# Patient Record
Sex: MALE | Race: WHITE | Employment: OTHER | URBAN - METROPOLITAN AREA
[De-identification: names, ages, dates, MRNs, and addresses within clinical notes are randomized per-mention and may not be internally consistent; named-entity substitution may affect disease eponyms.]

---

## 2019-04-11 ENCOUNTER — HOSPITAL ENCOUNTER (OUTPATIENT)
Dept: CT IMAGING | Age: 74
Discharge: HOME | End: 2019-04-11
Attending: FAMILY MEDICINE
Payer: MEDICARE

## 2019-04-11 DIAGNOSIS — S09.90XA CLOSED HEAD INJURY, INITIAL ENCOUNTER: ICD-10-CM

## 2019-04-11 DIAGNOSIS — R42 DIZZINESS: ICD-10-CM

## 2019-04-11 PROCEDURE — 70450 CT HEAD/BRAIN W/O DYE: CPT

## 2019-08-01 ENCOUNTER — HOSPITAL ENCOUNTER (INPATIENT)
Age: 74
LOS: 3 days | Discharge: HOME | DRG: 641 | End: 2019-08-04
Attending: EMERGENCY MEDICINE | Admitting: FAMILY MEDICINE
Payer: MEDICARE

## 2019-08-01 DIAGNOSIS — E87.1 HYPONATREMIA: Primary | ICD-10-CM

## 2019-08-01 PROBLEM — N17.9 AKI (ACUTE KIDNEY INJURY) (HCC): Status: ACTIVE | Noted: 2019-08-01

## 2019-08-01 LAB
ALBUMIN SERPL-MCNC: 3.4 G/DL (ref 3.5–5)
ALBUMIN SERPL-MCNC: 3.7 G/DL (ref 3.5–5)
ALBUMIN/GLOB SERPL: 1 {RATIO} (ref 1.1–2.2)
ALP SERPL-CCNC: 78 U/L (ref 45–117)
ALT SERPL-CCNC: 38 U/L (ref 12–78)
ANION GAP SERPL CALC-SCNC: 8 MMOL/L (ref 5–15)
ANION GAP SERPL CALC-SCNC: 8 MMOL/L (ref 5–15)
APPEARANCE UR: CLEAR
AST SERPL-CCNC: 31 U/L (ref 15–37)
ATRIAL RATE: 68 BPM
BACTERIA URNS QL MICRO: NEGATIVE /HPF
BASOPHILS # BLD: 0 K/UL (ref 0–0.1)
BASOPHILS NFR BLD: 0 % (ref 0–1)
BILIRUB SERPL-MCNC: 1 MG/DL (ref 0.2–1)
BILIRUB UR QL: NEGATIVE
BUN SERPL-MCNC: 13 MG/DL (ref 6–20)
BUN SERPL-MCNC: 13 MG/DL (ref 6–20)
BUN/CREAT SERPL: 11 (ref 12–20)
BUN/CREAT SERPL: 13 (ref 12–20)
CALCIUM SERPL-MCNC: 8.2 MG/DL (ref 8.5–10.1)
CALCIUM SERPL-MCNC: 8.5 MG/DL (ref 8.5–10.1)
CALCULATED P AXIS, ECG09: 27 DEGREES
CALCULATED R AXIS, ECG10: -22 DEGREES
CALCULATED T AXIS, ECG11: 32 DEGREES
CHLORIDE SERPL-SCNC: 82 MMOL/L (ref 97–108)
CHLORIDE SERPL-SCNC: 86 MMOL/L (ref 97–108)
CO2 SERPL-SCNC: 26 MMOL/L (ref 21–32)
CO2 SERPL-SCNC: 27 MMOL/L (ref 21–32)
COLOR UR: NORMAL
COMMENT, HOLDF: NORMAL
CREAT SERPL-MCNC: 0.99 MG/DL (ref 0.7–1.3)
CREAT SERPL-MCNC: 1.23 MG/DL (ref 0.7–1.3)
DIAGNOSIS, 93000: NORMAL
DIFFERENTIAL METHOD BLD: ABNORMAL
EOSINOPHIL # BLD: 0.1 K/UL (ref 0–0.4)
EOSINOPHIL NFR BLD: 1 % (ref 0–7)
EPITH CASTS URNS QL MICRO: NORMAL /LPF
ERYTHROCYTE [DISTWIDTH] IN BLOOD BY AUTOMATED COUNT: 11.9 % (ref 11.5–14.5)
GLOBULIN SER CALC-MCNC: 3.6 G/DL (ref 2–4)
GLUCOSE SERPL-MCNC: 114 MG/DL (ref 65–100)
GLUCOSE SERPL-MCNC: 134 MG/DL (ref 65–100)
GLUCOSE UR STRIP.AUTO-MCNC: NEGATIVE MG/DL
HCT VFR BLD AUTO: 35.1 % (ref 36.6–50.3)
HGB BLD-MCNC: 12.5 G/DL (ref 12.1–17)
HGB UR QL STRIP: NEGATIVE
IMM GRANULOCYTES # BLD AUTO: 0.1 K/UL (ref 0–0.04)
IMM GRANULOCYTES NFR BLD AUTO: 1 % (ref 0–0.5)
KETONES UR QL STRIP.AUTO: NEGATIVE MG/DL
LEUKOCYTE ESTERASE UR QL STRIP.AUTO: NEGATIVE
LYMPHOCYTES # BLD: 1.2 K/UL (ref 0.8–3.5)
LYMPHOCYTES NFR BLD: 13 % (ref 12–49)
MAGNESIUM SERPL-MCNC: 1.9 MG/DL (ref 1.6–2.4)
MCH RBC QN AUTO: 31.2 PG (ref 26–34)
MCHC RBC AUTO-ENTMCNC: 35.6 G/DL (ref 30–36.5)
MCV RBC AUTO: 87.5 FL (ref 80–99)
MONOCYTES # BLD: 0.8 K/UL (ref 0–1)
MONOCYTES NFR BLD: 9 % (ref 5–13)
NEUTS SEG # BLD: 7.3 K/UL (ref 1.8–8)
NEUTS SEG NFR BLD: 76 % (ref 32–75)
NITRITE UR QL STRIP.AUTO: NEGATIVE
NRBC # BLD: 0 K/UL (ref 0–0.01)
NRBC BLD-RTO: 0 PER 100 WBC
OSMOLALITY UR: 230 MOSM/KG H2O
P-R INTERVAL, ECG05: 188 MS
PH UR STRIP: 7 [PH] (ref 5–8)
PHOSPHATE SERPL-MCNC: 2.6 MG/DL (ref 2.6–4.7)
PHOSPHATE SERPL-MCNC: 3.1 MG/DL (ref 2.6–4.7)
PLATELET # BLD AUTO: 194 K/UL (ref 150–400)
PMV BLD AUTO: 8.5 FL (ref 8.9–12.9)
POTASSIUM SERPL-SCNC: 3.3 MMOL/L (ref 3.5–5.1)
POTASSIUM SERPL-SCNC: 3.5 MMOL/L (ref 3.5–5.1)
PROT SERPL-MCNC: 7.3 G/DL (ref 6.4–8.2)
PROT UR STRIP-MCNC: NEGATIVE MG/DL
Q-T INTERVAL, ECG07: 438 MS
QRS DURATION, ECG06: 100 MS
QTC CALCULATION (BEZET), ECG08: 465 MS
RBC # BLD AUTO: 4.01 M/UL (ref 4.1–5.7)
RBC #/AREA URNS HPF: NORMAL /HPF (ref 0–5)
SAMPLES BEING HELD,HOLD: NORMAL
SODIUM SERPL-SCNC: 116 MMOL/L (ref 136–145)
SODIUM SERPL-SCNC: 121 MMOL/L (ref 136–145)
SP GR UR REFRACTOMETRY: 1.01 (ref 1–1.03)
UROBILINOGEN UR QL STRIP.AUTO: 0.2 EU/DL (ref 0.2–1)
VENTRICULAR RATE, ECG03: 68 BPM
WBC # BLD AUTO: 9.5 K/UL (ref 4.1–11.1)
WBC URNS QL MICRO: NORMAL /HPF (ref 0–4)

## 2019-08-01 PROCEDURE — 74011250636 HC RX REV CODE- 250/636: Performed by: EMERGENCY MEDICINE

## 2019-08-01 PROCEDURE — 93005 ELECTROCARDIOGRAM TRACING: CPT

## 2019-08-01 PROCEDURE — 99284 EMERGENCY DEPT VISIT MOD MDM: CPT

## 2019-08-01 PROCEDURE — 85025 COMPLETE CBC W/AUTO DIFF WBC: CPT

## 2019-08-01 PROCEDURE — 83735 ASSAY OF MAGNESIUM: CPT

## 2019-08-01 PROCEDURE — 74011250636 HC RX REV CODE- 250/636: Performed by: FAMILY MEDICINE

## 2019-08-01 PROCEDURE — 96360 HYDRATION IV INFUSION INIT: CPT

## 2019-08-01 PROCEDURE — 74011250637 HC RX REV CODE- 250/637: Performed by: INTERNAL MEDICINE

## 2019-08-01 PROCEDURE — 65270000032 HC RM SEMIPRIVATE

## 2019-08-01 PROCEDURE — 84100 ASSAY OF PHOSPHORUS: CPT

## 2019-08-01 PROCEDURE — 80053 COMPREHEN METABOLIC PANEL: CPT

## 2019-08-01 PROCEDURE — 80069 RENAL FUNCTION PANEL: CPT

## 2019-08-01 PROCEDURE — 36415 COLL VENOUS BLD VENIPUNCTURE: CPT

## 2019-08-01 PROCEDURE — 81001 URINALYSIS AUTO W/SCOPE: CPT

## 2019-08-01 PROCEDURE — 83935 ASSAY OF URINE OSMOLALITY: CPT

## 2019-08-01 RX ORDER — ENOXAPARIN SODIUM 100 MG/ML
40 INJECTION SUBCUTANEOUS EVERY 24 HOURS
Status: DISCONTINUED | OUTPATIENT
Start: 2019-08-01 | End: 2019-08-04 | Stop reason: HOSPADM

## 2019-08-01 RX ORDER — LOSARTAN POTASSIUM 50 MG/1
100 TABLET ORAL DAILY
Status: DISCONTINUED | OUTPATIENT
Start: 2019-08-02 | End: 2019-08-04 | Stop reason: HOSPADM

## 2019-08-01 RX ORDER — SODIUM CHLORIDE 9 MG/ML
1000 INJECTION, SOLUTION INTRAVENOUS CONTINUOUS
Status: DISCONTINUED | OUTPATIENT
Start: 2019-08-01 | End: 2019-08-01

## 2019-08-01 RX ORDER — POTASSIUM CHLORIDE 750 MG/1
40 TABLET, FILM COATED, EXTENDED RELEASE ORAL
Status: COMPLETED | OUTPATIENT
Start: 2019-08-01 | End: 2019-08-01

## 2019-08-01 RX ORDER — SODIUM CHLORIDE 0.9 % (FLUSH) 0.9 %
5-40 SYRINGE (ML) INJECTION EVERY 8 HOURS
Status: DISCONTINUED | OUTPATIENT
Start: 2019-08-01 | End: 2019-08-04 | Stop reason: HOSPADM

## 2019-08-01 RX ORDER — SODIUM CHLORIDE 0.9 % (FLUSH) 0.9 %
5-40 SYRINGE (ML) INJECTION AS NEEDED
Status: DISCONTINUED | OUTPATIENT
Start: 2019-08-01 | End: 2019-08-04 | Stop reason: HOSPADM

## 2019-08-01 RX ADMIN — POTASSIUM CHLORIDE 40 MEQ: 750 TABLET, EXTENDED RELEASE ORAL at 19:14

## 2019-08-01 RX ADMIN — ENOXAPARIN SODIUM 40 MG: 40 INJECTION SUBCUTANEOUS at 19:15

## 2019-08-01 RX ADMIN — SODIUM CHLORIDE 1000 ML: 900 INJECTION, SOLUTION INTRAVENOUS at 12:09

## 2019-08-01 RX ADMIN — Medication 10 ML: at 22:00

## 2019-08-01 NOTE — ED TRIAGE NOTES
Pt presents after his PCP found his sodium to be low. Pt reports recently doubling his diuretic per his MD. Pt denies SOB, CP. Pt does report episodes of dizziness and lethargy that also makes him nauseas.  Pt states \"I have been peeing a lot\"
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
## 2019-08-01 NOTE — ROUTINE PROCESS
TRANSFER - OUT REPORT: 
 
Verbal report given to stephen(name) on Maty Pelaez  being transferred to (unit) for routine progression of care Report consisted of patients Situation, Background, Assessment and  
Recommendations(SBAR). Information from the following report(s) SBAR was reviewed with the receiving nurse. Lines:  
Peripheral IV 08/01/19 Left Antecubital (Active) Site Assessment Clean, dry, & intact 8/1/2019 11:21 AM  
Phlebitis Assessment 0 8/1/2019 11:21 AM  
Infiltration Assessment 0 8/1/2019 11:21 AM  
Dressing Status Clean, dry, & intact 8/1/2019 11:21 AM  
Dressing Type Transparent 8/1/2019 11:21 AM  
Hub Color/Line Status Patent; Flushed;Capped;Pink 8/1/2019 11:21 AM  
Action Taken Blood drawn 8/1/2019 11:21 AM  
  
 
Opportunity for questions and clarification was provided. Patient transported with: 
 [employer]

## 2019-08-02 LAB
ALBUMIN SERPL-MCNC: 3.5 G/DL (ref 3.5–5)
ALBUMIN SERPL-MCNC: 3.7 G/DL (ref 3.5–5)
ANION GAP SERPL CALC-SCNC: 8 MMOL/L (ref 5–15)
ANION GAP SERPL CALC-SCNC: 8 MMOL/L (ref 5–15)
BUN SERPL-MCNC: 14 MG/DL (ref 6–20)
BUN SERPL-MCNC: 19 MG/DL (ref 6–20)
BUN/CREAT SERPL: 13 (ref 12–20)
BUN/CREAT SERPL: 17 (ref 12–20)
CALCIUM SERPL-MCNC: 8.7 MG/DL (ref 8.5–10.1)
CALCIUM SERPL-MCNC: 8.8 MG/DL (ref 8.5–10.1)
CHLORIDE SERPL-SCNC: 89 MMOL/L (ref 97–108)
CHLORIDE SERPL-SCNC: 90 MMOL/L (ref 97–108)
CO2 SERPL-SCNC: 25 MMOL/L (ref 21–32)
CO2 SERPL-SCNC: 26 MMOL/L (ref 21–32)
CREAT SERPL-MCNC: 1.07 MG/DL (ref 0.7–1.3)
CREAT SERPL-MCNC: 1.11 MG/DL (ref 0.7–1.3)
GLUCOSE SERPL-MCNC: 101 MG/DL (ref 65–100)
GLUCOSE SERPL-MCNC: 118 MG/DL (ref 65–100)
PHOSPHATE SERPL-MCNC: 3 MG/DL (ref 2.6–4.7)
PHOSPHATE SERPL-MCNC: 3.2 MG/DL (ref 2.6–4.7)
POTASSIUM SERPL-SCNC: 3.9 MMOL/L (ref 3.5–5.1)
POTASSIUM SERPL-SCNC: 4 MMOL/L (ref 3.5–5.1)
SODIUM SERPL-SCNC: 122 MMOL/L (ref 136–145)
SODIUM SERPL-SCNC: 124 MMOL/L (ref 136–145)

## 2019-08-02 PROCEDURE — 74011250636 HC RX REV CODE- 250/636: Performed by: FAMILY MEDICINE

## 2019-08-02 PROCEDURE — 80069 RENAL FUNCTION PANEL: CPT

## 2019-08-02 PROCEDURE — 36415 COLL VENOUS BLD VENIPUNCTURE: CPT

## 2019-08-02 PROCEDURE — 74011250637 HC RX REV CODE- 250/637: Performed by: FAMILY MEDICINE

## 2019-08-02 PROCEDURE — 65270000032 HC RM SEMIPRIVATE

## 2019-08-02 RX ADMIN — Medication 10 ML: at 16:17

## 2019-08-02 RX ADMIN — Medication 10 ML: at 22:28

## 2019-08-02 RX ADMIN — ENOXAPARIN SODIUM 40 MG: 40 INJECTION SUBCUTANEOUS at 16:21

## 2019-08-02 RX ADMIN — Medication 10 ML: at 06:00

## 2019-08-02 RX ADMIN — LOSARTAN POTASSIUM 100 MG: 50 TABLET ORAL at 09:29

## 2019-08-02 NOTE — CDMP QUERY
Patient admitted with hyponatremia. Noted documentation of HATTIE in PN on 8/2/19. Please provide clinical indicators/treatment to support this diagnosis. The medical record reflects the following:   
   Risk Factors: losartan/hctz doubled a week ago for HTN control Clinical Indicators: Cr 0.87, 0.99, 1.23, 1.07; GFR 85, >60, 58, >60; excessive water intake; 
   Treatment: stop HCTZ, fluid restriction, Neph consult Thank you, Torey Schofield, RN, BSN, CCM Clinical  
[facility] 
[phone]

## 2019-08-02 NOTE — PROGRESS NOTES
Transition of Care Plan  1. Home with wife  2. Medical follow up      Reason for Admission:   Hyponatremia    (abnormal labs)      Medical hx includes. . Hypertension,   PCP [facility] [place]- he is following patient in the hospital                   RRAT Score:  5                   Plan for utilizing home health:    Not indicated at this time   Patient uses no DME                    Current Advanced Directive/Advance Care Plan: Not on file in chart                         Transition of Care Plan:     Home with wife, Makeda Counter 2008-1960 2 teenage daughters  and medical follow up       CM met with patient in his room to introduce self and explain role. Patient was alert and oriented and confirmed demographics, insurance -[address] He denies any concerns in securing medications. He has been  3 times-- he had no children with his first two children-- has two teenage daughters with present wife, Summer. He was was 64years old when had his first daughter. Patient is self care and active in the community. 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 [place] [employer] Went in [employer] and now is a . 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 Patient is self care and independent. Lives in a two level home in [facility]. Patient plans to return home when discharged. Wife will transport-- Cm to follow and assist with any needs that arise. Care Management Interventions  PCP Verified by CM: Yes  Mode of Transport at Discharge: (car with wife)  Transition of Care Consult (CM Consult): Discharge Planning  Discharge Durable Medical Equipment: No  Physical Therapy Consult: No  Occupational Therapy Consult: No  Speech Therapy Consult: No  Current Support Network: Lives with Spouse(lives with wife and two teenage daughters in own home.   self care and inependent prior to admission  No AMD in chart)  Confirm Follow Up Transport: (self)  Plan discussed with Pt/Family/Caregiver: Yes  Discharge Location  Discharge Placement: Home

## 2019-08-02 NOTE — PROGRESS NOTES
Na 121 per last blood draw. Per order from Dr. Richa Casas, RN paged on-call Nephrologist to notify. Spoke with Dr. Monty Zarate, who instructed to maintatin 1500mL fluid restriction and recheck sodium level at 4a.m. Will communicate to night shift as well.

## 2019-08-02 NOTE — PROGRESS NOTES
Bedside shift change report given to Gwen Del Valle RN (oncoming nurse) by Alexis Corley RN (offgoing nurse). Report included the following information SBAR and Kardex.

## 2019-08-02 NOTE — PROGRESS NOTES
Nancy Foy, Caryn Tobar, and Mati Cool Date: 8/1/2019      Subjective:     Patient feeling better today. Na up to 122. Renal functions improved. .       Current Facility-Administered Medications   Medication Dose Route Frequency    losartan (COZAAR) tablet 100 mg  100 mg Oral DAILY    sodium chloride (NS) flush 5-40 mL  5-40 mL IntraVENous Q8H    sodium chloride (NS) flush 5-40 mL  5-40 mL IntraVENous PRN    enoxaparin (LOVENOX) injection 40 mg  40 mg SubCUTAneous Q24H          Objective:     Patient Vitals for the past 8 hrs:   BP Temp Pulse Resp SpO2   08/01/19 2314 133/72 98.2 °F (36.8 °C) 66 18 98 %     No intake/output data recorded. 07/31 1901 - 08/02 0700  In: 240 [P.O.:240]  Out: -     Physical Exam: Lungs: clear to auscultation bilaterally  Heart: regular rate and rhythm, S1, S2 normal, no murmur, click, rub or gallop  Abdomen: soft, non-tender. Bowel sounds normal. No masses,  no organomegaly        Data Review   Recent Results (from the past 24 hour(s))   METABOLIC PANEL, COMPREHENSIVE    Collection Time: 08/01/19 11:18 AM   Result Value Ref Range    Sodium 116 (LL) 136 - 145 mmol/L    Potassium 3.5 3.5 - 5.1 mmol/L    Chloride 82 (L) 97 - 108 mmol/L    CO2 26 21 - 32 mmol/L    Anion gap 8 5 - 15 mmol/L    Glucose 114 (H) 65 - 100 mg/dL    BUN 13 6 - 20 MG/DL    Creatinine 0.99 0.70 - 1.30 MG/DL    BUN/Creatinine ratio 13 12 - 20      GFR est AA >60 >60 ml/min/1.73m2    GFR est non-AA >60 >60 ml/min/1.73m2    Calcium 8.5 8.5 - 10.1 MG/DL    Bilirubin, total 1.0 0.2 - 1.0 MG/DL    ALT (SGPT) 38 12 - 78 U/L    AST (SGOT) 31 15 - 37 U/L    Alk.  phosphatase 78 45 - 117 U/L    Protein, total 7.3 6.4 - 8.2 g/dL    Albumin 3.7 3.5 - 5.0 g/dL    Globulin 3.6 2.0 - 4.0 g/dL    A-G Ratio 1.0 (L) 1.1 - 2.2     CBC WITH AUTOMATED DIFF    Collection Time: 08/01/19 11:18 AM   Result Value Ref Range    WBC 9.5 4.1 - 11.1 K/uL    RBC 4.01 (L) 4.10 - 5.70 M/uL    HGB 12.5 12.1 - 17.0 g/dL    HCT 35.1 (L) 36.6 - 50.3 %    MCV 87.5 80.0 - 99.0 FL    MCH 31.2 26.0 - 34.0 PG    MCHC 35.6 30.0 - 36.5 g/dL    RDW 11.9 11.5 - 14.5 %    PLATELET 473 522 - 301 K/uL    MPV 8.5 (L) 8.9 - 12.9 FL    NRBC 0.0 0  WBC    ABSOLUTE NRBC 0.00 0.00 - 0.01 K/uL    NEUTROPHILS 76 (H) 32 - 75 %    LYMPHOCYTES 13 12 - 49 %    MONOCYTES 9 5 - 13 %    EOSINOPHILS 1 0 - 7 %    BASOPHILS 0 0 - 1 %    IMMATURE GRANULOCYTES 1 (H) 0.0 - 0.5 %    ABS. NEUTROPHILS 7.3 1.8 - 8.0 K/UL    ABS. LYMPHOCYTES 1.2 0.8 - 3.5 K/UL    ABS. MONOCYTES 0.8 0.0 - 1.0 K/UL    ABS. EOSINOPHILS 0.1 0.0 - 0.4 K/UL    ABS. BASOPHILS 0.0 0.0 - 0.1 K/UL    ABS. IMM. GRANS. 0.1 (H) 0.00 - 0.04 K/UL    DF AUTOMATED     SAMPLES BEING HELD    Collection Time: 08/01/19 11:18 AM   Result Value Ref Range    SAMPLES BEING HELD 1BLU 1RED     COMMENT        Add-on orders for these samples will be processed based on acceptable specimen integrity and analyte stability, which may vary by analyte.    MAGNESIUM    Collection Time: 08/01/19 11:18 AM   Result Value Ref Range    Magnesium 1.9 1.6 - 2.4 mg/dL   PHOSPHORUS    Collection Time: 08/01/19 11:18 AM   Result Value Ref Range    Phosphorus 3.1 2.6 - 4.7 MG/DL   EKG, 12 LEAD, INITIAL    Collection Time: 08/01/19 12:12 PM   Result Value Ref Range    Ventricular Rate 68 BPM    Atrial Rate 68 BPM    P-R Interval 188 ms    QRS Duration 100 ms    Q-T Interval 438 ms    QTC Calculation (Bezet) 465 ms    Calculated P Axis 27 degrees    Calculated R Axis -22 degrees    Calculated T Axis 32 degrees    Diagnosis       Normal sinus rhythm  Inferior-posterior infarct , age undetermined  No previous ECGs available  Confirmed by Philippe Lazcano MD, Jovan Membreno ([id number]) on 8/1/2019 2:16:24 PM     URINALYSIS W/MICROSCOPIC    Collection Time: 08/01/19  1:15 PM   Result Value Ref Range    Color YELLOW/STRAW      Appearance CLEAR CLEAR      Specific gravity 1.008 1.003 - 1.030      pH (UA) 7.0 5.0 - 8.0      Protein NEGATIVE  NEG mg/dL    Glucose NEGATIVE  NEG mg/dL    Ketone NEGATIVE  NEG mg/dL    Bilirubin NEGATIVE  NEG      Blood NEGATIVE  NEG      Urobilinogen 0.2 0.2 - 1.0 EU/dL    Nitrites NEGATIVE  NEG      Leukocyte Esterase NEGATIVE  NEG      WBC 0-4 0 - 4 /hpf    RBC 0-5 0 - 5 /hpf    Epithelial cells FEW FEW /lpf    Bacteria NEGATIVE  NEG /hpf   OSMOLALITY, UR    Collection Time: 08/01/19  1:15 PM   Result Value Ref Range    Osmolality,urine 230 MOSM/kg H2O   RENAL FUNCTION PANEL    Collection Time: 08/01/19  7:00 PM   Result Value Ref Range    Sodium 121 (L) 136 - 145 mmol/L    Potassium 3.3 (L) 3.5 - 5.1 mmol/L    Chloride 86 (L) 97 - 108 mmol/L    CO2 27 21 - 32 mmol/L    Anion gap 8 5 - 15 mmol/L    Glucose 134 (H) 65 - 100 mg/dL    BUN 13 6 - 20 MG/DL    Creatinine 1.23 0.70 - 1.30 MG/DL    BUN/Creatinine ratio 11 (L) 12 - 20      GFR est AA >60 >60 ml/min/1.73m2    GFR est non-AA 58 (L) >60 ml/min/1.73m2    Calcium 8.2 (L) 8.5 - 10.1 MG/DL    Phosphorus 2.6 2.6 - 4.7 MG/DL    Albumin 3.4 (L) 3.5 - 5.0 g/dL   RENAL FUNCTION PANEL    Collection Time: 08/02/19  4:10 AM   Result Value Ref Range    Sodium 122 (L) 136 - 145 mmol/L    Potassium 3.9 3.5 - 5.1 mmol/L    Chloride 89 (L) 97 - 108 mmol/L    CO2 25 21 - 32 mmol/L    Anion gap 8 5 - 15 mmol/L    Glucose 101 (H) 65 - 100 mg/dL    BUN 14 6 - 20 MG/DL    Creatinine 1.07 0.70 - 1.30 MG/DL    BUN/Creatinine ratio 13 12 - 20      GFR est AA >60 >60 ml/min/1.73m2    GFR est non-AA >60 >60 ml/min/1.73m2    Calcium 8.7 8.5 - 10.1 MG/DL    Phosphorus 3.2 2.6 - 4.7 MG/DL    Albumin 3.7 3.5 - 5.0 g/dL           Assessment:     Active Problems:    Hyponatremia (8/1/2019)      HATTIE (acute kidney injury) ([facility] [address] (8/1/2019)        Plan:     1) Cont fluid restriction  2) Correcting lytes  Appreciate nephrology hellp
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
## 2019-08-02 NOTE — PROGRESS NOTES
Nephrology Progress Note  Tarik Davies  Date of Admission : 8/1/2019    CC:  Follow up for hyponatremia       Assessment and Plan     Hyponatremia:  - likely 2/2 thiazide + excessive water intake  - Na improving nicely  - cont FR 1.5L/d  - repeat Na now and again in 6 hours  - goal Na 125    Hypokalemia:  - replete PRN     HTN:  - cont losartan  - should avoid thiazides from now on       Interval History:  Seen and examined. Na 122 this AM.  Feeling better. No cp, sob, n/v/d reported. Current Medications: all current  Medications have been eviewed in EPIC  Review of Systems: Pertinent items are noted in HPI. Objective:  Vitals:    Vitals:    08/01/19 1708 08/01/19 1959 08/01/19 2314 08/02/19 0859   BP: 132/71 137/73 133/72 129/66   Pulse: 90 72 66 79   Resp: 20 20 18 18   Temp: 98.3 °F (36.8 °C) 98.6 °F (37 °C) 98.2 °F (36.8 °C) 98.6 °F (37 °C)   SpO2: 95% 96% 98% 98%     Intake and Output:  No intake/output data recorded. 07/31 1901 - 08/02 0700  In: 240 [P.O.:240]  Out: 600 [Urine:600]    Physical Examination:  General: NAD,Conversant   Neck:  Supple, no mass  Resp:  Lungs CTA B/L, no wheezing , normal respiratory effort  CV:  RRR,  no murmur or rub, no LE edema  GI:  Soft, NT, + Bowel sounds, no hepatosplenomegaly  Neurologic:  Non focal  Psych:             AAO x 3 appropriate affect   Skin:  No Rash  :  No maldonado    []    High complexity decision making was performed  []    Patient is at high-risk of decompensation with multiple organ involvement    Lab Data Personally Reviewed: I have reviewed all the pertinent labs, microbiology data and radiology studies during assessment.     Recent Labs     08/02/19  0410 08/01/19  1900 08/01/19  1118   * 121* 116*   K 3.9 3.3* 3.5   CL 89* 86* 82*   CO2 25 27 26   * 134* 114*   BUN 14 13 13   CREA 1.07 1.23 0.99   CA 8.7 8.2* 8.5   MG  --   --  1.9   PHOS 3.2 2.6 3.1   ALB 3.7 3.4* 3.7   SGOT  --   --  31   ALT  --   --  38     Recent Labs 08/01/19  1118   WBC 9.5   HGB 12.5   HCT 35.1*        No results found for: SDES  No results found for: CULT  Recent Results (from the past 24 hour(s))   METABOLIC PANEL, COMPREHENSIVE    Collection Time: 08/01/19 11:18 AM   Result Value Ref Range    Sodium 116 (LL) 136 - 145 mmol/L    Potassium 3.5 3.5 - 5.1 mmol/L    Chloride 82 (L) 97 - 108 mmol/L    CO2 26 21 - 32 mmol/L    Anion gap 8 5 - 15 mmol/L    Glucose 114 (H) 65 - 100 mg/dL    BUN 13 6 - 20 MG/DL    Creatinine 0.99 0.70 - 1.30 MG/DL    BUN/Creatinine ratio 13 12 - 20      GFR est AA >60 >60 ml/min/1.73m2    GFR est non-AA >60 >60 ml/min/1.73m2    Calcium 8.5 8.5 - 10.1 MG/DL    Bilirubin, total 1.0 0.2 - 1.0 MG/DL    ALT (SGPT) 38 12 - 78 U/L    AST (SGOT) 31 15 - 37 U/L    Alk. phosphatase 78 45 - 117 U/L    Protein, total 7.3 6.4 - 8.2 g/dL    Albumin 3.7 3.5 - 5.0 g/dL    Globulin 3.6 2.0 - 4.0 g/dL    A-G Ratio 1.0 (L) 1.1 - 2.2     CBC WITH AUTOMATED DIFF    Collection Time: 08/01/19 11:18 AM   Result Value Ref Range    WBC 9.5 4.1 - 11.1 K/uL    RBC 4.01 (L) 4.10 - 5.70 M/uL    HGB 12.5 12.1 - 17.0 g/dL    HCT 35.1 (L) 36.6 - 50.3 %    MCV 87.5 80.0 - 99.0 FL    MCH 31.2 26.0 - 34.0 PG    MCHC 35.6 30.0 - 36.5 g/dL    RDW 11.9 11.5 - 14.5 %    PLATELET 275 242 - 549 K/uL    MPV 8.5 (L) 8.9 - 12.9 FL    NRBC 0.0 0  WBC    ABSOLUTE NRBC 0.00 0.00 - 0.01 K/uL    NEUTROPHILS 76 (H) 32 - 75 %    LYMPHOCYTES 13 12 - 49 %    MONOCYTES 9 5 - 13 %    EOSINOPHILS 1 0 - 7 %    BASOPHILS 0 0 - 1 %    IMMATURE GRANULOCYTES 1 (H) 0.0 - 0.5 %    ABS. NEUTROPHILS 7.3 1.8 - 8.0 K/UL    ABS. LYMPHOCYTES 1.2 0.8 - 3.5 K/UL    ABS. MONOCYTES 0.8 0.0 - 1.0 K/UL    ABS. EOSINOPHILS 0.1 0.0 - 0.4 K/UL    ABS. BASOPHILS 0.0 0.0 - 0.1 K/UL    ABS. IMM.  GRANS. 0.1 (H) 0.00 - 0.04 K/UL    DF AUTOMATED     SAMPLES BEING HELD    Collection Time: 08/01/19 11:18 AM   Result Value Ref Range    SAMPLES BEING HELD 1BLU 1RED     COMMENT        Add-on orders for these samples will be processed based on acceptable specimen integrity and analyte stability, which may vary by analyte.    MAGNESIUM    Collection Time: 08/01/19 11:18 AM   Result Value Ref Range    Magnesium 1.9 1.6 - 2.4 mg/dL   PHOSPHORUS    Collection Time: 08/01/19 11:18 AM   Result Value Ref Range    Phosphorus 3.1 2.6 - 4.7 MG/DL   EKG, 12 LEAD, INITIAL    Collection Time: 08/01/19 12:12 PM   Result Value Ref Range    Ventricular Rate 68 BPM    Atrial Rate 68 BPM    P-R Interval 188 ms    QRS Duration 100 ms    Q-T Interval 438 ms    QTC Calculation (Bezet) 465 ms    Calculated P Axis 27 degrees    Calculated R Axis -22 degrees    Calculated T Axis 32 degrees    Diagnosis       Normal sinus rhythm  Inferior-posterior infarct , age undetermined  No previous ECGs available  Confirmed by Reid Centeno MD, Faiza Finley ([id number]) on 8/1/2019 2:16:24 PM     URINALYSIS W/MICROSCOPIC    Collection Time: 08/01/19  1:15 PM   Result Value Ref Range    Color YELLOW/STRAW      Appearance CLEAR CLEAR      Specific gravity 1.008 1.003 - 1.030      pH (UA) 7.0 5.0 - 8.0      Protein NEGATIVE  NEG mg/dL    Glucose NEGATIVE  NEG mg/dL    Ketone NEGATIVE  NEG mg/dL    Bilirubin NEGATIVE  NEG      Blood NEGATIVE  NEG      Urobilinogen 0.2 0.2 - 1.0 EU/dL    Nitrites NEGATIVE  NEG      Leukocyte Esterase NEGATIVE  NEG      WBC 0-4 0 - 4 /hpf    RBC 0-5 0 - 5 /hpf    Epithelial cells FEW FEW /lpf    Bacteria NEGATIVE  NEG /hpf   OSMOLALITY, UR    Collection Time: 08/01/19  1:15 PM   Result Value Ref Range    Osmolality,urine 230 MOSM/kg H2O   RENAL FUNCTION PANEL    Collection Time: 08/01/19  7:00 PM   Result Value Ref Range    Sodium 121 (L) 136 - 145 mmol/L    Potassium 3.3 (L) 3.5 - 5.1 mmol/L    Chloride 86 (L) 97 - 108 mmol/L    CO2 27 21 - 32 mmol/L    Anion gap 8 5 - 15 mmol/L    Glucose 134 (H) 65 - 100 mg/dL    BUN 13 6 - 20 MG/DL    Creatinine 1.23 0.70 - 1.30 MG/DL    BUN/Creatinine ratio 11 (L) 12 - 20      GFR est AA >60 >60 ml/min/1.73m2 GFR est non-AA 58 (L) >60 ml/min/1.73m2    Calcium 8.2 (L) 8.5 - 10.1 MG/DL    Phosphorus 2.6 2.6 - 4.7 MG/DL    Albumin 3.4 (L) 3.5 - 5.0 g/dL   RENAL FUNCTION PANEL    Collection Time: 08/02/19  4:10 AM   Result Value Ref Range    Sodium 122 (L) 136 - 145 mmol/L    Potassium 3.9 3.5 - 5.1 mmol/L    Chloride 89 (L) 97 - 108 mmol/L    CO2 25 21 - 32 mmol/L    Anion gap 8 5 - 15 mmol/L    Glucose 101 (H) 65 - 100 mg/dL    BUN 14 6 - 20 MG/DL    Creatinine 1.07 0.70 - 1.30 MG/DL    BUN/Creatinine ratio 13 12 - 20      GFR est AA >60 >60 ml/min/1.73m2    GFR est non-AA >60 >60 ml/min/1.73m2    Calcium 8.7 8.5 - 10.1 MG/DL    Phosphorus 3.2 2.6 - 4.7 MG/DL    Albumin 3.7 3.5 - 5.0 g/dL                 Jhoana Valentino MD  [address]  Phone - [phone]   Fax - [phone]  [email or web address]

## 2019-08-03 LAB
ANION GAP SERPL CALC-SCNC: 9 MMOL/L (ref 5–15)
BUN SERPL-MCNC: 18 MG/DL (ref 6–20)
BUN/CREAT SERPL: 18 (ref 12–20)
CALCIUM SERPL-MCNC: 8.7 MG/DL (ref 8.5–10.1)
CHLORIDE SERPL-SCNC: 93 MMOL/L (ref 97–108)
CO2 SERPL-SCNC: 25 MMOL/L (ref 21–32)
CREAT SERPL-MCNC: 1.01 MG/DL (ref 0.7–1.3)
GLUCOSE SERPL-MCNC: 98 MG/DL (ref 65–100)
POTASSIUM SERPL-SCNC: 3.8 MMOL/L (ref 3.5–5.1)
SODIUM SERPL-SCNC: 127 MMOL/L (ref 136–145)

## 2019-08-03 PROCEDURE — 80048 BASIC METABOLIC PNL TOTAL CA: CPT

## 2019-08-03 PROCEDURE — 65270000032 HC RM SEMIPRIVATE

## 2019-08-03 PROCEDURE — 36415 COLL VENOUS BLD VENIPUNCTURE: CPT

## 2019-08-03 PROCEDURE — 74011250636 HC RX REV CODE- 250/636: Performed by: FAMILY MEDICINE

## 2019-08-03 PROCEDURE — 74011250637 HC RX REV CODE- 250/637: Performed by: FAMILY MEDICINE

## 2019-08-03 RX ADMIN — ENOXAPARIN SODIUM 40 MG: 40 INJECTION SUBCUTANEOUS at 15:43

## 2019-08-03 RX ADMIN — Medication 10 ML: at 06:41

## 2019-08-03 RX ADMIN — LOSARTAN POTASSIUM 100 MG: 50 TABLET ORAL at 09:10

## 2019-08-03 RX ADMIN — Medication 10 ML: at 15:20

## 2019-08-03 NOTE — PROGRESS NOTES
Bedside shift change report given to [facility] and Devan Zambrano (oncoming nurse) by Mika and David Mckeon (offgoing nurse). Report included the following information SBAR, Kardex, Procedure Summary, Intake/Output, MAR and Recent Results.

## 2019-08-03 NOTE — PROGRESS NOTES
Nery Marcus, Caryn Caldwell & Krystal    Admit Date: 8/1/2019    Subjective:     Pt says he is feeling better, but not quite well enough to go home yet. Na+ 127 today. No new complaints. Current Facility-Administered Medications   Medication Dose Route Frequency    losartan (COZAAR) tablet 100 mg  100 mg Oral DAILY    sodium chloride (NS) flush 5-40 mL  5-40 mL IntraVENous Q8H    sodium chloride (NS) flush 5-40 mL  5-40 mL IntraVENous PRN    enoxaparin (LOVENOX) injection 40 mg  40 mg SubCUTAneous Q24H          Objective:     Patient Vitals for the past 8 hrs:   BP Temp Pulse Resp SpO2   08/03/19 0813 138/72 98.7 °F (37.1 °C) 64 16 93 %     08/03 0701 - 08/03 1900  In: 118 [P.O.:118]  Out: -   08/01 1901 - 08/03 0700  In: 720 [P.O.:720]  Out: 2100 [Urine:2100]    Physical Exam: NAD. A&O. Neck -- Supple. No JVD. Heart -- RRR. Lungs -- CTA. Abd -- Benign. Ext -- No LE edema, b/l.       Data Review   Recent Results (from the past 24 hour(s))   RENAL FUNCTION PANEL    Collection Time: 08/02/19  7:30 PM   Result Value Ref Range    Sodium 124 (L) 136 - 145 mmol/L    Potassium 4.0 3.5 - 5.1 mmol/L    Chloride 90 (L) 97 - 108 mmol/L    CO2 26 21 - 32 mmol/L    Anion gap 8 5 - 15 mmol/L    Glucose 118 (H) 65 - 100 mg/dL    BUN 19 6 - 20 MG/DL    Creatinine 1.11 0.70 - 1.30 MG/DL    BUN/Creatinine ratio 17 12 - 20      GFR est AA >60 >60 ml/min/1.73m2    GFR est non-AA >60 >60 ml/min/1.73m2    Calcium 8.8 8.5 - 10.1 MG/DL    Phosphorus 3.0 2.6 - 4.7 MG/DL    Albumin 3.5 3.5 - 5.0 g/dL   METABOLIC PANEL, BASIC    Collection Time: 08/03/19  4:25 AM   Result Value Ref Range    Sodium 127 (L) 136 - 145 mmol/L    Potassium 3.8 3.5 - 5.1 mmol/L    Chloride 93 (L) 97 - 108 mmol/L    CO2 25 21 - 32 mmol/L    Anion gap 9 5 - 15 mmol/L    Glucose 98 65 - 100 mg/dL    BUN 18 6 - 20 MG/DL    Creatinine 1.01 0.70 - 1.30 MG/DL    BUN/Creatinine ratio 18 12 - 20      GFR est AA >60 >60 ml/min/1.73m2    GFR est non-AA >60 >60 ml/min/1.73m2    Calcium 8.7 8.5 - 10.1 MG/DL           Assessment:     Principal Problem:    Hyponatremia -- Due to HCTZ & polydipsia. Active Problems:    Essential hypertension (11/21/2015)        Plan:     1. Cont Losartan without HCTZ and cont PO fluid restriction. 2. If Na+ OK tomorrow, and if pt continues to improve, will aim for discharge home tomorrow.           Katey Gold MD

## 2019-08-04 VITALS
RESPIRATION RATE: 15 BRPM | BODY MASS INDEX: 27.92 KG/M2 | TEMPERATURE: 97.8 F | SYSTOLIC BLOOD PRESSURE: 155 MMHG | OXYGEN SATURATION: 97 % | WEIGHT: 200.2 LBS | DIASTOLIC BLOOD PRESSURE: 79 MMHG | HEART RATE: 75 BPM

## 2019-08-04 LAB
ANION GAP SERPL CALC-SCNC: 7 MMOL/L (ref 5–15)
BUN SERPL-MCNC: 18 MG/DL (ref 6–20)
BUN/CREAT SERPL: 18 (ref 12–20)
CALCIUM SERPL-MCNC: 8.9 MG/DL (ref 8.5–10.1)
CHLORIDE SERPL-SCNC: 98 MMOL/L (ref 97–108)
CO2 SERPL-SCNC: 25 MMOL/L (ref 21–32)
CREAT SERPL-MCNC: 1.01 MG/DL (ref 0.7–1.3)
GLUCOSE SERPL-MCNC: 98 MG/DL (ref 65–100)
POTASSIUM SERPL-SCNC: 4.1 MMOL/L (ref 3.5–5.1)
SODIUM SERPL-SCNC: 130 MMOL/L (ref 136–145)

## 2019-08-04 PROCEDURE — 80048 BASIC METABOLIC PNL TOTAL CA: CPT

## 2019-08-04 PROCEDURE — 36415 COLL VENOUS BLD VENIPUNCTURE: CPT

## 2019-08-04 PROCEDURE — 74011250637 HC RX REV CODE- 250/637: Performed by: FAMILY MEDICINE

## 2019-08-04 RX ORDER — LOSARTAN POTASSIUM 100 MG/1
100 TABLET ORAL DAILY
Qty: 30 TAB | Refills: 2 | Status: SHIPPED | OUTPATIENT
Start: 2019-08-04 | End: 2020-03-18 | Stop reason: SDUPTHER

## 2019-08-04 RX ADMIN — LOSARTAN POTASSIUM 100 MG: 50 TABLET ORAL at 08:25

## 2019-08-04 NOTE — PROGRESS NOTES
Na+ 130  Pt feels well and ready to go home. Discharge home on Losartan 100 mg every day (no HCTZ). He is instructed on 1,500 cc PO fluid restriction. F/u with Dr. Melissa Carreon this week for f/u.

## 2019-08-04 NOTE — DISCHARGE INSTRUCTIONS
Patient Discharge Instructions    Maty Pelaez / [id number] : 1945    Admitted 2019 Discharged: 2019     Take Home Medications       · It is important that you take the medication exactly as they are prescribed. · Keep your medication in the bottles provided by the pharmacist and keep a list of the medication names, dosages, and times to be taken in your wallet. · Do not take other medications without consulting your doctor. What to do at Home    Recommended diet: Regular Diet. Fluid restrictions -- 1,500 ml per 24 hours. Recommended activity: Activity as tolerated. Follow-up with Dr. Michael Morel this week. Information obtained by :  I understand that if any problems occur once I am at home I am to contact my physician. I understand and acknowledge receipt of the instructions indicated above.                                                                                                                                            Physician's or R.N.'s Signature                                                                  Date/Time                                                                                                                                              Patient or Representative Signature                                                          Date/Time

## 2019-08-04 NOTE — PROGRESS NOTES
Bedside and Verbal shift change report given to [address] (oncoming nurse) by Chris Norris (offgoing nurse). Report included the following information SBAR.

## 2019-08-26 NOTE — DISCHARGE SUMMARY
Physician Discharge Summary     Patient ID:  Maty Pelaez  [id number]  75 y.o.  1945    Admit date: 8/1/2019    Discharge date and time: 8/26/2019    Admission Diagnoses: Hyponatremia [E87.1]    Discharge Diagnoses:  Principal Diagnosis Hyponatremia                                            Principal Problem:    Hyponatremia (8/1/2019)    Active Problems:    Essential hypertension (11/21/2015)      HATTIE (acute kidney injury) ([facility] [address] (8/1/2019)           Hospital Course: Admitted with Na- 116 associated with profound malaise and feeling aweful for the last few days. Had been on HCTZ and recently his dose was increased for BP control. No CP, SOB, or altered MS. In hospital he HCTZ was stopped and was put on saline for 1 day. Then fluids were stopped and placed on free fluid restriction. He saw Nephrology. His BP was controlled with Losartan 100mg daily. His sodium improved to 130 and he felt much better. BP was adequately controlled. Discharged from hospital in much improved condition. PCP: Tong Alba    Consults: Nephrology        Discharge Exam:  Visit Vitals  /79 (BP 1 Location: Right arm, BP Patient Position: Sitting)   Pulse 75   Temp 97.8 °F (36.6 °C) Comment: Simultaneous filing. User may not have seen previous data. Resp 15   Wt 200 lb 3.2 oz (90.8 kg)   SpO2 97%   BMI 27.92 kg/m²     Neck: supple, symmetrical, trachea midline, no adenopathy, thyroid: not enlarged, symmetric, no tenderness/mass/nodules, no carotid bruit and no JVD  Lungs: clear to auscultation bilaterally  Heart: regular rate and rhythm, S1, S2 normal, no murmur, click, rub or gallop  Abdomen: soft, non-tender. Bowel sounds normal. No masses,  no organomegaly  Extremities: extremities normal, atraumatic, no cyanosis or edema  Neurologic: Grossly normal    Disposition: home    Patient Instructions:   Cannot display discharge medications since this patient is not currently admitted.     Activity: Activity as tolerated  Diet: Regular Diet and 1200 cc fluid restriction  Wound Care: None needed    Follow-up Appointments   Procedures    FOLLOW UP VISIT Appointment in: 3 - 5 Days With Dr. Case Zazueta. With Dr. Case Zazueta.      Standing Status:   Standing     Number of Occurrences:   1     Order Specific Question:   Appointment in     Answer:   3 - 5 Days          Signed:  Wen Solis MD  8/26/2019  10:05 AM

## 2020-12-11 ENCOUNTER — HOSPITAL ENCOUNTER (INPATIENT)
Age: 75
LOS: 33 days | Discharge: NURSING, REHAB OR LONG-TERM CARE | DRG: 896 | End: 2021-01-13
Attending: EMERGENCY MEDICINE | Admitting: INTERNAL MEDICINE
Payer: MEDICARE

## 2020-12-11 ENCOUNTER — APPOINTMENT (OUTPATIENT)
Dept: CT IMAGING | Age: 75
DRG: 896 | End: 2020-12-11
Attending: EMERGENCY MEDICINE
Payer: MEDICARE

## 2020-12-11 ENCOUNTER — APPOINTMENT (OUTPATIENT)
Dept: MRI IMAGING | Age: 75
DRG: 896 | End: 2020-12-11
Attending: NURSE PRACTITIONER
Payer: MEDICARE

## 2020-12-11 ENCOUNTER — APPOINTMENT (OUTPATIENT)
Dept: CT IMAGING | Age: 75
DRG: 896 | End: 2020-12-11
Attending: NURSE PRACTITIONER
Payer: MEDICARE

## 2020-12-11 DIAGNOSIS — E83.42 HYPOMAGNESEMIA: ICD-10-CM

## 2020-12-11 DIAGNOSIS — N39.0 URINARY TRACT INFECTION WITH HEMATURIA, SITE UNSPECIFIED: ICD-10-CM

## 2020-12-11 DIAGNOSIS — E87.6 HYPOKALEMIA: ICD-10-CM

## 2020-12-11 DIAGNOSIS — N17.9 AKI (ACUTE KIDNEY INJURY) (HCC): ICD-10-CM

## 2020-12-11 DIAGNOSIS — I47.29 NSVT (NONSUSTAINED VENTRICULAR TACHYCARDIA): ICD-10-CM

## 2020-12-11 DIAGNOSIS — R31.9 URINARY TRACT INFECTION WITH HEMATURIA, SITE UNSPECIFIED: ICD-10-CM

## 2020-12-11 DIAGNOSIS — F10.931 ALCOHOL WITHDRAWAL SYNDROME, WITH DELIRIUM (HCC): ICD-10-CM

## 2020-12-11 DIAGNOSIS — I10 ESSENTIAL HYPERTENSION: ICD-10-CM

## 2020-12-11 DIAGNOSIS — R26.81 UNSTEADY GAIT: ICD-10-CM

## 2020-12-11 DIAGNOSIS — R41.3 AMNESIA: Primary | ICD-10-CM

## 2020-12-11 PROBLEM — R41.82 AMS (ALTERED MENTAL STATUS): Status: ACTIVE | Noted: 2020-12-11

## 2020-12-11 PROBLEM — F10.10 EXCESSIVE DRINKING OF ALCOHOL: Status: ACTIVE | Noted: 2020-12-11

## 2020-12-11 LAB
ALBUMIN SERPL-MCNC: 3.3 G/DL (ref 3.5–5)
ALBUMIN/GLOB SERPL: 0.8 {RATIO} (ref 1.1–2.2)
ALP SERPL-CCNC: 84 U/L (ref 45–117)
ALT SERPL-CCNC: 76 U/L (ref 12–78)
AMMONIA PLAS-SCNC: <10 UMOL/L
ANION GAP SERPL CALC-SCNC: 9 MMOL/L (ref 5–15)
APPEARANCE UR: CLEAR
AST SERPL-CCNC: 148 U/L (ref 15–37)
ATRIAL RATE: 99 BPM
BACTERIA URNS QL MICRO: ABNORMAL /HPF
BASOPHILS # BLD: 0.1 K/UL (ref 0–0.1)
BASOPHILS NFR BLD: 2 % (ref 0–1)
BILIRUB SERPL-MCNC: 0.7 MG/DL (ref 0.2–1)
BILIRUB UR QL: NEGATIVE
BUN SERPL-MCNC: 8 MG/DL (ref 6–20)
BUN/CREAT SERPL: 8 (ref 12–20)
CALCIUM SERPL-MCNC: 7.9 MG/DL (ref 8.5–10.1)
CALCULATED P AXIS, ECG09: 39 DEGREES
CALCULATED R AXIS, ECG10: -6 DEGREES
CALCULATED T AXIS, ECG11: 42 DEGREES
CHLORIDE SERPL-SCNC: 105 MMOL/L (ref 97–108)
CO2 SERPL-SCNC: 28 MMOL/L (ref 21–32)
COLOR UR: ABNORMAL
CREAT SERPL-MCNC: 0.96 MG/DL (ref 0.7–1.3)
DIAGNOSIS, 93000: NORMAL
DIFFERENTIAL METHOD BLD: ABNORMAL
EOSINOPHIL # BLD: 0.1 K/UL (ref 0–0.4)
EOSINOPHIL NFR BLD: 2 % (ref 0–7)
EPITH CASTS URNS QL MICRO: ABNORMAL /LPF
ERYTHROCYTE [DISTWIDTH] IN BLOOD BY AUTOMATED COUNT: 13.4 % (ref 11.5–14.5)
ETHANOL SERPL-MCNC: 232 MG/DL
GLOBULIN SER CALC-MCNC: 4.2 G/DL (ref 2–4)
GLUCOSE SERPL-MCNC: 68 MG/DL (ref 65–100)
GLUCOSE UR STRIP.AUTO-MCNC: NEGATIVE MG/DL
HCT VFR BLD AUTO: 34.4 % (ref 36.6–50.3)
HGB BLD-MCNC: 11.5 G/DL (ref 12.1–17)
HGB UR QL STRIP: ABNORMAL
HYALINE CASTS URNS QL MICRO: ABNORMAL /LPF (ref 0–5)
IMM GRANULOCYTES # BLD AUTO: 0.1 K/UL (ref 0–0.04)
IMM GRANULOCYTES NFR BLD AUTO: 1 % (ref 0–0.5)
INR PPP: 0.9 (ref 0.9–1.1)
KETONES UR QL STRIP.AUTO: NEGATIVE MG/DL
LEUKOCYTE ESTERASE UR QL STRIP.AUTO: ABNORMAL
LYMPHOCYTES # BLD: 0.7 K/UL (ref 0.8–3.5)
LYMPHOCYTES NFR BLD: 13 % (ref 12–49)
MAGNESIUM SERPL-MCNC: 1.5 MG/DL (ref 1.6–2.4)
MCH RBC QN AUTO: 33.5 PG (ref 26–34)
MCHC RBC AUTO-ENTMCNC: 33.4 G/DL (ref 30–36.5)
MCV RBC AUTO: 100.3 FL (ref 80–99)
MONOCYTES # BLD: 0.5 K/UL (ref 0–1)
MONOCYTES NFR BLD: 10 % (ref 5–13)
NEUTS SEG # BLD: 3.6 K/UL (ref 1.8–8)
NEUTS SEG NFR BLD: 72 % (ref 32–75)
NITRITE UR QL STRIP.AUTO: NEGATIVE
NRBC # BLD: 0 K/UL (ref 0–0.01)
NRBC BLD-RTO: 0 PER 100 WBC
P-R INTERVAL, ECG05: 152 MS
PH UR STRIP: 5.5 [PH] (ref 5–8)
PHOSPHATE SERPL-MCNC: 3.2 MG/DL (ref 2.6–4.7)
PLATELET # BLD AUTO: 56 K/UL (ref 150–400)
PMV BLD AUTO: 8.8 FL (ref 8.9–12.9)
POTASSIUM SERPL-SCNC: 3.3 MMOL/L (ref 3.5–5.1)
PROT SERPL-MCNC: 7.5 G/DL (ref 6.4–8.2)
PROT UR STRIP-MCNC: 30 MG/DL
PROTHROMBIN TIME: 9.9 SEC (ref 9–11.1)
Q-T INTERVAL, ECG07: 392 MS
QRS DURATION, ECG06: 88 MS
QTC CALCULATION (BEZET), ECG08: 503 MS
RBC # BLD AUTO: 3.43 M/UL (ref 4.1–5.7)
RBC #/AREA URNS HPF: ABNORMAL /HPF (ref 0–5)
RBC MORPH BLD: ABNORMAL
SODIUM SERPL-SCNC: 142 MMOL/L (ref 136–145)
SP GR UR REFRACTOMETRY: 1.01 (ref 1–1.03)
TROPONIN I SERPL-MCNC: <0.05 NG/ML
UR CULT HOLD, URHOLD: NORMAL
UROBILINOGEN UR QL STRIP.AUTO: 0.2 EU/DL (ref 0.2–1)
VENTRICULAR RATE, ECG03: 99 BPM
WBC # BLD AUTO: 5.1 K/UL (ref 4.1–11.1)
WBC URNS QL MICRO: ABNORMAL /HPF (ref 0–4)

## 2020-12-11 PROCEDURE — 84100 ASSAY OF PHOSPHORUS: CPT

## 2020-12-11 PROCEDURE — A9575 INJ GADOTERATE MEGLUMI 0.1ML: HCPCS | Performed by: INTERNAL MEDICINE

## 2020-12-11 PROCEDURE — 70450 CT HEAD/BRAIN W/O DYE: CPT

## 2020-12-11 PROCEDURE — 74011000258 HC RX REV CODE- 258: Performed by: NURSE PRACTITIONER

## 2020-12-11 PROCEDURE — 70553 MRI BRAIN STEM W/O & W/DYE: CPT

## 2020-12-11 PROCEDURE — 85610 PROTHROMBIN TIME: CPT

## 2020-12-11 PROCEDURE — 81001 URINALYSIS AUTO W/SCOPE: CPT

## 2020-12-11 PROCEDURE — 74011250637 HC RX REV CODE- 250/637: Performed by: NURSE PRACTITIONER

## 2020-12-11 PROCEDURE — 80053 COMPREHEN METABOLIC PANEL: CPT

## 2020-12-11 PROCEDURE — 80307 DRUG TEST PRSMV CHEM ANLYZR: CPT

## 2020-12-11 PROCEDURE — 99222 1ST HOSP IP/OBS MODERATE 55: CPT | Performed by: PSYCHIATRY & NEUROLOGY

## 2020-12-11 PROCEDURE — 74011250636 HC RX REV CODE- 250/636: Performed by: NURSE PRACTITIONER

## 2020-12-11 PROCEDURE — 96375 TX/PRO/DX INJ NEW DRUG ADDON: CPT

## 2020-12-11 PROCEDURE — 74011250636 HC RX REV CODE- 250/636: Performed by: INTERNAL MEDICINE

## 2020-12-11 PROCEDURE — 70486 CT MAXILLOFACIAL W/O DYE: CPT

## 2020-12-11 PROCEDURE — 93005 ELECTROCARDIOGRAM TRACING: CPT

## 2020-12-11 PROCEDURE — 74011000250 HC RX REV CODE- 250: Performed by: NURSE PRACTITIONER

## 2020-12-11 PROCEDURE — 90715 TDAP VACCINE 7 YRS/> IM: CPT | Performed by: NURSE PRACTITIONER

## 2020-12-11 PROCEDURE — 74011000250 HC RX REV CODE- 250: Performed by: INTERNAL MEDICINE

## 2020-12-11 PROCEDURE — 65660000000 HC RM CCU STEPDOWN

## 2020-12-11 PROCEDURE — 84484 ASSAY OF TROPONIN QUANT: CPT

## 2020-12-11 PROCEDURE — 96365 THER/PROPH/DIAG IV INF INIT: CPT

## 2020-12-11 PROCEDURE — 36415 COLL VENOUS BLD VENIPUNCTURE: CPT

## 2020-12-11 PROCEDURE — 83735 ASSAY OF MAGNESIUM: CPT

## 2020-12-11 PROCEDURE — 82140 ASSAY OF AMMONIA: CPT

## 2020-12-11 PROCEDURE — 90471 IMMUNIZATION ADMIN: CPT

## 2020-12-11 PROCEDURE — 85025 COMPLETE CBC W/AUTO DIFF WBC: CPT

## 2020-12-11 PROCEDURE — 87086 URINE CULTURE/COLONY COUNT: CPT

## 2020-12-11 PROCEDURE — 99284 EMERGENCY DEPT VISIT MOD MDM: CPT

## 2020-12-11 RX ORDER — SODIUM CHLORIDE 0.9 % (FLUSH) 0.9 %
5-40 SYRINGE (ML) INJECTION EVERY 8 HOURS
Status: DISCONTINUED | OUTPATIENT
Start: 2020-12-11 | End: 2021-01-13 | Stop reason: HOSPADM

## 2020-12-11 RX ORDER — LOSARTAN POTASSIUM 50 MG/1
100 TABLET ORAL DAILY
Status: DISCONTINUED | OUTPATIENT
Start: 2020-12-12 | End: 2020-12-11

## 2020-12-11 RX ORDER — LORAZEPAM 2 MG/ML
2 INJECTION INTRAMUSCULAR
Status: DISCONTINUED | OUTPATIENT
Start: 2020-12-11 | End: 2020-12-26

## 2020-12-11 RX ORDER — DEXTROSE, SODIUM CHLORIDE, AND POTASSIUM CHLORIDE 5; .9; .15 G/100ML; G/100ML; G/100ML
75 INJECTION INTRAVENOUS CONTINUOUS
Status: DISCONTINUED | OUTPATIENT
Start: 2020-12-11 | End: 2020-12-13

## 2020-12-11 RX ORDER — SODIUM CHLORIDE 0.9 % (FLUSH) 0.9 %
10 SYRINGE (ML) INJECTION
Status: DISPENSED | OUTPATIENT
Start: 2020-12-11 | End: 2020-12-12

## 2020-12-11 RX ORDER — SODIUM CHLORIDE 0.9 % (FLUSH) 0.9 %
5-40 SYRINGE (ML) INJECTION AS NEEDED
Status: DISCONTINUED | OUTPATIENT
Start: 2020-12-11 | End: 2021-01-13 | Stop reason: HOSPADM

## 2020-12-11 RX ORDER — TETRACAINE HYDROCHLORIDE 5 MG/ML
1 SOLUTION OPHTHALMIC
Status: COMPLETED | OUTPATIENT
Start: 2020-12-11 | End: 2020-12-11

## 2020-12-11 RX ORDER — GADOTERATE MEGLUMINE 376.9 MG/ML
20 INJECTION INTRAVENOUS
Status: COMPLETED | OUTPATIENT
Start: 2020-12-11 | End: 2020-12-11

## 2020-12-11 RX ORDER — ONDANSETRON 2 MG/ML
4 INJECTION INTRAMUSCULAR; INTRAVENOUS
Status: DISCONTINUED | OUTPATIENT
Start: 2020-12-11 | End: 2021-01-03

## 2020-12-11 RX ORDER — MAGNESIUM SULFATE HEPTAHYDRATE 40 MG/ML
2 INJECTION, SOLUTION INTRAVENOUS ONCE
Status: COMPLETED | OUTPATIENT
Start: 2020-12-11 | End: 2020-12-11

## 2020-12-11 RX ORDER — POTASSIUM CHLORIDE 7.45 MG/ML
10 INJECTION INTRAVENOUS AS NEEDED
Status: DISCONTINUED | OUTPATIENT
Start: 2020-12-11 | End: 2021-01-13 | Stop reason: HOSPADM

## 2020-12-11 RX ORDER — LORAZEPAM 2 MG/ML
4 INJECTION INTRAMUSCULAR
Status: DISCONTINUED | OUTPATIENT
Start: 2020-12-11 | End: 2020-12-26

## 2020-12-11 RX ORDER — ONDANSETRON 4 MG/1
4 TABLET, ORALLY DISINTEGRATING ORAL
Status: DISCONTINUED | OUTPATIENT
Start: 2020-12-11 | End: 2021-01-03

## 2020-12-11 RX ORDER — ACETAMINOPHEN 325 MG/1
650 TABLET ORAL
Status: DISCONTINUED | OUTPATIENT
Start: 2020-12-11 | End: 2021-01-13 | Stop reason: HOSPADM

## 2020-12-11 RX ORDER — POLYETHYLENE GLYCOL 3350 17 G/17G
17 POWDER, FOR SOLUTION ORAL DAILY PRN
Status: DISCONTINUED | OUTPATIENT
Start: 2020-12-11 | End: 2021-01-13 | Stop reason: HOSPADM

## 2020-12-11 RX ORDER — POTASSIUM CHLORIDE 750 MG/1
40 TABLET, FILM COATED, EXTENDED RELEASE ORAL
Status: COMPLETED | OUTPATIENT
Start: 2020-12-11 | End: 2020-12-11

## 2020-12-11 RX ORDER — LORAZEPAM 0.5 MG/1
1 TABLET ORAL
Status: COMPLETED | OUTPATIENT
Start: 2020-12-11 | End: 2020-12-11

## 2020-12-11 RX ORDER — THERA TABS 400 MCG
1 TAB ORAL DAILY
Status: DISCONTINUED | OUTPATIENT
Start: 2020-12-11 | End: 2020-12-12

## 2020-12-11 RX ORDER — ACETAMINOPHEN 650 MG/1
650 SUPPOSITORY RECTAL
Status: DISCONTINUED | OUTPATIENT
Start: 2020-12-11 | End: 2021-01-13 | Stop reason: HOSPADM

## 2020-12-11 RX ORDER — LOSARTAN POTASSIUM 50 MG/1
100 TABLET ORAL
Status: DISCONTINUED | OUTPATIENT
Start: 2020-12-11 | End: 2020-12-14

## 2020-12-11 RX ADMIN — LORAZEPAM 2 MG: 2 INJECTION INTRAMUSCULAR; INTRAVENOUS at 22:37

## 2020-12-11 RX ADMIN — GADOTERATE MEGLUMINE 20 ML: 376.9 INJECTION INTRAVENOUS at 20:25

## 2020-12-11 RX ADMIN — LORAZEPAM 2 MG: 2 INJECTION INTRAMUSCULAR; INTRAVENOUS at 21:24

## 2020-12-11 RX ADMIN — TETANUS TOXOID, REDUCED DIPHTHERIA TOXOID AND ACELLULAR PERTUSSIS VACCINE, ADSORBED 0.5 ML: 5; 2.5; 8; 8; 2.5 SUSPENSION INTRAMUSCULAR at 12:50

## 2020-12-11 RX ADMIN — CEFTRIAXONE SODIUM 1 G: 1 INJECTION, POWDER, FOR SOLUTION INTRAMUSCULAR; INTRAVENOUS at 15:23

## 2020-12-11 RX ADMIN — LORAZEPAM 1 MG: 0.5 TABLET ORAL at 19:41

## 2020-12-11 RX ADMIN — Medication 10 ML: at 21:46

## 2020-12-11 RX ADMIN — LOSARTAN POTASSIUM 100 MG: 50 TABLET, FILM COATED ORAL at 22:32

## 2020-12-11 RX ADMIN — TETRACAINE HYDROCHLORIDE 1 DROP: 5 SOLUTION OPHTHALMIC at 12:49

## 2020-12-11 RX ADMIN — POTASSIUM CHLORIDE 40 MEQ: 750 TABLET, FILM COATED, EXTENDED RELEASE ORAL at 13:58

## 2020-12-11 RX ADMIN — THIAMINE HYDROCHLORIDE 100 MG: 100 INJECTION, SOLUTION INTRAMUSCULAR; INTRAVENOUS at 15:32

## 2020-12-11 RX ADMIN — FLUORESCEIN SODIUM 1 STRIP: 1 STRIP OPHTHALMIC at 12:49

## 2020-12-11 RX ADMIN — THIAMINE HYDROCHLORIDE: 100 INJECTION, SOLUTION INTRAMUSCULAR; INTRAVENOUS at 21:45

## 2020-12-11 RX ADMIN — MAGNESIUM SULFATE HEPTAHYDRATE 2 G: 40 INJECTION, SOLUTION INTRAVENOUS at 13:58

## 2020-12-11 NOTE — ED NOTES
TRANSFER - OUT REPORT:    Verbal report given to Nilo Goldberg RN (name) on Jacqueline Cifuentes  being transferred to [facility](unit) for routine progression of care       Report consisted of patients Situation, Background, Assessment and   Recommendations(SBAR). Information from the following report(s) SBAR and ED Summary was reviewed with the receiving nurse. Lines:   Peripheral IV 12/11/20 Left Antecubital (Active)   Site Assessment Clean, dry, & intact 12/11/20 1239   Phlebitis Assessment 0 12/11/20 1239   Infiltration Assessment 0 12/11/20 1239   Dressing Status Clean, dry, & intact 12/11/20 1239   Dressing Type Transparent 12/11/20 1239   Hub Color/Line Status Pink 12/11/20 1239        Opportunity for questions and clarification was provided.

## 2020-12-11 NOTE — CONSULTS
Consult dictated. Suspect he had a mild concussion related to the fall and symptoms possibly related to combination of a postconcussive syndrome, alcohol use and possibly infection/UTI. Reasonable to check MRI to rule out ischemia or signs of contusion.   Watch for alcohol withdrawal/DTs  Piyush Mcelroy MD
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
## 2020-12-11 NOTE — ED TRIAGE NOTES
Pt stated he tripped and fell on Tuesday, hitting head, denies loc, denies neck or back pain, denies n/v, pt stated he feels disoriented and \"weird\", +red/purplish discoloration to right eye
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
## 2020-12-12 PROBLEM — E87.6 HYPOKALEMIA: Status: ACTIVE | Noted: 2020-12-12

## 2020-12-12 PROBLEM — N39.0 UTI (URINARY TRACT INFECTION): Status: ACTIVE | Noted: 2020-12-12

## 2020-12-12 PROBLEM — D64.9 ANEMIA: Status: ACTIVE | Noted: 2020-12-12

## 2020-12-12 PROBLEM — D69.6 THROMBOCYTOPENIA (HCC): Status: ACTIVE | Noted: 2020-12-12

## 2020-12-12 LAB
ANION GAP SERPL CALC-SCNC: 9 MMOL/L (ref 5–15)
BACTERIA SPEC CULT: NORMAL
BUN SERPL-MCNC: 9 MG/DL (ref 6–20)
BUN/CREAT SERPL: 8 (ref 12–20)
CALCIUM SERPL-MCNC: 7.7 MG/DL (ref 8.5–10.1)
CHLORIDE SERPL-SCNC: 106 MMOL/L (ref 97–108)
CO2 SERPL-SCNC: 25 MMOL/L (ref 21–32)
CREAT SERPL-MCNC: 1.06 MG/DL (ref 0.7–1.3)
ERYTHROCYTE [DISTWIDTH] IN BLOOD BY AUTOMATED COUNT: 13.1 % (ref 11.5–14.5)
GLUCOSE SERPL-MCNC: 95 MG/DL (ref 65–100)
HCT VFR BLD AUTO: 29.7 % (ref 36.6–50.3)
HGB BLD-MCNC: 10.4 G/DL (ref 12.1–17)
MAGNESIUM SERPL-MCNC: 1.6 MG/DL (ref 1.6–2.4)
MCH RBC QN AUTO: 34.1 PG (ref 26–34)
MCHC RBC AUTO-ENTMCNC: 35 G/DL (ref 30–36.5)
MCV RBC AUTO: 97.4 FL (ref 80–99)
NRBC # BLD: 0 K/UL (ref 0–0.01)
NRBC BLD-RTO: 0 PER 100 WBC
PLATELET # BLD AUTO: 49 K/UL (ref 150–400)
PMV BLD AUTO: 9.5 FL (ref 8.9–12.9)
POTASSIUM SERPL-SCNC: 3.4 MMOL/L (ref 3.5–5.1)
RBC # BLD AUTO: 3.05 M/UL (ref 4.1–5.7)
SERVICE CMNT-IMP: NORMAL
SODIUM SERPL-SCNC: 140 MMOL/L (ref 136–145)
WBC # BLD AUTO: 4.2 K/UL (ref 4.1–11.1)

## 2020-12-12 PROCEDURE — 74011250637 HC RX REV CODE- 250/637: Performed by: INTERNAL MEDICINE

## 2020-12-12 PROCEDURE — 83735 ASSAY OF MAGNESIUM: CPT

## 2020-12-12 PROCEDURE — 84425 ASSAY OF VITAMIN B-1: CPT

## 2020-12-12 PROCEDURE — 77030034696 HC CATH URETH FOL 2W BARD -A

## 2020-12-12 PROCEDURE — 85027 COMPLETE CBC AUTOMATED: CPT

## 2020-12-12 PROCEDURE — 80048 BASIC METABOLIC PNL TOTAL CA: CPT

## 2020-12-12 PROCEDURE — 74011000250 HC RX REV CODE- 250: Performed by: NURSE PRACTITIONER

## 2020-12-12 PROCEDURE — 51798 US URINE CAPACITY MEASURE: CPT

## 2020-12-12 PROCEDURE — 74011250636 HC RX REV CODE- 250/636: Performed by: NURSE PRACTITIONER

## 2020-12-12 PROCEDURE — 74011000250 HC RX REV CODE- 250: Performed by: INTERNAL MEDICINE

## 2020-12-12 PROCEDURE — 74011000258 HC RX REV CODE- 258: Performed by: INTERNAL MEDICINE

## 2020-12-12 PROCEDURE — 74011250636 HC RX REV CODE- 250/636: Performed by: INTERNAL MEDICINE

## 2020-12-12 PROCEDURE — 65660000000 HC RM CCU STEPDOWN

## 2020-12-12 PROCEDURE — 74011250637 HC RX REV CODE- 250/637: Performed by: NURSE PRACTITIONER

## 2020-12-12 PROCEDURE — 97161 PT EVAL LOW COMPLEX 20 MIN: CPT

## 2020-12-12 PROCEDURE — 94760 N-INVAS EAR/PLS OXIMETRY 1: CPT

## 2020-12-12 PROCEDURE — 36415 COLL VENOUS BLD VENIPUNCTURE: CPT

## 2020-12-12 PROCEDURE — 97530 THERAPEUTIC ACTIVITIES: CPT

## 2020-12-12 RX ORDER — LABETALOL HYDROCHLORIDE 5 MG/ML
10 INJECTION, SOLUTION INTRAVENOUS ONCE
Status: COMPLETED | OUTPATIENT
Start: 2020-12-12 | End: 2020-12-12

## 2020-12-12 RX ORDER — POTASSIUM CHLORIDE 750 MG/1
30 TABLET, FILM COATED, EXTENDED RELEASE ORAL
Status: COMPLETED | OUTPATIENT
Start: 2020-12-12 | End: 2020-12-12

## 2020-12-12 RX ORDER — LIDOCAINE HYDROCHLORIDE 20 MG/ML
JELLY TOPICAL ONCE
Status: COMPLETED | OUTPATIENT
Start: 2020-12-12 | End: 2020-12-12

## 2020-12-12 RX ORDER — CLONIDINE HYDROCHLORIDE 0.1 MG/1
0.1 TABLET ORAL
Status: DISCONTINUED | OUTPATIENT
Start: 2020-12-12 | End: 2020-12-28

## 2020-12-12 RX ORDER — DIAZEPAM 10 MG/2ML
10 INJECTION INTRAMUSCULAR ONCE
Status: COMPLETED | OUTPATIENT
Start: 2020-12-12 | End: 2020-12-12

## 2020-12-12 RX ORDER — CHLORDIAZEPOXIDE HYDROCHLORIDE 25 MG/1
25 CAPSULE, GELATIN COATED ORAL 3 TIMES DAILY
Status: DISCONTINUED | OUTPATIENT
Start: 2020-12-12 | End: 2020-12-13

## 2020-12-12 RX ADMIN — LORAZEPAM 2 MG: 2 INJECTION INTRAMUSCULAR; INTRAVENOUS at 22:11

## 2020-12-12 RX ADMIN — THERA TABS 1 TABLET: TAB at 10:05

## 2020-12-12 RX ADMIN — LORAZEPAM 2 MG: 2 INJECTION INTRAMUSCULAR; INTRAVENOUS at 00:37

## 2020-12-12 RX ADMIN — LORAZEPAM 2 MG: 2 INJECTION INTRAMUSCULAR; INTRAVENOUS at 20:07

## 2020-12-12 RX ADMIN — POTASSIUM CHLORIDE, DEXTROSE MONOHYDRATE AND SODIUM CHLORIDE 75 ML/HR: 150; 5; 900 INJECTION, SOLUTION INTRAVENOUS at 14:22

## 2020-12-12 RX ADMIN — CHLORDIAZEPOXIDE HYDROCHLORIDE 25 MG: 25 CAPSULE ORAL at 21:32

## 2020-12-12 RX ADMIN — LORAZEPAM 2 MG: 2 INJECTION INTRAMUSCULAR; INTRAVENOUS at 15:49

## 2020-12-12 RX ADMIN — LORAZEPAM 2 MG: 2 INJECTION INTRAMUSCULAR; INTRAVENOUS at 06:34

## 2020-12-12 RX ADMIN — CHLORDIAZEPOXIDE HYDROCHLORIDE 25 MG: 25 CAPSULE ORAL at 15:39

## 2020-12-12 RX ADMIN — POTASSIUM CHLORIDE: 2 INJECTION, SOLUTION, CONCENTRATE INTRAVENOUS at 20:10

## 2020-12-12 RX ADMIN — LABETALOL HYDROCHLORIDE 10 MG: 5 INJECTION INTRAVENOUS at 04:15

## 2020-12-12 RX ADMIN — Medication 10 ML: at 22:00

## 2020-12-12 RX ADMIN — Medication 10 ML: at 06:34

## 2020-12-12 RX ADMIN — CLONIDINE HYDROCHLORIDE 0.1 MG: 0.1 TABLET ORAL at 13:59

## 2020-12-12 RX ADMIN — LABETALOL HYDROCHLORIDE 10 MG: 5 INJECTION INTRAVENOUS at 07:00

## 2020-12-12 RX ADMIN — DIAZEPAM 10 MG: 5 INJECTION, SOLUTION INTRAMUSCULAR; INTRAVENOUS at 02:04

## 2020-12-12 RX ADMIN — CEFTRIAXONE SODIUM 1 G: 1 INJECTION, POWDER, FOR SOLUTION INTRAMUSCULAR; INTRAVENOUS at 14:00

## 2020-12-12 RX ADMIN — LORAZEPAM 2 MG: 2 INJECTION INTRAMUSCULAR; INTRAVENOUS at 10:05

## 2020-12-12 RX ADMIN — LOSARTAN POTASSIUM 100 MG: 50 TABLET, FILM COATED ORAL at 21:32

## 2020-12-12 RX ADMIN — POTASSIUM CHLORIDE 30 MEQ: 750 TABLET, FILM COATED, EXTENDED RELEASE ORAL at 13:59

## 2020-12-12 RX ADMIN — LIDOCAINE HYDROCHLORIDE: 20 JELLY TOPICAL at 05:33

## 2020-12-12 RX ADMIN — CLONIDINE HYDROCHLORIDE 0.1 MG: 0.1 TABLET ORAL at 21:32

## 2020-12-12 NOTE — PROGRESS NOTES
12/11/20 2200   Vital Signs   Temp 99.8 °F (37.7 °C)   Temp Source Oral   Pulse (Heart Rate) (!) 104   Heart Rate Source Monitor   Cardiac Rhythm NSR   Resp Rate 17   O2 Sat (%) 95 %   Level of Consciousness Alert   BP (!) 200/92   MAP (Calculated) 128   BP 1 Method Automatic   BP 1 Location Left arm   BP Patient Position At rest   MEWS Score 4   Alarms Set and Audible Cardiac alarms   Box Number 661   Electrodes Replaced No   Pain 1   Pain Scale 1 Numeric (0 - 10)   Pain Intensity 1 0   Patient Stated Pain Goal 0   Pain Reassessment 1 Yes   Oxygen Therapy   O2 Device Room air   Height/Weight   Weight 93 kg (205 lb 0.4 oz)   Weight Source Bed   BMI (calculated) 28.6       Paulo Rye NP notified of MEWS score. Pt has a known UTI. Pt's PTA meds reviewed and losartan ordered and given.

## 2020-12-12 NOTE — PROGRESS NOTES
Nery Fair, & Marcello    Admit Date: 12/11/2020    Subjective:     Events noted. Pt's mental status not back to baseline per wife, but he is communicating fine. Brain MRI without acute findings. No new complaints. Current Facility-Administered Medications   Medication Dose Route Frequency    . PHARMACY TO SUBSTITUTE PER PROTOCOL (Reordered from: olmesartan (BENICAR) 20 mg tablet)    Per Protocol    cloNIDine HCL (CATAPRES) tablet 0.1 mg  0.1 mg Oral Q3H PRN    chlordiazePOXIDE (LIBRIUM) capsule 25 mg  25 mg Oral TID    potassium chloride SR (KLOR-CON 10) tablet 30 mEq  30 mEq Oral NOW    0.9% sodium chloride 1,000 mL with thiamine 224 mg, folic acid 1 mg, potassium chloride 20 mEq, magnesium sulfate 1 g, mvi, pedi no.1 with vit k infusion   IntraVENous Q24H    cefTRIAXone (ROCEPHIN) 1 g in 0.9% sodium chloride (MBP/ADV) 50 mL MBP  1 g IntraVENous Q24H    sodium chloride (NS) flush 5-40 mL  5-40 mL IntraVENous Q8H    sodium chloride (NS) flush 5-40 mL  5-40 mL IntraVENous PRN    potassium chloride 10 mEq in 100 ml IVPB  10 mEq IntraVENous PRN    acetaminophen (TYLENOL) tablet 650 mg  650 mg Oral Q6H PRN    Or    acetaminophen (TYLENOL) suppository 650 mg  650 mg Rectal Q6H PRN    polyethylene glycol (MIRALAX) packet 17 g  17 g Oral DAILY PRN    ondansetron (ZOFRAN ODT) tablet 4 mg  4 mg Oral Q8H PRN    Or    ondansetron (ZOFRAN) injection 4 mg  4 mg IntraVENous Q6H PRN    LORazepam (ATIVAN) injection 2 mg  2 mg IntraVENous Q1H PRN    LORazepam (ATIVAN) injection 4 mg  4 mg IntraVENous Q1H PRN    dextrose 5% - 0.9% NaCl with KCl 20 mEq/L infusion  75 mL/hr IntraVENous CONTINUOUS    influenza vaccine 2020-21 (6 mos+)(PF) (FLUARIX/FLULAVAL/FLUZONE QUAD) injection 0.5 mL  0.5 mL IntraMUSCular PRIOR TO DISCHARGE    losartan (COZAAR) tablet 100 mg  100 mg Oral QHS          Objective:     Patient Vitals for the past 8 hrs:   BP Temp Pulse Resp SpO2   12/12/20 1159 (!) 183/81       12/12/20 1007 (!) 162/94 98.8 °F (37.1 °C) (!) 115 18 93 %   12/12/20 0944     97 %   12/12/20 0700   98     12/12/20 0615 (!) 216/117 98.3 °F (36.8 °C) (!) 112 19 95 %   12/12/20 0452 (!) 204/99  (!) 107     12/12/20 0432   (!) 111       No intake/output data recorded. 12/10 1901 - 12/12 0700  In: 100 [I.V.:100]  Out: 1190 [Urine:1190]    Physical Exam: NAD. Alert. Neck -- Supple. No JVD. Heart -- RRR. Lungs -- CTA. Abd -- Benign. Ext -- No LE edema, b/l. Data Review   Recent Results (from the past 24 hour(s))   EKG, 12 LEAD, INITIAL    Collection Time: 12/11/20 12:29 PM   Result Value Ref Range    Ventricular Rate 99 BPM    Atrial Rate 99 BPM    P-R Interval 152 ms    QRS Duration 88 ms    Q-T Interval 392 ms    QTC Calculation (Bezet) 503 ms    Calculated P Axis 39 degrees    Calculated R Axis -6 degrees    Calculated T Axis 42 degrees    Diagnosis       Normal sinus rhythm  Nonspecific ST abnormality    When compared with ECG of 01-AUG-2019 12:12,  No significant change was found  Confirmed by Kelsi Roy M.D., [place] ([id number]) on 12/11/2020 4:53:01 PM     CBC WITH AUTOMATED DIFF    Collection Time: 12/11/20 12:40 PM   Result Value Ref Range    WBC 5.1 4.1 - 11.1 K/uL    RBC 3.43 (L) 4.10 - 5.70 M/uL    HGB 11.5 (L) 12.1 - 17.0 g/dL    HCT 34.4 (L) 36.6 - 50.3 %    .3 (H) 80.0 - 99.0 FL    MCH 33.5 26.0 - 34.0 PG    MCHC 33.4 30.0 - 36.5 g/dL    RDW 13.4 11.5 - 14.5 %    PLATELET 56 (L) 687 - 400 K/uL    MPV 8.8 (L) 8.9 - 12.9 FL    NRBC 0.0 0  WBC    ABSOLUTE NRBC 0.00 0.00 - 0.01 K/uL    NEUTROPHILS 72 32 - 75 %    LYMPHOCYTES 13 12 - 49 %    MONOCYTES 10 5 - 13 %    EOSINOPHILS 2 0 - 7 %    BASOPHILS 2 (H) 0 - 1 %    IMMATURE GRANULOCYTES 1 (H) 0.0 - 0.5 %    ABS. NEUTROPHILS 3.6 1.8 - 8.0 K/UL    ABS.  LYMPHOCYTES 0.7 (L) 0.8 - 3.5 K/UL    ABS. MONOCYTES 0.5 0.0 - 1.0 K/UL    ABS. EOSINOPHILS 0.1 0.0 - 0.4 K/UL    ABS. BASOPHILS 0.1 0.0 - 0.1 K/UL    ABS. IMM. GRANS. 0.1 (H) 0.00 - 0.04 K/UL    DF SMEAR SCANNED      RBC COMMENTS MACROCYTOSIS  1+       METABOLIC PANEL, COMPREHENSIVE    Collection Time: 12/11/20 12:40 PM   Result Value Ref Range    Sodium 142 136 - 145 mmol/L    Potassium 3.3 (L) 3.5 - 5.1 mmol/L    Chloride 105 97 - 108 mmol/L    CO2 28 21 - 32 mmol/L    Anion gap 9 5 - 15 mmol/L    Glucose 68 65 - 100 mg/dL    BUN 8 6 - 20 MG/DL    Creatinine 0.96 0.70 - 1.30 MG/DL    BUN/Creatinine ratio 8 (L) 12 - 20      GFR est AA >60 >60 ml/min/1.73m2    GFR est non-AA >60 >60 ml/min/1.73m2    Calcium 7.9 (L) 8.5 - 10.1 MG/DL    Bilirubin, total 0.7 0.2 - 1.0 MG/DL    ALT (SGPT) 76 12 - 78 U/L    AST (SGOT) 148 (H) 15 - 37 U/L    Alk.  phosphatase 84 45 - 117 U/L    Protein, total 7.5 6.4 - 8.2 g/dL    Albumin 3.3 (L) 3.5 - 5.0 g/dL    Globulin 4.2 (H) 2.0 - 4.0 g/dL    A-G Ratio 0.8 (L) 1.1 - 2.2     TROPONIN I    Collection Time: 12/11/20 12:40 PM   Result Value Ref Range    Troponin-I, Qt. <0.05 <0.05 ng/mL   MAGNESIUM    Collection Time: 12/11/20 12:40 PM   Result Value Ref Range    Magnesium 1.5 (L) 1.6 - 2.4 mg/dL   AMMONIA    Collection Time: 12/11/20 12:40 PM   Result Value Ref Range    Ammonia <10 <32 UMOL/L   PROTHROMBIN TIME + INR    Collection Time: 12/11/20 12:40 PM   Result Value Ref Range    INR 0.9 0.9 - 1.1      Prothrombin time 9.9 9.0 - 11.1 sec   ETHYL ALCOHOL    Collection Time: 12/11/20 12:40 PM   Result Value Ref Range    ALCOHOL(ETHYL),SERUM 232 (H) <10 MG/DL   PHOSPHORUS    Collection Time: 12/11/20 12:40 PM   Result Value Ref Range    Phosphorus 3.2 2.6 - 4.7 MG/DL   URINALYSIS W/MICROSCOPIC    Collection Time: 12/11/20 12:44 PM   Result Value Ref Range    Color YELLOW/STRAW      Appearance CLEAR CLEAR      Specific gravity 1.014 1.003 - 1.030      pH (UA) 5.5 5.0 - 8.0      Protein 30 (A) NEG mg/dL    Glucose Negative NEG mg/dL    Ketone Negative NEG mg/dL    Bilirubin Negative NEG      Blood TRACE (A) NEG      Urobilinogen 0.2 0.2 - 1.0 EU/dL    Nitrites Negative NEG      Leukocyte Esterase SMALL (A) NEG      WBC  0 - 4 /hpf    RBC 0-5 0 - 5 /hpf    Epithelial cells FEW FEW /lpf    Bacteria 1+ (A) NEG /hpf    Hyaline cast 2-5 0 - 5 /lpf   URINE CULTURE HOLD SAMPLE    Collection Time: 12/11/20 12:44 PM    Specimen: Serum; Urine   Result Value Ref Range    Urine culture hold        Urine on hold in Microbiology dept for 2 days. If unpreserved urine is submitted, it cannot be used for addtional testing after 24 hours, recollection will be required.    METABOLIC PANEL, BASIC    Collection Time: 12/12/20  1:38 AM   Result Value Ref Range    Sodium 140 136 - 145 mmol/L    Potassium 3.4 (L) 3.5 - 5.1 mmol/L    Chloride 106 97 - 108 mmol/L    CO2 25 21 - 32 mmol/L    Anion gap 9 5 - 15 mmol/L    Glucose 95 65 - 100 mg/dL    BUN 9 6 - 20 MG/DL    Creatinine 1.06 0.70 - 1.30 MG/DL    BUN/Creatinine ratio 8 (L) 12 - 20      GFR est AA >60 >60 ml/min/1.73m2    GFR est non-AA >60 >60 ml/min/1.73m2    Calcium 7.7 (L) 8.5 - 10.1 MG/DL   CBC W/O DIFF    Collection Time: 12/12/20  1:38 AM   Result Value Ref Range    WBC 4.2 4.1 - 11.1 K/uL    RBC 3.05 (L) 4.10 - 5.70 M/uL    HGB 10.4 (L) 12.1 - 17.0 g/dL    HCT 29.7 (L) 36.6 - 50.3 %    MCV 97.4 80.0 - 99.0 FL    MCH 34.1 (H) 26.0 - 34.0 PG    MCHC 35.0 30.0 - 36.5 g/dL    RDW 13.1 11.5 - 14.5 %    PLATELET 49 (LL) 048 - 400 K/uL    MPV 9.5 8.9 - 12.9 FL    NRBC 0.0 0  WBC    ABSOLUTE NRBC 0.00 0.00 - 0.01 K/uL   MAGNESIUM    Collection Time: 12/12/20  1:38 AM   Result Value Ref Range    Magnesium 1.6 1.6 - 2.4 mg/dL           Assessment:     Principal Problem:    AMS (altered mental status) (12/11/2020) -- Probably multifactorial, including ETOH, concussion, UTI, etc.        Active Problems:    Essential hypertension (11/21/2015)      Excessive drinking of alcohol with significant risk for ETOH w/d.      UTI (urinary tract infection) (12/12/2020)      Hypokalemia (12/12/2020)      Thrombocytopenia -- Likely due to ETOH abuse. Anemia (12/12/2020)        Plan:     1. Cont CIWA protocol. I am starting scheduled Librium. 2. Cont IVF & Goody Bag.  3. Replete K+. 4. Cont Rocephin & f/u urine cx.  5. Follow Hgb & plts. Check iron profile, B12/folate in am.  6. PT.      D/w wife, present.         Orlando Bradley MD

## 2020-12-12 NOTE — PROGRESS NOTES
Bedside shift change report given to Elizabeth Post RN (oncoming nurse) by Dre Alvarez RN (offgoing nurse). Report included the following information SBAR, Kardex, Intake/Output, MAR, Recent Results, Cardiac Rhythm NSR/ST and Dual Neuro Assessment.
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
## 2020-12-12 NOTE — PROGRESS NOTES
12/12/20 0615   Vital Signs   Temp 98.3 °F (36.8 °C)   Temp Source Oral   Pulse (Heart Rate) (!) 112   Heart Rate Source Monitor   Cardiac Rhythm Sinus Tach   Resp Rate 19   O2 Sat (%) 95 %   Level of Consciousness Alert   BP (!) 216/117   MAP (Calculated) 150   BP 1 Method Automatic   BP 1 Location Right arm   BP Patient Position At rest   MEWS Score 5   Alarms Set and Audible Cardiac alarms   Box Number 661   Electrodes Replaced No   Pain 1   Pain Scale 1 Numeric (0 - 10)   Pain Intensity 1 0   Patient Stated Pain Goal 0   Pain Reassessment 1 Yes   Oxygen Therapy   O2 Device Room air     NP notified, IV labetalol 10 mg ordered again. 0730 Pt's wife, Summer, called and informed of overnight events.  RN answered all questions and went over plan of care

## 2020-12-12 NOTE — CONSULTS
[address]    Name:  Juancho Houston  MR#:  [id number]  :  1945  ACCOUNT #:  [de-identified]  DATE OF SERVICE:  2020    REQUESTING PHYSICIAN:  Jim Severance, MD    REASON FOR EVALUATION:  Recent fall, memory issues, unsteady gait. HISTORY OF PRESENT ILLNESS:  The patient is a 77-year-old male with history of hypertension, who likes to drink alcohol on a daily basis. He drinks vodka and may drink 8-10 drinks spread out over the day on an average. This past Tuesday, he was walking on a pavement and states that it was uneven. He fell, face down, and struck his head just above his right eyebrow. He had a laceration. Thereafter, he was fine and looked relatively okay the next day. However, yesterday, he was feeling tired and slept most of the day. Daughter called the PCP to make an appointment but they advised him to go to the Emergency Department as he was starting to slur his words, was not remembering what he had done before and was unsteady on his feet. The patient thinks that he had not been drinking since the fall but his blood alcohol level came back elevated. He states he cannot remember drinking over the last couple of days. Denies any headache, changes in vision, shortness of breath, chest pain, or palpitations. He does have some tremors in his hands. No changes in bladder or bowel function. He was found to have a urinary tract infection. PAST MEDICAL HISTORY:  As mentioned above. HOME MEDICATIONS:  Losartan. ALLERGIES:  ACE INHIBITORS AND THIAZIDE. SOCIAL HISTORY:  Lives at home. No history of smoking. Alcohol use, daily as above. FAMILY HISTORY:  Noncontributory. PHYSICAL EXAMINATION:  GENERAL:  The patient is alert, fully oriented. VITAL SIGNS:  Blood pressure 172/87, temperature 98.6, pulse is 100. NEUROLOGIC:  Speech is clear. Comprehension is normal.  Pupils are equal, round, and reactive. Extraocular movements are full. Face is symmetric. Tongue is midline. Hearing is baseline. Muscle tone and bulk normal.  Strength normal in all extremities. He has fine tremor in both hands when arms were outstretched. DTRs 2/2, symmetric. Romberg positive. Slightly unsteady on his feet and cannot do tandem walking. HEART:  Regular rate and rhythm. CHEST:  Clear. ABDOMEN:  Soft, nontender. Positive bowel sounds. EXTREMITIES:  No edema. LABORATORY DATA:  CBC with WBC 5.1, hemoglobin 11.5, hematocrit is 34.4, platelet count is 49. Chemistry:  Sodium 142, potassium 3.3, BUN 8, creatinine 0.96. , ALT 76.  CT of brain is unremarkable. His potassium was initially elevated at 5.2 and magnesium was low at 1.5. ASSESSMENT AND PLAN:  A 42-year-old male with history of chronic alcohol abuse who had a fall on an uneven pavement 2 days ago. I suspect that he had a mild concussion related to it and his current symptoms of forgetfulness, lethargy, unsteadiness of gait are likely related to a combination of postconcussive syndrome, alcohol use, and possibly the urinary tract infection. Reasonable to check MRI to rule out ischemia or signs of contusion. Watch for alcohol withdrawal/delirium tremens. Please call with any further questions. Thank you for this consultation.       MD VENESSA [id number]  D:  12/11/2020 17:39  T:  12/11/2020 23:17  JOB #:  [id number]

## 2020-12-12 NOTE — PROGRESS NOTES
12/12/20 0215   Vitals   Temp 98.5 °F (36.9 °C)   Temp Source Oral   Pulse (Heart Rate) (!) 129   Heart Rate Source Monitor   Resp Rate 18   O2 Sat (%) 93 %   Level of Consciousness Alert   BP (!) 203/108   MAP (Calculated) 140   BP 1 Location Left arm   BP 1 Method Automatic   BP Patient Position At rest   Cardiac Rhythm Sinus Tach   MEWS Score 5   Alarms Set and Audible Cardiac alarms   Box Number 661   Electrodes Replaced No       NP aware      0400 /118, . NP made aware. 10 mg IV labetalol given. 0530 bladder scanned patient again since HR and BP are still elevated after treatment with IV labetalol. 220 ml in bladder, but patient is symptomatic and trying to urinate every 30 mins to an hour and only getting out about 50 mL of bloody urine each time. RN attempted and was unsuccessful. RN attempted again using uro-jet and a 14 Fr coude catheter and was unsuccessful. Prostate seems to be very enlarged. Pt did not tolerate procedure well     0715 Denisa, RN attempted straight cath 18 fr coude and was unsuccessful again. Pt complaining of burning sensation and difficulty urinating.

## 2020-12-12 NOTE — PROGRESS NOTES
12/12/20 0103   Vital Signs   Temp 98.1 °F (36.7 °C)   Temp Source Oral   Pulse (Heart Rate) (!) 130   Heart Rate Source Monitor   Cardiac Rhythm NSR   Resp Rate 16   O2 Sat (%) 95 %   Level of Consciousness Alert   BP (!) 148/86   MAP (Calculated) 107   BP 1 Method Automatic   BP 1 Location Left arm   BP Patient Position At rest   MEWS Score 4   Alarms Set and Audible Cardiac alarms   Box Number 661   Electrodes Replaced No   Pain 1   Pain Scale 1 Numeric (0 - 10)   Pain Intensity 1 0   Patient Stated Pain Goal 0   Pain Reassessment 1 Yes   Oxygen Therapy   O2 Device Room air     Elyn Pain, NP notified about HR. Seems to be related to ETOH withdrawals. Pt drowsy from ativan, yet still tremorous to the point of not being able to sit up or hold on to anything, anxious, and restless. Also, pt having visable blood urine output with moderate sized blood clots. I do not believe he was straight cathed in the ED for UA. Labs ordered and drawn. 0200 pt trying to get out of bed to go to the bathroom, reminded patient to use condom cath, Pt bladder scanned, 240 ml in bladder. Pt placed on bedpan, unable to have BM. HR still in 130s. [de-identified] 10. Pt is now oriented x2 (person and situation) but states that he is at home and states that the year is \"[id number]\". 10 mg IV Valium given.

## 2020-12-13 ENCOUNTER — APPOINTMENT (OUTPATIENT)
Dept: GENERAL RADIOLOGY | Age: 75
DRG: 896 | End: 2020-12-13
Attending: INTERNAL MEDICINE
Payer: MEDICARE

## 2020-12-13 LAB
ALBUMIN SERPL-MCNC: 2.7 G/DL (ref 3.5–5)
ALBUMIN/GLOB SERPL: 0.8 {RATIO} (ref 1.1–2.2)
ALP SERPL-CCNC: 62 U/L (ref 45–117)
ALT SERPL-CCNC: 41 U/L (ref 12–78)
ANION GAP SERPL CALC-SCNC: 7 MMOL/L (ref 5–15)
ARTERIAL PATENCY WRIST A: YES
ARTERIAL PATENCY WRIST A: YES
AST SERPL-CCNC: 63 U/L (ref 15–37)
BASE DEFICIT BLD-SCNC: 2 MMOL/L
BASE DEFICIT BLD-SCNC: 4 MMOL/L
BASOPHILS # BLD: 0 K/UL (ref 0–0.1)
BASOPHILS NFR BLD: 0 % (ref 0–1)
BDY SITE: ABNORMAL
BDY SITE: ABNORMAL
BILIRUB SERPL-MCNC: 1 MG/DL (ref 0.2–1)
BUN SERPL-MCNC: 8 MG/DL (ref 6–20)
BUN/CREAT SERPL: 9 (ref 12–20)
CA-I BLD-SCNC: 1.07 MMOL/L (ref 1.12–1.32)
CA-I BLD-SCNC: 1.1 MMOL/L (ref 1.12–1.32)
CALCIUM SERPL-MCNC: 7.6 MG/DL (ref 8.5–10.1)
CHLORIDE SERPL-SCNC: 108 MMOL/L (ref 97–108)
CO2 SERPL-SCNC: 24 MMOL/L (ref 21–32)
CREAT SERPL-MCNC: 0.87 MG/DL (ref 0.7–1.3)
DIFFERENTIAL METHOD BLD: ABNORMAL
EOSINOPHIL # BLD: 0.1 K/UL (ref 0–0.4)
EOSINOPHIL NFR BLD: 2 % (ref 0–7)
ERYTHROCYTE [DISTWIDTH] IN BLOOD BY AUTOMATED COUNT: 13.2 % (ref 11.5–14.5)
FERRITIN SERPL-MCNC: 957 NG/ML (ref 26–388)
FOLATE SERPL-MCNC: 21.8 NG/ML (ref 5–21)
GAS FLOW.O2 O2 DELIVERY SYS: ABNORMAL L/MIN
GAS FLOW.O2 O2 DELIVERY SYS: ABNORMAL L/MIN
GAS FLOW.O2 SETTING OXYMISER: 20 BPM
GLOBULIN SER CALC-MCNC: 3.3 G/DL (ref 2–4)
GLUCOSE SERPL-MCNC: 103 MG/DL (ref 65–100)
HCO3 BLD-SCNC: 20.9 MMOL/L (ref 22–26)
HCO3 BLD-SCNC: 23.2 MMOL/L (ref 22–26)
HCT VFR BLD AUTO: 27.7 % (ref 36.6–50.3)
HGB BLD-MCNC: 9.5 G/DL (ref 12.1–17)
IMM GRANULOCYTES # BLD AUTO: 0 K/UL
IMM GRANULOCYTES NFR BLD AUTO: 0 %
IRON SATN MFR SERPL: 10 % (ref 20–50)
IRON SERPL-MCNC: 20 UG/DL (ref 35–150)
LYMPHOCYTES # BLD: 0.3 K/UL (ref 0.8–3.5)
LYMPHOCYTES NFR BLD: 6 % (ref 12–49)
MAGNESIUM SERPL-MCNC: 1.7 MG/DL (ref 1.6–2.4)
MCH RBC QN AUTO: 34.2 PG (ref 26–34)
MCHC RBC AUTO-ENTMCNC: 34.3 G/DL (ref 30–36.5)
MCV RBC AUTO: 99.6 FL (ref 80–99)
MONOCYTES # BLD: 0.3 K/UL (ref 0–1)
MONOCYTES NFR BLD: 6 % (ref 5–13)
NEUTS SEG # BLD: 4.3 K/UL (ref 1.8–8)
NEUTS SEG NFR BLD: 86 % (ref 32–75)
NRBC # BLD: 0 K/UL (ref 0–0.01)
NRBC BLD-RTO: 0 PER 100 WBC
O2/TOTAL GAS SETTING VFR VENT: 100 %
O2/TOTAL GAS SETTING VFR VENT: 40 %
PATH REV BLD -IMP: ABNORMAL
PCO2 BLD: 35.9 MMHG (ref 35–45)
PCO2 BLD: 37.7 MMHG (ref 35–45)
PEEP RESPIRATORY: 6 CMH2O
PEEP RESPIRATORY: 8 CMH2O
PH BLD: 7.37 [PH] (ref 7.35–7.45)
PH BLD: 7.4 [PH] (ref 7.35–7.45)
PIP ISTAT,IPIP: 14
PLATELET # BLD AUTO: 47 K/UL (ref 150–400)
PMV BLD AUTO: 10.4 FL (ref 8.9–12.9)
PO2 BLD: 127 MMHG (ref 80–100)
PO2 BLD: 80 MMHG (ref 80–100)
POTASSIUM SERPL-SCNC: 3.5 MMOL/L (ref 3.5–5.1)
PROT SERPL-MCNC: 6 G/DL (ref 6.4–8.2)
RBC # BLD AUTO: 2.78 M/UL (ref 4.1–5.7)
RBC MORPH BLD: ABNORMAL
SAO2 % BLD: 96 % (ref 92–97)
SAO2 % BLD: 99 % (ref 92–97)
SODIUM SERPL-SCNC: 139 MMOL/L (ref 136–145)
SPECIMEN TYPE: ABNORMAL
SPECIMEN TYPE: ABNORMAL
TIBC SERPL-MCNC: 193 UG/DL (ref 250–450)
VENTILATION MODE VENT: ABNORMAL
VIT B12 SERPL-MCNC: 596 PG/ML (ref 193–986)
VT SETTING VENT: 450 ML
WBC # BLD AUTO: 5 K/UL (ref 4.1–11.1)

## 2020-12-13 PROCEDURE — 74011000258 HC RX REV CODE- 258: Performed by: INTERNAL MEDICINE

## 2020-12-13 PROCEDURE — 82607 VITAMIN B-12: CPT

## 2020-12-13 PROCEDURE — 77030008477 HC STYL SATN SLP COVD -A

## 2020-12-13 PROCEDURE — 82728 ASSAY OF FERRITIN: CPT

## 2020-12-13 PROCEDURE — 77030034540

## 2020-12-13 PROCEDURE — 74011000250 HC RX REV CODE- 250: Performed by: INTERNAL MEDICINE

## 2020-12-13 PROCEDURE — 94760 N-INVAS EAR/PLS OXIMETRY 1: CPT

## 2020-12-13 PROCEDURE — 36415 COLL VENOUS BLD VENIPUNCTURE: CPT

## 2020-12-13 PROCEDURE — 74011250636 HC RX REV CODE- 250/636: Performed by: INTERNAL MEDICINE

## 2020-12-13 PROCEDURE — 94660 CPAP INITIATION&MGMT: CPT

## 2020-12-13 PROCEDURE — 36600 WITHDRAWAL OF ARTERIAL BLOOD: CPT

## 2020-12-13 PROCEDURE — 83735 ASSAY OF MAGNESIUM: CPT

## 2020-12-13 PROCEDURE — 82803 BLOOD GASES ANY COMBINATION: CPT

## 2020-12-13 PROCEDURE — 65620000000 HC RM CCU GENERAL

## 2020-12-13 PROCEDURE — 74011250637 HC RX REV CODE- 250/637: Performed by: INTERNAL MEDICINE

## 2020-12-13 PROCEDURE — 71045 X-RAY EXAM CHEST 1 VIEW: CPT

## 2020-12-13 PROCEDURE — 0BH17EZ INSERTION OF ENDOTRACHEAL AIRWAY INTO TRACHEA, VIA NATURAL OR ARTIFICIAL OPENING: ICD-10-PCS | Performed by: INTERNAL MEDICINE

## 2020-12-13 PROCEDURE — 83921 ORGANIC ACID SINGLE QUANT: CPT

## 2020-12-13 PROCEDURE — 5A1945Z RESPIRATORY VENTILATION, 24-96 CONSECUTIVE HOURS: ICD-10-PCS | Performed by: INTERNAL MEDICINE

## 2020-12-13 PROCEDURE — 80053 COMPREHEN METABOLIC PANEL: CPT

## 2020-12-13 PROCEDURE — 82746 ASSAY OF FOLIC ACID SERUM: CPT

## 2020-12-13 PROCEDURE — 83540 ASSAY OF IRON: CPT

## 2020-12-13 PROCEDURE — 85025 COMPLETE CBC W/AUTO DIFF WBC: CPT

## 2020-12-13 PROCEDURE — 2709999900 HC NON-CHARGEABLE SUPPLY

## 2020-12-13 PROCEDURE — 77030008683 HC TU ET CUF COVD -A

## 2020-12-13 PROCEDURE — 94002 VENT MGMT INPAT INIT DAY: CPT

## 2020-12-13 RX ORDER — SODIUM CHLORIDE, SODIUM LACTATE, POTASSIUM CHLORIDE, CALCIUM CHLORIDE 600; 310; 30; 20 MG/100ML; MG/100ML; MG/100ML; MG/100ML
100 INJECTION, SOLUTION INTRAVENOUS CONTINUOUS
Status: DISCONTINUED | OUTPATIENT
Start: 2020-12-13 | End: 2020-12-16

## 2020-12-13 RX ORDER — PROPOFOL 10 MG/ML
INJECTION, EMULSION INTRAVENOUS
Status: DISPENSED
Start: 2020-12-13 | End: 2020-12-14

## 2020-12-13 RX ORDER — DIAZEPAM 10 MG/2ML
5 INJECTION INTRAMUSCULAR
Status: DISCONTINUED | OUTPATIENT
Start: 2020-12-13 | End: 2020-12-13

## 2020-12-13 RX ORDER — PROPOFOL 10 MG/ML
0-50 VIAL (ML) INTRAVENOUS
Status: DISCONTINUED | OUTPATIENT
Start: 2020-12-13 | End: 2020-12-16

## 2020-12-13 RX ORDER — FENTANYL CITRATE 50 UG/ML
25 INJECTION, SOLUTION INTRAMUSCULAR; INTRAVENOUS
Status: DISCONTINUED | OUTPATIENT
Start: 2020-12-13 | End: 2020-12-20

## 2020-12-13 RX ORDER — PROPOFOL 10 MG/ML
100 INJECTION, EMULSION INTRAVENOUS
Status: COMPLETED | OUTPATIENT
Start: 2020-12-13 | End: 2020-12-13

## 2020-12-13 RX ORDER — ROCURONIUM BROMIDE 10 MG/ML
50 INJECTION, SOLUTION INTRAVENOUS
Status: COMPLETED | OUTPATIENT
Start: 2020-12-13 | End: 2020-12-13

## 2020-12-13 RX ORDER — LORAZEPAM 2 MG/ML
2 INJECTION INTRAMUSCULAR EVERY 6 HOURS
Status: DISCONTINUED | OUTPATIENT
Start: 2020-12-13 | End: 2020-12-13 | Stop reason: SDUPTHER

## 2020-12-13 RX ORDER — DIAZEPAM 10 MG/2ML
5 INJECTION INTRAMUSCULAR EVERY 6 HOURS
Status: DISCONTINUED | OUTPATIENT
Start: 2020-12-13 | End: 2020-12-13

## 2020-12-13 RX ORDER — HYDRALAZINE HYDROCHLORIDE 20 MG/ML
10 INJECTION INTRAMUSCULAR; INTRAVENOUS
Status: DISCONTINUED | OUTPATIENT
Start: 2020-12-13 | End: 2020-12-17

## 2020-12-13 RX ADMIN — CLONIDINE HYDROCHLORIDE 0.1 MG: 0.1 TABLET ORAL at 08:52

## 2020-12-13 RX ADMIN — HYDRALAZINE HYDROCHLORIDE 10 MG: 20 INJECTION INTRAMUSCULAR; INTRAVENOUS at 13:57

## 2020-12-13 RX ADMIN — LORAZEPAM 2 MG: 2 INJECTION INTRAMUSCULAR; INTRAVENOUS at 18:24

## 2020-12-13 RX ADMIN — LORAZEPAM 2 MG: 2 INJECTION INTRAMUSCULAR; INTRAVENOUS at 00:15

## 2020-12-13 RX ADMIN — LORAZEPAM 2 MG: 2 INJECTION INTRAMUSCULAR; INTRAVENOUS at 05:19

## 2020-12-13 RX ADMIN — PROPOFOL 100 MG: 10 INJECTION, EMULSION INTRAVENOUS at 21:18

## 2020-12-13 RX ADMIN — Medication 10 ML: at 22:12

## 2020-12-13 RX ADMIN — SODIUM CHLORIDE, POTASSIUM CHLORIDE, SODIUM LACTATE AND CALCIUM CHLORIDE 100 ML/HR: 600; 310; 30; 20 INJECTION, SOLUTION INTRAVENOUS at 22:40

## 2020-12-13 RX ADMIN — POTASSIUM CHLORIDE: 2 INJECTION, SOLUTION, CONCENTRATE INTRAVENOUS at 19:09

## 2020-12-13 RX ADMIN — HYDRALAZINE HYDROCHLORIDE 10 MG: 20 INJECTION INTRAMUSCULAR; INTRAVENOUS at 18:29

## 2020-12-13 RX ADMIN — LORAZEPAM 4 MG: 2 INJECTION INTRAMUSCULAR; INTRAVENOUS at 15:57

## 2020-12-13 RX ADMIN — POTASSIUM CHLORIDE, DEXTROSE MONOHYDRATE AND SODIUM CHLORIDE 75 ML/HR: 150; 5; 900 INJECTION, SOLUTION INTRAVENOUS at 12:53

## 2020-12-13 RX ADMIN — FENTANYL CITRATE 25 MCG: 50 INJECTION, SOLUTION INTRAMUSCULAR; INTRAVENOUS at 20:47

## 2020-12-13 RX ADMIN — LORAZEPAM 4 MG: 2 INJECTION INTRAMUSCULAR; INTRAVENOUS at 08:55

## 2020-12-13 RX ADMIN — Medication 10 ML: at 13:24

## 2020-12-13 RX ADMIN — PROPOFOL 25 MCG/KG/MIN: 10 INJECTION, EMULSION INTRAVENOUS at 21:28

## 2020-12-13 RX ADMIN — ROCURONIUM BROMIDE 50 MG: 10 INJECTION, SOLUTION INTRAVENOUS at 21:18

## 2020-12-13 RX ADMIN — LORAZEPAM 4 MG: 2 INJECTION INTRAMUSCULAR; INTRAVENOUS at 20:22

## 2020-12-13 RX ADMIN — LORAZEPAM 4 MG: 2 INJECTION INTRAMUSCULAR; INTRAVENOUS at 20:42

## 2020-12-13 RX ADMIN — SODIUM CHLORIDE, POTASSIUM CHLORIDE, SODIUM LACTATE AND CALCIUM CHLORIDE 500 ML: 600; 310; 30; 20 INJECTION, SOLUTION INTRAVENOUS at 22:07

## 2020-12-13 RX ADMIN — LORAZEPAM 2 MG: 2 INJECTION INTRAMUSCULAR; INTRAVENOUS at 11:40

## 2020-12-13 RX ADMIN — CEFTRIAXONE SODIUM 1 G: 1 INJECTION, POWDER, FOR SOLUTION INTRAMUSCULAR; INTRAVENOUS at 12:12

## 2020-12-13 RX ADMIN — CLONIDINE HYDROCHLORIDE 0.1 MG: 0.1 TABLET ORAL at 05:13

## 2020-12-13 RX ADMIN — LORAZEPAM 4 MG: 2 INJECTION INTRAMUSCULAR; INTRAVENOUS at 13:35

## 2020-12-13 RX ADMIN — DIAZEPAM 5 MG: 5 INJECTION, SOLUTION INTRAMUSCULAR; INTRAVENOUS at 17:32

## 2020-12-13 RX ADMIN — LORAZEPAM 4 MG: 2 INJECTION INTRAMUSCULAR; INTRAVENOUS at 19:33

## 2020-12-13 RX ADMIN — CHLORDIAZEPOXIDE HYDROCHLORIDE 25 MG: 25 CAPSULE ORAL at 08:52

## 2020-12-13 RX ADMIN — Medication 10 ML: at 06:00

## 2020-12-13 NOTE — ROUTINE PROCESS
Bedside shift change report given to Shawna Thomas RN (oncoming nurse) by Claudio Henry RN (offgoing nurse). Report included the following information SBAR, MAR, Recent Results, Cardiac Rhythm SR and Dual Neuro Assessment.

## 2020-12-13 NOTE — PROGRESS NOTES
12/13/20 1005   Vital Signs   Temp 97.3 °F (36.3 °C)   Temp Source Oral   Pulse (Heart Rate) (!) 109   Heart Rate Source Monitor   Resp Rate 24   O2 Sat (%) 90 %   Level of Consciousness Alert   BP (!) 202/102   MAP (Calculated) 135   MEWS Score 5     Dr. Juwan Pereira aware, orders to follow. Patient on CIWA protocol & bed alarm in place. 12/13/20 1351   Vitals   Temp 97.9 °F (36.6 °C)   Temp Source Oral   Pulse (Heart Rate) (!) 110   Heart Rate Source Monitor   Resp Rate 23   Level of Consciousness Alert   BP (!) 201/97   MAP (Calculated) 132   MEWS Score 5       1630: Pt experiencing worsening withdrawal symptoms, HR into 130s, RR into 30s. MD on call paged and notified. 12/13/20 1757   Vital Signs   Temp (!) 101.3 °F (38.5 °C)   Temp Source Oral   Pulse (Heart Rate) (!) 129   Heart Rate Source Monitor   Resp Rate 28   O2 Sat (%) 94 %   Level of Consciousness Alert   BP (!) 169/89   MAP (Calculated) 116   MEWS Score 6   1839: Dr. Juwan Pereira notified of temperature. Will continue to monitor. 1900: TRANSFER - OUT REPORT:    Verbal report given to David Monroy RN(name) on Yaneth Driver  being transferred to CCU(unit) for urgent transfer       Report consisted of patients Situation, Background, Assessment and   Recommendations(SBAR). Information from the following report(s) SBAR, Kardex, Intake/Output, MAR, Recent Results, Med Rec Status, Cardiac Rhythm ST and Dual Neuro Assessment was reviewed with the receiving nurse. Lines:   Peripheral IV 12/13/20 Right Forearm (Active)   Site Assessment Clean, dry, & intact 12/13/20 1741   Phlebitis Assessment 0 12/13/20 1741   Infiltration Assessment 0 12/13/20 1741   Dressing Status Clean, dry, & intact 12/13/20 1741   Dressing Type Transparent 12/13/20 1741   Hub Color/Line Status Pink;Flushed; Infusing 12/13/20 1741   Action Taken Open ports on tubing capped 12/13/20 1741   Alcohol Cap Used Yes 12/13/20 1741        Opportunity for questions and clarification was provided.       Patient transported with:   Monitor  O2 @ 6 L liters  Patient-specific medications from Pharmacy  Registered Nurse

## 2020-12-13 NOTE — PROGRESS NOTES
Bedside and Verbal shift change report given to [address] [place] [address] (oncoming nurse) by Rachael Oneal RN (offgoing nurse). Report included the following information SBAR, Kardex, ED Summary, Procedure Summary, Intake/Output, MAR, Recent Results, Cardiac Rhythm NSR/ST, Quality Measures and Dual Neuro Assessment.

## 2020-12-13 NOTE — PROGRESS NOTES
Nery Regan, & Libertad Vilchis    Admit Date: 12/11/2020    Subjective:     Pt looks worse -- Appears to be withdrawing. No sz's. Current Facility-Administered Medications   Medication Dose Route Frequency    LORazepam (ATIVAN) injection 2 mg  2 mg IntraVENous Q6H    hydrALAZINE (APRESOLINE) 20 mg/mL injection 10 mg  10 mg IntraVENous Q4H PRN    . PHARMACY TO SUBSTITUTE PER PROTOCOL (Reordered from: olmesartan (BENICAR) 20 mg tablet)    Per Protocol    cloNIDine HCL (CATAPRES) tablet 0.1 mg  0.1 mg Oral Q3H PRN    0.9% sodium chloride 1,000 mL with thiamine 079 mg, folic acid 1 mg, potassium chloride 20 mEq, magnesium sulfate 1 g, mvi, pedi no.1 with vit k infusion   IntraVENous Q24H    cefTRIAXone (ROCEPHIN) 1 g in 0.9% sodium chloride (MBP/ADV) 50 mL MBP  1 g IntraVENous Q24H    sodium chloride (NS) flush 5-40 mL  5-40 mL IntraVENous Q8H    sodium chloride (NS) flush 5-40 mL  5-40 mL IntraVENous PRN    potassium chloride 10 mEq in 100 ml IVPB  10 mEq IntraVENous PRN    acetaminophen (TYLENOL) tablet 650 mg  650 mg Oral Q6H PRN    Or    acetaminophen (TYLENOL) suppository 650 mg  650 mg Rectal Q6H PRN    polyethylene glycol (MIRALAX) packet 17 g  17 g Oral DAILY PRN    ondansetron (ZOFRAN ODT) tablet 4 mg  4 mg Oral Q8H PRN    Or    ondansetron (ZOFRAN) injection 4 mg  4 mg IntraVENous Q6H PRN    LORazepam (ATIVAN) injection 2 mg  2 mg IntraVENous Q1H PRN    LORazepam (ATIVAN) injection 4 mg  4 mg IntraVENous Q1H PRN    dextrose 5% - 0.9% NaCl with KCl 20 mEq/L infusion  75 mL/hr IntraVENous CONTINUOUS    influenza vaccine 2020-21 (6 mos+)(PF) (FLUARIX/FLULAVAL/FLUZONE QUAD) injection 0.5 mL  0.5 mL IntraMUSCular PRIOR TO DISCHARGE    losartan (COZAAR) tablet 100 mg  100 mg Oral QHS          Objective:     Patient Vitals for the past 8 hrs:   BP Temp Pulse Resp SpO2   12/13/20 1026     95 %   12/13/20 1005 (!) 202/102 97.3 °F (36.3 °C) (!) 109 24 90 %   12/13/20 9188 (!) 189/111  (!) 116     12/13/20 0615 (!) 160/83 97.2 °F (36.2 °C) 97 18 98 %   12/13/20 0513 (!) 181/95  94     12/13/20 0312 (!) 171/82  (!) 103       No intake/output data recorded. 12/11 1901 - 12/13 0700  In: -   Out: 2790 [Urine:2790]    Physical Exam: NAD. Neck -- Supple. No JVD. Heart -- RR (Rate 100's). Lungs -- CTA. Abd -- Benign. Ext -- No LE edema, b/l. Data Review   Recent Results (from the past 24 hour(s))   CBC WITH AUTOMATED DIFF    Collection Time: 12/13/20  2:54 AM   Result Value Ref Range    WBC 5.0 4.1 - 11.1 K/uL    RBC 2.78 (L) 4.10 - 5.70 M/uL    HGB 9.5 (L) 12.1 - 17.0 g/dL    HCT 27.7 (L) 36.6 - 50.3 %    MCV 99.6 (H) 80.0 - 99.0 FL    MCH 34.2 (H) 26.0 - 34.0 PG    MCHC 34.3 30.0 - 36.5 g/dL    RDW 13.2 11.5 - 14.5 %    PLATELET 47 (LL) 047 - 400 K/uL    MPV 10.4 8.9 - 12.9 FL    NRBC 0.0 0  WBC    ABSOLUTE NRBC 0.00 0.00 - 0.01 K/uL    NEUTROPHILS 86 (H) 32 - 75 %    LYMPHOCYTES 6 (L) 12 - 49 %    MONOCYTES 6 5 - 13 %    EOSINOPHILS 2 0 - 7 %    BASOPHILS 0 0 - 1 %    IMMATURE GRANULOCYTES 0 %    ABS. NEUTROPHILS 4.3 1.8 - 8.0 K/UL    ABS. LYMPHOCYTES 0.3 (L) 0.8 - 3.5 K/UL    ABS. MONOCYTES 0.3 0.0 - 1.0 K/UL    ABS. EOSINOPHILS 0.1 0.0 - 0.4 K/UL    ABS. BASOPHILS 0.0 0.0 - 0.1 K/UL    ABS. IMM. GRANS. 0.0 K/UL    DF MANUAL      RBC COMMENTS MACROCYTOSIS  1+        Pathologist review        Pathologic examination results can be viewed in [facility] Chart Review under the Pathology tab.    METABOLIC PANEL, COMPREHENSIVE    Collection Time: 12/13/20  2:54 AM   Result Value Ref Range    Sodium 139 136 - 145 mmol/L    Potassium 3.5 3.5 - 5.1 mmol/L    Chloride 108 97 - 108 mmol/L    CO2 24 21 - 32 mmol/L    Anion gap 7 5 - 15 mmol/L    Glucose 103 (H) 65 - 100 mg/dL    BUN 8 6 - 20 MG/DL    Creatinine 0.87 0.70 - 1.30 MG/DL BUN/Creatinine ratio 9 (L) 12 - 20      GFR est AA >60 >60 ml/min/1.73m2    GFR est non-AA >60 >60 ml/min/1.73m2    Calcium 7.6 (L) 8.5 - 10.1 MG/DL    Bilirubin, total 1.0 0.2 - 1.0 MG/DL    ALT (SGPT) 41 12 - 78 U/L    AST (SGOT) 63 (H) 15 - 37 U/L    Alk. phosphatase 62 45 - 117 U/L    Protein, total 6.0 (L) 6.4 - 8.2 g/dL    Albumin 2.7 (L) 3.5 - 5.0 g/dL    Globulin 3.3 2.0 - 4.0 g/dL    A-G Ratio 0.8 (L) 1.1 - 2.2     MAGNESIUM    Collection Time: 12/13/20  2:54 AM   Result Value Ref Range    Magnesium 1.7 1.6 - 2.4 mg/dL   FERRITIN    Collection Time: 12/13/20  2:54 AM   Result Value Ref Range    Ferritin 957 (H) 26 - 388 NG/ML   IRON PROFILE    Collection Time: 12/13/20  2:54 AM   Result Value Ref Range    Iron 20 (L) 35 - 150 ug/dL    TIBC 193 (L) 250 - 450 ug/dL    Iron % saturation 10 (L) 20 - 50 %   VITAMIN B12    Collection Time: 12/13/20  2:54 AM   Result Value Ref Range    Vitamin B12 596 193 - 986 pg/mL   FOLATE    Collection Time: 12/13/20  2:54 AM   Result Value Ref Range    Folate 21.8 (H) 5.0 - 21.0 ng/mL           Assessment:     Principal Problem:    AMS (altered mental status) (12/11/2020) -- Felt largely due to ETOH w/d. Active Problems:    Essential hypertension (11/21/2015)      Excessive drinking of alcohol with significant risk for ETOH w/d.      UTI (urinary tract infection) (12/12/2020)      Hypokalemia (12/12/2020)      Thrombocytopenia -- Likely due to ETOH abuse. Anemia -- Prob dilutional, etc.  Iron profile suggestive of anemia of chronic dz. Plan:     1. Cont CIWA protocol. He is having trouble taking PO, so change scheduled Librium to scheduled IV Ativan 2 mg Q6h.  2. Cont IVF & Goody Bag.  3. Cont Rocephin & f/u urine cx.  4. Follow Hgb & plts. F/u remaining anemia labs. 5. If pt worsens, he will need to go to ICU for closer monitoring & heavier sedation. D/w wife, present.         Orlando Bradley MD

## 2020-12-13 NOTE — PROGRESS NOTES
Critical lab value of 47 for platelet count reported from [facility] at 0345. JOSE ARMANDO Alonzo notified.

## 2020-12-13 NOTE — PROGRESS NOTES
Transition of Care: Pending medical progress patients janes [facility] (Phone: [phone]) would like patient to be discharged home with home health services but also has discussed SNF vs Acute Rehab, pending medical progress. Cm will continue to follow. Reason for Admission:   AMS                    RUR Score:  14%                   Plan for utilizing home health:   Uncertain at this time but if patient needs home health would like to use [facility] ([phone] ) and if they don't accept patients janes [facility] was fine with using [facility] ( [phone] )    If patient needs SNF/Rehab patients spouse was going to have a conversation with Dr. Steven Watson [facility] to discuss and would contact care management if she needs  SNF/Rehab lists. Patients wife prefers discharge home with home health vs Rehab/SNF. She would have a bed and bathroom available downstairs for the patient because currently patients bedroom is on the 2nd floor. PCP:     Jinny Voss MD   General - Family Medicine    [phone]       Current Advanced Directive/Advance Care Plan:   Full Code and no ACP docs                         The Plan for Transition of Care is related to the following treatment goals: PT/OT SN    The  [facility] representative [facility] was provided with a choice of provider and agrees   with the discharge plan. [x] Yes [] No    Freedom of choice list was provided with basic dialogue that supports the patient's individualized plan of care/goals, treatment preferences and shares the quality data associated with the providers.  [x] Yes [] No

## 2020-12-14 ENCOUNTER — APPOINTMENT (OUTPATIENT)
Dept: GENERAL RADIOLOGY | Age: 75
DRG: 896 | End: 2020-12-14
Attending: EMERGENCY MEDICINE
Payer: MEDICARE

## 2020-12-14 LAB
ALBUMIN SERPL-MCNC: 2.3 G/DL (ref 3.5–5)
ALBUMIN/GLOB SERPL: 0.7 {RATIO} (ref 1.1–2.2)
ALP SERPL-CCNC: 51 U/L (ref 45–117)
ALT SERPL-CCNC: 34 U/L (ref 12–78)
ANION GAP SERPL CALC-SCNC: 9 MMOL/L (ref 5–15)
AST SERPL-CCNC: 49 U/L (ref 15–37)
BASOPHILS # BLD: 0.1 K/UL (ref 0–0.1)
BASOPHILS NFR BLD: 1 % (ref 0–1)
BILIRUB SERPL-MCNC: 0.6 MG/DL (ref 0.2–1)
BUN SERPL-MCNC: 12 MG/DL (ref 6–20)
BUN/CREAT SERPL: 11 (ref 12–20)
CALCIUM SERPL-MCNC: 7 MG/DL (ref 8.5–10.1)
CHLORIDE SERPL-SCNC: 110 MMOL/L (ref 97–108)
CO2 SERPL-SCNC: 22 MMOL/L (ref 21–32)
CREAT SERPL-MCNC: 1.06 MG/DL (ref 0.7–1.3)
DIFFERENTIAL METHOD BLD: ABNORMAL
EOSINOPHIL # BLD: 0.1 K/UL (ref 0–0.4)
EOSINOPHIL NFR BLD: 1 % (ref 0–7)
ERYTHROCYTE [DISTWIDTH] IN BLOOD BY AUTOMATED COUNT: 13.9 % (ref 11.5–14.5)
GLOBULIN SER CALC-MCNC: 3.2 G/DL (ref 2–4)
GLUCOSE SERPL-MCNC: 112 MG/DL (ref 65–100)
HCT VFR BLD AUTO: 25.5 % (ref 36.6–50.3)
HGB BLD-MCNC: 8.3 G/DL (ref 12.1–17)
IMM GRANULOCYTES # BLD AUTO: 0.1 K/UL (ref 0–0.04)
IMM GRANULOCYTES NFR BLD AUTO: 1 % (ref 0–0.5)
LYMPHOCYTES # BLD: 0.6 K/UL (ref 0.8–3.5)
LYMPHOCYTES NFR BLD: 9 % (ref 12–49)
MAGNESIUM SERPL-MCNC: 1.6 MG/DL (ref 1.6–2.4)
MCH RBC QN AUTO: 33.7 PG (ref 26–34)
MCHC RBC AUTO-ENTMCNC: 32.5 G/DL (ref 30–36.5)
MCV RBC AUTO: 103.7 FL (ref 80–99)
MONOCYTES # BLD: 0.6 K/UL (ref 0–1)
MONOCYTES NFR BLD: 10 % (ref 5–13)
NEUTS SEG # BLD: 4.8 K/UL (ref 1.8–8)
NEUTS SEG NFR BLD: 78 % (ref 32–75)
NRBC # BLD: 0 K/UL (ref 0–0.01)
NRBC BLD-RTO: 0 PER 100 WBC
PLATELET # BLD AUTO: 50 K/UL (ref 150–400)
PMV BLD AUTO: 10.2 FL (ref 8.9–12.9)
POTASSIUM SERPL-SCNC: 3.8 MMOL/L (ref 3.5–5.1)
PROT SERPL-MCNC: 5.5 G/DL (ref 6.4–8.2)
RBC # BLD AUTO: 2.46 M/UL (ref 4.1–5.7)
RBC MORPH BLD: ABNORMAL
RBC MORPH BLD: ABNORMAL
SODIUM SERPL-SCNC: 141 MMOL/L (ref 136–145)
WBC # BLD AUTO: 6.3 K/UL (ref 4.1–11.1)

## 2020-12-14 PROCEDURE — 74011250637 HC RX REV CODE- 250/637: Performed by: EMERGENCY MEDICINE

## 2020-12-14 PROCEDURE — 74011000258 HC RX REV CODE- 258: Performed by: EMERGENCY MEDICINE

## 2020-12-14 PROCEDURE — 94003 VENT MGMT INPAT SUBQ DAY: CPT

## 2020-12-14 PROCEDURE — 74011000258 HC RX REV CODE- 258: Performed by: INTERNAL MEDICINE

## 2020-12-14 PROCEDURE — 74011000258 HC RX REV CODE- 258: Performed by: FAMILY MEDICINE

## 2020-12-14 PROCEDURE — 65620000000 HC RM CCU GENERAL

## 2020-12-14 PROCEDURE — 74011000250 HC RX REV CODE- 250: Performed by: EMERGENCY MEDICINE

## 2020-12-14 PROCEDURE — 77030040361 HC SLV COMPR DVT MDII -B

## 2020-12-14 PROCEDURE — 83735 ASSAY OF MAGNESIUM: CPT

## 2020-12-14 PROCEDURE — C9113 INJ PANTOPRAZOLE SODIUM, VIA: HCPCS | Performed by: EMERGENCY MEDICINE

## 2020-12-14 PROCEDURE — 74011000250 HC RX REV CODE- 250: Performed by: FAMILY MEDICINE

## 2020-12-14 PROCEDURE — 74011250636 HC RX REV CODE- 250/636: Performed by: INTERNAL MEDICINE

## 2020-12-14 PROCEDURE — 74011250636 HC RX REV CODE- 250/636: Performed by: EMERGENCY MEDICINE

## 2020-12-14 PROCEDURE — 36415 COLL VENOUS BLD VENIPUNCTURE: CPT

## 2020-12-14 PROCEDURE — 74018 RADEX ABDOMEN 1 VIEW: CPT

## 2020-12-14 PROCEDURE — 85025 COMPLETE CBC W/AUTO DIFF WBC: CPT

## 2020-12-14 PROCEDURE — 80053 COMPREHEN METABOLIC PANEL: CPT

## 2020-12-14 RX ORDER — FOLIC ACID 5 MG/ML
1 INJECTION, SOLUTION INTRAMUSCULAR; INTRAVENOUS; SUBCUTANEOUS DAILY
Status: DISCONTINUED | OUTPATIENT
Start: 2020-12-14 | End: 2020-12-14 | Stop reason: SDUPTHER

## 2020-12-14 RX ORDER — MAGNESIUM SULFATE 1 G/100ML
1 INJECTION INTRAVENOUS ONCE
Status: COMPLETED | OUTPATIENT
Start: 2020-12-14 | End: 2020-12-14

## 2020-12-14 RX ORDER — CHLORDIAZEPOXIDE HYDROCHLORIDE 25 MG/1
25 CAPSULE, GELATIN COATED ORAL EVERY 8 HOURS
Status: DISCONTINUED | OUTPATIENT
Start: 2020-12-14 | End: 2020-12-20

## 2020-12-14 RX ORDER — SODIUM CHLORIDE, SODIUM LACTATE, POTASSIUM CHLORIDE, CALCIUM CHLORIDE 600; 310; 30; 20 MG/100ML; MG/100ML; MG/100ML; MG/100ML
100 INJECTION, SOLUTION INTRAVENOUS CONTINUOUS
Status: DISCONTINUED | OUTPATIENT
Start: 2020-12-14 | End: 2020-12-16

## 2020-12-14 RX ORDER — MIDODRINE HYDROCHLORIDE 5 MG/1
5 TABLET ORAL EVERY 8 HOURS
Status: DISCONTINUED | OUTPATIENT
Start: 2020-12-14 | End: 2020-12-17

## 2020-12-14 RX ADMIN — SODIUM CHLORIDE, SODIUM LACTATE, POTASSIUM CHLORIDE, AND CALCIUM CHLORIDE 100 ML/HR: 600; 310; 30; 20 INJECTION, SOLUTION INTRAVENOUS at 22:59

## 2020-12-14 RX ADMIN — CHLORDIAZEPOXIDE HYDROCHLORIDE 25 MG: 25 CAPSULE ORAL at 20:34

## 2020-12-14 RX ADMIN — THIAMINE HYDROCHLORIDE 100 MG: 100 INJECTION, SOLUTION INTRAMUSCULAR; INTRAVENOUS at 11:37

## 2020-12-14 RX ADMIN — Medication 10 ML: at 05:13

## 2020-12-14 RX ADMIN — PROPOFOL 20 MCG/KG/MIN: 10 INJECTION, EMULSION INTRAVENOUS at 03:51

## 2020-12-14 RX ADMIN — SODIUM CHLORIDE, SODIUM LACTATE, POTASSIUM CHLORIDE, AND CALCIUM CHLORIDE 100 ML/HR: 600; 310; 30; 20 INJECTION, SOLUTION INTRAVENOUS at 11:37

## 2020-12-14 RX ADMIN — CEFTRIAXONE SODIUM 1 G: 1 INJECTION, POWDER, FOR SOLUTION INTRAMUSCULAR; INTRAVENOUS at 13:00

## 2020-12-14 RX ADMIN — MIDODRINE HYDROCHLORIDE 5 MG: 5 TABLET ORAL at 13:06

## 2020-12-14 RX ADMIN — FOLIC ACID: 5 INJECTION, SOLUTION INTRAMUSCULAR; INTRAVENOUS; SUBCUTANEOUS at 11:38

## 2020-12-14 RX ADMIN — CHLORDIAZEPOXIDE HYDROCHLORIDE 25 MG: 25 CAPSULE ORAL at 13:07

## 2020-12-14 RX ADMIN — Medication 10 ML: at 13:07

## 2020-12-14 RX ADMIN — Medication 50 MCG/HR: at 02:26

## 2020-12-14 RX ADMIN — SODIUM CHLORIDE, POTASSIUM CHLORIDE, SODIUM LACTATE AND CALCIUM CHLORIDE 100 ML/HR: 600; 310; 30; 20 INJECTION, SOLUTION INTRAVENOUS at 10:00

## 2020-12-14 RX ADMIN — MIDODRINE HYDROCHLORIDE 5 MG: 5 TABLET ORAL at 20:34

## 2020-12-14 RX ADMIN — MAGNESIUM SULFATE HEPTAHYDRATE 1 G: 1 INJECTION, SOLUTION INTRAVENOUS at 10:54

## 2020-12-14 RX ADMIN — SODIUM CHLORIDE 40 MG: 9 INJECTION INTRAMUSCULAR; INTRAVENOUS; SUBCUTANEOUS at 13:00

## 2020-12-14 RX ADMIN — PROPOFOL 15 MCG/KG/MIN: 10 INJECTION, EMULSION INTRAVENOUS at 18:21

## 2020-12-14 NOTE — PROGRESS NOTES
Spiritual Care Assessment/Progress Note  [address]      NAME: Kirill Najera      MRN: [id number]  AGE: 76 y.o.  SEX: male  Jewish Affiliation: Other   Language: English     12/14/2020     Total Time (in minutes): 9     Spiritual Assessment begun in [facility] through conversation with:         []Patient        [] Family    [] Friend(s)        Reason for Consult: Initial/Spiritual assessment, critical care     Spiritual beliefs: (Please include comment if needed)     [] Identifies with a jose tradition:         [] Supported by a jose community:            [] Claims no spiritual orientation:           [] Seeking spiritual identity:                [] Adheres to an individual form of spirituality:           [x] Not able to assess:                           Identified resources for coping:      [] Prayer                               [] Music                  [] Guided Imagery     [] Family/friends                 [] Pet visits     [] Devotional reading                         [] Unknown     [] Other:                                              Interventions offered during this visit: (See comments for more details)    Patient Interventions: Initial/Spiritual assessment, Critical care, Initial visit, Other (comment)(Note left at bedside)     Family/Friend(s): Other (comment)(Note left at bedside)     Plan of Care:     [] Support spiritual and/or cultural needs    [] Support AMD and/or advance care planning process      [] Support grieving process   [] Coordinate Rites and/or Rituals    [] Coordination with community clergy   [] No spiritual needs identified at this time   [] Detailed Plan of Care below (See Comments)  [] Make referral to Music Therapy  [] Make referral to Pet Therapy     [] Make referral to Addiction services  [] Make referral to [facility]  [] Make referral to Spiritual Care Partner  [] No future visits requested        [] Follow up upon further referrals     Comments: Visited Mr Paty Morrow in [address] [place] [address] for initial spiritual assessment. Mr Paty Morrow was lying quietly in bed with his eyes closed and on vent support; no family was present at time of visit. Left note at bedside assuring patient and family of ongoing  availability for support. : Rev. Payam Thibodeaux.  Mykel Laurent; [facility], to contact [phone] [address] Nixon call: [phone]

## 2020-12-14 NOTE — CONSULTS
[facility]    ICU Team Consult Note    Name: Hiro Burnett   : 1945   MRN: [id number]   Date: 2020      Subjective:   Progress Note: 2020      Patient is asked to be seen by Dr. Amanda Gonzáles for alcohol withdrawals and respiratory distress, necessitating the need for possible ICU care. Reason for ICU Admission: Alcohol withdrawals     Overnight Events: The patient was noted for worsening agitation and delirium while on the medical floor. The patient was noted for tachypnea and requiring BiPAP. The patient remains encephalopathic and has needed several additional doses of benzodiazepines. POD:* No surgery found *    S/P:     Active Problem List:     Problem List  Date Reviewed: 2020          Codes Class    UTI (urinary tract infection) ICD-10-CM: N39.0  ICD-9-CM: 599.0         Hypokalemia ICD-10-CM: E87.6  ICD-9-CM: 276.8         Thrombocytopenia (HCC) ICD-10-CM: D69.6  ICD-9-CM: 287.5         Anemia ICD-10-CM: D64.9  ICD-9-CM: 285.9         * (Principal) AMS (altered mental status) ICD-10-CM: R41.82  ICD-9-CM: 780.97         Excessive drinking of alcohol ICD-10-CM: F10.10  ICD-9-CM: 305.00         Hyponatremia ICD-10-CM: E87.1  ICD-9-CM: 276.1         HATTIE (acute kidney injury) ([facility] [address] ICD-10-CM: N17.9  ICD-9-CM: 584.9         Essential hypertension ICD-10-CM: I10  ICD-9-CM: 401.9               Past Medical History:      has a past medical history of Cancer ([facility] [address] (~ ), Hypertension, and Other ill-defined conditions(799.89) (1960~). Past Surgical History:      has a past surgical history that includes hx other surgical (~ ) and colonoscopy (2011). Home Medications:     Prior to Admission medications    Medication Sig Start Date End Date Taking? Authorizing Provider   losartan (COZAAR) 100 mg tablet TAKE 1 TABLET BY MOUTH EVERY DAY 20  Yes Asia Saini MD       Allergies/Social/Family History:      Allergies   Allergen Reactions    Ace Inhibitors Cough  Thiazides Other (comments)     hyponatremia      Social History     Tobacco Use    Smoking status: Never Smoker    Smokeless tobacco: Never Used   Substance Use Topics    Alcohol use: Yes     Comment: occasional beer      Family History   Problem Relation Age of Onset    Hypertension Father        Review of Systems:     Review of systems not obtained due to patient factors. Objective:   Vital Signs:  Visit Vitals  BP (!) 171/100   Pulse (!) 127   Temp 99 °F (37.2 °C)   Resp (!) 37   Wt 90 kg (198 lb 6.4 oz)   SpO2 95%   BMI 27.67 kg/m²    O2 Flow Rate (L/min): 40 l/min O2 Device: BIPAP Temp (24hrs), Av.4 °F (36.9 °C), Min:97.2 °F (36.2 °C), Max:101.3 °F (38.5 °C)           Intake/Output:     Intake/Output Summary (Last 24 hours) at 2020  Last data filed at 2020 1213  Gross per 24 hour   Intake    Output 1550 ml   Net -1550 ml       Physical Exam:    General:  delirious, severe distress, appears older than stated age, confused, disoriented  Eye:  conjunctivae/corneas clear. PERRL, EOM's intact. Fundi benign  Neurologic:  no focal deficits, grossly non-focal, CN II-XII intact  Lymphatic:  Cervical, supraclavicular, and axillary nodes normal.   Neck:  normal and no erythema or exudates noted. Lungs:  clear to auscultation bilaterally  Heart:  normal apical impulse, regular rate and rhythm, S1, S2 normal, no S3 or S4  Abdomen:  soft, non-tender.  Bowel sounds normal. No masses,  no organomegaly  Cardiovascular:  Regular rate and rhythm, S1S2 present, without murmur or extra heart sounds, pedal pulses normal and no edema  Skin:  Normal. and no rash or abnormalities    LABS AND  DATA: Personally reviewed  Recent Labs     20   WBC 5.0 4.2   HGB 9.5* 10.4*   HCT 27.7* 29.7*   PLT 47* 49*     Recent Labs     20  1240    140 142   K 3.5 3.4* 3.3*    106 105   CO2 24 25 28   BUN 8 9 8   CREA 0.87 1.06 0.96   * 95 68   CA 7.6* 7.7* 7.9*   MG 1.7 1.6 1.5*   PHOS  --   --  3.2     Recent Labs     12/13/20  0254 12/11/20  1240   AP 62 84   TP 6.0* 7.5   ALB 2.7* 3.3*   GLOB 3.3 4.2*     Recent Labs     12/11/20  1240   INR 0.9   PTP 9.9      Recent Labs     12/13/20  1958   PHI 7.40   PCO2I 37.7   PO2I 80   FIO2I 40     Recent Labs     12/11/20  1240   TROIQ <0.05       Hemodynamics:   PAP:   CO:     Wedge:   CI:     CVP:    SVR:       PVR:       Ventilator Settings:  Mode Rate Tidal Volume Pressure FiO2 PEEP                    Peak airway pressure:      Minute ventilation: 9.1 l/min        MEDS: Reviewed    Chest X-Ray: personally reviewed and report checked        Assessment:     ICU Problems:  Acute metabolic encephalopathy  Delirium termens   Obstructive Sleep Apnea  Acute respiratory distress    ICU Comprehensive Plan of Care:   Plans for this Shift:   1. Continue with benzodiazapine dosing for alcohol withdrawals   2. Monitor airway, high risk for intubation  3.  Continue with alcohol withdrawal protocol    Multidisciplinary Rounds Completed:  No    ABCDEF Bundle/Checklist  Pain Medications: Fentanyl  Target RASS: 0 - Alert & Calm - Spontaneously pays attention to caregiver  Sedation Medications: None  CAM-ICU:  Positive  Mobility: Bedrest  PT/OT: Will consult PT   Restraints: None needed at this time  Discussed Plan of Care (goals of care): No  Addressed Code Status: Full Code    CARDIOVASCULAR  Cardiac Gtts: None  SBP Goal of: < 180 mmHg  MAP Goal of: > 65 mmHg  Transfusion Trigger (Hgb): <7 g/dL    RESPIRATORY  Vent Goals:   N/A  DVT Prophylaxis (if no, list reason): Heparin   SPO2 Goal: > 92%  Pulmonary toilet: Incentive Spirometry     GI/  August Catheter Present: Yes  GI Prophylaxis: Not at this time   Nutrition: No   IVFs: Multivitamin   Bowel Movement: No  Bowel Regimen: None needed at this time  Insulin: None    ANTIBIOTICS  Antibiotics:  Ceftriaxone    T/L/D  Tubes: None  Lines: Peripheral IV  Drains: None    SPECIAL EQUIPMENT  None    DISPOSITION  Stay in ICU    CRITICAL CARE CONSULTANT NOTE  I had a face to face encounter with the patient, reviewed and interpreted patient data including clinical events, labs, images, vital signs, I/O's, and examined patient. I have discussed the case and the plan and management of the patient's care with the consulting services, the bedside nurses and the respiratory therapist.      NOTE OF PERSONAL INVOLVEMENT IN CARE   This patient has a high probability of imminent, clinically significant deterioration, which requires the highest level of preparedness to intervene urgently. I participated in the decision-making and personally managed or directed the management of the following life and organ supporting interventions that required my frequent assessment to treat or prevent imminent deterioration. I personally spent 40 minutes of critical care time. This is time spent at this critically ill patient's bedside actively involved in patient care as well as the coordination of care and discussions with the patient's family. This does not include any procedural time which has been billed separately.

## 2020-12-14 NOTE — PROGRESS NOTES
0730: Bedside shift change report given to Zuleyka Kirk RN (oncoming nurse) by ESTRADA Chopra and Brennon Garay RN (offgoing nurse). Report included the following information SBAR, Kardex, ED Summary, Procedure Summary, Intake/Output, MAR, Recent Results, Cardiac Rhythm NSR and Dual Neuro Assessment. 0830: Summer, pt's wife, @ the bedside. 1330: RN @ the bedside; OGT inserted and KUB ordered. 1415: Dr Lindsay Winter @ the bedside with [facility]. 1425: Xray @ the bedside.

## 2020-12-14 NOTE — PROGRESS NOTES
Patient's wife, Jaime Fleming called and left a voicemail to call back so that we can give her an update. 2214: Patient's wife called back and was made aware of patient's current condition: intubation and light sedation. Opportunity for questions given. States she will be in today sometime to see him.

## 2020-12-14 NOTE — PROGRESS NOTES
Chart reviewed, patient received sedated and on vent. Pt s/p rapid response. Per ABCDE protocol, will work with patient when PEEP is 10.0 or less, FIO2 60% or less (FiO2 80% per chart), and patient is following basic commands. Will follow patient peripherally. Recommend nursing to complete with patient, as able, in order to promote cardiopulmonary systems, maintain strength, endurance and independence:   -bed in chair position with foot board on 3x/day 30-60 mins max each and/OR reverse trendelenburg with foot board on and non-skid footwear  -passive ROM B UEs and LEs during bathing to prevent contractures  -positioning to prevent edema and contractures. Thank you for your assistance.

## 2020-12-14 NOTE — PROGRESS NOTES
[facility]    ICU TEAM Progress Note    Name: Juan Luis Anderson   : 1945   MRN: [id number]   Date: 2020      Assessment     ICU Problems:  Acute metabolic encephalopathy  Delirium termens   Obstructive Sleep Apnea  Acute respiratory distress    ICU Comprehensive Plan of Care:     Plans for this Shift:   Severe alcohol withdrawal/DTs-currently on propofol and fentanyl for sedation, add Librium to therapy, start thiamine and folic acid supplementation    Transient hypotension-keep maps above 65, start midodrine therapy, resuscitation with IV fluids as needed    Acute respiratory tdlpipr-vcxjekrgk-hleomzqs lung protective strategies on the ventilator, needing an impressive FiO2, daily weaning trials    Start tube feeding, Protonix for GI prophylaxis    Oliguric, monitor urine output closely, dose medication renally, avoid nephrotoxic agents, continue maintenance IV fluids, correct electrolyte derangements as needed    Quite thrombocytopenic-monitor off chemical DVT prophylaxis, reports of hematuria-platelet count less than 20 and still has persistent hematuria will transfuse    Spiked a temperature yesterday, white count within normal limits, positive UA but negative urine culture-for now monitor off antibiotics    Keep glucose less than 180 with subcutaneous insulin as needed      Subjective:   Progress Note: 2020        HPI: Patient is asked to be seen by Dr. Anisa Wells for alcohol withdrawals and respiratory distress, necessitating the need for possible ICU care.      Reason for ICU Admission: Alcohol withdrawals      Overnight Events: The patient was noted for worsening agitation and delirium while on the medical floor. The patient was noted for tachypnea and requiring BiPAP. The patient remains encephalopathic and has needed several additional doses of benzodiazepines.      Overnight Events: Intubated overnight, oliguric    Active Problem List:     Problem List  Date Reviewed: 2020 Codes Class    UTI (urinary tract infection) ICD-10-CM: N39.0  ICD-9-CM: 599.0         Hypokalemia ICD-10-CM: E87.6  ICD-9-CM: 276.8         Thrombocytopenia (HCC) ICD-10-CM: D69.6  ICD-9-CM: 287.5         Anemia ICD-10-CM: D64.9  ICD-9-CM: 285.9         * (Principal) AMS (altered mental status) ICD-10-CM: R41.82  ICD-9-CM: 780.97         Excessive drinking of alcohol ICD-10-CM: F10.10  ICD-9-CM: 305.00         Hyponatremia ICD-10-CM: E87.1  ICD-9-CM: 276.1         HATTIE (acute kidney injury) ([facility] ICD-10-CM: N17.9  ICD-9-CM: 584.9         Essential hypertension ICD-10-CM: I10  ICD-9-CM: 401.9               Past Medical History:      has a past medical history of Cancer ([facility] (~ ), Hypertension, and Other ill-defined conditions(799.89) (1960~). Past Surgical History:      has a past surgical history that includes hx other surgical (~ ) and colonoscopy (2011). Home Medications:     Prior to Admission medications    Medication Sig Start Date End Date Taking? Authorizing Provider   losartan (COZAAR) 100 mg tablet TAKE 1 TABLET BY MOUTH EVERY DAY 20  Yes Barbra Oscar MD       Allergies/Social/Family History:      Allergies   Allergen Reactions    Ace Inhibitors Cough    Thiazides Other (comments)     hyponatremia      Social History     Tobacco Use    Smoking status: Never Smoker    Smokeless tobacco: Never Used   Substance Use Topics    Alcohol use: Yes     Comment: occasional beer      Family History   Problem Relation Age of Onset    Hypertension Father        Objective:   Vital Signs:  Visit Vitals  /61   Pulse 71   Temp 99.2 °F (37.3 °C)   Resp 20   Ht 5' 11\" (1.803 m)   Wt 90 kg (198 lb 6.4 oz)   SpO2 99%   BMI 27.67 kg/m²    O2 Flow Rate (L/min): 40 l/min O2 Device: Endotracheal tube, Ventilator Temp (24hrs), Av.7 °F (37.6 °C), Min:98.9 °F (37.2 °C), Max:101.3 °F (38.5 °C)           Intake/Output:     Intake/Output Summary (Last 24 hours) at 2020 1520  Last data filed at 12/14/2020 0900  Gross per 24 hour   Intake 6288.09 ml   Output 915 ml   Net 5373.09 ml       Physical Exam:  General-intubated, sedated  Neuro-pupils reactive, positive cough, withdraws in uppers  Cardiac-RRR  Lungs-clear  Abdomen-soft, nontender, nondistended  Extremities-warm        LABS AND  DATA: Personally reviewed  Recent Labs     12/14/20 0436 12/13/20 0254   WBC 6.3 5.0   HGB 8.3* 9.5*   HCT 25.5* 27.7*   PLT 50* 47*     Recent Labs     12/14/20 0436 12/13/20  0254    139   K 3.8 3.5   * 108   CO2 22 24   BUN 12 8   CREA 1.06 0.87   * 103*   CA 7.0* 7.6*   MG 1.6 1.7     Recent Labs     12/14/20 0436 12/13/20 0254   AP 51 62   TP 5.5* 6.0*   ALB 2.3* 2.7*   GLOB 3.2 3.3     No results for input(s): INR, PTP, APTT, INREXT in the last 72 hours. Recent Labs     12/13/20 2212 12/13/20 1958   PHI 7.37 7.40   PCO2I 35.9 37.7   PO2I 127* 80   FIO2I 100 40     No results for input(s): CPK, CKMB, TROIQ, BNPP in the last 72 hours. Hemodynamics:   PAP:   CO:     Wedge:   CI:     CVP:    SVR:       PVR:       Ventilator Settings:  Mode Rate Tidal Volume Pressure FiO2 PEEP   Assist control, Volume control   450 ml    70 %(weaned at this time sats remain 97%) 10 cm H20     Peak airway pressure: 22 cm H2O    Minute ventilation: 9.58 l/min        MEDS: Reviewed    Chest X-Ray:  CXR Results  (Last 48 hours)               12/13/20 2151  XR CHEST PORT Final result    Impression:  IMPRESSION:   Small left-sided pleural effusion with small amount of left basilar atelectasis   as well. ET tube is in appropriate position. NG tube does not definitively terminate below the diaphragm. Narrative:  Clinical history: ETT Plaement   INDICATION:   ETT Plaement   COMPARISON: 6/26/2008       FINDINGS:   AP portable upright view of the chest demonstrates a stable  cardiopericardial   silhouette. Interval small left-sided pleural effusion.  Interval increased   interstitial opacity. ET tube approximately 4 cm of the above the alex. NG   tube. There is no focal consolidation. .There is no pneumothorax. . Patient is on a   cardiac monitor. NOTE OF PERSONAL INVOLVEMENT IN CARE   This patient has a high probability of imminent, clinically significant deterioration, which requires the highest level of preparedness to intervene urgently. I participated in the decision-making and personally managed or directed the management of the following life and organ supporting interventions that required my frequent assessment to treat or prevent imminent deterioration. I personally spent 40 minutes of critical care time. This does not include any procedural time.     Signed By: Sukh Smith MD     December 14, 2020

## 2020-12-14 NOTE — PROCEDURES
[facility]      Procedure Note - Intubation:   Performed by Mera Langley DO . Immediately prior to the procedure, the patient was reevaluated and found suitable for the planned procedure and any planned medications. Immediately prior to the procedure a time out was called to verify the correct patient, procedure, equipment, staff, and marking as appropriate. Medications given were propofol and rocuronium (Zemuron). A number 8.0 cuffed   ETT was placed to 26 cm at the teeth. Placement was evaluated by noting bilateral, symmetric breath sounds, good end-tidal CO2 detector color change  and chest x-ray visualization. Attempts required: 1. Complications: none. RSI was used. .  The procedure was tolerated well.

## 2020-12-14 NOTE — PROGRESS NOTES
12/14/2020 -   NICOLASA:  - RUR: 16%  - Disposition is TBD dependent on progression  - CM notes that preference is for [facility] via [facility] or At [address]  - Patient will need IM Letter prior to discharge    - Patient is S/P intubation 12/13  - Patient remains vented and sedated  - CIWA Protocol continues  - PT and OT pending medical appropriateness  CRM: Pavel Jon, MPH, [address] [place] [address]; Z: [phone]

## 2020-12-14 NOTE — PROGRESS NOTES
1930 Bedside and Verbal shift change report given to [facility] (oncoming nurse) by [employer] (offgoing nurse). Report included the following information SBAR, Kardex, Procedure Summary, Intake/Output, MAR, Accordion and Recent Results.

## 2020-12-14 NOTE — PROGRESS NOTES
Comprehensive Nutrition Assessment    Type and Reason for Visit: Initial, Consult    Nutrition Recommendations/Plan:      Start enteral nutrition support (see goal below)    [place] liquid MVI    Transition B1 and Folic acid supplementation to NG    Monitor lytes and replace prn-Check BMP with phosphorus and magnesium daily x 3     Nutrition Assessment:    Pt admitted with AMS (recent fall). PMHx: Melanoma, HTN, Alcohol abuse. Noted: DT's, acute respiratory distress-intubated last night. Mr Ailyn Alvarez appears well-nourished. Left message for wife concerning appetite/?wt loss PTA (per MST). Weight stable for at least the past year per trends in EHR (see below). Diprivan @ current rate of 8.1 ml/hr will provide 214 lipid calories per day. Suggested tube feeding: Osmolite 1.5 @ 50 ml/hr with 1 packet Prosource daily and 150 ml water flush q 4 hr. This will provide 1200 ml, 1860 calories (2074 including Diprivan), 90 gm protein and 1875 ml free water (tube feeding/flush) per day to meet estimated needs. Goody vitamins ordered-transition to oral route if tolerates enteral feeds. Corrected CA 8.4 mg/dl-slightly BNL. Magnesium replaced today. Phosphorus last checked 12/11-WNL. Recommend adding to am labs tomorrow. May be at refeeding risk. Unclear how well eating PTA. Malnutrition Assessment:  Malnutrition Status:  No malnutrition    Context:  Acute illness       Nutritionally Significant Medications:   Folic acid, magnesium sulfate, Protonix, B1    Estimated Daily Nutrient Needs:  Energy (kcal): 2[facility] 2003b); Weight Used for Energy Requirements: Current(90 kg)  Protein (g):  (1.0-1.2g/kg);  Weight Used for Protein Requirements: Current(90 kg)  Fluid (ml/day):  ; Method Used for Fluid Requirements: 1 ml/kcal    Nutrition Related Findings:       BM: 12/14  Edema: none  Wounds:  None       Current Nutrition Therapies:   Diet: NPO  Additional Caloric Sources:  Diprivan  Meal intake: No data found. Anthropometric Measures:  · Height:  5' 11\" (180.3 cm)  · Current Body Wt:  90 kg (198 lb 6.6 oz)   · Admission Body Wt:  205 lb 0.4 oz      · Ideal Body Wt:   :  115.4 %     · BMI Categories:  Overweight (BMI 25.0-29. 9)     Wt Readings from Last 10 Encounters:   12/13/20 90 kg (198 lb 6.4 oz)   12/11/20 93 kg (205 lb 0.4 oz)   07/07/20 93 kg (205 lb)   09/04/19 93 kg (205 lb)   08/09/19 93 kg (205 lb)   08/03/19 90.8 kg (200 lb 3.2 oz)   08/01/19 93.4 kg (206 lb)   07/25/19 93.4 kg (206 lb)   06/12/19 93.4 kg (206 lb)   04/11/19 92.5 kg (204 lb)       Nutrition Diagnosis:   · Inadequate oral intake related to impaired respiratory function as evidenced by NPO or clear liquid status due to medical condition, intubation    Nutrition Interventions:   Food and/or Nutrient Delivery: Start tube feeding  Nutrition Education and Counseling: No recommendations at this time  Coordination of Nutrition Care: Continue to monitor while inpatient, Interdisciplinary rounds    Goals: Tolerate tube feeding at goal in next 2-3 days. Nutrition Monitoring and Evaluation:   Behavioral-Environmental Outcomes: None identified  Food/Nutrient Intake Outcomes: Enteral nutrition intake/tolerance  Physical Signs/Symptoms Outcomes: Biochemical data, Weight, GI status, Hemodynamic status    Discharge Planning:     Too soon to determine     Terell Salguero RD CNSC  Contact: Rodolfo Shea

## 2020-12-15 ENCOUNTER — APPOINTMENT (OUTPATIENT)
Dept: ULTRASOUND IMAGING | Age: 75
DRG: 896 | End: 2020-12-15
Attending: EMERGENCY MEDICINE
Payer: MEDICARE

## 2020-12-15 ENCOUNTER — APPOINTMENT (OUTPATIENT)
Dept: GENERAL RADIOLOGY | Age: 75
DRG: 896 | End: 2020-12-15
Attending: EMERGENCY MEDICINE
Payer: MEDICARE

## 2020-12-15 LAB
ALBUMIN SERPL-MCNC: 2.2 G/DL (ref 3.5–5)
ALBUMIN/GLOB SERPL: 0.6 {RATIO} (ref 1.1–2.2)
ALP SERPL-CCNC: 54 U/L (ref 45–117)
ALT SERPL-CCNC: 30 U/L (ref 12–78)
ANION GAP SERPL CALC-SCNC: 9 MMOL/L (ref 5–15)
AST SERPL-CCNC: 40 U/L (ref 15–37)
BILIRUB SERPL-MCNC: 0.6 MG/DL (ref 0.2–1)
BUN SERPL-MCNC: 17 MG/DL (ref 6–20)
BUN/CREAT SERPL: 19 (ref 12–20)
CALCIUM SERPL-MCNC: 7.5 MG/DL (ref 8.5–10.1)
CHLORIDE SERPL-SCNC: 110 MMOL/L (ref 97–108)
CO2 SERPL-SCNC: 23 MMOL/L (ref 21–32)
CREAT SERPL-MCNC: 0.9 MG/DL (ref 0.7–1.3)
ERYTHROCYTE [DISTWIDTH] IN BLOOD BY AUTOMATED COUNT: 13.8 % (ref 11.5–14.5)
GLOBULIN SER CALC-MCNC: 3.8 G/DL (ref 2–4)
GLUCOSE SERPL-MCNC: 76 MG/DL (ref 65–100)
HCT VFR BLD AUTO: 28.5 % (ref 36.6–50.3)
HGB BLD-MCNC: 9.3 G/DL (ref 12.1–17)
MAGNESIUM SERPL-MCNC: 2 MG/DL (ref 1.6–2.4)
MCH RBC QN AUTO: 34.3 PG (ref 26–34)
MCHC RBC AUTO-ENTMCNC: 32.6 G/DL (ref 30–36.5)
MCV RBC AUTO: 105.2 FL (ref 80–99)
NRBC # BLD: 0 K/UL (ref 0–0.01)
NRBC BLD-RTO: 0 PER 100 WBC
PHOSPHATE SERPL-MCNC: 3.4 MG/DL (ref 2.6–4.7)
PLATELET # BLD AUTO: 58 K/UL (ref 150–400)
PMV BLD AUTO: 10.9 FL (ref 8.9–12.9)
POTASSIUM SERPL-SCNC: 3.5 MMOL/L (ref 3.5–5.1)
PROT SERPL-MCNC: 6 G/DL (ref 6.4–8.2)
RBC # BLD AUTO: 2.71 M/UL (ref 4.1–5.7)
SODIUM SERPL-SCNC: 142 MMOL/L (ref 136–145)
WBC # BLD AUTO: 6.7 K/UL (ref 4.1–11.1)

## 2020-12-15 PROCEDURE — 85027 COMPLETE CBC AUTOMATED: CPT

## 2020-12-15 PROCEDURE — 74011250636 HC RX REV CODE- 250/636: Performed by: INTERNAL MEDICINE

## 2020-12-15 PROCEDURE — 84100 ASSAY OF PHOSPHORUS: CPT

## 2020-12-15 PROCEDURE — 74011250637 HC RX REV CODE- 250/637: Performed by: EMERGENCY MEDICINE

## 2020-12-15 PROCEDURE — 74011250636 HC RX REV CODE- 250/636: Performed by: EMERGENCY MEDICINE

## 2020-12-15 PROCEDURE — 94003 VENT MGMT INPAT SUBQ DAY: CPT

## 2020-12-15 PROCEDURE — 83735 ASSAY OF MAGNESIUM: CPT

## 2020-12-15 PROCEDURE — 74011000250 HC RX REV CODE- 250: Performed by: EMERGENCY MEDICINE

## 2020-12-15 PROCEDURE — 76770 US EXAM ABDO BACK WALL COMP: CPT

## 2020-12-15 PROCEDURE — 71045 X-RAY EXAM CHEST 1 VIEW: CPT

## 2020-12-15 PROCEDURE — 80053 COMPREHEN METABOLIC PANEL: CPT

## 2020-12-15 PROCEDURE — 36415 COLL VENOUS BLD VENIPUNCTURE: CPT

## 2020-12-15 PROCEDURE — 65620000000 HC RM CCU GENERAL

## 2020-12-15 PROCEDURE — C9113 INJ PANTOPRAZOLE SODIUM, VIA: HCPCS | Performed by: EMERGENCY MEDICINE

## 2020-12-15 PROCEDURE — 74011000258 HC RX REV CODE- 258: Performed by: EMERGENCY MEDICINE

## 2020-12-15 RX ORDER — ENOXAPARIN SODIUM 100 MG/ML
40 INJECTION SUBCUTANEOUS EVERY 24 HOURS
Status: DISCONTINUED | OUTPATIENT
Start: 2020-12-15 | End: 2021-01-13 | Stop reason: HOSPADM

## 2020-12-15 RX ORDER — DEXMEDETOMIDINE HYDROCHLORIDE 4 UG/ML
.1-1.5 INJECTION, SOLUTION INTRAVENOUS
Status: DISCONTINUED | OUTPATIENT
Start: 2020-12-15 | End: 2020-12-17

## 2020-12-15 RX ORDER — LABETALOL HYDROCHLORIDE 5 MG/ML
40 INJECTION, SOLUTION INTRAVENOUS ONCE
Status: ACTIVE | OUTPATIENT
Start: 2020-12-15 | End: 2020-12-16

## 2020-12-15 RX ADMIN — Medication 10 ML: at 14:02

## 2020-12-15 RX ADMIN — POTASSIUM CHLORIDE 10 MEQ: 7.46 INJECTION, SOLUTION INTRAVENOUS at 09:00

## 2020-12-15 RX ADMIN — Medication 50 MCG/HR: at 07:39

## 2020-12-15 RX ADMIN — PROPOFOL 25 MCG/KG/MIN: 10 INJECTION, EMULSION INTRAVENOUS at 01:09

## 2020-12-15 RX ADMIN — HYDRALAZINE HYDROCHLORIDE 10 MG: 20 INJECTION INTRAMUSCULAR; INTRAVENOUS at 11:46

## 2020-12-15 RX ADMIN — THIAMINE HYDROCHLORIDE 100 MG: 100 INJECTION, SOLUTION INTRAMUSCULAR; INTRAVENOUS at 08:00

## 2020-12-15 RX ADMIN — LORAZEPAM 4 MG: 2 INJECTION INTRAMUSCULAR; INTRAVENOUS at 11:36

## 2020-12-15 RX ADMIN — ENOXAPARIN SODIUM 40 MG: 40 INJECTION SUBCUTANEOUS at 07:56

## 2020-12-15 RX ADMIN — POTASSIUM CHLORIDE 10 MEQ: 7.46 INJECTION, SOLUTION INTRAVENOUS at 07:49

## 2020-12-15 RX ADMIN — DEXMEDETOMIDINE HYDROCHLORIDE 0.4 MCG/KG/HR: 400 INJECTION INTRAVENOUS at 14:00

## 2020-12-15 RX ADMIN — SODIUM CHLORIDE, SODIUM LACTATE, POTASSIUM CHLORIDE, AND CALCIUM CHLORIDE 100 ML/HR: 600; 310; 30; 20 INJECTION, SOLUTION INTRAVENOUS at 22:46

## 2020-12-15 RX ADMIN — LORAZEPAM 2 MG: 2 INJECTION INTRAMUSCULAR; INTRAVENOUS at 21:14

## 2020-12-15 RX ADMIN — POTASSIUM CHLORIDE 10 MEQ: 7.46 INJECTION, SOLUTION INTRAVENOUS at 11:41

## 2020-12-15 RX ADMIN — PROPOFOL 25 MCG/KG/MIN: 10 INJECTION, EMULSION INTRAVENOUS at 06:24

## 2020-12-15 RX ADMIN — DEXMEDETOMIDINE HYDROCHLORIDE 0.5 MCG/KG/HR: 400 INJECTION INTRAVENOUS at 22:46

## 2020-12-15 RX ADMIN — POTASSIUM CHLORIDE 10 MEQ: 7.46 INJECTION, SOLUTION INTRAVENOUS at 10:44

## 2020-12-15 RX ADMIN — CHLORDIAZEPOXIDE HYDROCHLORIDE 25 MG: 25 CAPSULE ORAL at 05:31

## 2020-12-15 RX ADMIN — SODIUM CHLORIDE 40 MG: 9 INJECTION INTRAMUSCULAR; INTRAVENOUS; SUBCUTANEOUS at 11:39

## 2020-12-15 RX ADMIN — CHLORDIAZEPOXIDE HYDROCHLORIDE 25 MG: 25 CAPSULE ORAL at 14:02

## 2020-12-15 RX ADMIN — LORAZEPAM 4 MG: 2 INJECTION INTRAMUSCULAR; INTRAVENOUS at 13:12

## 2020-12-15 RX ADMIN — Medication 10 ML: at 21:04

## 2020-12-15 RX ADMIN — HYDRALAZINE HYDROCHLORIDE 10 MG: 20 INJECTION INTRAMUSCULAR; INTRAVENOUS at 20:14

## 2020-12-15 NOTE — PROGRESS NOTES
0730: Bedside shift change report given to ESTRADA Ivan (oncoming nurse) by Bouchra Herring RN (offgoing nurse). Report included the following information SBAR, Kardex, ED Summary, OR Summary, Procedure Summary, Intake/Output, MAR, Recent Results, Cardiac Rhythm NSR and Dual Neuro Assessment. 0800: Dr Sharyn Linda, intensivist, assessing pt @ the bedside. MD placed orders for bedside renal US.     0945: US @ the bedside to perform renal US r/t decreased UOP. 1000: Interdisciplinary rounds were held @ the bedside with RN, CCL, RD, CM and MD.     8599: RN @ the bedside for SAT. 1100: MD @ the bedside to place pt on SBT for 1 hour. 1625: Pt extubated to [place] @ 4L. 1700: August removed r/t leaking. 1930: Bedside shift change report given to Philip Cortez RN (oncoming nurse) by ESTRADA Ivan (offgoing nurse). Report included the following information SBAR, Kardex, ED Summary, OR Summary, Procedure Summary, Intake/Output, MAR, Recent Results, Cardiac Rhythm ST and Dual Neuro Assessment.

## 2020-12-15 NOTE — PROGRESS NOTES
Physician Progress Note      PATIENT:               Naida Ruiz  CSN #:                  [id number]  :                       1945  ADMIT DATE:       2020 12:24 PM  [address] [place] DATE:  RESPONDING  PROVIDER #:        Lora Coombs MD          QUERY TEXT:    Dear Attending Provider,    Pt admitted with excessive alcohol use, acute encephalopathy, and UTI. Pt noted to have respiratory insufficiency. If possible, please document in the progress notes and discharge summary if you are evaluating and/or treating any of the following: The medical record reflects the following:  Risk Factors: 76yo with hx of excessive alcohol use and a concussion from recent fall  Clinical Indicators: ICU was consulted on  for respiratory distress. The patient was noted for tachypnea and requiring BiPAP. P/F ratio: 200 -- 127 (based on blood gasses)  Treatment: admitted to ICU and intubated at 2157 on     Thank you,  Rey Presley  [phone]  Options provided:  -- Acute respiratory failure with hypoxia  -- Hypoxia  -- Other - I will add my own diagnosis  -- Disagree - Not applicable / Not valid  -- Disagree - Clinically unable to determine / Unknown  -- Refer to Clinical Documentation Reviewer    PROVIDER RESPONSE TEXT:    This patient is in acute respiratory failure with hypoxia.     Query created by: Miguel Washington on 2020 11:18 AM      Electronically signed by:  Lora Coombs MD 12/15/2020 7:43 AM

## 2020-12-15 NOTE — PROGRESS NOTES
12/15/20 1531   Weaning Parameters   Spontaneous Breathing Trial Complete Yes   Resp Rate Observed 17   Ve 9      RSBI 50     1625: [facility]. MD/RN at bedside. Family present.

## 2020-12-15 NOTE — PROGRESS NOTES
1930: Report received from [address]: Pts wife updated  0730: Bedside shift change report given to [address] [place] [address], [address] (oncoming nurse) by Alphonso Payne (offgoing nurse). Report included the following information SBAR, Kardex, Intake/Output and MAR.

## 2020-12-15 NOTE — PROGRESS NOTES
[facility]    ICU TEAM Progress Note    Name: Pam Lyles   : 1945   MRN: [id number]   Date: 12/15/2020      Assessment     ICU Problems:  Acute metabolic encephalopathy  Delirium termens   Obstructive Sleep Apnea  Acute respiratory distress    ICU Comprehensive Plan of Care:     Plans for this Shift:   Severe alcohol withdrawal/DTs-currently on propofol and fentanyl for sedation, cont Librium, thiamine supplementation, CIWA protocol    keep maps above 65, cont as needed midodrine therapy    Acute respiratory fvxckxs-wpdtguadb-dszcsjfz lung protective strategies on the ventilator, daily weaning trials- doing well today - hopefully we can extubate    Hold feeds in anticipation of extubation, Protonix for GI prophylaxis    Oliguric but Cr stable, renal US unremarkable, monitor urine output closely, dose medication renally, avoid nephrotoxic agents, continue maintenance IV fluids, correct electrolyte derangements as needed    Quite thrombocytopenic-plt count > 50 now, will start chemical DVT ppx, reports of hematuria-much better    No signs of infection, off abx    Keep glucose less than 180 with subcutaneous insulin as needed      Subjective:   Progress Note: 12/15/2020        HPI: Patient is asked to be seen by Dr. Leonor Leedsma for alcohol withdrawals and respiratory distress, necessitating the need for possible ICU care.      Reason for ICU Admission: Alcohol withdrawals      Overnight Events: The patient was noted for worsening agitation and delirium while on the medical floor. The patient was noted for tachypnea and requiring BiPAP. The patient remains encephalopathic and has needed several additional doses of benzodiazepines.      Overnight Events: remains Intubated, oliguric    Active Problem List:     Problem List  Date Reviewed: 2020          Codes Class    UTI (urinary tract infection) ICD-10-CM: N39.0  ICD-9-CM: 599.0         Hypokalemia ICD-10-CM: E87.6  ICD-9-CM: 276.8 Thrombocytopenia (HCC) ICD-10-CM: D69.6  ICD-9-CM: 287.5         Anemia ICD-10-CM: D64.9  ICD-9-CM: 285.9         * (Principal) AMS (altered mental status) ICD-10-CM: R41.82  ICD-9-CM: 780.97         Excessive drinking of alcohol ICD-10-CM: F10.10  ICD-9-CM: 305.00         Hyponatremia ICD-10-CM: E87.1  ICD-9-CM: 276.1         HATTIE (acute kidney injury) ([facility] ICD-10-CM: N17.9  ICD-9-CM: 584.9         Essential hypertension ICD-10-CM: I10  ICD-9-CM: 401.9               Past Medical History:      has a past medical history of Cancer ([facility] (~ ), Hypertension, and Other ill-defined conditions(799.89) (1960~). Past Surgical History:      has a past surgical history that includes hx other surgical (~ ) and colonoscopy (2011). Home Medications:     Prior to Admission medications    Medication Sig Start Date End Date Taking? Authorizing Provider   losartan (COZAAR) 100 mg tablet TAKE 1 TABLET BY MOUTH EVERY DAY 20  Yes Aristeo Sandoval MD       Allergies/Social/Family History:      Allergies   Allergen Reactions    Ace Inhibitors Cough    Thiazides Other (comments)     hyponatremia      Social History     Tobacco Use    Smoking status: Never Smoker    Smokeless tobacco: Never Used   Substance Use Topics    Alcohol use: Yes     Comment: occasional beer      Family History   Problem Relation Age of Onset    Hypertension Father        Objective:   Vital Signs:  Visit Vitals  BP (!) 149/70   Pulse 97   Temp 100 °F (37.8 °C)   Resp 18   Ht 5' 11\" (1.803 m)   Wt 92.3 kg (203 lb 7.8 oz)   SpO2 97%   BMI 28.38 kg/m²    O2 Flow Rate (L/min): 40 l/min O2 Device: Endotracheal tube, Ventilator Temp (24hrs), Av.9 °F (37.2 °C), Min:98.5 °F (36.9 °C), Max:100 °F (37.8 °C)           Intake/Output:     Intake/Output Summary (Last 24 hours) at 12/15/2020 1331  Last data filed at 12/15/2020 1000  Gross per 24 hour   Intake 3708.92 ml   Output 480 ml   Net 3228.92 ml       Physical Exam:  General-intubated, sedated  Neuro-pupils reactive, positive cough, following simple commands  Cardiac-RRR  Lungs-clear  Abdomen-soft, nontender, nondistended  Extremities-warm        LABS AND  DATA: Personally reviewed  Recent Labs     12/15/20  0445 12/14/20  0436   WBC 6.7 6.3   HGB 9.3* 8.3*   HCT 28.5* 25.5*   PLT 58* 50*     Recent Labs     12/15/20  0445 12/14/20  0436    141   K 3.5 3.8   * 110*   CO2 23 22   BUN 17 12   CREA 0.90 1.06   GLU 76 112*   CA 7.5* 7.0*   MG 2.0 1.6   PHOS 3.4  --      Recent Labs     12/15/20  0445 12/14/20  0436   AP 54 51   TP 6.0* 5.5*   ALB 2.2* 2.3*   GLOB 3.8 3.2     No results for input(s): INR, PTP, APTT, INREXT, INREXT in the last 72 hours. Recent Labs     12/13/20 2212 12/13/20 1958   PHI 7.37 7.40   PCO2I 35.9 37.7   PO2I 127* 80   FIO2I 100 40     No results for input(s): CPK, CKMB, TROIQ, BNPP in the last 72 hours. Hemodynamics:   PAP:   CO:     Wedge:   CI:     CVP:    SVR:       PVR:       Ventilator Settings:  Mode Rate Tidal Volume Pressure FiO2 PEEP   Assist control, Volume control   450 ml  8 cm H2O 40 % 5 cm H20     Peak airway pressure: 22 cm H2O    Minute ventilation: 10.2 l/min        MEDS: Reviewed    Chest X-Ray:  CXR Results  (Last 48 hours)               12/15/20 0508  XR CHEST PORT Final result    Impression:  IMPRESSION:   No significant interval change. Narrative:  Clinical history: Intubated   INDICATION:   Intubated   COMPARISON: 12/13/2020       FINDINGS:   AP portable upright view of the chest demonstrates a stable  cardiopericardial   silhouette. ET tube is unchanged in position. Left basilar atelectasis and   effusion unchanged. Minimal interstitial opacities are stable. .There is no   pneumothorax. . Patient is on a cardiac monitor. 12/13/20 2151  XR CHEST PORT Final result    Impression:  IMPRESSION:   Small left-sided pleural effusion with small amount of left basilar atelectasis   as well.        ET tube is in appropriate position. NG tube does not definitively terminate below the diaphragm. Narrative:  Clinical history: ETT Plaement   INDICATION:   ETT Plaement   COMPARISON: 6/26/2008       FINDINGS:   AP portable upright view of the chest demonstrates a stable  cardiopericardial   silhouette. Interval small left-sided pleural effusion. Interval increased   interstitial opacity. ET tube approximately 4 cm of the above the alex. NG   tube. There is no focal consolidation. .There is no pneumothorax. . Patient is on a   cardiac monitor. NOTE OF PERSONAL INVOLVEMENT IN CARE   This patient has a high probability of imminent, clinically significant deterioration, which requires the highest level of preparedness to intervene urgently. I participated in the decision-making and personally managed or directed the management of the following life and organ supporting interventions that required my frequent assessment to treat or prevent imminent deterioration. I personally spent 40 minutes of critical care time. This does not include any procedural time.     Signed By: Teri Valdovinos MD     December 15, 2020

## 2020-12-15 NOTE — PROGRESS NOTES
Physical Therapy Note:  Consult received and chart reviewed. Patient currently intubated and sedated on CIWA protocol. Unable to participate in evaluation at this time. Will defer and follow up when alert and following commands.   Hermann Bland, PT, DPT

## 2020-12-15 NOTE — PROGRESS NOTES
12/15/2020 -   NICOLASA:  - RUR: 17%  - Disposition is TBD dependent on progression  - CM notes that preference is for [facility] via [facility] or At [facility]  - Patient will need IM Letter prior to discharge       - CIWA Protocol continues  - Patient remains vented and sedated  - Tube Feeds were started 12/14  - IVF continue  - PT and OT pending medical appropriateness    CM to follow to discuss potential addiction rehab and sobriety support efforts.   CRM: Mckenna Wilks, MPH, [address] [place] [address]; Z: [phone]

## 2020-12-16 ENCOUNTER — APPOINTMENT (OUTPATIENT)
Dept: GENERAL RADIOLOGY | Age: 75
DRG: 896 | End: 2020-12-16
Attending: EMERGENCY MEDICINE
Payer: MEDICARE

## 2020-12-16 LAB
ANION GAP SERPL CALC-SCNC: 11 MMOL/L (ref 5–15)
BASOPHILS # BLD: 0.1 K/UL (ref 0–0.1)
BASOPHILS NFR BLD: 1 % (ref 0–1)
BUN SERPL-MCNC: 18 MG/DL (ref 6–20)
BUN/CREAT SERPL: 25 (ref 12–20)
CALCIUM SERPL-MCNC: 7.2 MG/DL (ref 8.5–10.1)
CHLORIDE SERPL-SCNC: 111 MMOL/L (ref 97–108)
CO2 SERPL-SCNC: 20 MMOL/L (ref 21–32)
CREAT SERPL-MCNC: 0.72 MG/DL (ref 0.7–1.3)
DIFFERENTIAL METHOD BLD: ABNORMAL
EOSINOPHIL # BLD: 0.2 K/UL (ref 0–0.4)
EOSINOPHIL NFR BLD: 3 % (ref 0–7)
ERYTHROCYTE [DISTWIDTH] IN BLOOD BY AUTOMATED COUNT: 13.2 % (ref 11.5–14.5)
GLUCOSE SERPL-MCNC: 97 MG/DL (ref 65–100)
HCT VFR BLD AUTO: 27.6 % (ref 36.6–50.3)
HGB BLD-MCNC: 9 G/DL (ref 12.1–17)
IMM GRANULOCYTES # BLD AUTO: 0.1 K/UL (ref 0–0.04)
IMM GRANULOCYTES NFR BLD AUTO: 1 % (ref 0–0.5)
LYMPHOCYTES # BLD: 0.5 K/UL (ref 0.8–3.5)
LYMPHOCYTES NFR BLD: 9 % (ref 12–49)
MAGNESIUM SERPL-MCNC: 1.9 MG/DL (ref 1.6–2.4)
MCH RBC QN AUTO: 33.3 PG (ref 26–34)
MCHC RBC AUTO-ENTMCNC: 32.6 G/DL (ref 30–36.5)
MCV RBC AUTO: 102.2 FL (ref 80–99)
MONOCYTES # BLD: 1.1 K/UL (ref 0–1)
MONOCYTES NFR BLD: 20 % (ref 5–13)
NEUTS SEG # BLD: 3.5 K/UL (ref 1.8–8)
NEUTS SEG NFR BLD: 66 % (ref 32–75)
NRBC # BLD: 0 K/UL (ref 0–0.01)
NRBC BLD-RTO: 0 PER 100 WBC
PHOSPHATE SERPL-MCNC: 3.6 MG/DL (ref 2.6–4.7)
PLATELET # BLD AUTO: 90 K/UL (ref 150–400)
PMV BLD AUTO: 9.5 FL (ref 8.9–12.9)
POTASSIUM SERPL-SCNC: 3.6 MMOL/L (ref 3.5–5.1)
RBC # BLD AUTO: 2.7 M/UL (ref 4.1–5.7)
RBC MORPH BLD: ABNORMAL
SODIUM SERPL-SCNC: 142 MMOL/L (ref 136–145)
WBC # BLD AUTO: 5.5 K/UL (ref 4.1–11.1)

## 2020-12-16 PROCEDURE — 74011250636 HC RX REV CODE- 250/636: Performed by: EMERGENCY MEDICINE

## 2020-12-16 PROCEDURE — 85025 COMPLETE CBC W/AUTO DIFF WBC: CPT

## 2020-12-16 PROCEDURE — 77030004950 HC CATH ENTRL NG COVD -A

## 2020-12-16 PROCEDURE — 36415 COLL VENOUS BLD VENIPUNCTURE: CPT

## 2020-12-16 PROCEDURE — 74011250636 HC RX REV CODE- 250/636: Performed by: INTERNAL MEDICINE

## 2020-12-16 PROCEDURE — 84100 ASSAY OF PHOSPHORUS: CPT

## 2020-12-16 PROCEDURE — 80048 BASIC METABOLIC PNL TOTAL CA: CPT

## 2020-12-16 PROCEDURE — 77030040831 HC BAG URINE DRNG MDII -A

## 2020-12-16 PROCEDURE — C9113 INJ PANTOPRAZOLE SODIUM, VIA: HCPCS | Performed by: EMERGENCY MEDICINE

## 2020-12-16 PROCEDURE — 83735 ASSAY OF MAGNESIUM: CPT

## 2020-12-16 PROCEDURE — 74011000258 HC RX REV CODE- 258: Performed by: EMERGENCY MEDICINE

## 2020-12-16 PROCEDURE — 97530 THERAPEUTIC ACTIVITIES: CPT

## 2020-12-16 PROCEDURE — 94762 N-INVAS EAR/PLS OXIMTRY CONT: CPT

## 2020-12-16 PROCEDURE — 65620000000 HC RM CCU GENERAL

## 2020-12-16 PROCEDURE — 71045 X-RAY EXAM CHEST 1 VIEW: CPT

## 2020-12-16 PROCEDURE — 77010033711 HC HIGH FLOW OXYGEN

## 2020-12-16 PROCEDURE — 74011000250 HC RX REV CODE- 250: Performed by: EMERGENCY MEDICINE

## 2020-12-16 PROCEDURE — 97164 PT RE-EVAL EST PLAN CARE: CPT

## 2020-12-16 RX ORDER — FUROSEMIDE 10 MG/ML
40 INJECTION INTRAMUSCULAR; INTRAVENOUS
Status: DISCONTINUED | OUTPATIENT
Start: 2020-12-16 | End: 2020-12-17

## 2020-12-16 RX ORDER — FUROSEMIDE 10 MG/ML
40 INJECTION INTRAMUSCULAR; INTRAVENOUS ONCE
Status: COMPLETED | OUTPATIENT
Start: 2020-12-16 | End: 2020-12-16

## 2020-12-16 RX ADMIN — FUROSEMIDE 40 MG: 10 INJECTION, SOLUTION INTRAMUSCULAR; INTRAVENOUS at 13:00

## 2020-12-16 RX ADMIN — Medication 10 ML: at 06:42

## 2020-12-16 RX ADMIN — LORAZEPAM 2 MG: 2 INJECTION INTRAMUSCULAR; INTRAVENOUS at 18:36

## 2020-12-16 RX ADMIN — SODIUM CHLORIDE 40 MG: 9 INJECTION INTRAMUSCULAR; INTRAVENOUS; SUBCUTANEOUS at 13:00

## 2020-12-16 RX ADMIN — Medication 10 ML: at 21:14

## 2020-12-16 RX ADMIN — DEXMEDETOMIDINE HYDROCHLORIDE 0.5 MCG/KG/HR: 400 INJECTION INTRAVENOUS at 05:46

## 2020-12-16 RX ADMIN — HYDRALAZINE HYDROCHLORIDE 10 MG: 20 INJECTION INTRAMUSCULAR; INTRAVENOUS at 21:36

## 2020-12-16 RX ADMIN — LORAZEPAM 4 MG: 2 INJECTION INTRAMUSCULAR; INTRAVENOUS at 22:31

## 2020-12-16 RX ADMIN — LORAZEPAM 2 MG: 2 INJECTION INTRAMUSCULAR; INTRAVENOUS at 21:36

## 2020-12-16 RX ADMIN — FUROSEMIDE 40 MG: 10 INJECTION, SOLUTION INTRAMUSCULAR; INTRAVENOUS at 21:14

## 2020-12-16 RX ADMIN — THIAMINE HYDROCHLORIDE 100 MG: 100 INJECTION, SOLUTION INTRAMUSCULAR; INTRAVENOUS at 08:56

## 2020-12-16 RX ADMIN — ENOXAPARIN SODIUM 40 MG: 40 INJECTION SUBCUTANEOUS at 08:48

## 2020-12-16 RX ADMIN — Medication 10 ML: at 13:00

## 2020-12-16 NOTE — INTERDISCIPLINARY ROUNDS
Multidisciplinary rounds were held 12/16/20. Today's plan/goal includes (but not limited to): Weaning precedex, diet.
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
## 2020-12-16 NOTE — PROGRESS NOTES
1930: SBAR received from [employer]. 2000: Patient assessed. Restless, hypertensive, and tachycardic. Will start precdex and give PRN hydraliazine. 2114: Patient still hypertensive and restless. Not taking PO meds so unable to give Librium. CIWA 25 - spoke with MD will give 2 mg of ativan and continute to titrate precedex. 2145: Patient O2 trending down to high 80s on 6L NC - spoke with intensivist - will place on Hiflow NC.   2147: RT at bedside - placed patient on Hiflow 35L 100%. 0300: Patient incontinent of 5th large loose stool - spoke with intensivist - orders for flexi - placed flex - patient tolerated well. Assigned nurse, Radha Kuo RN, and verifying nurse, Patty Zapien, verify a provider order for the Fecal Management System is present and have reviewed the indications and contraindications confirming it is appropriate to proceed with insertion of the Fecal Management System. 0400: Patient oxygen low 80s - increased hi flow to 55L - patient oxygen saturations slowly increased to mid 90s - will monitor. 0730: Bedside and Verbal shift change report given to Gabriela DORADO  (oncoming nurse) by Jose Carlisle RN  (offgoing nurse). Report included the following information SBAR, Kardex, Intake/Output, MAR and Recent Results.

## 2020-12-16 NOTE — PROGRESS NOTES
0730 Received verbal bedside report from [facility], [facility]. Assumed care of the pt.     1100 Attempted dobhoff placement. Unsuccessful at this time. Shift summary: Pt weaned off precedex. Pt has become less agitated and more alert throughout the day. 1930 Bedside and Verbal shift change report given to Mirlande Prescott RN (oncoming nurse) by Hosey Lesches, RN (offgoing nurse). Report included the following information SBAR, Kardex, Procedure Summary, Intake/Output, MAR, Recent Results and Cardiac Rhythm NSR.

## 2020-12-16 NOTE — PROGRESS NOTES
12/16/2020 -   NICOLASA:   - RUR: 19%  - Disposition is TBD dependent on progression  - CM notes that preference is for [place] via [facility] [place] or [facility]  - Patient will need IM Letter prior to discharge    - Patient was extubated yesterday, 12/16  - Patient had to be placed on Hi Flow O2 at 55L over night  - Flex Seal was placed overnight   - PT and OT are pending medical appropriateness  - IVF and Tube Feeds continue  CRM: Mauri Hoffman, MPH, [address] [place] [address]; Z: [phone]

## 2020-12-16 NOTE — PROGRESS NOTES
[facility]    ICU TEAM Progress Note    Name: Felicity Farrell   : 1945   MRN: [id number]   Date: 2020      Assessment     ICU Problems:  Acute metabolic encephalopathy  Delirium termens   Obstructive Sleep Apnea  Acute respiratory distress    ICU Comprehensive Plan of Care:     Plans for this Shift:   Severe alcohol withdrawal/DTs-currently on CIWA protocol, Librium, PRN ativan, cont thiamine supplementation    keep maps above 65, cont as needed midodrine therapy    Acute respiratory failure-now on HFNC, BL effusions/pulm edema - will diurese today    Hopefully MS will improve enough for him to tolerate PO soon, Protonix for GI prophylaxis    monitor urine output closely, as above - will diurese today, correct electrolyte derangements as needed    Quite thrombocytopenic-hold chemical DVT ppxif plt count drops below 50, reports of hematuria initially-resolved    No signs of infection, off abx    Keep glucose less than 180 with subcutaneous insulin as needed      Subjective:   Progress Note: 2020        HPI: Patient is asked to be seen by Dr. Constantin Membreno for alcohol withdrawals and respiratory distress, necessitating the need for possible ICU care.      Reason for ICU Admission: Alcohol withdrawals      Overnight Events: The patient was noted for worsening agitation and delirium while on the medical floor. The patient was noted for tachypnea and requiring BiPAP. The patient remains encephalopathic and has needed several additional doses of benzodiazepines.      Overnight Events: Extubated 12/15  Now on HFNC, still requiring precedex intermittently    Active Problem List:     Problem List  Date Reviewed: 2020          Codes Class    UTI (urinary tract infection) ICD-10-CM: N39.0  ICD-9-CM: 599.0         Hypokalemia ICD-10-CM: E87.6  ICD-9-CM: 276.8         Thrombocytopenia ([facility] ICD-10-CM: D69.6  ICD-9-CM: 287.5         Anemia ICD-10-CM: D64.9  ICD-9-CM: 285.9         * (Principal) AMS (altered mental status) ICD-10-CM: R41.82  ICD-9-CM: 780.97         Excessive drinking of alcohol ICD-10-CM: F10.10  ICD-9-CM: 305.00         Hyponatremia ICD-10-CM: E87.1  ICD-9-CM: 276.1         HATTIE (acute kidney injury) ([facility] ICD-10-CM: N17.9  ICD-9-CM: 584.9         Essential hypertension ICD-10-CM: I10  ICD-9-CM: 401.9               Past Medical History:      has a past medical history of [facility] (~ ), Hypertension, and Other ill-defined conditions(799.89) (1960~). Past Surgical History:      has a past surgical history that includes hx other surgical (~ ) and colonoscopy (2011). Home Medications:     Prior to Admission medications    Medication Sig Start Date End Date Taking? Authorizing Provider   losartan (COZAAR) 100 mg tablet TAKE 1 TABLET BY MOUTH EVERY DAY 20  Yes Heather Baum MD       Allergies/Social/Family History:      Allergies   Allergen Reactions    Ace Inhibitors Cough    Thiazides Other (comments)     hyponatremia      Social History     Tobacco Use    Smoking status: Never Smoker    Smokeless tobacco: Never Used   Substance Use Topics    Alcohol use: Yes     Comment: occasional beer      Family History   Problem Relation Age of Onset    Hypertension Father        Objective:   Vital Signs:  Visit Vitals  /63   Pulse (!) 59   Temp 97.9 °F (36.6 °C)   Resp 18   Ht 5' 11\" (1.803 m)   Wt 92.5 kg (203 lb 14.8 oz)   SpO2 98%   BMI 28.44 kg/m²    O2 Flow Rate (L/min): 55 l/min O2 Device: Hi flow nasal cannula Temp (24hrs), Av.4 °F (37.4 °C), Min:97.9 °F (36.6 °C), Max:100 °F (37.8 °C)           Intake/Output:     Intake/Output Summary (Last 24 hours) at 2020 1328  Last data filed at 2020 0900  Gross per 24 hour   Intake 424.4 ml   Output 50 ml   Net 374.4 ml       Physical Exam:  General-In moderate resp distress  Neuro-following simple commands  Cardiac-RRR  Lungs-crackles BL, decreased in the bases, use of accessory muscles  Abdomen-soft, nontender, nondistended  Extremities-warm        LABS AND  DATA: Personally reviewed  Recent Labs     12/16/20  1224 12/15/20  0445   WBC 5.5 6.7   HGB 9.0* 9.3*   HCT 27.6* 28.5*   PLT 90* 58*     Recent Labs     12/16/20  1224 12/15/20  0445    142   K 3.6 3.5   * 110*   CO2 20* 23   BUN 18 17   CREA 0.72 0.90   GLU 97 76   CA 7.2* 7.5*   MG 1.9 2.0   PHOS 3.6 3.4     Recent Labs     12/15/20  0445 12/14/20  0436   AP 54 51   TP 6.0* 5.5*   ALB 2.2* 2.3*   GLOB 3.8 3.2     No results for input(s): INR, PTP, APTT, INREXT, INREXT in the last 72 hours. Recent Labs     12/13/20 2212 12/13/20  1958   PHI 7.37 7.40   PCO2I 35.9 37.7   PO2I 127* 80   FIO2I 100 40     No results for input(s): CPK, CKMB, TROIQ, BNPP in the last 72 hours. Hemodynamics:   PAP:   CO:     Wedge:   CI:     CVP:    SVR:       PVR:       Ventilator Settings:  Mode Rate Tidal Volume Pressure FiO2 PEEP   Assist control, Volume control   450 ml  5 cm H2O 100 % 5 cm H20     Peak airway pressure: 22 cm H2O    Minute ventilation: 10.2 l/min        MEDS: Reviewed    Chest X-Ray:  CXR Results  (Last 48 hours)               12/16/20 0538  XR CHEST PORT Final result    Impression:  IMPRESSION:    1. New or increased pulmonary edema. 2. Layering bilateral pleural effusions. Narrative:  PORTABLE CHEST RADIOGRAPH/S: 12/16/2020 5:38 AM       INDICATION: Bilateral pleural effusions and atelectasis. COMPARISON: 12/15/2020, 12/13/2020, 6/26/2008. TECHNIQUE: Portable frontal upright radiograph/s of the chest.       FINDINGS:    Passive atelectasis is associated with layering bilateral pleural effusions. Interstitial pulmonary edema is new or increased from 12/15/2020. An ET tube has   been removed. The central airways are patent. No large pneumothorax; the chin   obscures the apices. 12/15/20 0508  XR CHEST PORT Final result    Impression:  IMPRESSION:   No significant interval change.                 Narrative: Clinical history: Intubated   INDICATION:   Intubated   COMPARISON: 12/13/2020       FINDINGS:   AP portable upright view of the chest demonstrates a stable  cardiopericardial   silhouette. ET tube is unchanged in position. Left basilar atelectasis and   effusion unchanged. Minimal interstitial opacities are stable. .There is no   pneumothorax. . Patient is on a cardiac monitor. NOTE OF PERSONAL INVOLVEMENT IN CARE   This patient has a high probability of imminent, clinically significant deterioration, which requires the highest level of preparedness to intervene urgently. I participated in the decision-making and personally managed or directed the management of the following life and organ supporting interventions that required my frequent assessment to treat or prevent imminent deterioration. I personally spent 40 minutes of critical care time. This does not include any procedural time.     Signed By: Amanda Estrada MD     December 16, 2020

## 2020-12-16 NOTE — PROGRESS NOTES
Nutrition Note    Chart reviewed for brief follow-up; discussed during IDR. Good to see Mr Zahra Amaral extubated. Not ready for diet advancement d/t AMS 2/2 alcohol withdrawal. Plan is to place DHT for enteral nutrition support. Recommend adjusting previous tube feeding order slightly. Goal: Osmolite 1.5 @ 55 ml/hr with 1 packet Prosource daily and 160 ml water flush q 4 hr. This will provide 1320 ml, 2040 calories, 98 gm protein and 2025 ml free water (tube feeding/flush) per day to meet estimated needs. Recommend adding [place] MVI; continue B1 (transition to oral route). RD to follow.       Estimated Nutrition Needs:   Energy: [id number] (MSJ x 1.2-1.3)  Wt used: Current(90 kg)  Protein:  (1.0-1.2g/kg)  Wt used: Current(90 kg)   Fluid:   1 ml/kcal      Electronically signed by Maria Dolores Wallace RD on 12/16/2020 at 10:23 AM  Contact: [employer]

## 2020-12-17 LAB
ANION GAP SERPL CALC-SCNC: 11 MMOL/L (ref 5–15)
BUN SERPL-MCNC: 17 MG/DL (ref 6–20)
BUN/CREAT SERPL: 20 (ref 12–20)
CALCIUM SERPL-MCNC: 7.9 MG/DL (ref 8.5–10.1)
CHLORIDE SERPL-SCNC: 107 MMOL/L (ref 97–108)
CO2 SERPL-SCNC: 22 MMOL/L (ref 21–32)
CREAT SERPL-MCNC: 0.87 MG/DL (ref 0.7–1.3)
ERYTHROCYTE [DISTWIDTH] IN BLOOD BY AUTOMATED COUNT: 13.1 % (ref 11.5–14.5)
GLUCOSE SERPL-MCNC: 87 MG/DL (ref 65–100)
HCT VFR BLD AUTO: 29.3 % (ref 36.6–50.3)
HGB BLD-MCNC: 9.8 G/DL (ref 12.1–17)
MAGNESIUM SERPL-MCNC: 1.7 MG/DL (ref 1.6–2.4)
MCH RBC QN AUTO: 33.9 PG (ref 26–34)
MCHC RBC AUTO-ENTMCNC: 33.4 G/DL (ref 30–36.5)
MCV RBC AUTO: 101.4 FL (ref 80–99)
NRBC # BLD: 0 K/UL (ref 0–0.01)
NRBC BLD-RTO: 0 PER 100 WBC
PHOSPHATE SERPL-MCNC: 2.7 MG/DL (ref 2.6–4.7)
PLATELET # BLD AUTO: 116 K/UL (ref 150–400)
PMV BLD AUTO: 9.8 FL (ref 8.9–12.9)
POTASSIUM SERPL-SCNC: 3 MMOL/L (ref 3.5–5.1)
RBC # BLD AUTO: 2.89 M/UL (ref 4.1–5.7)
SODIUM SERPL-SCNC: 140 MMOL/L (ref 136–145)
VIT B1 BLD-SCNC: 173.9 NMOL/L (ref 66.5–200)
WBC # BLD AUTO: 6.9 K/UL (ref 4.1–11.1)

## 2020-12-17 PROCEDURE — 74011000250 HC RX REV CODE- 250: Performed by: EMERGENCY MEDICINE

## 2020-12-17 PROCEDURE — 74011250636 HC RX REV CODE- 250/636: Performed by: EMERGENCY MEDICINE

## 2020-12-17 PROCEDURE — C9113 INJ PANTOPRAZOLE SODIUM, VIA: HCPCS | Performed by: EMERGENCY MEDICINE

## 2020-12-17 PROCEDURE — 92610 EVALUATE SWALLOWING FUNCTION: CPT

## 2020-12-17 PROCEDURE — 85027 COMPLETE CBC AUTOMATED: CPT

## 2020-12-17 PROCEDURE — 74011250636 HC RX REV CODE- 250/636: Performed by: INTERNAL MEDICINE

## 2020-12-17 PROCEDURE — 84100 ASSAY OF PHOSPHORUS: CPT

## 2020-12-17 PROCEDURE — 83735 ASSAY OF MAGNESIUM: CPT

## 2020-12-17 PROCEDURE — 36415 COLL VENOUS BLD VENIPUNCTURE: CPT

## 2020-12-17 PROCEDURE — 74011250637 HC RX REV CODE- 250/637: Performed by: INTERNAL MEDICINE

## 2020-12-17 PROCEDURE — 80048 BASIC METABOLIC PNL TOTAL CA: CPT

## 2020-12-17 PROCEDURE — 65660000000 HC RM CCU STEPDOWN

## 2020-12-17 PROCEDURE — 74011000258 HC RX REV CODE- 258: Performed by: EMERGENCY MEDICINE

## 2020-12-17 PROCEDURE — 74011250637 HC RX REV CODE- 250/637: Performed by: EMERGENCY MEDICINE

## 2020-12-17 PROCEDURE — 77030040831 HC BAG URINE DRNG MDII -A

## 2020-12-17 RX ORDER — FUROSEMIDE 10 MG/ML
40 INJECTION INTRAMUSCULAR; INTRAVENOUS DAILY
Status: DISCONTINUED | OUTPATIENT
Start: 2020-12-18 | End: 2020-12-17

## 2020-12-17 RX ORDER — LABETALOL HYDROCHLORIDE 5 MG/ML
10 INJECTION, SOLUTION INTRAVENOUS
Status: DISCONTINUED | OUTPATIENT
Start: 2020-12-17 | End: 2020-12-28

## 2020-12-17 RX ORDER — MAGNESIUM SULFATE HEPTAHYDRATE 40 MG/ML
2 INJECTION, SOLUTION INTRAVENOUS ONCE
Status: COMPLETED | OUTPATIENT
Start: 2020-12-17 | End: 2020-12-17

## 2020-12-17 RX ORDER — LOSARTAN POTASSIUM 50 MG/1
100 TABLET ORAL DAILY
Status: DISCONTINUED | OUTPATIENT
Start: 2020-12-17 | End: 2020-12-26

## 2020-12-17 RX ORDER — FUROSEMIDE 10 MG/ML
40 INJECTION INTRAMUSCULAR; INTRAVENOUS ONCE
Status: COMPLETED | OUTPATIENT
Start: 2020-12-17 | End: 2020-12-17

## 2020-12-17 RX ORDER — HYDRALAZINE HYDROCHLORIDE 20 MG/ML
20 INJECTION INTRAMUSCULAR; INTRAVENOUS
Status: DISCONTINUED | OUTPATIENT
Start: 2020-12-17 | End: 2020-12-28

## 2020-12-17 RX ORDER — POTASSIUM CHLORIDE 14.9 MG/ML
10 INJECTION INTRAVENOUS
Status: COMPLETED | OUTPATIENT
Start: 2020-12-17 | End: 2020-12-17

## 2020-12-17 RX ADMIN — Medication 10 ML: at 21:26

## 2020-12-17 RX ADMIN — FUROSEMIDE 40 MG: 10 INJECTION, SOLUTION INTRAMUSCULAR; INTRAVENOUS at 08:14

## 2020-12-17 RX ADMIN — CLONIDINE HYDROCHLORIDE 0.1 MG: 0.1 TABLET ORAL at 22:21

## 2020-12-17 RX ADMIN — THIAMINE HYDROCHLORIDE 100 MG: 100 INJECTION, SOLUTION INTRAMUSCULAR; INTRAVENOUS at 08:35

## 2020-12-17 RX ADMIN — MAGNESIUM SULFATE HEPTAHYDRATE 2 G: 40 INJECTION, SOLUTION INTRAVENOUS at 09:21

## 2020-12-17 RX ADMIN — POTASSIUM CHLORIDE 10 MEQ: 14.9 INJECTION, SOLUTION INTRAVENOUS at 09:25

## 2020-12-17 RX ADMIN — POTASSIUM CHLORIDE 10 MEQ: 14.9 INJECTION, SOLUTION INTRAVENOUS at 06:40

## 2020-12-17 RX ADMIN — POTASSIUM CHLORIDE 10 MEQ: 14.9 INJECTION, SOLUTION INTRAVENOUS at 08:14

## 2020-12-17 RX ADMIN — CHLORDIAZEPOXIDE HYDROCHLORIDE 25 MG: 25 CAPSULE ORAL at 21:25

## 2020-12-17 RX ADMIN — LOSARTAN POTASSIUM 100 MG: 50 TABLET, FILM COATED ORAL at 11:08

## 2020-12-17 RX ADMIN — ENOXAPARIN SODIUM 40 MG: 40 INJECTION SUBCUTANEOUS at 08:14

## 2020-12-17 RX ADMIN — CHLORDIAZEPOXIDE HYDROCHLORIDE 25 MG: 25 CAPSULE ORAL at 15:11

## 2020-12-17 RX ADMIN — POTASSIUM CHLORIDE 10 MEQ: 14.9 INJECTION, SOLUTION INTRAVENOUS at 11:12

## 2020-12-17 RX ADMIN — Medication 10 ML: at 15:12

## 2020-12-17 RX ADMIN — POTASSIUM CHLORIDE 10 MEQ: 14.9 INJECTION, SOLUTION INTRAVENOUS at 05:36

## 2020-12-17 RX ADMIN — SODIUM CHLORIDE 40 MG: 9 INJECTION INTRAMUSCULAR; INTRAVENOUS; SUBCUTANEOUS at 11:12

## 2020-12-17 NOTE — INTERDISCIPLINARY ROUNDS
Multidisciplinary rounds were held Thursday, December 17, 2020. Today's plan/goal includes (but not limited to):  Increased LOC/Orientation, decreased hallucinations, transfer out CCU

## 2020-12-17 NOTE — PROGRESS NOTES
[facility]    ICU TEAM Progress Note    Name: Brittany Rodriguez   : 1945   MRN: [id number]   Date: 2020      Assessment     ICU Problems:  Acute metabolic encephalopathy  Delirium termens   Obstructive Sleep Apnea  Acute respiratory distress    ICU Comprehensive Plan of Care:     Plans for this Shift:   Severe alcohol withdrawal/MZb-[id number]-cgcnjjshe on CIWA protocol, Librium, PRN ativan, cont thiamine supplementation    Now hypertensive - resume home losartan    Acute respiratory failure-now on NC, BL effusions/pulm edema - will diurese again today    Hopefully MS will improve enough for him to tolerate PO soon, f/u speech eval Protonix for GI prophylaxis    monitor urine output closely, as above - will diurese again today, correct electrolyte derangements as needed    Quite thrombocytopenic- improving-hold chemical DVT ppxif plt count drops below 50, reports of hematuria initially-resolved    No signs of infection, off abx    Keep euglycemic      Subjective:   Progress Note: 2020        HPI: Patient is asked to be seen by Dr. Abdoulaye Maciel [address] [place] [address] for alcohol withdrawals and respiratory distress, necessitating the need for possible ICU care.      Reason for ICU Admission: Alcohol withdrawals      Overnight Events: The patient was noted for worsening agitation and delirium while on the medical floor. The patient was noted for tachypnea and requiring BiPAP. The patient remains encephalopathic and has needed several additional doses of benzodiazepines.      Overnight Events: Extubated 12/15  Now off HFNC, off precedex    Active Problem List:     Problem List  Date Reviewed: 2020          Codes Class    UTI (urinary tract infection) ICD-10-CM: N39.0  ICD-9-CM: 599.0         Hypokalemia ICD-10-CM: E87.6  ICD-9-CM: 276.8         Thrombocytopenia ([facility] ICD-10-CM: D69.6  ICD-9-CM: 287.5         Anemia ICD-10-CM: D64.9  ICD-9-CM: 285.9         * (Principal) AMS (altered mental status) ICD-10-CM: R41.82  ICD-9-CM: 780.97         Excessive drinking of alcohol ICD-10-CM: F10.10  ICD-9-CM: 305.00         Hyponatremia ICD-10-CM: E87.1  ICD-9-CM: 276.1         HATTIE (acute kidney injury) ([facility] ICD-10-CM: N17.9  ICD-9-CM: 584.9         Essential hypertension ICD-10-CM: I10  ICD-9-CM: 401.9               Past Medical History:      has a past medical [facility] of [facility] (~ ), Hypertension, and Other ill-defined conditions(799.89) (1960~). Past Surgical History:      has a past surgical history that includes hx other surgical (~ ) and colonoscopy (2011). Home Medications:     Prior to Admission medications    Medication Sig Start Date End Date Taking? Authorizing Provider   losartan (COZAAR) 100 mg tablet TAKE 1 TABLET BY MOUTH EVERY DAY 20  Yes Heidy Padilla MD       Allergies/Social/Family History:      Allergies   Allergen Reactions    Ace Inhibitors Cough    Thiazides Other (comments)     hyponatremia      Social History     Tobacco Use    Smoking status: Never Smoker    Smokeless tobacco: Never Used   Substance Use Topics    Alcohol use: Yes     Comment: occasional beer      Family History   Problem Relation Age of Onset    Hypertension Father        Objective:   Vital Signs:  Visit Vitals  BP (!) 174/96   Pulse (!) 107   Temp 97.9 °F (36.6 °C)   Resp 20   Ht 5' 11\" (1.803 m)   Wt 92.2 kg (203 lb 4.2 oz)   SpO2 97%   BMI 28.35 kg/m²    O2 Flow Rate (L/min): 4 l/min O2 Device: Nasal cannula Temp (24hrs), Av.4 °F (36.9 °C), Min:97.8 °F (36.6 °C), Max:98.9 °F (37.2 °C)           Intake/Output:     Intake/Output Summary (Last 24 hours) at 2020 1048  Last data filed at 2020 0900  Gross per 24 hour   Intake 710.35 ml   Output 2200 ml   Net -1489.65 ml       Physical Exam:  General-NAD  Neuro-Conversant, intermittent restlessness  Cardiac-RRR  Lungs-Clear anteriorly  Abdomen-soft, nontender, nondistended  Extremities-warm        LABS AND  DATA: Personally reviewed  Recent Labs     12/17/20  0355 12/16/20  1224   WBC 6.9 5.5   HGB 9.8* 9.0*   HCT 29.3* 27.6*   * 90*     Recent Labs     12/17/20  0355 12/17/20  0354 12/16/20  1224     --  142   K 3.0*  --  3.6     --  111*   CO2 22  --  20*   BUN 17  --  18   CREA 0.87  --  0.72   GLU 87  --  97   CA 7.9*  --  7.2*   MG 1.7  --  1.9   PHOS  --  2.7 3.6     Recent Labs     12/15/20  0445   AP 54   TP 6.0*   ALB 2.2*   GLOB 3.8     No results for input(s): INR, PTP, APTT, INREXT, INREXT in the last 72 hours. No results for input(s): PHI, PCO2I, PO2I, FIO2I in the last 72 hours. No results for input(s): CPK, CKMB, TROIQ, BNPP in the last 72 hours. Hemodynamics:   PAP:   CO:     Wedge:   CI:     CVP:    SVR:       PVR:       Ventilator Settings:  Mode Rate Tidal Volume Pressure FiO2 PEEP   Assist control, Volume control   450 ml  5 cm H2O 50 % 5 cm H20     Peak airway pressure: 22 cm H2O    Minute ventilation: 10.2 l/min        MEDS: Reviewed    Chest X-Ray:  CXR Results  (Last 48 hours)               12/16/20 0538  XR CHEST PORT Final result    Impression:  IMPRESSION:    1. New or increased pulmonary edema. 2. Layering bilateral pleural effusions. Narrative:  PORTABLE CHEST RADIOGRAPH/S: 12/16/2020 5:38 AM       INDICATION: Bilateral pleural effusions and atelectasis. COMPARISON: 12/15/2020, 12/13/2020, 6/26/2008. TECHNIQUE: Portable frontal upright radiograph/s of the chest.       FINDINGS:    Passive atelectasis is associated with layering bilateral pleural effusions. Interstitial pulmonary edema is new or increased from 12/15/2020. An ET tube has   been removed. The central airways are patent. No large pneumothorax; the chin   obscures the apices.                   Time 35 mins    Signed By: Valentina Lemus MD     December 17, 2020
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
## 2020-12-17 NOTE — PROGRESS NOTES
Physical Therapy  12/17/2020    Chart reviewed. Noted patient hypertensive (175/100 mmHg), supine in bed. Will defer skilled therapy at this time. Thank you.   Robert Hayden, PT, DPT

## 2020-12-17 NOTE — PROGRESS NOTES
0730 Received verbal bedside report from [address]. Assumed care of the pt.     0800 Incontinence care performed. Condom cath placed on pt.   1040 Speech at the bedside. States pt is cleared for regular diet. Orders placed. 1100 TRANSFER - OUT REPORT:    Verbal report given to ESTRADA Llanos (name) on Skinner Cousins  being transferred to  (unit) for routine progression of care       Report consisted of patients Situation, Background, Assessment and   Recommendations(SBAR). Information from the following report(s) SBAR, Kardex, ED Summary, Procedure Summary, Intake/Output, MAR, Recent Results and Cardiac Rhythm NSR was reviewed with the receiving nurse. Lines:   Peripheral IV 12/13/20 Right Forearm (Active)   Site Assessment Clean, dry, & intact 12/17/20 0800   Phlebitis Assessment 0 12/17/20 0800   Infiltration Assessment 0 12/17/20 0800   Dressing Status Clean, dry, & intact 12/17/20 0800   Dressing Type Transparent;Tape 12/17/20 0800   Hub Color/Line Status Pink;Capped 12/17/20 0800   Action Taken Open ports on tubing capped 12/17/20 0800   Alcohol Cap Used Yes 12/17/20 0800       Peripheral IV 12/13/20 Left Forearm (Active)   Site Assessment Clean, dry, & intact 12/17/20 0800   Phlebitis Assessment 0 12/17/20 0800   Infiltration Assessment 0 12/17/20 0800   Dressing Status Clean, dry, & intact 12/17/20 0800   Dressing Type Transparent;Tape 12/17/20 0800   Hub Color/Line Status Pink; Infusing 12/17/20 0800   Action Taken Open ports on tubing capped 12/17/20 0800   Alcohol Cap Used Yes 12/17/20 0800        Opportunity for questions and clarification was provided.       Patient transported with:   Monitor  O2 @ 4 liters  Registered Nurse  [facility]

## 2020-12-17 NOTE — PROGRESS NOTES
TRANSFER - IN REPORT:    Verbal report received from Gabriela(name) on Delon Rose  being received from CCU(unit) for routine progression of care      Report consisted of patients Situation, Background, Assessment and   Recommendations(SBAR). Information from the following report(s) SBAR was reviewed with the receiving nurse. Opportunity for questions and clarification was provided. Assessment completed upon patients arrival to unit and care assumed.

## 2020-12-17 NOTE — PROGRESS NOTES
1930: Bedside and Verbal shift change report given to Rogelio Tidwell RN (oncoming nurse) by Beltran Mckinnon RN (offgoing nurse). Report included the following information SBAR, Kardex, ED Summary, Procedure Summary, Intake/Output, MAR, Recent Results, Med Rec Status, Cardiac Rhythm NSR, Alarm Parameters  and Dual Neuro Assessment. 2000: Resumed pt care. Full CHG bath provided due to urine incontinence. Condom cath applied. Pt A/O to person/place, disoriented to time/situation. 2100: CIWA 8, PRN ativan given. , unable to swallow pills for PRN clonidine, OK per MD to given PRN hydralazine instead. 2200: Pt took off condom catheter, incontinent of urine, experiencing mild hallucinations, able to be redirected. CIWA 12, PRN ativan given. 0300: Incontinence care performed     0400: Linen soaked with urine, full CHG provided. AM labs drawn and sent     0500: K+ 3.0, replaced per protocol    0645: O2 Sats stable 100%, ok per Dr. Emma Jenkins to transition to NC. Pt placed on 4L NC, O2 sats @ 94%. 0730: Bedside and Verbal shift change report given to ESTRADA Ochoa (oncoming nurse) by Rogelio Tidwell RN (offgoing nurse). Report included the following information SBAR, Kardex, Procedure Summary, Intake/Output, Accordion, Recent Results, Med Rec Status, Cardiac Rhythm NSR, Alarm Parameters  and Dual Neuro Assessment.

## 2020-12-18 LAB
ANION GAP SERPL CALC-SCNC: 9 MMOL/L (ref 5–15)
BUN SERPL-MCNC: 16 MG/DL (ref 6–20)
BUN/CREAT SERPL: 22 (ref 12–20)
CALCIUM SERPL-MCNC: 8.5 MG/DL (ref 8.5–10.1)
CHLORIDE SERPL-SCNC: 108 MMOL/L (ref 97–108)
CO2 SERPL-SCNC: 26 MMOL/L (ref 21–32)
CREAT SERPL-MCNC: 0.74 MG/DL (ref 0.7–1.3)
ERYTHROCYTE [DISTWIDTH] IN BLOOD BY AUTOMATED COUNT: 12.6 % (ref 11.5–14.5)
GLUCOSE SERPL-MCNC: 81 MG/DL (ref 65–100)
HCT VFR BLD AUTO: 29 % (ref 36.6–50.3)
HGB BLD-MCNC: 9.8 G/DL (ref 12.1–17)
Lab: NORMAL
MCH RBC QN AUTO: 33.6 PG (ref 26–34)
MCHC RBC AUTO-ENTMCNC: 33.8 G/DL (ref 30–36.5)
MCV RBC AUTO: 99.3 FL (ref 80–99)
METHYLMALONATE SERPL-SCNC: 143 NMOL/L (ref 0–378)
NRBC # BLD: 0 K/UL (ref 0–0.01)
NRBC BLD-RTO: 0 PER 100 WBC
PHOSPHATE SERPL-MCNC: 2.9 MG/DL (ref 2.6–4.7)
PLATELET # BLD AUTO: 165 K/UL (ref 150–400)
PMV BLD AUTO: 9.7 FL (ref 8.9–12.9)
POTASSIUM SERPL-SCNC: 3.1 MMOL/L (ref 3.5–5.1)
RBC # BLD AUTO: 2.92 M/UL (ref 4.1–5.7)
SODIUM SERPL-SCNC: 143 MMOL/L (ref 136–145)
WBC # BLD AUTO: 6.4 K/UL (ref 4.1–11.1)

## 2020-12-18 PROCEDURE — 74011250637 HC RX REV CODE- 250/637: Performed by: INTERNAL MEDICINE

## 2020-12-18 PROCEDURE — 74011250637 HC RX REV CODE- 250/637: Performed by: EMERGENCY MEDICINE

## 2020-12-18 PROCEDURE — 74011250636 HC RX REV CODE- 250/636: Performed by: EMERGENCY MEDICINE

## 2020-12-18 PROCEDURE — 84100 ASSAY OF PHOSPHORUS: CPT

## 2020-12-18 PROCEDURE — 74011250637 HC RX REV CODE- 250/637: Performed by: FAMILY MEDICINE

## 2020-12-18 PROCEDURE — 65660000000 HC RM CCU STEPDOWN

## 2020-12-18 PROCEDURE — 85027 COMPLETE CBC AUTOMATED: CPT

## 2020-12-18 PROCEDURE — C9113 INJ PANTOPRAZOLE SODIUM, VIA: HCPCS | Performed by: EMERGENCY MEDICINE

## 2020-12-18 PROCEDURE — 36415 COLL VENOUS BLD VENIPUNCTURE: CPT

## 2020-12-18 PROCEDURE — 80048 BASIC METABOLIC PNL TOTAL CA: CPT

## 2020-12-18 PROCEDURE — 74011250636 HC RX REV CODE- 250/636: Performed by: INTERNAL MEDICINE

## 2020-12-18 PROCEDURE — 97530 THERAPEUTIC ACTIVITIES: CPT

## 2020-12-18 PROCEDURE — 74011000250 HC RX REV CODE- 250: Performed by: EMERGENCY MEDICINE

## 2020-12-18 RX ORDER — AMLODIPINE BESYLATE 5 MG/1
5 TABLET ORAL DAILY
Status: DISCONTINUED | OUTPATIENT
Start: 2020-12-18 | End: 2020-12-19

## 2020-12-18 RX ORDER — POTASSIUM CHLORIDE 750 MG/1
20 TABLET, FILM COATED, EXTENDED RELEASE ORAL 2 TIMES DAILY
Status: COMPLETED | OUTPATIENT
Start: 2020-12-18 | End: 2020-12-19

## 2020-12-18 RX ADMIN — CHLORDIAZEPOXIDE HYDROCHLORIDE 25 MG: 25 CAPSULE ORAL at 14:53

## 2020-12-18 RX ADMIN — LOSARTAN POTASSIUM 100 MG: 50 TABLET, FILM COATED ORAL at 09:08

## 2020-12-18 RX ADMIN — ACETAMINOPHEN 650 MG: 325 TABLET ORAL at 21:15

## 2020-12-18 RX ADMIN — CLONIDINE HYDROCHLORIDE 0.1 MG: 0.1 TABLET ORAL at 20:30

## 2020-12-18 RX ADMIN — CHLORDIAZEPOXIDE HYDROCHLORIDE 25 MG: 25 CAPSULE ORAL at 21:13

## 2020-12-18 RX ADMIN — SODIUM CHLORIDE 40 MG: 9 INJECTION INTRAMUSCULAR; INTRAVENOUS; SUBCUTANEOUS at 12:29

## 2020-12-18 RX ADMIN — LORAZEPAM 2 MG: 2 INJECTION INTRAMUSCULAR; INTRAVENOUS at 04:05

## 2020-12-18 RX ADMIN — ENOXAPARIN SODIUM 40 MG: 40 INJECTION SUBCUTANEOUS at 09:08

## 2020-12-18 RX ADMIN — Medication 10 ML: at 14:00

## 2020-12-18 RX ADMIN — LORAZEPAM 2 MG: 2 INJECTION INTRAMUSCULAR; INTRAVENOUS at 00:59

## 2020-12-18 RX ADMIN — LABETALOL HYDROCHLORIDE 10 MG: 5 INJECTION INTRAVENOUS at 07:01

## 2020-12-18 RX ADMIN — Medication 10 ML: at 21:13

## 2020-12-18 RX ADMIN — Medication 10 ML: at 06:06

## 2020-12-18 RX ADMIN — HYDRALAZINE HYDROCHLORIDE 20 MG: 20 INJECTION INTRAMUSCULAR; INTRAVENOUS at 04:19

## 2020-12-18 RX ADMIN — CHLORDIAZEPOXIDE HYDROCHLORIDE 25 MG: 25 CAPSULE ORAL at 06:04

## 2020-12-18 RX ADMIN — POTASSIUM CHLORIDE 20 MEQ: 750 TABLET, FILM COATED, EXTENDED RELEASE ORAL at 17:47

## 2020-12-18 RX ADMIN — AMLODIPINE BESYLATE 5 MG: 5 TABLET ORAL at 09:08

## 2020-12-18 RX ADMIN — POTASSIUM CHLORIDE 20 MEQ: 750 TABLET, FILM COATED, EXTENDED RELEASE ORAL at 09:08

## 2020-12-18 NOTE — PROGRESS NOTES
Problem: Falls - Risk of  Goal: *Absence of Falls  Description: Document Celeste Tej Fall Risk and appropriate interventions in the flowsheet.   Outcome: Progressing Towards Goal  Note: Fall Risk Interventions:  Mobility Interventions: Communicate number of staff needed for ambulation/transfer    Mentation Interventions: Bed/chair exit alarm, Door open when patient unattended, Adequate sleep, hydration, pain control    Medication Interventions: Evaluate medications/consider consulting pharmacy    Elimination Interventions: Call light in reach    History of Falls Interventions: Bed/chair exit alarm

## 2020-12-18 NOTE — PROGRESS NOTES
Nancy Armstrong, Caryn Oseguera, and Hilario Martins Date: 12/11/2020      Subjective:     Patient awake and recognizes me. Had a pleasant conversation with him this morning. He thinks he has been in [place] for the last few days.  ..       Current Facility-Administered Medications   Medication Dose Route Frequency    potassium chloride SR (KLOR-CON 10) tablet 20 mEq  20 mEq Oral BID    amLODIPine (NORVASC) tablet 5 mg  5 mg Oral DAILY    losartan (COZAAR) tablet 100 mg  100 mg Oral DAILY    hydrALAZINE (APRESOLINE) 20 mg/mL injection 20 mg  20 mg IntraVENous Q4H PRN    labetaloL (NORMODYNE;TRANDATE) injection 10 mg  10 mg IntraVENous Q2H PRN    enoxaparin (LOVENOX) injection 40 mg  40 mg SubCUTAneous Q24H    ELECTROLYTE REPLACEMENT PROTOCOL - Potassium and Magnesium  1 Each Other PRN    chlordiazePOXIDE (LIBRIUM) capsule 25 mg  25 mg Oral Q8H    thiamine (B-1) 100 mg in 0.9% sodium chloride 50 mL IVPB  100 mg IntraVENous DAILY    pantoprazole (PROTONIX) 40 mg in 0.9% sodium chloride 10 mL injection  40 mg IntraVENous Q24H    fentaNYL citrate (PF) injection 25 mcg  25 mcg IntraVENous Q20MIN PRN    cloNIDine HCL (CATAPRES) tablet 0.1 mg  0.1 mg Oral Q3H PRN    sodium chloride (NS) flush 5-40 mL  5-40 mL IntraVENous Q8H    sodium chloride (NS) flush 5-40 mL  5-40 mL IntraVENous PRN    potassium chloride 10 mEq in 100 ml IVPB  10 mEq IntraVENous PRN    acetaminophen (TYLENOL) tablet 650 mg  650 mg Oral Q6H PRN    Or    acetaminophen (TYLENOL) suppository 650 mg  650 mg Rectal Q6H PRN    polyethylene glycol (MIRALAX) packet 17 g  17 g Oral DAILY PRN    ondansetron (ZOFRAN ODT) tablet 4 mg  4 mg Oral Q8H PRN    Or    ondansetron (ZOFRAN) injection 4 mg  4 mg IntraVENous Q6H PRN    LORazepam (ATIVAN) injection 2 mg  2 mg IntraVENous Q1H PRN    LORazepam (ATIVAN) injection 4 mg  4 mg IntraVENous Q1H PRN    influenza vaccine 2020-21 (6 mos+)(PF) (FLUARIX/FLULAVAL/FLUZONE QUAD) injection 0.5 mL 0.5 mL IntraMUSCular PRIOR TO DISCHARGE          Objective:     Patient Vitals for the past 8 hrs:   BP Temp Pulse Resp SpO2   12/18/20 0648 (!) 182/92       12/18/20 0549 (!) 177/88       12/18/20 0416 (!) 214/104 97.5 °F (36.4 °C) 91 20 93 %   12/18/20 0103 (!) 153/75 98.6 °F (37 °C) 89 19 93 %     12/17 1901 - 12/18 0700  In: -   Out: 1000 [Urine:1000]  12/16 0701 - 12/17 1900  In: 1241.7 [I.V.:1241.7]  Out: 2250 [Urine:1900; Drains:350]    Physical Exam: Lungs: clear to auscultation bilaterally  Heart: regular rate and rhythm, S1, S2 normal, no murmur, click, rub or gallop  Abdomen: soft, non-tender.  Bowel sounds normal. No masses,  no organomegaly        Data Review   Recent Results (from the past 24 hour(s))   CBC W/O DIFF    Collection Time: 12/18/20  3:35 AM   Result Value Ref Range    WBC 6.4 4.1 - 11.1 K/uL    RBC 2.92 (L) 4.10 - 5.70 M/uL    HGB 9.8 (L) 12.1 - 17.0 g/dL    HCT 29.0 (L) 36.6 - 50.3 %    MCV 99.3 (H) 80.0 - 99.0 FL    MCH 33.6 26.0 - 34.0 PG    MCHC 33.8 30.0 - 36.5 g/dL    RDW 12.6 11.5 - 14.5 %    PLATELET 387 189 - 575 K/uL    MPV 9.7 8.9 - 12.9 FL    NRBC 0.0 0  WBC    ABSOLUTE NRBC 0.00 0.00 - 3.38 K/uL   METABOLIC PANEL, BASIC    Collection Time: 12/18/20  3:35 AM   Result Value Ref Range    Sodium 143 136 - 145 mmol/L    Potassium 3.1 (L) 3.5 - 5.1 mmol/L    Chloride 108 97 - 108 mmol/L    CO2 26 21 - 32 mmol/L    Anion gap 9 5 - 15 mmol/L    Glucose 81 65 - 100 mg/dL    BUN 16 6 - 20 MG/DL    Creatinine 0.74 0.70 - 1.30 MG/DL    BUN/Creatinine ratio 22 (H) 12 - 20      GFR est AA >60 >60 ml/min/1.73m2    GFR est non-AA >60 >60 ml/min/1.73m2    Calcium 8.5 8.5 - 10.1 MG/DL           Assessment:     Principal Problem:    AMS (altered mental status) (12/11/2020)    Active Problems:    Essential hypertension (11/21/2015)      Excessive drinking of alcohol (12/11/2020)      UTI (urinary tract infection) (12/12/2020)      Hypokalemia (12/12/2020)      [facility] ([facility] (12/12/2020)      Anemia (12/12/2020)        Plan:     1) Replete K  2) Add Amlodipine to meds for BP control  3) PT  4) OOB to chair

## 2020-12-18 NOTE — PROGRESS NOTES
LINETTE rec'd from [address] [place] [address] and care assumed at this time. Medications, labs and poc reviewed.

## 2020-12-18 NOTE — PROGRESS NOTES
Problem: Mobility Impaired (Adult and Pediatric)  Goal: *Acute Goals and Plan of Care (Insert Text)  Description: FUNCTIONAL STATUS PRIOR TO ADMISSION: Patient was independent and active without use of DME.    HOME SUPPORT PRIOR TO ADMISSION: The patient lived with his wife but did not require assist.    Physical Therapy Goals  Initiated 12/12/2020 and re-evaluated/downgraded 12/16/2020    1. Patient will move from supine to sit and sit to supine , scoot up and down, and roll side to side in bed with minimal assistance/contact guard assist within 7 day(s). 2.  Patient will transfer from bed to chair and chair to bed with minimal assistance/contact guard assist using the least restrictive device within 7 day(s). 3.  Patient will perform sit to stand with minimal assistance/contact guard assist within 7 day(s). 4.  Patient will ambulate with minimal assistance/contact guard assist for 50 feet with the least restrictive device within 7 day(s). Outcome: Progressing Towards Goal   PHYSICAL THERAPY TREATMENT  Patient: Poppy Frank (47 y.o. male)  Date: 12/18/2020  Diagnosis: AMS (altered mental status) [R41.82] AMS (altered mental status)       Precautions:  fall, seizure, CIWA  Chart, physical therapy assessment, plan of care and goals were reviewed. ASSESSMENT  Patient continues with skilled PT services and is slowly progressing towards goals. Pt alert throughout session, but oriented to self only, required repeated cues to follow simple commands. Pt continues to require max A of 1-2 with bed mob; able to maintain sitting balance EOB with CGA/ SBA. Attempted sit to stand from slightly elevated EOB, however pt unable to achieve lift off with max A x 2. Pt remains well below functional baseline. Will benefit from mobility progression with acute PT as tolerated. Recommend follow up SNF rehab at d/c.     Current Level of Function Impacting Discharge (mobility/balance): rolling max A, supine to sit max A, sit to supine max A x 2, static sit SBA/ CGA    Other factors to consider for discharge: ETOH withdrawal, supportive wife         PLAN :  Patient continues to benefit from skilled intervention to address the above impairments. Continue treatment per established plan of care. to address goals. Recommendation for discharge: (in order for the patient to meet his/her long term goals)  Therapy up to 5 days/week in SNF setting    This discharge recommendation:  Has been made in collaboration with the attending provider and/or case management    IF patient discharges home will need the following DME: to be determined (TBD)       SUBJECTIVE:   Patient confused; oriented to self only    OBJECTIVE DATA SUMMARY:   Critical Behavior:  Neurologic State: Confused  Orientation Level: Disoriented to place, Disoriented to situation, Disoriented to time, Oriented to person  Cognition: Follows commands  Safety/Judgement: Awareness of environment  Functional Mobility Training:  Bed Mobility:  Rolling: Maximum assistance  Supine to Sit: Maximum assistance  Sit to Supine: Maximum assistance;Assist x2  Scooting: Total assistance;Assist x2(for scooting in supine)        Transfers:      Attempted sit to stand, pt unable to achieve lift off with max A x 2                              Balance:  Sitting: Impaired  Sitting - Static: Fair (occasional)  Sitting - Dynamic: Fair (occasional)  Pain Rating:  No c/o pain    Activity Tolerance:   Fair    After treatment patient left in no apparent distress:   Supine in bed, Call bell within reach, Bed / chair alarm activated, and Side rails x 3    COMMUNICATION/COLLABORATION:   The patients plan of care was discussed with: Registered nurse and Certified nursing assistant/patient care technician.      Vincenzo Bergman, PT   Time Calculation: 21 mins

## 2020-12-18 NOTE — PROGRESS NOTES
Transition of Care: TBD; home with home health and family vs rehab?  (depends on progression with therapies)    Transport Plan: TBD; BLS vs home with wife in car     RUR: 16%    Dx: altered mental status    Discharge pending:  -pending complex medical progress and care recommendations   -that disposition preference from patients wife  is for [facility] via [facility] or [facility] [address]   -Patient was extubated yesterday, 12/16 in the ICU and transferred to [facility] and OT has started (pending recommendations and patient progression)  -patient is alert but still having confusion  -patient still on CIWA scale    CM following  Juan David Mao RN, CRM

## 2020-12-19 LAB
ANION GAP SERPL CALC-SCNC: 9 MMOL/L (ref 5–15)
BUN SERPL-MCNC: 16 MG/DL (ref 6–20)
BUN/CREAT SERPL: 21 (ref 12–20)
CALCIUM SERPL-MCNC: 8.9 MG/DL (ref 8.5–10.1)
CHLORIDE SERPL-SCNC: 109 MMOL/L (ref 97–108)
CO2 SERPL-SCNC: 28 MMOL/L (ref 21–32)
CREAT SERPL-MCNC: 0.78 MG/DL (ref 0.7–1.3)
ERYTHROCYTE [DISTWIDTH] IN BLOOD BY AUTOMATED COUNT: 13.2 % (ref 11.5–14.5)
GLUCOSE SERPL-MCNC: 80 MG/DL (ref 65–100)
HCT VFR BLD AUTO: 30.3 % (ref 36.6–50.3)
HGB BLD-MCNC: 10.2 G/DL (ref 12.1–17)
MCH RBC QN AUTO: 33 PG (ref 26–34)
MCHC RBC AUTO-ENTMCNC: 33.7 G/DL (ref 30–36.5)
MCV RBC AUTO: 98.1 FL (ref 80–99)
NRBC # BLD: 0 K/UL (ref 0–0.01)
NRBC BLD-RTO: 0 PER 100 WBC
PHOSPHATE SERPL-MCNC: 3.7 MG/DL (ref 2.6–4.7)
PLATELET # BLD AUTO: 244 K/UL (ref 150–400)
PMV BLD AUTO: 9.3 FL (ref 8.9–12.9)
POTASSIUM SERPL-SCNC: 3.2 MMOL/L (ref 3.5–5.1)
RBC # BLD AUTO: 3.09 M/UL (ref 4.1–5.7)
SODIUM SERPL-SCNC: 146 MMOL/L (ref 136–145)
WBC # BLD AUTO: 9.2 K/UL (ref 4.1–11.1)

## 2020-12-19 PROCEDURE — 74011250636 HC RX REV CODE- 250/636: Performed by: INTERNAL MEDICINE

## 2020-12-19 PROCEDURE — C9113 INJ PANTOPRAZOLE SODIUM, VIA: HCPCS | Performed by: EMERGENCY MEDICINE

## 2020-12-19 PROCEDURE — 74011250636 HC RX REV CODE- 250/636: Performed by: EMERGENCY MEDICINE

## 2020-12-19 PROCEDURE — 65660000000 HC RM CCU STEPDOWN

## 2020-12-19 PROCEDURE — 74011250637 HC RX REV CODE- 250/637: Performed by: INTERNAL MEDICINE

## 2020-12-19 PROCEDURE — 84100 ASSAY OF PHOSPHORUS: CPT

## 2020-12-19 PROCEDURE — 74011000258 HC RX REV CODE- 258: Performed by: EMERGENCY MEDICINE

## 2020-12-19 PROCEDURE — 74011250637 HC RX REV CODE- 250/637: Performed by: EMERGENCY MEDICINE

## 2020-12-19 PROCEDURE — 85027 COMPLETE CBC AUTOMATED: CPT

## 2020-12-19 PROCEDURE — 80048 BASIC METABOLIC PNL TOTAL CA: CPT

## 2020-12-19 PROCEDURE — 74011250637 HC RX REV CODE- 250/637: Performed by: FAMILY MEDICINE

## 2020-12-19 PROCEDURE — 74011000250 HC RX REV CODE- 250: Performed by: EMERGENCY MEDICINE

## 2020-12-19 PROCEDURE — 36415 COLL VENOUS BLD VENIPUNCTURE: CPT

## 2020-12-19 RX ORDER — PROPRANOLOL HYDROCHLORIDE 10 MG/1
10 TABLET ORAL 3 TIMES DAILY
Status: DISCONTINUED | OUTPATIENT
Start: 2020-12-19 | End: 2020-12-24

## 2020-12-19 RX ORDER — POTASSIUM CHLORIDE 750 MG/1
10 TABLET, FILM COATED, EXTENDED RELEASE ORAL 2 TIMES DAILY
Status: DISCONTINUED | OUTPATIENT
Start: 2020-12-19 | End: 2020-12-20

## 2020-12-19 RX ORDER — AMLODIPINE BESYLATE 5 MG/1
10 TABLET ORAL DAILY
Status: DISCONTINUED | OUTPATIENT
Start: 2020-12-20 | End: 2021-01-09

## 2020-12-19 RX ADMIN — ENOXAPARIN SODIUM 40 MG: 40 INJECTION SUBCUTANEOUS at 10:35

## 2020-12-19 RX ADMIN — LORAZEPAM 2 MG: 2 INJECTION INTRAMUSCULAR; INTRAVENOUS at 02:33

## 2020-12-19 RX ADMIN — Medication 10 ML: at 06:20

## 2020-12-19 RX ADMIN — POTASSIUM CHLORIDE 20 MEQ: 750 TABLET, FILM COATED, EXTENDED RELEASE ORAL at 10:34

## 2020-12-19 RX ADMIN — CHLORDIAZEPOXIDE HYDROCHLORIDE 25 MG: 25 CAPSULE ORAL at 15:42

## 2020-12-19 RX ADMIN — PROPRANOLOL HYDROCHLORIDE 10 MG: 10 TABLET ORAL at 12:36

## 2020-12-19 RX ADMIN — LOSARTAN POTASSIUM 100 MG: 50 TABLET, FILM COATED ORAL at 10:34

## 2020-12-19 RX ADMIN — CHLORDIAZEPOXIDE HYDROCHLORIDE 25 MG: 25 CAPSULE ORAL at 21:41

## 2020-12-19 RX ADMIN — THIAMINE HYDROCHLORIDE 100 MG: 100 INJECTION, SOLUTION INTRAMUSCULAR; INTRAVENOUS at 09:00

## 2020-12-19 RX ADMIN — Medication 10 ML: at 21:42

## 2020-12-19 RX ADMIN — HYDRALAZINE HYDROCHLORIDE 20 MG: 20 INJECTION INTRAMUSCULAR; INTRAVENOUS at 04:21

## 2020-12-19 RX ADMIN — AMLODIPINE BESYLATE 5 MG: 5 TABLET ORAL at 10:35

## 2020-12-19 RX ADMIN — Medication 10 ML: at 15:43

## 2020-12-19 RX ADMIN — CLONIDINE HYDROCHLORIDE 0.1 MG: 0.1 TABLET ORAL at 06:19

## 2020-12-19 RX ADMIN — CHLORDIAZEPOXIDE HYDROCHLORIDE 25 MG: 25 CAPSULE ORAL at 06:19

## 2020-12-19 RX ADMIN — PROPRANOLOL HYDROCHLORIDE 10 MG: 10 TABLET ORAL at 22:37

## 2020-12-19 RX ADMIN — SODIUM CHLORIDE 40 MG: 9 INJECTION INTRAMUSCULAR; INTRAVENOUS; SUBCUTANEOUS at 12:38

## 2020-12-19 NOTE — PROGRESS NOTES
Update    Left message on VM of number posted in electronic chart [phone]) around 3:41pm    3:47pm received page from call center to call [phone] for Jazzmine Miles  Writer called above number which spouse Mrs Miles answered.  Spouse states that she did not get the call from the writer left on ([phone]) though she has the patient's phone.     She states that she does not wish to be rude but voice continued dissatisfaction of the floor staff and service. She continues the conversation that they never come to the hospital and that the writer should be informed that the hospital staff/service is far below her expectations. She states that she was speaking to one staff member yesterday and that she was not finished talking to her when (according to the spouse) that the staff turned on her heels and walked away.    The spouse states that pt is not to take oral meds though the writer pointed out that he has been getting oral meds prior to this weekend and his transition to his current medical floor which the staff documents that the patient has been taking oral meds adequately.     The spouse then wanted to review the patient's scheduled and prn medications and questions some of them though shortly later she acknowledges that the patient has been getting the majority of them prior to this weekend. She comments that \"no one\" can tell her what the patient is on.    The spouse then states again she does not wish to be rude and directs the comment this time to the writer referencing the writer is not in the hospital now. The writer responds that the practice is not present in the hospital during the entire day which this has not changed in the weekend rounds of the physicians.  The spouse then comments \"you're new\" without any further words.    The call lasted over 10 minutes before the spouse concluded her conversation with the writer.

## 2020-12-19 NOTE — PROGRESS NOTES
Bedside and Verbal shift change report given to Mercedes Veronica RN (oncoming nurse) by Jason Kamara RN (offgoing nurse). Report included the following information SBAR, Kardex and MAR.

## 2020-12-19 NOTE — PROGRESS NOTES
Problem: Falls - Risk of  Goal: *Absence of Falls  Description: Document Shonda Blood Fall Risk and appropriate interventions in the flowsheet.   Outcome: Progressing Towards Goal  Note: Fall Risk Interventions:  Mobility Interventions: Communicate number of staff needed for ambulation/transfer    Mentation Interventions: Bed/chair exit alarm, Door open when patient unattended, Adequate sleep, hydration, pain control    Medication Interventions: Evaluate medications/consider consulting pharmacy    Elimination Interventions: Call light in reach    History of Falls Interventions: Bed/chair exit alarm         Problem: Patient Education: Go to Patient Education Activity  Goal: Patient/Family Education  Outcome: Progressing Towards Goal     Problem: Alcohol Withdrawal  Goal: *STG: Participates in treatment plan  Outcome: Progressing Towards Goal  Goal: *STG: Remains safe in hospital  Outcome: Progressing Towards Goal  Goal: *STG: Seeks staff when symptoms of withdrawal increase  Outcome: Progressing Towards Goal  Goal: *STG: Complies with medication therapy  Outcome: Progressing Towards Goal  Goal: *STG: Attends activities and groups  Outcome: Progressing Towards Goal  Goal: *STG: Will identify negative impact of chemical dependency including the use of tobacco, alcohol, and other substances  Outcome: Progressing Towards Goal  Goal: *STG: Verbalizes abstinence as an achievable goal  Outcome: Progressing Towards Goal  Goal: *STG: Agrees to participate in outpatient after care program to support ongoing mental health  Outcome: Progressing Towards Goal  Goal: *STG: Able to indentify relapse triggers including interpersonal/social and familial factors  Outcome: Progressing Towards Goal  Goal: *STG: Identify lifestyle changes to support long term sobriety such as vocation, employment, education, and legal issues  Outcome: Progressing Towards Goal  Goal: *STG: Maintains appropriate nutrition and hydration  Outcome: Progressing Towards Goal  Goal: *STG: Vital signs within defined limits  Outcome: Progressing Towards Goal  Goal: *STG/LTG: Relapse prevention plan in place to include housing/aftercare, leisure activities, and spirituality  Outcome: Progressing Towards Goal  Goal: Interventions  Outcome: Progressing Towards Goal     Problem: Patient Education: Go to Patient Education Activity  Goal: Patient/Family Education  Outcome: Progressing Towards Goal     Problem: Pain  Goal: *Control of Pain  Outcome: Progressing Towards Goal  Goal: *PALLIATIVE CARE:  Alleviation of Pain  Outcome: Progressing Towards Goal     Problem: Patient Education: Go to Patient Education Activity  Goal: Patient/Family Education  Outcome: Progressing Towards Goal     Problem: Patient Education: Go to Patient Education Activity  Goal: Patient/Family Education  Outcome: Progressing Towards Goal     Problem: Pressure Injury - Risk of  Goal: *Prevention of pressure injury  Description: Document Naresh Scale and appropriate interventions in the flowsheet.   Outcome: Progressing Towards Goal  Note: Pressure Injury Interventions:  Sensory Interventions: Assess changes in LOC    Moisture Interventions: Absorbent underpads    Activity Interventions: Pressure redistribution bed/mattress(bed type)    Mobility Interventions: PT/OT evaluation    Nutrition Interventions: Document food/fluid/supplement intake    Friction and Shear Interventions: Apply protective barrier, creams and emollients, Lift sheet, Minimize layers                Problem: Patient Education: Go to Patient Education Activity  Goal: Patient/Family Education  Outcome: Progressing Towards Goal     Problem: Discharge Planning  Goal: *Discharge to safe environment  Outcome: Progressing Towards Goal     Problem: Non-Violent Restraints  Goal: *Removal from restraints as soon as assessed to be safe  Outcome: Progressing Towards Goal  Goal: *No harm/injury to patient while restraints in use  Outcome: Progressing Towards Goal  Goal: *Patient's dignity will be maintained  Outcome: Progressing Towards Goal  Goal: *Patient Specific Goal (EDIT GOAL, INSERT TEXT)  Outcome: Progressing Towards Goal  Goal: Non-violent Restaints:Standard Interventions  Outcome: Progressing Towards Goal  Goal: Non-violent Restraints:Patient Interventions  Outcome: Progressing Towards Goal  Goal: Patient/Family Education  Outcome: Progressing Towards Goal     Problem: Ventilator Management  Goal: *Adequate oxygenation and ventilation  Outcome: Progressing Towards Goal  Goal: *Patient maintains clear airway/free of aspiration  Outcome: Progressing Towards Goal  Goal: *Absence of infection signs and symptoms  Outcome: Progressing Towards Goal  Goal: *Normal spontaneous ventilation  Outcome: Progressing Towards Goal     Problem: Patient Education: Go to Patient Education Activity  Goal: Patient/Family Education  Outcome: Progressing Towards Goal     Problem: Nutrition Deficit  Goal: *Optimize nutritional status  Outcome: Progressing Towards Goal     Problem: Breathing Pattern - Ineffective  Goal: *Absence of hypoxia  Outcome: Progressing Towards Goal  Goal: *Use of effective breathing techniques  Outcome: Progressing Towards Goal  Goal: *PALLIATIVE CARE:  Alleviation of Dyspnea  Outcome: Progressing Towards Goal     Problem: Patient Education: Go to Patient Education Activity  Goal: Patient/Family Education  Outcome: Progressing Towards Goal     Problem: Breathing Pattern - Ineffective  Goal: *Absence of hypoxia  Outcome: Progressing Towards Goal  Goal: *Use of effective breathing techniques  Outcome: Progressing Towards Goal  Goal: *PALLIATIVE CARE:  Alleviation of Dyspnea  Outcome: Progressing Towards Goal     Problem: Patient Education: Go to Patient Education Activity  Goal: Patient/Family Education  Outcome: Progressing Towards Goal

## 2020-12-19 NOTE — PROGRESS NOTES
Progress Note on behalf of pcp Dr. Junaid Barahona (to return on 12/21/2020). WEEKEND/AFTERHOURS TO USE SAME ANSWERING SERVICE AS THE PRACTICE [phone]      Admit Date: 12/11/2020      Subjective:      Snoring, good O2 sat, was hypertensive and HR in low 100's. Spoke with nurse, no events reports and nurse states that spouse had already left.     Current Facility-Administered Medications   Medication Dose Route Frequency    amLODIPine (NORVASC) tablet 5 mg  5 mg Oral DAILY    losartan (COZAAR) tablet 100 mg  100 mg Oral DAILY    hydrALAZINE (APRESOLINE) 20 mg/mL injection 20 mg  20 mg IntraVENous Q4H PRN    labetaloL (NORMODYNE;TRANDATE) injection 10 mg  10 mg IntraVENous Q2H PRN    enoxaparin (LOVENOX) injection 40 mg  40 mg SubCUTAneous Q24H    ELECTROLYTE REPLACEMENT PROTOCOL - Potassium and Magnesium  1 Each Other PRN    chlordiazePOXIDE (LIBRIUM) capsule 25 mg  25 mg Oral Q8H    thiamine (B-1) 100 mg in 0.9% sodium chloride 50 mL IVPB  100 mg IntraVENous DAILY    pantoprazole (PROTONIX) 40 mg in 0.9% sodium chloride 10 mL injection  40 mg IntraVENous Q24H    fentaNYL citrate (PF) injection 25 mcg  25 mcg IntraVENous Q20MIN PRN    cloNIDine HCL (CATAPRES) tablet 0.1 mg  0.1 mg Oral Q3H PRN    sodium chloride (NS) flush 5-40 mL  5-40 mL IntraVENous Q8H    sodium chloride (NS) flush 5-40 mL  5-40 mL IntraVENous PRN    potassium chloride 10 mEq in 100 ml IVPB  10 mEq IntraVENous PRN    acetaminophen (TYLENOL) tablet 650 mg  650 mg Oral Q6H PRN    Or    acetaminophen (TYLENOL) suppository 650 mg  650 mg Rectal Q6H PRN    polyethylene glycol (MIRALAX) packet 17 g  17 g Oral DAILY PRN    ondansetron (ZOFRAN ODT) tablet 4 mg  4 mg Oral Q8H PRN    Or    ondansetron (ZOFRAN) injection 4 mg  4 mg IntraVENous Q6H PRN    LORazepam (ATIVAN) injection 2 mg  2 mg IntraVENous Q1H PRN    LORazepam (ATIVAN) injection 4 mg  4 mg IntraVENous Q1H PRN    influenza vaccine 2020-21 (6 mos+)(PF) (FLUARIX/FLULAVAL/FLUZONE QUAD) injection 0.5 mL  0.5 mL IntraMUSCular PRIOR TO DISCHARGE          Objective:     Patient Vitals for the past 8 hrs:   BP Temp Pulse Resp SpO2   12/19/20 0820 (!) (P) 145/86  (!) (P) 110 (P) 19 (P) 97 %   12/19/20 0633 (!) 187/87 98 °F (36.7 °C) (!) 114 15 98 %   12/19/20 0616 (!) 188/85  (!) 111     12/19/20 0409 (!) 199/99 97.6 °F (36.4 °C) 95 17 95 %     No intake/output data recorded.   12/17 1901 - 12/19 0700  In: 300 [P.O.:300]  Out: 1000 [Urine:1000]    Physical Exam:   Snoring  Cardiac: Mildly tachy  Pulm: no obvious wheezing, sounds thought to be upper airway  Abd soft, no grimacing with light palpation, bowel sounds heard  Ext: no pitting edema, no cyanosis, pulses good      Data Review   Recent Results (from the past 24 hour(s))   PHOSPHORUS    Collection Time: 12/19/20  4:19 AM   Result Value Ref Range    Phosphorus 3.7 2.6 - 4.7 MG/DL   CBC W/O DIFF    Collection Time: 12/19/20  4:19 AM   Result Value Ref Range    WBC 9.2 4.1 - 11.1 K/uL    RBC 3.09 (L) 4.10 - 5.70 M/uL    HGB 10.2 (L) 12.1 - 17.0 g/dL    HCT 30.3 (L) 36.6 - 50.3 %    MCV 98.1 80.0 - 99.0 FL    MCH 33.0 26.0 - 34.0 PG    MCHC 33.7 30.0 - 36.5 g/dL    RDW 13.2 11.5 - 14.5 %    PLATELET 012 084 - 344 K/uL    MPV 9.3 8.9 - 12.9 FL    NRBC 0.0 0  WBC    ABSOLUTE NRBC 0.00 0.00 - 8.83 K/uL   METABOLIC PANEL, BASIC    Collection Time: 12/19/20  4:19 AM   Result Value Ref Range    Sodium 146 (H) 136 - 145 mmol/L    Potassium 3.2 (L) 3.5 - 5.1 mmol/L    Chloride 109 (H) 97 - 108 mmol/L    CO2 28 21 - 32 mmol/L    Anion gap 9 5 - 15 mmol/L    Glucose 80 65 - 100 mg/dL    BUN 16 6 - 20 MG/DL    Creatinine 0.78 0.70 - 1.30 MG/DL    BUN/Creatinine ratio 21 (H) 12 - 20      GFR est AA >60 >60 ml/min/1.73m2    GFR est non-AA >60 >60 ml/min/1.73m2    Calcium 8.9 8.5 - 10.1 MG/DL           Assessment:     Principal Problem:    AMS (altered mental status) (12/11/2020)    Active Problems:    Essential hypertension (11/21/2015)      Excessive drinking of alcohol (12/11/2020)      UTI (urinary tract infection) (12/12/2020)      Hypokalemia (12/12/2020)      Thrombocytopenia ([address] (12/12/2020)      Anemia (12/12/2020)        Plan:     1. Adjusted meds to help with bp and HR, with holding parameters  2. Continue to monitor.   3  Resume therapy

## 2020-12-20 LAB
ANION GAP SERPL CALC-SCNC: 5 MMOL/L (ref 5–15)
BASOPHILS # BLD: 0.1 K/UL (ref 0–0.1)
BASOPHILS NFR BLD: 1 % (ref 0–1)
BUN SERPL-MCNC: 14 MG/DL (ref 6–20)
BUN/CREAT SERPL: 17 (ref 12–20)
CALCIUM SERPL-MCNC: 8.7 MG/DL (ref 8.5–10.1)
CHLORIDE SERPL-SCNC: 111 MMOL/L (ref 97–108)
CO2 SERPL-SCNC: 29 MMOL/L (ref 21–32)
CREAT SERPL-MCNC: 0.83 MG/DL (ref 0.7–1.3)
DIFFERENTIAL METHOD BLD: ABNORMAL
EOSINOPHIL # BLD: 0.3 K/UL (ref 0–0.4)
EOSINOPHIL NFR BLD: 3 % (ref 0–7)
ERYTHROCYTE [DISTWIDTH] IN BLOOD BY AUTOMATED COUNT: 13.2 % (ref 11.5–14.5)
GLUCOSE SERPL-MCNC: 101 MG/DL (ref 65–100)
HCT VFR BLD AUTO: 32.3 % (ref 36.6–50.3)
HGB BLD-MCNC: 10.5 G/DL (ref 12.1–17)
IMM GRANULOCYTES # BLD AUTO: 0.1 K/UL (ref 0–0.04)
IMM GRANULOCYTES NFR BLD AUTO: 1 % (ref 0–0.5)
LYMPHOCYTES # BLD: 1 K/UL (ref 0.8–3.5)
LYMPHOCYTES NFR BLD: 11 % (ref 12–49)
MAGNESIUM SERPL-MCNC: 1.8 MG/DL (ref 1.6–2.4)
MCH RBC QN AUTO: 32.4 PG (ref 26–34)
MCHC RBC AUTO-ENTMCNC: 32.5 G/DL (ref 30–36.5)
MCV RBC AUTO: 99.7 FL (ref 80–99)
MONOCYTES # BLD: 1.5 K/UL (ref 0–1)
MONOCYTES NFR BLD: 17 % (ref 5–13)
NEUTS SEG # BLD: 5.9 K/UL (ref 1.8–8)
NEUTS SEG NFR BLD: 67 % (ref 32–75)
NRBC # BLD: 0 K/UL (ref 0–0.01)
NRBC BLD-RTO: 0 PER 100 WBC
PHOSPHATE SERPL-MCNC: 3.3 MG/DL (ref 2.6–4.7)
PLATELET # BLD AUTO: 285 K/UL (ref 150–400)
PMV BLD AUTO: 8.9 FL (ref 8.9–12.9)
POTASSIUM SERPL-SCNC: 3.1 MMOL/L (ref 3.5–5.1)
RBC # BLD AUTO: 3.24 M/UL (ref 4.1–5.7)
SODIUM SERPL-SCNC: 145 MMOL/L (ref 136–145)
WBC # BLD AUTO: 8.9 K/UL (ref 4.1–11.1)

## 2020-12-20 PROCEDURE — C9113 INJ PANTOPRAZOLE SODIUM, VIA: HCPCS | Performed by: EMERGENCY MEDICINE

## 2020-12-20 PROCEDURE — 84100 ASSAY OF PHOSPHORUS: CPT

## 2020-12-20 PROCEDURE — 74011250636 HC RX REV CODE- 250/636: Performed by: EMERGENCY MEDICINE

## 2020-12-20 PROCEDURE — 74011000250 HC RX REV CODE- 250: Performed by: EMERGENCY MEDICINE

## 2020-12-20 PROCEDURE — 65660000000 HC RM CCU STEPDOWN

## 2020-12-20 PROCEDURE — 74011250636 HC RX REV CODE- 250/636: Performed by: INTERNAL MEDICINE

## 2020-12-20 PROCEDURE — 36415 COLL VENOUS BLD VENIPUNCTURE: CPT

## 2020-12-20 PROCEDURE — 80048 BASIC METABOLIC PNL TOTAL CA: CPT

## 2020-12-20 PROCEDURE — 85025 COMPLETE CBC W/AUTO DIFF WBC: CPT

## 2020-12-20 PROCEDURE — 83735 ASSAY OF MAGNESIUM: CPT

## 2020-12-20 PROCEDURE — 74011000258 HC RX REV CODE- 258: Performed by: EMERGENCY MEDICINE

## 2020-12-20 PROCEDURE — 74011250637 HC RX REV CODE- 250/637: Performed by: INTERNAL MEDICINE

## 2020-12-20 PROCEDURE — 74011250637 HC RX REV CODE- 250/637: Performed by: EMERGENCY MEDICINE

## 2020-12-20 RX ORDER — CHLORDIAZEPOXIDE HYDROCHLORIDE 10 MG/1
10 CAPSULE, GELATIN COATED ORAL EVERY 8 HOURS
Status: DISCONTINUED | OUTPATIENT
Start: 2020-12-20 | End: 2020-12-21

## 2020-12-20 RX ORDER — POTASSIUM CHLORIDE 750 MG/1
20 TABLET, FILM COATED, EXTENDED RELEASE ORAL 2 TIMES DAILY
Status: DISCONTINUED | OUTPATIENT
Start: 2020-12-20 | End: 2020-12-24

## 2020-12-20 RX ADMIN — LORAZEPAM 2 MG: 2 INJECTION INTRAMUSCULAR; INTRAVENOUS at 05:40

## 2020-12-20 RX ADMIN — POTASSIUM CHLORIDE 10 MEQ: 7.46 INJECTION, SOLUTION INTRAVENOUS at 09:43

## 2020-12-20 RX ADMIN — CHLORDIAZEPOXIDE HYDROCHLORIDE 10 MG: 10 CAPSULE ORAL at 21:09

## 2020-12-20 RX ADMIN — PROPRANOLOL HYDROCHLORIDE 10 MG: 10 TABLET ORAL at 09:50

## 2020-12-20 RX ADMIN — ENOXAPARIN SODIUM 40 MG: 40 INJECTION SUBCUTANEOUS at 09:45

## 2020-12-20 RX ADMIN — Medication 10 ML: at 06:59

## 2020-12-20 RX ADMIN — Medication 10 ML: at 14:00

## 2020-12-20 RX ADMIN — POTASSIUM CHLORIDE 10 MEQ: 7.46 INJECTION, SOLUTION INTRAVENOUS at 05:41

## 2020-12-20 RX ADMIN — PROPRANOLOL HYDROCHLORIDE 10 MG: 10 TABLET ORAL at 21:09

## 2020-12-20 RX ADMIN — SODIUM CHLORIDE 40 MG: 9 INJECTION INTRAMUSCULAR; INTRAVENOUS; SUBCUTANEOUS at 12:08

## 2020-12-20 RX ADMIN — LOSARTAN POTASSIUM 100 MG: 50 TABLET, FILM COATED ORAL at 09:44

## 2020-12-20 RX ADMIN — POTASSIUM CHLORIDE 10 MEQ: 750 TABLET, EXTENDED RELEASE ORAL at 09:44

## 2020-12-20 RX ADMIN — POTASSIUM CHLORIDE 10 MEQ: 7.46 INJECTION, SOLUTION INTRAVENOUS at 07:54

## 2020-12-20 RX ADMIN — CHLORDIAZEPOXIDE HYDROCHLORIDE 25 MG: 25 CAPSULE ORAL at 05:30

## 2020-12-20 RX ADMIN — Medication 10 ML: at 21:10

## 2020-12-20 RX ADMIN — PROPRANOLOL HYDROCHLORIDE 10 MG: 10 TABLET ORAL at 15:51

## 2020-12-20 RX ADMIN — POTASSIUM CHLORIDE 20 MEQ: 750 TABLET, FILM COATED, EXTENDED RELEASE ORAL at 18:15

## 2020-12-20 RX ADMIN — THIAMINE HYDROCHLORIDE 100 MG: 100 INJECTION, SOLUTION INTRAMUSCULAR; INTRAVENOUS at 12:08

## 2020-12-20 RX ADMIN — CHLORDIAZEPOXIDE HYDROCHLORIDE 10 MG: 10 CAPSULE ORAL at 15:51

## 2020-12-20 RX ADMIN — POTASSIUM CHLORIDE 10 MEQ: 7.46 INJECTION, SOLUTION INTRAVENOUS at 06:58

## 2020-12-20 RX ADMIN — AMLODIPINE BESYLATE 10 MG: 5 TABLET ORAL at 09:44

## 2020-12-20 NOTE — PROGRESS NOTES
Progress Note on behalf of pcp Dr. Larry Roland  (Dr. Larry Roland to return on Monday 12/21/2020). WEEKEND/AFTERHOURS TO USE SAME ANSWERING SERVICE AS THE PRACTICE [phone]. Admit Date: 12/11/2020      Subjective:     Pt sleeping but able to awake, bp/HR improvement with medication adjustment over the weekend.     Refer to 12/19/2020 conversation with patient's spouse    Current Facility-Administered Medications   Medication Dose Route Frequency    amLODIPine (NORVASC) tablet 10 mg  10 mg Oral DAILY    propranoloL (INDERAL) tablet 10 mg  10 mg Oral TID    potassium chloride SR (KLOR-CON 10) tablet 10 mEq  10 mEq Oral BID    losartan (COZAAR) tablet 100 mg  100 mg Oral DAILY    hydrALAZINE (APRESOLINE) 20 mg/mL injection 20 mg  20 mg IntraVENous Q4H PRN    labetaloL (NORMODYNE;TRANDATE) injection 10 mg  10 mg IntraVENous Q2H PRN    enoxaparin (LOVENOX) injection 40 mg  40 mg SubCUTAneous Q24H    ELECTROLYTE REPLACEMENT PROTOCOL - Potassium and Magnesium  1 Each Other PRN    chlordiazePOXIDE (LIBRIUM) capsule 25 mg  25 mg Oral Q8H    thiamine (B-1) 100 mg in 0.9% sodium chloride 50 mL IVPB  100 mg IntraVENous DAILY    pantoprazole (PROTONIX) 40 mg in 0.9% sodium chloride 10 mL injection  40 mg IntraVENous Q24H    fentaNYL citrate (PF) injection 25 mcg  25 mcg IntraVENous Q20MIN PRN    cloNIDine HCL (CATAPRES) tablet 0.1 mg  0.1 mg Oral Q3H PRN    sodium chloride (NS) flush 5-40 mL  5-40 mL IntraVENous Q8H    sodium chloride (NS) flush 5-40 mL  5-40 mL IntraVENous PRN    potassium chloride 10 mEq in 100 ml IVPB  10 mEq IntraVENous PRN    acetaminophen (TYLENOL) tablet 650 mg  650 mg Oral Q6H PRN    Or    acetaminophen (TYLENOL) suppository 650 mg  650 mg Rectal Q6H PRN    polyethylene glycol (MIRALAX) packet 17 g  17 g Oral DAILY PRN    ondansetron (ZOFRAN ODT) tablet 4 mg  4 mg Oral Q8H PRN    Or    ondansetron (ZOFRAN) injection 4 mg  4 mg IntraVENous Q6H PRN    LORazepam (ATIVAN) injection 2 mg 2 mg IntraVENous Q1H PRN    LORazepam (ATIVAN) injection 4 mg  4 mg IntraVENous Q1H PRN    influenza vaccine 2020-21 (6 mos+)(PF) (FLUARIX/FLULAVAL/FLUZONE QUAD) injection 0.5 mL  0.5 mL IntraMUSCular PRIOR TO DISCHARGE          Objective:     Patient Vitals for the past 8 hrs:   BP Temp Pulse Resp Weight   12/20/20 0950 (!) 158/85  82     12/20/20 0717 (!) 164/106 97.4 °F (36.3 °C) 81 16    12/20/20 0635     186 lb 4.6 oz (84.5 kg)   12/20/20 0317 (!) 147/77 97.6 °F (36.4 °C) 75 25      No intake/output data recorded. No intake/output data recorded.     Physical Exam:   Snoring  Cardiac: Mildly tachy  Pulm: no obvious wheezing, sounds thought to be upper airway  Abd soft, no grimacing with light palpation, bowel sounds heard  Ext: no pitting edema, no cyanosis, pulses good      Data Review   Recent Results (from the past 24 hour(s))   PHOSPHORUS    Collection Time: 12/20/20  2:44 AM   Result Value Ref Range    Phosphorus 3.3 2.6 - 4.7 MG/DL   METABOLIC PANEL, BASIC    Collection Time: 12/20/20  2:44 AM   Result Value Ref Range    Sodium 145 136 - 145 mmol/L    Potassium 3.1 (L) 3.5 - 5.1 mmol/L    Chloride 111 (H) 97 - 108 mmol/L    CO2 29 21 - 32 mmol/L    Anion gap 5 5 - 15 mmol/L    Glucose 101 (H) 65 - 100 mg/dL    BUN 14 6 - 20 MG/DL    Creatinine 0.83 0.70 - 1.30 MG/DL    BUN/Creatinine ratio 17 12 - 20      GFR est AA >60 >60 ml/min/1.73m2    GFR est non-AA >60 >60 ml/min/1.73m2    Calcium 8.7 8.5 - 10.1 MG/DL   MAGNESIUM    Collection Time: 12/20/20  2:44 AM   Result Value Ref Range    Magnesium 1.8 1.6 - 2.4 mg/dL   CBC WITH AUTOMATED DIFF    Collection Time: 12/20/20  2:44 AM   Result Value Ref Range    WBC 8.9 4.1 - 11.1 K/uL    RBC 3.24 (L) 4.10 - 5.70 M/uL    HGB 10.5 (L) 12.1 - 17.0 g/dL    HCT 32.3 (L) 36.6 - 50.3 %    MCV 99.7 (H) 80.0 - 99.0 FL    MCH 32.4 26.0 - 34.0 PG    MCHC 32.5 30.0 - 36.5 g/dL    RDW 13.2 11.5 - 14.5 %    PLATELET 031 324 - 346 K/uL    MPV 8.9 8.9 - 12.9 FL NRBC 0.0 0  WBC    ABSOLUTE NRBC 0.00 0.00 - 0.01 K/uL    NEUTROPHILS 67 32 - 75 %    LYMPHOCYTES 11 (L) 12 - 49 %    MONOCYTES 17 (H) 5 - 13 %    EOSINOPHILS 3 0 - 7 %    BASOPHILS 1 0 - 1 %    IMMATURE GRANULOCYTES 1 (H) 0.0 - 0.5 %    ABS. NEUTROPHILS 5.9 1.8 - 8.0 K/UL    ABS. LYMPHOCYTES 1.0 0.8 - 3.5 K/UL    ABS. MONOCYTES 1.5 (H) 0.0 - 1.0 K/UL    ABS. EOSINOPHILS 0.3 0.0 - 0.4 K/UL    ABS. BASOPHILS 0.1 0.0 - 0.1 K/UL    ABS. IMM. GRANS. 0.1 (H) 0.00 - 0.04 K/UL    DF AUTOMATED             Assessment:     Principal Problem:    AMS (altered mental status) (12/11/2020)    Active Problems:    Essential hypertension (11/21/2015)      Excessive drinking of alcohol (12/11/2020)      UTI (urinary tract infection) (12/12/2020)      Hypokalemia (12/12/2020)      Thrombocytopenia ([facility] [address] (12/12/2020)      Anemia (12/12/2020)        Plan:     1. Continue to monitor (meds to help with bp and HR were adjusted, was started on propranolol over the weekend). Was already on losartan and amlodipine prior to the weekend coverage. The dose of amlodipine was increased over the weekend.   2. Increased potassium supplement 12/20 due to potassium level trend  3  Resume librium--->12/20/2020 decreased scheduled dose

## 2020-12-20 NOTE — PROGRESS NOTES
19:00 - Went into patient's room to administer medication. He was laying lateral in the bed, flexiseal was pulled out laying in bed, and bed was covered in loose stool and urine. Patient was cleaned up. Oncoming nurse was notified and was to pass on in report to notify the physician.

## 2020-12-21 PROBLEM — F10.939 ALCOHOL WITHDRAWAL (HCC): Status: ACTIVE | Noted: 2020-12-21

## 2020-12-21 LAB
ANION GAP SERPL CALC-SCNC: 7 MMOL/L (ref 5–15)
BUN SERPL-MCNC: 13 MG/DL (ref 6–20)
BUN/CREAT SERPL: 15 (ref 12–20)
CALCIUM SERPL-MCNC: 8.7 MG/DL (ref 8.5–10.1)
CHLORIDE SERPL-SCNC: 114 MMOL/L (ref 97–108)
CO2 SERPL-SCNC: 26 MMOL/L (ref 21–32)
CREAT SERPL-MCNC: 0.86 MG/DL (ref 0.7–1.3)
GLUCOSE SERPL-MCNC: 93 MG/DL (ref 65–100)
PHOSPHATE SERPL-MCNC: 3 MG/DL (ref 2.6–4.7)
POTASSIUM SERPL-SCNC: 3.3 MMOL/L (ref 3.5–5.1)
SODIUM SERPL-SCNC: 147 MMOL/L (ref 136–145)
TSH SERPL DL<=0.05 MIU/L-ACNC: 1.38 UIU/ML (ref 0.36–3.74)
VIT B12 SERPL-MCNC: 1532 PG/ML (ref 193–986)

## 2020-12-21 PROCEDURE — 84443 ASSAY THYROID STIM HORMONE: CPT

## 2020-12-21 PROCEDURE — 74011250637 HC RX REV CODE- 250/637: Performed by: FAMILY MEDICINE

## 2020-12-21 PROCEDURE — 74011250637 HC RX REV CODE- 250/637: Performed by: EMERGENCY MEDICINE

## 2020-12-21 PROCEDURE — 74011250636 HC RX REV CODE- 250/636: Performed by: INTERNAL MEDICINE

## 2020-12-21 PROCEDURE — 74011250637 HC RX REV CODE- 250/637: Performed by: INTERNAL MEDICINE

## 2020-12-21 PROCEDURE — 77010033678 HC OXYGEN DAILY

## 2020-12-21 PROCEDURE — 36415 COLL VENOUS BLD VENIPUNCTURE: CPT

## 2020-12-21 PROCEDURE — 97530 THERAPEUTIC ACTIVITIES: CPT

## 2020-12-21 PROCEDURE — 74011250636 HC RX REV CODE- 250/636: Performed by: EMERGENCY MEDICINE

## 2020-12-21 PROCEDURE — 94762 N-INVAS EAR/PLS OXIMTRY CONT: CPT

## 2020-12-21 PROCEDURE — C9113 INJ PANTOPRAZOLE SODIUM, VIA: HCPCS | Performed by: EMERGENCY MEDICINE

## 2020-12-21 PROCEDURE — 80048 BASIC METABOLIC PNL TOTAL CA: CPT

## 2020-12-21 PROCEDURE — 74011000250 HC RX REV CODE- 250: Performed by: EMERGENCY MEDICINE

## 2020-12-21 PROCEDURE — 65660000000 HC RM CCU STEPDOWN

## 2020-12-21 PROCEDURE — 99223 1ST HOSP IP/OBS HIGH 75: CPT | Performed by: PSYCHIATRY & NEUROLOGY

## 2020-12-21 PROCEDURE — 74011000258 HC RX REV CODE- 258: Performed by: EMERGENCY MEDICINE

## 2020-12-21 PROCEDURE — 82607 VITAMIN B-12: CPT

## 2020-12-21 PROCEDURE — 97535 SELF CARE MNGMENT TRAINING: CPT

## 2020-12-21 PROCEDURE — 84100 ASSAY OF PHOSPHORUS: CPT

## 2020-12-21 PROCEDURE — 97165 OT EVAL LOW COMPLEX 30 MIN: CPT

## 2020-12-21 RX ORDER — CHLORDIAZEPOXIDE HYDROCHLORIDE 5 MG/1
5 CAPSULE, GELATIN COATED ORAL EVERY 8 HOURS
Status: DISCONTINUED | OUTPATIENT
Start: 2020-12-21 | End: 2020-12-23

## 2020-12-21 RX ADMIN — THIAMINE HYDROCHLORIDE 100 MG: 100 INJECTION, SOLUTION INTRAMUSCULAR; INTRAVENOUS at 09:00

## 2020-12-21 RX ADMIN — SODIUM CHLORIDE 40 MG: 9 INJECTION INTRAMUSCULAR; INTRAVENOUS; SUBCUTANEOUS at 11:48

## 2020-12-21 RX ADMIN — LORAZEPAM 2 MG: 2 INJECTION INTRAMUSCULAR; INTRAVENOUS at 04:22

## 2020-12-21 RX ADMIN — POTASSIUM CHLORIDE 10 MEQ: 7.46 INJECTION, SOLUTION INTRAVENOUS at 20:38

## 2020-12-21 RX ADMIN — ENOXAPARIN SODIUM 40 MG: 40 INJECTION SUBCUTANEOUS at 07:08

## 2020-12-21 RX ADMIN — CHLORDIAZEPOXIDE HYDROCHLORIDE 5 MG: 5 CAPSULE ORAL at 21:12

## 2020-12-21 RX ADMIN — HYDRALAZINE HYDROCHLORIDE 20 MG: 20 INJECTION INTRAMUSCULAR; INTRAVENOUS at 01:56

## 2020-12-21 RX ADMIN — PROPRANOLOL HYDROCHLORIDE 10 MG: 10 TABLET ORAL at 21:23

## 2020-12-21 RX ADMIN — POTASSIUM CHLORIDE 20 MEQ: 750 TABLET, FILM COATED, EXTENDED RELEASE ORAL at 18:00

## 2020-12-21 RX ADMIN — PROPRANOLOL HYDROCHLORIDE 10 MG: 10 TABLET ORAL at 18:22

## 2020-12-21 RX ADMIN — PROPRANOLOL HYDROCHLORIDE 10 MG: 10 TABLET ORAL at 08:57

## 2020-12-21 RX ADMIN — LORAZEPAM 2 MG: 2 INJECTION INTRAMUSCULAR; INTRAVENOUS at 18:22

## 2020-12-21 RX ADMIN — AMLODIPINE BESYLATE 10 MG: 5 TABLET ORAL at 08:45

## 2020-12-21 RX ADMIN — Medication 10 ML: at 21:12

## 2020-12-21 RX ADMIN — Medication 10 ML: at 07:08

## 2020-12-21 RX ADMIN — LOSARTAN POTASSIUM 100 MG: 50 TABLET, FILM COATED ORAL at 08:46

## 2020-12-21 RX ADMIN — CHLORDIAZEPOXIDE HYDROCHLORIDE 5 MG: 5 CAPSULE ORAL at 16:34

## 2020-12-21 RX ADMIN — POTASSIUM CHLORIDE 10 MEQ: 7.46 INJECTION, SOLUTION INTRAVENOUS at 22:56

## 2020-12-21 RX ADMIN — POTASSIUM CHLORIDE 10 MEQ: 7.46 INJECTION, SOLUTION INTRAVENOUS at 21:42

## 2020-12-21 RX ADMIN — LORAZEPAM 2 MG: 2 INJECTION INTRAMUSCULAR; INTRAVENOUS at 08:46

## 2020-12-21 RX ADMIN — POTASSIUM CHLORIDE 20 MEQ: 750 TABLET, FILM COATED, EXTENDED RELEASE ORAL at 08:46

## 2020-12-21 NOTE — PROGRESS NOTES
Problem: Mobility Impaired (Adult and Pediatric)  Goal: *Acute Goals and Plan of Care (Insert Text)  Description: FUNCTIONAL STATUS PRIOR TO ADMISSION: Patient was independent and active without use of DME.    HOME SUPPORT PRIOR TO ADMISSION: The patient lived with his wife but did not require assist.    Physical Therapy Goals  Initiated 12/12/2020 and re-evaluated/downgraded 12/16/2020    1. Patient will move from supine to sit and sit to supine , scoot up and down, and roll side to side in bed with minimal assistance/contact guard assist within 7 day(s). 2.  Patient will transfer from bed to chair and chair to bed with minimal assistance/contact guard assist using the least restrictive device within 7 day(s). 3.  Patient will perform sit to stand with minimal assistance/contact guard assist within 7 day(s). 4.  Patient will ambulate with minimal assistance/contact guard assist for 50 feet with the least restrictive device within 7 day(s). Outcome: Progressing Towards Goal   PHYSICAL THERAPY TREATMENT  Patient: Jordon Chaparro (11 y.o. male)  Date: 12/21/2020  Diagnosis: AMS (altered mental status) [R41.82] AMS (altered mental status)       Precautions:  fall, bed alarm if up to the chair. Chart, physical therapy assessment, plan of care and goals were reviewed. ASSESSMENT  Patient continues with skilled PT services and is very slowly progressing towards goals. He was received asleep and was drowsy until he was sitting at the EOB. He was oriented to self only and had poor retention of information discussed with him, repeatedly told that he was in the hospital, poor recall. He has decreased attention. His speech is very difficult to understand. He did mention wanting some fruit several times. He is limited by impaired sitting balance with assist required most of the time, briefly able to sit with supervision but for the most part had a progressive posterior lean and sometimes a lean to his left.  He required cueing to stay on task. Deemed unsafe to attempt standing today. Anticipate slow gains and a need for rehab. . Also of note, pt has a rash over most of his back, discussed with his nurse. .    Current Level of Function Impacting Discharge (mobility/balance): max assist X 2 to total assist X 2. Vitals:     12/21/20 0926 12/21/20 0941    BP:  (!) 164/85 (!) 148/95    BP 1 Location:  Left arm Left arm    BP Patient Position:  Supine; At rest Sitting    Pulse:  85 88    Resp:  19 15    Temp:       SpO2:on 3 liters of 02  96% 100%                       Other factors to consider for discharge: far form his baseline, had been intubated and then extubated on 12/15/2020         PLAN :  Patient continues to benefit from skilled intervention to address the above impairments. Continue treatment per established plan of care. to address goals. Recommendation for discharge: (in order for the patient to meet his/her long term goals)  Working towards tolerating therapy 3 hours per day 5-7 days per week, but at this point recommend rehab within a SNF    This discharge recommendation:  A follow-up discussion with the attending provider and/or case management is planned    IF patient discharges home will need the following DME: bedside commode, hospital bed, mechanical lift, wheelchair, and ramps to access his home, as well as 24/7 hands on assist with all of mobility, assist X 2 required. .       SUBJECTIVE:   Patient stated Rosy Alvarez, I feel Medina Mason.     OBJECTIVE DATA SUMMARY:   Chart checked, pt cleared by nursing  Critical Behavior:  Neurologic State: Confused, Drowsy  Orientation Level: Oriented to person, Disoriented to place, Disoriented to situation, Disoriented to time  Cognition: Impaired decision making, Poor safety awareness, Decreased attention/concentration, Decreased command following  Safety/Judgement: Decreased awareness of environment, Lack of insight into deficits  Functional Mobility Training:  Bed Mobility:  Rolling: Maximum assistance;Assist x2  Supine to Sit: Total assistance;Assist x2  Sit to Supine: Maximum assistance;Assist x2  Scooting: Total assistance;Assist x2(in supine to [facility])        Transfers:            Deemed unsafe to attempt                       Balance:  Sitting: Impaired  Sitting - Static: Poor (constant support)(briefly sat supporting self but not consistenlty ). Tendency for slow posterior and sometimes left lean  Sitting - Dynamic: Poor (constant support)  Ambulation/Gait Training:                                                      N/a  Stairs: Therapeutic Exercises:     Pain Rating:  None rated    Activity Tolerance:   Fair and requires rest breaks    After treatment patient left in no apparent distress:   Supine in bed, Heels elevated for pressure relief, Call bell within reach, Side rails x 3, and bed to modified rome position    COMMUNICATION/COLLABORATION:   The patients plan of care was discussed with: Occupational therapist and Registered nurse.      Belia Connor   Time Calculation: 49 mins
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
## 2020-12-21 NOTE — PROGRESS NOTES
Nancy Hernandez, Marylen Coyer, and Alma Hand Date: 12/11/2020      Subjective:     Patient feeling OK today.  Awake and verbal. .       Current Facility-Administered Medications   Medication Dose Route Frequency    chlordiazePOXIDE (LIBRIUM) capsule 5 mg  5 mg Oral Q8H    potassium chloride SR (KLOR-CON 10) tablet 20 mEq  20 mEq Oral BID    amLODIPine (NORVASC) tablet 10 mg  10 mg Oral DAILY    propranoloL (INDERAL) tablet 10 mg  10 mg Oral TID    losartan (COZAAR) tablet 100 mg  100 mg Oral DAILY    hydrALAZINE (APRESOLINE) 20 mg/mL injection 20 mg  20 mg IntraVENous Q4H PRN    labetaloL (NORMODYNE;TRANDATE) injection 10 mg  10 mg IntraVENous Q2H PRN    enoxaparin (LOVENOX) injection 40 mg  40 mg SubCUTAneous Q24H    ELECTROLYTE REPLACEMENT PROTOCOL - Potassium and Magnesium  1 Each Other PRN    thiamine (B-1) 100 mg in 0.9% sodium chloride 50 mL IVPB  100 mg IntraVENous DAILY    pantoprazole (PROTONIX) 40 mg in 0.9% sodium chloride 10 mL injection  40 mg IntraVENous Q24H    cloNIDine HCL (CATAPRES) tablet 0.1 mg  0.1 mg Oral Q3H PRN    sodium chloride (NS) flush 5-40 mL  5-40 mL IntraVENous Q8H    sodium chloride (NS) flush 5-40 mL  5-40 mL IntraVENous PRN    potassium chloride 10 mEq in 100 ml IVPB  10 mEq IntraVENous PRN    acetaminophen (TYLENOL) tablet 650 mg  650 mg Oral Q6H PRN    Or    acetaminophen (TYLENOL) suppository 650 mg  650 mg Rectal Q6H PRN    polyethylene glycol (MIRALAX) packet 17 g  17 g Oral DAILY PRN    ondansetron (ZOFRAN ODT) tablet 4 mg  4 mg Oral Q8H PRN    Or    ondansetron (ZOFRAN) injection 4 mg  4 mg IntraVENous Q6H PRN    LORazepam (ATIVAN) injection 2 mg  2 mg IntraVENous Q1H PRN    LORazepam (ATIVAN) injection 4 mg  4 mg IntraVENous Q1H PRN    influenza vaccine 2020-21 (6 mos+)(PF) (FLUARIX/FLULAVAL/FLUZONE QUAD) injection 0.5 mL  0.5 mL IntraMUSCular PRIOR TO DISCHARGE          Objective:     Patient Vitals for the past 8 hrs:   BP Temp Pulse Resp SpO2 Weight   12/21/20 0423 (!) 141/88 97.8 °F (36.6 °C) 82 20 100 % 181 lb (82.1 kg)   12/21/20 0047 (!) 195/99 98 °F (36.7 °C) 79 18 97 %      No intake/output data recorded. No intake/output data recorded. Physical Exam:   Visit Vitals  BP (!) 141/88 (BP 1 Location: Left arm, BP Patient Position: At rest;Supine)   Pulse 82   Temp 97.8 °F (36.6 °C)   Resp 20   Ht 5' 11\" (1.803 m)   Wt 181 lb (82.1 kg)   SpO2 100%   BMI 25.24 kg/m²     Chest - clear  abd benign      Data Review No results found for this or any previous visit (from the past 24 hour(s)).         Assessment:     Principal Problem:    AMS (altered mental status) (12/11/2020)    Active Problems:    Essential hypertension (11/21/2015)      Excessive drinking of alcohol (12/11/2020)      UTI (urinary tract infection) (12/12/2020)      Hypokalemia (12/12/2020)      Thrombocytopenia ([address] (12/12/2020)      Anemia (12/12/2020)      Alcohol withdrawal ([address] (12/21/2020)        Plan:     1) Decrease Librium to 5mg Q8hr  2) Work with PT today  3) Follow up on lytes this AM

## 2020-12-21 NOTE — PROGRESS NOTES
Problem: Falls - Risk of  Goal: *Absence of Falls  Description: Document Jose Adair Fall Risk and appropriate interventions in the flowsheet.   Outcome: Progressing Towards Goal  Note: Fall Risk Interventions:  Mobility Interventions: Bed/chair exit alarm, Patient to call before getting OOB    Mentation Interventions: Adequate sleep, hydration, pain control, Bed/chair exit alarm, Door open when patient unattended, Evaluate medications/consider consulting pharmacy, Reorient patient, More frequent rounding    Medication Interventions: Evaluate medications/consider consulting pharmacy, Bed/chair exit alarm    Elimination Interventions: Call light in reach    History of Falls Interventions: Bed/chair exit alarm, Evaluate medications/consider consulting pharmacy, Investigate reason for fall         Problem: Patient Education: Go to Patient Education Activity  Goal: Patient/Family Education  Outcome: Progressing Towards Goal     Problem: Alcohol Withdrawal  Goal: *STG: Participates in treatment plan  Outcome: Progressing Towards Goal  Goal: *STG: Remains safe in hospital  Outcome: Progressing Towards Goal  Goal: *STG: Seeks staff when symptoms of withdrawal increase  Outcome: Progressing Towards Goal  Goal: *STG: Complies with medication therapy  Outcome: Progressing Towards Goal  Goal: *STG: Attends activities and groups  Outcome: Progressing Towards Goal     Problem: Pain  Goal: *Control of Pain  Outcome: Progressing Towards Goal  Goal: *PALLIATIVE CARE:  Alleviation of Pain  Outcome: Progressing Towards Goal     Problem: Patient Education: Go to Patient Education Activity  Goal: Patient/Family Education  Outcome: Progressing Towards Goal     Problem: Patient Education: Go to Patient Education Activity  Goal: Patient/Family Education  Outcome: Progressing Towards Goal     Problem: Pressure Injury - Risk of  Goal: *Prevention of pressure injury  Description: Document Naresh Scale and appropriate interventions in the flowsheet.   Outcome: Progressing Towards Goal  Note: Pressure Injury Interventions:  Sensory Interventions: Assess changes in LOC, Check visual cues for pain, Keep linens dry and wrinkle-free, Maintain/enhance activity level, Minimize linen layers    Moisture Interventions: Absorbent underpads, Apply protective barrier, creams and emollients, Minimize layers, Moisture barrier    Activity Interventions: Pressure redistribution bed/mattress(bed type)    Mobility Interventions: HOB 30 degrees or less    Nutrition Interventions: Document food/fluid/supplement intake    Friction and Shear Interventions: HOB 30 degrees or less                Problem: Patient Education: Go to Patient Education Activity  Goal: Patient/Family Education  Outcome: Progressing Towards Goal     Problem: Discharge Planning  Goal: *Discharge to safe environment  Outcome: Progressing Towards Goal     Problem: Breathing Pattern - Ineffective  Goal: *Absence of hypoxia  Outcome: Progressing Towards Goal  Goal: *Use of effective breathing techniques  Outcome: Progressing Towards Goal     Problem: Patient Education: Go to Patient Education Activity  Goal: Patient/Family Education  Outcome: Progressing Towards Goal     Problem: Breathing Pattern - Ineffective  Goal: *Absence of hypoxia  Outcome: Progressing Towards Goal  Goal: *Use of effective breathing techniques  Outcome: Progressing Towards Goal  Goal: *PALLIATIVE CARE:  Alleviation of Dyspnea  Outcome: Progressing Towards Goal     Problem: Patient Education: Go to Patient Education Activity  Goal: Patient/Family Education  Outcome: Progressing Towards Goal

## 2020-12-21 NOTE — PROGRESS NOTES
Bedside and Verbal shift change report given to Tootie Hernandes (oncoming nurse) by Steph Iraheta (offgoing nurse).  Report included the following information SBAR, Kardex, Intake/Output, MAR, Recent Results and Cardiac Rhythm NSR-ST.
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
## 2020-12-21 NOTE — PROGRESS NOTES
Transition of Care: TBD; home with home health and family vs rehab?  (depends on progression with therapies)     Transport Plan: TBD; BLS vs home with wife in car      RUR: 17%     Dx: altered mental status    CM noted from chart.     Discharge pending:  -pending complex medical progress and care recommendations   -disposition preference from patients wife is for [facility] via [facility] or [facility] [address]   -Patient was extubated 12/16 in the ICU and transferred to [address] [place] [address]  - PT and OT pending recommendations and patient progression)  -patient is alert but still having confusion  -patient still on CIWA scale    1200: CM attempted to meet with patient at bedside; patient is sleeping soundly at this time; CM will followup later today     CM following  Orville Lutz RN, CRM

## 2020-12-21 NOTE — ROUTINE PROCESS
Orders received, chart reviewed and patient evaluated by occupational therapy. Pending progression with skilled acute occupational therapy, recommend: To be determined: at this time SNF, however, working toward tolerating 3 hours for IPR Recommend with nursing patient to complete as able in order to maintain strength, endurance and independence: bed to chair position 3x/day for meals and functional mobility rolling in bed for toileting with 2 assist. Thank you for your assistance. Full evaluation to follow.

## 2020-12-21 NOTE — PROGRESS NOTES
Bedside and Verbal shift change report given to Yon Leija (oncoming nurse) by Jessica Alcantara RN (offgoing nurse). Report included the following information SBAR, Kardex, ED Summary, Procedure Summary, Intake/Output, MAR, Recent Results and Cardiac Rhythm Sinus Tach.

## 2020-12-22 LAB
ANION GAP SERPL CALC-SCNC: 8 MMOL/L (ref 5–15)
BUN SERPL-MCNC: 13 MG/DL (ref 6–20)
BUN/CREAT SERPL: 14 (ref 12–20)
CALCIUM SERPL-MCNC: 8.7 MG/DL (ref 8.5–10.1)
CHLORIDE SERPL-SCNC: 113 MMOL/L (ref 97–108)
CO2 SERPL-SCNC: 27 MMOL/L (ref 21–32)
CREAT SERPL-MCNC: 0.95 MG/DL (ref 0.7–1.3)
GLUCOSE SERPL-MCNC: 99 MG/DL (ref 65–100)
POTASSIUM SERPL-SCNC: 3.4 MMOL/L (ref 3.5–5.1)
SODIUM SERPL-SCNC: 148 MMOL/L (ref 136–145)

## 2020-12-22 PROCEDURE — 74011250637 HC RX REV CODE- 250/637: Performed by: INTERNAL MEDICINE

## 2020-12-22 PROCEDURE — 74011250637 HC RX REV CODE- 250/637: Performed by: FAMILY MEDICINE

## 2020-12-22 PROCEDURE — 74011000258 HC RX REV CODE- 258: Performed by: EMERGENCY MEDICINE

## 2020-12-22 PROCEDURE — 74011250637 HC RX REV CODE- 250/637: Performed by: EMERGENCY MEDICINE

## 2020-12-22 PROCEDURE — 97530 THERAPEUTIC ACTIVITIES: CPT

## 2020-12-22 PROCEDURE — 74011250636 HC RX REV CODE- 250/636: Performed by: INTERNAL MEDICINE

## 2020-12-22 PROCEDURE — 80048 BASIC METABOLIC PNL TOTAL CA: CPT

## 2020-12-22 PROCEDURE — C9113 INJ PANTOPRAZOLE SODIUM, VIA: HCPCS | Performed by: EMERGENCY MEDICINE

## 2020-12-22 PROCEDURE — 74011250636 HC RX REV CODE- 250/636: Performed by: EMERGENCY MEDICINE

## 2020-12-22 PROCEDURE — 97110 THERAPEUTIC EXERCISES: CPT

## 2020-12-22 PROCEDURE — 74011000250 HC RX REV CODE- 250: Performed by: EMERGENCY MEDICINE

## 2020-12-22 PROCEDURE — 97535 SELF CARE MNGMENT TRAINING: CPT

## 2020-12-22 PROCEDURE — 36415 COLL VENOUS BLD VENIPUNCTURE: CPT

## 2020-12-22 PROCEDURE — 65660000000 HC RM CCU STEPDOWN

## 2020-12-22 RX ORDER — CHLORDIAZEPOXIDE HYDROCHLORIDE 5 MG/1
5 CAPSULE, GELATIN COATED ORAL
Status: DISCONTINUED | OUTPATIENT
Start: 2020-12-22 | End: 2020-12-23

## 2020-12-22 RX ADMIN — PROPRANOLOL HYDROCHLORIDE 10 MG: 10 TABLET ORAL at 22:15

## 2020-12-22 RX ADMIN — CHLORDIAZEPOXIDE HYDROCHLORIDE 5 MG: 5 CAPSULE ORAL at 22:05

## 2020-12-22 RX ADMIN — POTASSIUM CHLORIDE 20 MEQ: 750 TABLET, FILM COATED, EXTENDED RELEASE ORAL at 17:58

## 2020-12-22 RX ADMIN — ENOXAPARIN SODIUM 40 MG: 40 INJECTION SUBCUTANEOUS at 09:38

## 2020-12-22 RX ADMIN — Medication 10 ML: at 17:59

## 2020-12-22 RX ADMIN — CHLORDIAZEPOXIDE HYDROCHLORIDE 5 MG: 5 CAPSULE ORAL at 06:47

## 2020-12-22 RX ADMIN — AMLODIPINE BESYLATE 10 MG: 5 TABLET ORAL at 09:39

## 2020-12-22 RX ADMIN — THIAMINE HYDROCHLORIDE 100 MG: 100 INJECTION, SOLUTION INTRAMUSCULAR; INTRAVENOUS at 10:21

## 2020-12-22 RX ADMIN — POTASSIUM CHLORIDE 20 MEQ: 750 TABLET, FILM COATED, EXTENDED RELEASE ORAL at 09:38

## 2020-12-22 RX ADMIN — CHLORDIAZEPOXIDE HYDROCHLORIDE 5 MG: 5 CAPSULE ORAL at 17:58

## 2020-12-22 RX ADMIN — PROPRANOLOL HYDROCHLORIDE 10 MG: 10 TABLET ORAL at 17:58

## 2020-12-22 RX ADMIN — POTASSIUM CHLORIDE 10 MEQ: 7.46 INJECTION, SOLUTION INTRAVENOUS at 00:02

## 2020-12-22 RX ADMIN — PROPRANOLOL HYDROCHLORIDE 10 MG: 10 TABLET ORAL at 09:41

## 2020-12-22 RX ADMIN — LOSARTAN POTASSIUM 100 MG: 50 TABLET, FILM COATED ORAL at 09:38

## 2020-12-22 RX ADMIN — SODIUM CHLORIDE 40 MG: 9 INJECTION INTRAMUSCULAR; INTRAVENOUS; SUBCUTANEOUS at 11:33

## 2020-12-22 RX ADMIN — Medication 10 ML: at 22:05

## 2020-12-22 RX ADMIN — LORAZEPAM 2 MG: 2 INJECTION INTRAMUSCULAR; INTRAVENOUS at 04:02

## 2020-12-22 RX ADMIN — Medication 10 ML: at 06:48

## 2020-12-22 NOTE — PROGRESS NOTES
Nancy Anderson, Jaydon Payne, Caryn, and Mitra Allen Date: 12/11/2020      Subjective:     Patient awake and with good conversation. I explained to him that he may likely need rehab as there was no way his wife could care for him as he is. He agrees. Did require Ativan last night for compative episode.  .       Current Facility-Administered Medications   Medication Dose Route Frequency    chlordiazePOXIDE (LIBRIUM) capsule 5 mg  5 mg Oral Q8H    potassium chloride SR (KLOR-CON 10) tablet 20 mEq  20 mEq Oral BID    amLODIPine (NORVASC) tablet 10 mg  10 mg Oral DAILY    propranoloL (INDERAL) tablet 10 mg  10 mg Oral TID    losartan (COZAAR) tablet 100 mg  100 mg Oral DAILY    hydrALAZINE (APRESOLINE) 20 mg/mL injection 20 mg  20 mg IntraVENous Q4H PRN    labetaloL (NORMODYNE;TRANDATE) injection 10 mg  10 mg IntraVENous Q2H PRN    enoxaparin (LOVENOX) injection 40 mg  40 mg SubCUTAneous Q24H    ELECTROLYTE REPLACEMENT PROTOCOL - Potassium and Magnesium  1 Each Other PRN    thiamine (B-1) 100 mg in 0.9% sodium chloride 50 mL IVPB  100 mg IntraVENous DAILY    pantoprazole (PROTONIX) 40 mg in 0.9% sodium chloride 10 mL injection  40 mg IntraVENous Q24H    cloNIDine HCL (CATAPRES) tablet 0.1 mg  0.1 mg Oral Q3H PRN    sodium chloride (NS) flush 5-40 mL  5-40 mL IntraVENous Q8H    sodium chloride (NS) flush 5-40 mL  5-40 mL IntraVENous PRN    potassium chloride 10 mEq in 100 ml IVPB  10 mEq IntraVENous PRN    acetaminophen (TYLENOL) tablet 650 mg  650 mg Oral Q6H PRN    Or    acetaminophen (TYLENOL) suppository 650 mg  650 mg Rectal Q6H PRN    polyethylene glycol (MIRALAX) packet 17 g  17 g Oral DAILY PRN    ondansetron (ZOFRAN ODT) tablet 4 mg  4 mg Oral Q8H PRN    Or    ondansetron (ZOFRAN) injection 4 mg  4 mg IntraVENous Q6H PRN    LORazepam (ATIVAN) injection 2 mg  2 mg IntraVENous Q1H PRN    LORazepam (ATIVAN) injection 4 mg  4 mg IntraVENous Q1H PRN    influenza vaccine 2020-21 (6 mos+)(PF) (FLUARIX/FLULAVAL/FLUZONE QUAD) injection 0.5 mL  0.5 mL IntraMUSCular PRIOR TO DISCHARGE          Objective:     Patient Vitals for the past 8 hrs:   BP Temp Pulse Resp SpO2   12/22/20 0505 (!) 156/61       12/22/20 0437 (!) 164/86       12/22/20 0427 (!) 179/90       12/22/20 0327 (!) 186/88 98.2 °F (36.8 °C) 88 16 97 %   12/22/20 0103 (!) 158/77       12/22/20 0029 (!) 165/76 97.7 °F (36.5 °C) 83 15 96 %     No intake/output data recorded. No intake/output data recorded. Physical Exam: Lungs: clear to auscultation bilaterally  Heart: regular rate and rhythm, S1, S2 normal, no murmur, click, rub or gallop  Abdomen: soft, non-tender.  Bowel sounds normal. No masses,  no organomegaly        Data Review   Recent Results (from the past 24 hour(s))   METABOLIC PANEL, BASIC    Collection Time: 12/21/20  4:53 PM   Result Value Ref Range    Sodium 147 (H) 136 - 145 mmol/L    Potassium 3.3 (L) 3.5 - 5.1 mmol/L    Chloride 114 (H) 97 - 108 mmol/L    CO2 26 21 - 32 mmol/L    Anion gap 7 5 - 15 mmol/L    Glucose 93 65 - 100 mg/dL    BUN 13 6 - 20 MG/DL    Creatinine 0.86 0.70 - 1.30 MG/DL    BUN/Creatinine ratio 15 12 - 20      GFR est AA >60 >60 ml/min/1.73m2    GFR est non-AA >60 >60 ml/min/1.73m2    Calcium 8.7 8.5 - 10.1 MG/DL   VITAMIN B12    Collection Time: 12/21/20  4:53 PM   Result Value Ref Range    Vitamin B12 1,532 (H) 193 - 986 pg/mL   TSH 3RD GENERATION    Collection Time: 12/21/20  4:53 PM   Result Value Ref Range    TSH 1.38 0.36 - 3.74 uIU/mL           Assessment:     Principal Problem:    AMS (altered mental status) (12/11/2020)    Active Problems:    Essential hypertension (11/21/2015)      Excessive drinking of alcohol (12/11/2020)      UTI (urinary tract infection) (12/12/2020)      Hypokalemia (12/12/2020)      Thrombocytopenia ([address] (12/12/2020)      Anemia (12/12/2020)      Alcohol withdrawal (HCC) (12/21/2020)        Plan:     1) O2 sats good off O2  2) Encourage PO intake  3) Increase Librium at night back to 10mg for now  4) Start looking for NH rehab bed  5) Cont PT/OT

## 2020-12-22 NOTE — PROGRESS NOTES
Pt is A&O to self only, PAINAD-0. Pt is briefed for incontinence. VSS, due medications given per orders. IV patent, RA, bedrest at this time. Pt is lethargic this shift but able to be aroused. Poor appetite observed by writer. Pt worked with PT/OT this shift-tolerated fairly. Pt's wife updated on POC. Frequent rounding utilized. Will continue to monitor and assess.

## 2020-12-22 NOTE — PROGRESS NOTES
Problem: Mobility Impaired (Adult and Pediatric)  Goal: *Acute Goals and Plan of Care (Insert Text)  Description: FUNCTIONAL STATUS PRIOR TO ADMISSION: Patient was independent and active without use of DME.    HOME SUPPORT PRIOR TO ADMISSION: The patient lived with his wife but did not require assist.    Physical Therapy Goals  Initiated 12/12/2020 and re-evaluated/downgraded 12/16/2020    1. Patient will move from supine to sit and sit to supine , scoot up and down, and roll side to side in bed with minimal assistance/contact guard assist within 7 day(s). 2.  Patient will transfer from bed to chair and chair to bed with minimal assistance/contact guard assist using the least restrictive device within 7 day(s). 3.  Patient will perform sit to stand with minimal assistance/contact guard assist within 7 day(s). 4.  Patient will ambulate with minimal assistance/contact guard assist for 50 feet with the least restrictive device within 7 day(s). Outcome: Not Progressing Towards Goal   PHYSICAL THERAPY TREATMENT  Patient: Jordon Chaparro (2 y.o. male)  Date: 12/22/2020  Diagnosis: AMS (altered mental status) [R41.82] AMS (altered mental status)       Precautions: Fall, bed alarm, CIWA, skin  Chart, physical therapy assessment, plan of care and goals were reviewed. ASSESSMENT  Patient continues with skilled PT services and is not progressing towards goals. Pt was received asleep at time of 1405 and was lethargic (had received ativan last night (at 0402 per chart review) secondary to agitation). In addition, he was soiled in BM and not aware. He was a total assist for rolling for clean up. While he did wake up some, his participation level was minimal. Given above, opted for bed to chair position. Once to chair position he woke up a little bit more. Attempted to engage him in LE ex but the ex became primarily [address].  Of note, today observed a change in his hands from yesterday, now edematous and red on the dorsum, especially of the fingers, discussed with his nurse. He was on room air throughout session (had yesterday been on 3 liters of 02). His 02 sats today were stable in the mid to upper 90s and his HR was at 114 during activity. I highly recommend mindful maintenance of a bright room during the daytime and lights off only at night in an effort to keep pt oriented to a proper day/night cycle. He remains confused and was oriented today to self only. He demonstrated poor retention of information. Continue to recommend SNF    Current Level of Function Impacting Discharge (mobility/balance): total assist    Other factors to consider for discharge: far from his baseline, had been intubated and then extubated on 12/15/2020         PLAN :  Patient continues to benefit from skilled intervention to address the above impairments. Continue treatment per established plan of care. to address goals. Recommendation for discharge: (in order for the patient to meet his/her long term goals)  Working toward being able to participate in therapy 3 hours per day 5-7 days per week, at this point more appropriate for rehab within a SNF    This discharge recommendation:  A follow-up discussion with the attending provider and/or case management is planned    IF patient discharges home will need the following DME: : bedside commode, hospital bed, mechanical lift, wheelchair, rolling walker, and ramps to access his home, as well as 24/7 hands on assist with all of mobility, assist X 2 required. .       SUBJECTIVE:   Patient stated that his last name was Bautista Doctor.     OBJECTIVE DATA SUMMARY:   Chart checked, pt cleared by nursing  Critical Behavior:  Neurologic State: Lethargic, Confused  Orientation Level: Oriented to person, Disoriented to time, Disoriented to situation, Disoriented to place  Cognition: Decreased attention/concentration, Decreased command following, Impaired decision making, Memory loss  Safety/Judgement: Decreased awareness of environment, Decreased awareness of need for assistance, Decreased awareness of need for safety, Decreased insight into deficits  Functional Mobility Training:  Bed Mobility:  Rolling: Total assistance; Adaptive equipment; Additional time                 Transfers:                                   Balance:     Ambulation/Gait Training:                                                      N/a  Stairs: Therapeutic Exercises:   While in bed to chair position he participated in LAQs and ankle pumps bilaterally. He required assist for all   Pain Rating:  None rated    Activity Tolerance:   02 sats stable in mid to upper 90s on room air and HR up to 114 bpm with activity. After treatment patient left in no apparent distress:   Supine in bed, Bed / chair alarm activated, and Side rails x 3, LUCIANO present working with pt    COMMUNICATION/COLLABORATION:   The patients plan of care was discussed with: Occupational therapy assistant and Registered nurse.      Faviola Anila   Time Calculation: 26 mins

## 2020-12-22 NOTE — PROGRESS NOTES
Transition of Care: TBD; likely to a SNF (wife is looking over list at this time)     Transport Plan: BLS (not set up yet)       RUR: 18%     Dx: altered mental status     CM noted from chart.     Discharge pending:  -pending complex medical progress and care recommendations   -Patient was extubated 12/16 in the ICU and transferred to   - PT and OT pending recommendations and patient progression)    1000: CM noted CM order for rehab placement; CM called patients wife [facility] [phone] to speak about rehab; she is in agreement with pateint going to SNF; she stated she will be up to unit later today to look over the list and choose; this CM left SNF list in patients room; will followup later today with Mrs Alisson Hernandez on choice     CM following  Brennan Ryan RN, CRM

## 2020-12-22 NOTE — PROGRESS NOTES
Problem: Self Care Deficits Care Plan (Adult)  Goal: *Acute Goals and Plan of Care (Insert Text)  Description:   FUNCTIONAL STATUS PRIOR TO ADMISSION: patient unable to provide accurate PLOF but reports he has family who assist him with putting on his shoes and socks. HOME SUPPORT: The patient lived with wife but did not require significant assist.    Occupational Therapy Goals  Initiated 12/21/2020  1. Patient will perform grooming sitting unsupported with minimal assistance within 7 day(s). 2.  Patient will perform anterior neck to thigh bathing sitting unsupported with minimal assistance within 7 day(s). 3.  Patient will perform lower body dressing with moderate assistance within 7 day(s). 4.  Patient will perform toilet transfers to/from [facility] with moderate assistance within 7 day(s). 5.  Patient will perform all aspects of toileting with moderate assistance  within 7 day(s). 6.  Patient will participate in upper extremity therapeutic exercise/activities with supervision/set-up for 5 minutes within 7 day(s). 7.  Patient will utilize energy conservation techniques during functional activities with verbal cues within 7 day(s). Outcome: Not Met   OCCUPATIONAL THERAPY TREATMENT  Patient: Tasia Combs (13 y.o. male)  Date: 12/22/2020  Diagnosis: AMS (altered mental status) [R41.82] AMS (altered mental status)       Precautions: Fall  Chart, occupational therapy assessment, plan of care, and goals were reviewed. ASSESSMENT  Patient continues with skilled OT services and is not progressing towards goals. Chart reviewed and cleared by nurse. Patient given Ativan overnight and has been sleeping per nurse. Patient received in bed and asleep. Awakens to voice. Benefits by elevated volume to hear . Participation impacted by impaired bilateral UE AROM (noted reddened areas over dorsal fingers and hands). Unable to determine if hands are painful.  Patient found to be incontinent of bowel and bladder with max assist of 1 for rolling with use of bedrails and total assist for toileting hygiene and clothing management. Patient placed in modified chair position in bed which increased level of alertness. Patient completed grooming tasks with max to total assist. Patient demonstrates impaired activity tolerance, attention to task, arousal, cardiopulmonary tolerance (HR in low to mid 100 teens during session) as well as impaired orientation, confusion,cognition. Current Level of Function Impacting Discharge (ADLs): max to total assist for self care and bed mobiltiy    Other factors to consider for discharge: independent at baseline per chart, history of fall          PLAN :  Patient continues to benefit from skilled intervention to address the above impairments. Continue treatment per established plan of care. to address goals. Recommend with staff: bed in modified chair position as tolerated, encourage participation in self care and self feeding when alert    Recommend next OT session: upper body bathing    Recommendation for discharge: (in order for the patient to meet his/her long term goals)  Therapy up to 5 days/week in SNF setting  If discharged to home, would need a hospital bed, mechanical lift, [facility], 24/7 assist for cognition and safety, as well as total assist for self care and mobility (possibley of 2 people)  This discharge recommendation:  Has been made in collaboration with the attending provider and/or case management    IF patient discharges home will need the following DME: bedside commode, hospital bed and mechanical lift       SUBJECTIVE:   Patient stated we just brushed teeth.  when asked if he remembers what we just did.  He is correct    OBJECTIVE DATA SUMMARY:   Cognitive/Behavioral Status:  Neurologic State: Lethargic;Confused  Orientation Level: Oriented to person;Disoriented to time;Disoriented to situation;Disoriented to place  Cognition: Decreased attention/concentration;Decreased command following; Impaired decision making;Memory loss  Perception: Appears intact  Perseveration: No perseveration noted  Safety/Judgement: Decreased awareness of environment;Decreased awareness of need for assistance;Decreased awareness of need for safety;Decreased insight into deficits    Functional Mobility and Transfers for ADLs:  Bed Mobility:  Rolling: Maximum assistance;Assist x1;Additional time; Adaptive equipment      ADL Intervention:  Feeding  Drink to Mouth: Maximum assistance    Grooming  Position Performed: Long sitting on bed  Washing Face: Maximum assistance  Washing Hands: Total assistance (dependent)  Brushing Teeth: Maximum assistance  Brushing/Combing Hair: Total assistance (dependent)  Cues: Physical assistance;Verbal cues provided;Visual cues provided              Upper Body Dressing Assistance  Dressing Assistance: Total assistance(dependent)(inferred from mobility and command following)    Lower Body Dressing Assistance  Dressing Assistance: Total assistance(dependent)(inferred mobilty and command following)    Toileting  Toileting Assistance: Total assistance(dependent)  Bladder Hygiene: Total assistance (dependent)  Bowel Hygiene: Total assistance (dependent)  Clothing Management: Total assistance (dependent)  Cues: Physical assistance for pants down;Physical assistance for pants up; Tactile cues provided;Verbal cues provided  Adaptive Equipment: Other (comment)(bedrail)    Cognitive Retraining  Orientation Retraining: Reorienting;Place  Organizing/Sequencing: Breaking task down  Attention to Task: Distractibility; Single task  Following Commands:  Follows one step commands/directions  Safety/Judgement: Decreased awareness of environment;Decreased awareness of need for assistance;Decreased awareness of need for safety;Decreased insight into deficits  Cues: Tactile cues provided;Verbal cues provided;Visual cues provided    Activity Tolerance:   Fair    After treatment patient left in no apparent distress:   Call bell within reach, Bed / chair alarm activated and bed in modified chair position    COMMUNICATION/COLLABORATION:   The patients plan of care was discussed with: Physical therapist and Registered nurse.      MUSTAPHA Galvan  Time Calculation: 39 mins

## 2020-12-22 NOTE — PROGRESS NOTES
Comprehensive Nutrition Assessment    Type and Reason for Visit: Reassess    Nutrition Recommendations/Plan:      Continue regular diet as ordered, RD has added Ensure Enlive TID    Nutrition Assessment:    12/14:  Pt admitted with AMS (recent fall). PMHx: Melanoma, HTN, Alcohol abuse. Noted: DT's, acute respiratory distress-intubated last night. Mr Kyle Robertson appears well-nourished. Left message for wife concerning appetite/?wt loss PTA (per MST). Weight stable for at least the past year per trends in EHR (see below). Diprivan @ current rate of 8.1 ml/hr will provide 214 lipid calories per day. Suggested tube feeding: Osmolite 1.5 @ 50 ml/hr with 1 packet Prosource daily and 150 ml water flush q 4 hr. This will provide 1200 ml, 1860 calories (2074 including Diprivan), 90 gm protein and 1875 ml free water (tube feeding/flush) per day to meet estimated needs. Goody vitamins ordered-transition to oral route if tolerates enteral feeds. Corrected CA 8.4 mg/dl-slightly BNL. Magnesium replaced today. Phosphorus last checked 12/11-WNL. Recommend adding to am labs tomorrow. May be at refeeding risk. Unclear how well eating PTA. 12/22:  Pt is tolerating regular diet, still with periods of confusion (and required ativan last night for combative behavior). CM working on discharge to SNF/rehab as pt's wife is not able to care for him at home. Pt continues to work with therapies, but is slow to make progress. Nothing new to add from nutrition standpoint but will continue to follow pt's progress. Malnutrition Assessment:  Malnutrition Status:  No malnutrition    Context:  Acute illness       Nutritionally Significant Medications:   Folic acid, magnesium sulfate, Protonix, B1    Estimated Daily Nutrient Needs:  Energy (kcal): 0302-7918 (MSJ x 1.2-1.3); Weight Used for Energy Requirements: Current(90 kg)  Protein (g):  (1.0-1.2g/kg);  Weight Used for Protein Requirements: Current(90 kg)  Fluid (ml/day):  ; Method Used for Fluid Requirements: 1 ml/kcal    Nutrition Related Findings:       BM: 12/21  Edema: none  Wounds:  None       Current Nutrition Therapies:   Diet: regular  Additional Caloric Sources:    Meal intake:   Patient Vitals for the past 168 hrs:   % Diet Eaten   12/18/20 1742 10 %   12/18/20 1239 10 %   12/18/20 0808 10 %       Anthropometric Measures:  · Height:  5' 11\" (180.3 cm)  · Current Body Wt:  90 kg (198 lb 6.6 oz)   · Admission Body Wt:  205 lb 0.4 oz      · Ideal Body Wt:   :  115.4 %     · BMI Categories:  Overweight (BMI 25.0-29. 9)     Wt Readings from Last 10 Encounters:   12/21/20 82.1 kg (181 lb)   12/11/20 93 kg (205 lb 0.4 oz)   07/07/20 93 kg (205 lb)   09/04/19 93 kg (205 lb)   08/09/19 93 kg (205 lb)   08/03/19 90.8 kg (200 lb 3.2 oz)   08/01/19 93.4 kg (206 lb)   07/25/19 93.4 kg (206 lb)   06/12/19 93.4 kg (206 lb)   04/11/19 92.5 kg (204 lb)       Nutrition Diagnosis:   · Inadequate oral intake related to impaired respiratory function as evidenced by NPO or clear liquid status due to medical condition, intubation    Nutrition Interventions:   Food and/or Nutrient Delivery: Start tube feeding  Nutrition Education and Counseling: No recommendations at this time  Coordination of Nutrition Care: Continue to monitor while inpatient, Interdisciplinary rounds    Goals: Tolerate tube feeding at goal in next 2-3 days. Nutrition Monitoring and Evaluation:   Behavioral-Environmental Outcomes: None identified  Food/Nutrient Intake Outcomes: Enteral nutrition intake/tolerance  Physical Signs/Symptoms Outcomes: Biochemical data, Weight, GI status, Hemodynamic status    Discharge Planning:     Too soon to determine     Gilberto Toure RD, CSP  Contact: [employer]

## 2020-12-23 ENCOUNTER — APPOINTMENT (OUTPATIENT)
Dept: GENERAL RADIOLOGY | Age: 75
DRG: 896 | End: 2020-12-23
Attending: FAMILY MEDICINE
Payer: MEDICARE

## 2020-12-23 LAB
ANION GAP SERPL CALC-SCNC: 7 MMOL/L (ref 5–15)
APPEARANCE UR: ABNORMAL
BACTERIA URNS QL MICRO: NEGATIVE /HPF
BASOPHILS # BLD: 0.3 K/UL (ref 0–0.1)
BASOPHILS NFR BLD: 2 % (ref 0–1)
BILIRUB UR QL CFM: NEGATIVE
BUN SERPL-MCNC: 19 MG/DL (ref 6–20)
BUN/CREAT SERPL: 13 (ref 12–20)
CALCIUM SERPL-MCNC: 8.3 MG/DL (ref 8.5–10.1)
CHLORIDE SERPL-SCNC: 116 MMOL/L (ref 97–108)
CO2 SERPL-SCNC: 26 MMOL/L (ref 21–32)
COLOR UR: ABNORMAL
CREAT SERPL-MCNC: 1.51 MG/DL (ref 0.7–1.3)
DIFFERENTIAL METHOD BLD: ABNORMAL
EOSINOPHIL # BLD: 0 K/UL (ref 0–0.4)
EOSINOPHIL NFR BLD: 0 % (ref 0–7)
EPITH CASTS URNS QL MICRO: ABNORMAL /LPF
ERYTHROCYTE [DISTWIDTH] IN BLOOD BY AUTOMATED COUNT: 13.4 % (ref 11.5–14.5)
GLUCOSE SERPL-MCNC: 106 MG/DL (ref 65–100)
GLUCOSE UR STRIP.AUTO-MCNC: NEGATIVE MG/DL
HCT VFR BLD AUTO: 29.1 % (ref 36.6–50.3)
HGB BLD-MCNC: 9.6 G/DL (ref 12.1–17)
HGB UR QL STRIP: ABNORMAL
HYALINE CASTS URNS QL MICRO: ABNORMAL /LPF (ref 0–5)
IMM GRANULOCYTES # BLD AUTO: 0 K/UL
IMM GRANULOCYTES NFR BLD AUTO: 0 %
KETONES UR QL STRIP.AUTO: 15 MG/DL
LEUKOCYTE ESTERASE UR QL STRIP.AUTO: ABNORMAL
LYMPHOCYTES # BLD: 1 K/UL (ref 0.8–3.5)
LYMPHOCYTES NFR BLD: 7 % (ref 12–49)
MCH RBC QN AUTO: 33.6 PG (ref 26–34)
MCHC RBC AUTO-ENTMCNC: 33 G/DL (ref 30–36.5)
MCV RBC AUTO: 101.7 FL (ref 80–99)
MONOCYTES # BLD: 1.6 K/UL (ref 0–1)
MONOCYTES NFR BLD: 11 % (ref 5–13)
NEUTS SEG # BLD: 11.2 K/UL (ref 1.8–8)
NEUTS SEG NFR BLD: 80 % (ref 32–75)
NITRITE UR QL STRIP.AUTO: NEGATIVE
NRBC # BLD: 0 K/UL (ref 0–0.01)
NRBC BLD-RTO: 0 PER 100 WBC
PH UR STRIP: 5.5 [PH] (ref 5–8)
PLATELET # BLD AUTO: 312 K/UL (ref 150–400)
PMV BLD AUTO: 9.2 FL (ref 8.9–12.9)
POTASSIUM SERPL-SCNC: 3.4 MMOL/L (ref 3.5–5.1)
PROT UR STRIP-MCNC: 30 MG/DL
RBC # BLD AUTO: 2.86 M/UL (ref 4.1–5.7)
RBC #/AREA URNS HPF: ABNORMAL /HPF (ref 0–5)
RBC MORPH BLD: ABNORMAL
SODIUM SERPL-SCNC: 149 MMOL/L (ref 136–145)
SP GR UR REFRACTOMETRY: 1.03 (ref 1–1.03)
UROBILINOGEN UR QL STRIP.AUTO: 1 EU/DL (ref 0.2–1)
WBC # BLD AUTO: 14.1 K/UL (ref 4.1–11.1)
WBC URNS QL MICRO: ABNORMAL /HPF (ref 0–4)

## 2020-12-23 PROCEDURE — 65660000000 HC RM CCU STEPDOWN

## 2020-12-23 PROCEDURE — 81001 URINALYSIS AUTO W/SCOPE: CPT

## 2020-12-23 PROCEDURE — C9113 INJ PANTOPRAZOLE SODIUM, VIA: HCPCS | Performed by: EMERGENCY MEDICINE

## 2020-12-23 PROCEDURE — 87040 BLOOD CULTURE FOR BACTERIA: CPT

## 2020-12-23 PROCEDURE — 36415 COLL VENOUS BLD VENIPUNCTURE: CPT

## 2020-12-23 PROCEDURE — 71045 X-RAY EXAM CHEST 1 VIEW: CPT

## 2020-12-23 PROCEDURE — 74011250637 HC RX REV CODE- 250/637: Performed by: INTERNAL MEDICINE

## 2020-12-23 PROCEDURE — 74011250637 HC RX REV CODE- 250/637: Performed by: EMERGENCY MEDICINE

## 2020-12-23 PROCEDURE — 74011000258 HC RX REV CODE- 258: Performed by: EMERGENCY MEDICINE

## 2020-12-23 PROCEDURE — 85025 COMPLETE CBC W/AUTO DIFF WBC: CPT

## 2020-12-23 PROCEDURE — 97535 SELF CARE MNGMENT TRAINING: CPT

## 2020-12-23 PROCEDURE — 80048 BASIC METABOLIC PNL TOTAL CA: CPT

## 2020-12-23 PROCEDURE — 74011250637 HC RX REV CODE- 250/637: Performed by: FAMILY MEDICINE

## 2020-12-23 PROCEDURE — 74011000258 HC RX REV CODE- 258: Performed by: FAMILY MEDICINE

## 2020-12-23 PROCEDURE — 87086 URINE CULTURE/COLONY COUNT: CPT

## 2020-12-23 PROCEDURE — 74011250636 HC RX REV CODE- 250/636: Performed by: EMERGENCY MEDICINE

## 2020-12-23 PROCEDURE — 74011250636 HC RX REV CODE- 250/636: Performed by: FAMILY MEDICINE

## 2020-12-23 PROCEDURE — 74011000250 HC RX REV CODE- 250: Performed by: EMERGENCY MEDICINE

## 2020-12-23 RX ORDER — CHLORDIAZEPOXIDE HYDROCHLORIDE 5 MG/1
5 CAPSULE, GELATIN COATED ORAL EVERY 12 HOURS
Status: DISCONTINUED | OUTPATIENT
Start: 2020-12-23 | End: 2020-12-26

## 2020-12-23 RX ORDER — SODIUM CHLORIDE, SODIUM LACTATE, POTASSIUM CHLORIDE, CALCIUM CHLORIDE 600; 310; 30; 20 MG/100ML; MG/100ML; MG/100ML; MG/100ML
100 INJECTION, SOLUTION INTRAVENOUS CONTINUOUS
Status: DISPENSED | OUTPATIENT
Start: 2020-12-23 | End: 2020-12-23

## 2020-12-23 RX ADMIN — LOSARTAN POTASSIUM 100 MG: 50 TABLET, FILM COATED ORAL at 09:33

## 2020-12-23 RX ADMIN — PROPRANOLOL HYDROCHLORIDE 10 MG: 10 TABLET ORAL at 23:00

## 2020-12-23 RX ADMIN — SODIUM CHLORIDE, POTASSIUM CHLORIDE, SODIUM LACTATE AND CALCIUM CHLORIDE 100 ML/HR: 600; 310; 30; 20 INJECTION, SOLUTION INTRAVENOUS at 07:27

## 2020-12-23 RX ADMIN — THIAMINE HYDROCHLORIDE 100 MG: 100 INJECTION, SOLUTION INTRAMUSCULAR; INTRAVENOUS at 10:58

## 2020-12-23 RX ADMIN — PROPRANOLOL HYDROCHLORIDE 10 MG: 10 TABLET ORAL at 09:34

## 2020-12-23 RX ADMIN — Medication 10 ML: at 06:20

## 2020-12-23 RX ADMIN — SODIUM CHLORIDE, POTASSIUM CHLORIDE, SODIUM LACTATE AND CALCIUM CHLORIDE 100 ML/HR: 600; 310; 30; 20 INJECTION, SOLUTION INTRAVENOUS at 17:46

## 2020-12-23 RX ADMIN — Medication 10 ML: at 14:00

## 2020-12-23 RX ADMIN — ACETAMINOPHEN 650 MG: 325 TABLET ORAL at 00:36

## 2020-12-23 RX ADMIN — Medication 10 ML: at 23:00

## 2020-12-23 RX ADMIN — AMLODIPINE BESYLATE 10 MG: 5 TABLET ORAL at 09:34

## 2020-12-23 RX ADMIN — ENOXAPARIN SODIUM 40 MG: 40 INJECTION SUBCUTANEOUS at 09:33

## 2020-12-23 RX ADMIN — CHLORDIAZEPOXIDE HYDROCHLORIDE 5 MG: 5 CAPSULE ORAL at 14:00

## 2020-12-23 RX ADMIN — SODIUM CHLORIDE 40 MG: 9 INJECTION INTRAMUSCULAR; INTRAVENOUS; SUBCUTANEOUS at 11:37

## 2020-12-23 RX ADMIN — CHLORDIAZEPOXIDE HYDROCHLORIDE 5 MG: 5 CAPSULE ORAL at 06:20

## 2020-12-23 RX ADMIN — POTASSIUM CHLORIDE 20 MEQ: 750 TABLET, FILM COATED, EXTENDED RELEASE ORAL at 09:34

## 2020-12-23 RX ADMIN — CHLORDIAZEPOXIDE HYDROCHLORIDE 5 MG: 5 CAPSULE ORAL at 23:00

## 2020-12-23 RX ADMIN — PIPERACILLIN AND TAZOBACTAM 3.38 G: 3; .375 INJECTION, POWDER, LYOPHILIZED, FOR SOLUTION INTRAVENOUS at 10:58

## 2020-12-23 RX ADMIN — PIPERACILLIN AND TAZOBACTAM 3.38 G: 3; .375 INJECTION, POWDER, LYOPHILIZED, FOR SOLUTION INTRAVENOUS at 18:00

## 2020-12-23 NOTE — PROGRESS NOTES
Physical Therapy    Attempted to see patient for follow up PT session, but he was too lethargic to be able to participate effectively. He rouses to his name and attempts to say a word or 2, but drift back off to sleep almost immediately. We will follow up tomorrow to attempt to progress his activity. Continue to recommend SNF as he is well below his baseline level of function at this time.     Jess Hastings, PT

## 2020-12-23 NOTE — PROGRESS NOTES
Pt is very lethargic and disoriented. NPO status initiated. Blood cultures and urine samples sent to lab per orders. Pt placed on contact precautions per Dr. Leigh Arevalo for possible r/o cdiff-awaiting infectious disease provider to evaluate patient prior to sending sample per Dr. Leigh Arevalo. IV patent-LR @100, 2L NC, bedrest at this time. Pt bathed, briefed for incontinence, oral care provided. Pt and wife updated on POC. Call light within reach, hourly safety rounds maintained. Will continue to monitor and assess.

## 2020-12-23 NOTE — PROGRESS NOTES
Nancy Conklin, Bishop Woodard, Caryn, and Whitney Sharma Date: 12/11/2020      Subjective:     Patient with diarrhea last pm. Quite sedated. .       Current Facility-Administered Medications   Medication Dose Route Frequency    lactated Ringers infusion  100 mL/hr IntraVENous CONTINUOUS    chlordiazePOXIDE (LIBRIUM) capsule 5 mg  5 mg Oral QHS    chlordiazePOXIDE (LIBRIUM) capsule 5 mg  5 mg Oral Q8H    potassium chloride SR (KLOR-CON 10) tablet 20 mEq  20 mEq Oral BID    amLODIPine (NORVASC) tablet 10 mg  10 mg Oral DAILY    propranoloL (INDERAL) tablet 10 mg  10 mg Oral TID    losartan (COZAAR) tablet 100 mg  100 mg Oral DAILY    hydrALAZINE (APRESOLINE) 20 mg/mL injection 20 mg  20 mg IntraVENous Q4H PRN    labetaloL (NORMODYNE;TRANDATE) injection 10 mg  10 mg IntraVENous Q2H PRN    enoxaparin (LOVENOX) injection 40 mg  40 mg SubCUTAneous Q24H    ELECTROLYTE REPLACEMENT PROTOCOL - Potassium and Magnesium  1 Each Other PRN    thiamine (B-1) 100 mg in 0.9% sodium chloride 50 mL IVPB  100 mg IntraVENous DAILY    pantoprazole (PROTONIX) 40 mg in 0.9% sodium chloride 10 mL injection  40 mg IntraVENous Q24H    cloNIDine HCL (CATAPRES) tablet 0.1 mg  0.1 mg Oral Q3H PRN    sodium chloride (NS) flush 5-40 mL  5-40 mL IntraVENous Q8H    sodium chloride (NS) flush 5-40 mL  5-40 mL IntraVENous PRN    potassium chloride 10 mEq in 100 ml IVPB  10 mEq IntraVENous PRN    acetaminophen (TYLENOL) tablet 650 mg  650 mg Oral Q6H PRN    Or    acetaminophen (TYLENOL) suppository 650 mg  650 mg Rectal Q6H PRN    polyethylene glycol (MIRALAX) packet 17 g  17 g Oral DAILY PRN    ondansetron (ZOFRAN ODT) tablet 4 mg  4 mg Oral Q8H PRN    Or    ondansetron (ZOFRAN) injection 4 mg  4 mg IntraVENous Q6H PRN    LORazepam (ATIVAN) injection 2 mg  2 mg IntraVENous Q1H PRN    LORazepam (ATIVAN) injection 4 mg  4 mg IntraVENous Q1H PRN    influenza vaccine 2020-21 (6 mos+)(PF) (FLUARIX/FLULAVAL/FLUZONE QUAD) injection 0.5 mL  0.5 mL IntraMUSCular PRIOR TO DISCHARGE          Objective:     Patient Vitals for the past 8 hrs:   BP Temp Pulse Resp SpO2   12/23/20 0346 108/77 98.5 °F (36.9 °C) 94 19 99 %   12/23/20 0030 118/65 (!) 100.5 °F (38.1 °C) 96 27 96 %     No intake/output data recorded. No intake/output data recorded. Physical Exam: Lungs: clear to auscultation bilaterally  Heart: regular rate and rhythm, S1, S2 normal, no murmur, click, rub or gallop  Abdomen: soft, non-tender.  Bowel sounds normal. No masses,  no organomegaly        Data Review   Recent Results (from the past 24 hour(s))   METABOLIC PANEL, BASIC    Collection Time: 12/22/20  8:14 AM   Result Value Ref Range    Sodium 148 (H) 136 - 145 mmol/L    Potassium 3.4 (L) 3.5 - 5.1 mmol/L    Chloride 113 (H) 97 - 108 mmol/L    CO2 27 21 - 32 mmol/L    Anion gap 8 5 - 15 mmol/L    Glucose 99 65 - 100 mg/dL    BUN 13 6 - 20 MG/DL    Creatinine 0.95 0.70 - 1.30 MG/DL    BUN/Creatinine ratio 14 12 - 20      GFR est AA >60 >60 ml/min/1.73m2    GFR est non-AA >60 >60 ml/min/1.73m2    Calcium 8.7 8.5 - 10.1 MG/DL           Assessment:     Principal Problem:    AMS (altered mental status) (12/11/2020)    Active Problems:    Essential hypertension (11/21/2015)      Excessive drinking of alcohol (12/11/2020)      UTI (urinary tract infection) (12/12/2020)      Hypokalemia (12/12/2020)      Thrombocytopenia ([address] (12/12/2020)      Anemia (12/12/2020)      Alcohol withdrawal ([address] (12/21/2020)        Plan:     1) Check labs including C diff  2) LR at 100cc/hr  3) Decrease PM Librium  4) Start Zosyn IV

## 2020-12-23 NOTE — PROGRESS NOTES
Problem: Self Care Deficits Care Plan (Adult)  Goal: *Acute Goals and Plan of Care (Insert Text)  Description:   FUNCTIONAL STATUS PRIOR TO ADMISSION: patient unable to provide accurate PLOF but reports he has family who assist him with putting on his shoes and socks. HOME SUPPORT: The patient lived with wife but did not require significant assist.    Occupational Therapy Goals  Initiated 12/21/2020  1. Patient will perform grooming sitting unsupported with minimal assistance within 7 day(s). 2.  Patient will perform anterior neck to thigh bathing sitting unsupported with minimal assistance within 7 day(s). 3.  Patient will perform lower body dressing with moderate assistance within 7 day(s). 4.  Patient will perform toilet transfers to/from [facility] with moderate assistance within 7 day(s). 5.  Patient will perform all aspects of toileting with moderate assistance  within 7 day(s). 6.  Patient will participate in upper extremity therapeutic exercise/activities with supervision/set-up for 5 minutes within 7 day(s). 7.  Patient will utilize energy conservation techniques during functional activities with verbal cues within 7 day(s). Outcome: Not Progressing Towards Goal   OCCUPATIONAL THERAPY TREATMENT  Patient: Poppy Frank (1 y.o. male)  Date: 12/23/2020  Diagnosis: AMS (altered mental status) [R41.82] AMS (altered mental status)       Precautions: Fall  Chart, occupational therapy assessment, plan of care, and goals were reviewed. ASSESSMENT  Patient continues with skilled OT services and is not progressing towards goals. Chart reviewed and cleared by nurse who reports patient with diarrhea and now rule out for C diff. Participation impacted by impaired arousal, impaired command following, orientation,attention to task, insight, safety awareness, command following, initiation,bilateral UE AROM, strength, coordination and endurance for task.  Redness at dorsal hands noted and reported to nurse on 12/22/2020 no longer present. Patient unable to sustain arousal for more than 1 to 2 min at a time. Benefits by elevated voice volume secondary to demonstration of impaired hearing. Current Level of Function Impacting Discharge (ADLs): Total assist for all self care    Other factors to consider for discharge: lethargy         PLAN :  Patient continues to benefit from skilled intervention to address the above impairments. Continue treatment per established plan of care. to address goals. Recommend with staff: Place in long sit versus modified chair position in bed 3 times a day for one hour, encourage to participate in any self care activity that is appropriate: washing face and hands, brushing teeth    Recommend next OT session: command following, grooming    Recommendation for discharge: (in order for the patient to meet his/her long term goals)  Therapy up to 5 days/week in SNF setting  If discharged to home, will need 24/7 care for all self care and mobility, total assist self care and mobility (possibly with need for assist of 2), total assist iADLs, HH  This discharge recommendation:  Has been made in collaboration with the attending provider and/or case management    IF patient discharges home will need the following DME: hospital bed, mechanical lift, and wheelchair       SUBJECTIVE:   Patient stated Sure.  when asked to follow a command, but unable to initiate an activity    OBJECTIVE DATA SUMMARY:   Cognitive/Behavioral Status:  Neurologic State: Lethargic;Eyes open to voice  Orientation Level: Oriented to person;Disoriented to time;Disoriented to situation;Disoriented to place  Cognition: Decreased attention/concentration;Decreased command following;Memory loss  Perception: Cues to attend to right side of body;Cues to attend to left side of body; Tactile;Verbal;Visual  Perseveration: No perseveration noted  Safety/Judgement: Decreased awareness of environment;Decreased awareness of need for assistance;Decreased awareness of need for safety;Decreased insight into deficits    Functional Mobility and Transfers for ADLs:       ADL Intervention:       Grooming  Grooming Assistance: Total assistance(dependent)  Position Performed: Long sitting on bed  Washing Face: Total assistance (dependent)  Washing Hands: Moderate assistance    Upper Body Bathing  Bathing Assistance: Total assistance(dependent)  Position Performed: Long sitting on bed  Cues: Physical assistance;Verbal cues provided; Tactile cues provided;Visual cues provided       Toileting  Toileting Assistance: Total assistance(dependent)(incontinent)    Cognitive Retraining  Orientation Retraining: Reorienting;Place  Organizing/Sequencing: Breaking task down  Attention to Task: Distractibility; Single task  Safety/Judgement: Decreased awareness of environment;Decreased awareness of need for assistance;Decreased awareness of need for safety;Decreased insight into deficits  Cues: Tactile cues provided;Verbal cues provided;Visual cues provided    Activity Tolerance:   Poor    After treatment patient left in no apparent distress:   Call bell within reach, Caregiver / family present, and Long sit in bed (nurse present)    COMMUNICATION/COLLABORATION:   The patients plan of care was discussed with: Registered nurse.      MUSTAPHA Caldera  Time Calculation: 18 mins

## 2020-12-23 NOTE — PROGRESS NOTES
Transition of Care: TBD;  SNF; pending referrals sent to [facility] and [facility] on 12/23 (Patients wife is Ty Ramsey [phone])     Transport Plan: BLS (not set up yet)       RUR: 18%     Dx: altered mental status     CM noted from chart.     Discharge pending:  -pending complex medical progress and care recommendations   -Patient was extubated 12/16 in the ICU and transferred to [address] [place] [address] Nw recommendations and patient progression)  -pending SNF choice from patients wife (list given to her on 12/22)   -patient starting IV zosyn today     1400: CM met with patients wife; she has chosen [facility] and [facility]; CM sent referrals to both via [address]; awaiting responses    Transition of Care Plan:     The Plan for Transition of Care is related to the following treatment goals: SNF    The Patient and/or patient representative Ty Ramsey (wife) was provided with a choice of provider and agrees  with the discharge plan. Yes [x] No []    A Freedom of choice list was provided with basic dialogue that supports the patient's individualized plan of care/goals and shares the quality data associated with the providers.        Yes [x] No []        CM following  Noel Dance, RN, CRM

## 2020-12-24 ENCOUNTER — APPOINTMENT (OUTPATIENT)
Dept: NON INVASIVE DIAGNOSTICS | Age: 75
DRG: 896 | End: 2020-12-24
Attending: INTERNAL MEDICINE
Payer: MEDICARE

## 2020-12-24 ENCOUNTER — APPOINTMENT (OUTPATIENT)
Dept: GENERAL RADIOLOGY | Age: 75
DRG: 896 | End: 2020-12-24
Attending: ANESTHESIOLOGY
Payer: MEDICARE

## 2020-12-24 LAB
ALBUMIN SERPL-MCNC: 2.4 G/DL (ref 3.5–5)
ALBUMIN/GLOB SERPL: 0.6 {RATIO} (ref 1.1–2.2)
ALP SERPL-CCNC: 89 U/L (ref 45–117)
ALT SERPL-CCNC: 77 U/L (ref 12–78)
ANION GAP SERPL CALC-SCNC: 5 MMOL/L (ref 5–15)
ANION GAP SERPL CALC-SCNC: 9 MMOL/L (ref 5–15)
AST SERPL-CCNC: 122 U/L (ref 15–37)
BACTERIA SPEC CULT: NORMAL
BASOPHILS # BLD: 0.2 K/UL (ref 0–0.1)
BASOPHILS # BLD: 0.3 K/UL (ref 0–0.1)
BASOPHILS NFR BLD: 2 % (ref 0–1)
BASOPHILS NFR BLD: 2 % (ref 0–1)
BILIRUB SERPL-MCNC: 0.3 MG/DL (ref 0.2–1)
BNP SERPL-MCNC: 1815 PG/ML
BUN SERPL-MCNC: 20 MG/DL (ref 6–20)
BUN SERPL-MCNC: 20 MG/DL (ref 6–20)
BUN/CREAT SERPL: 17 (ref 12–20)
BUN/CREAT SERPL: 18 (ref 12–20)
CALCIUM SERPL-MCNC: 8.2 MG/DL (ref 8.5–10.1)
CALCIUM SERPL-MCNC: 8.6 MG/DL (ref 8.5–10.1)
CHLORIDE SERPL-SCNC: 119 MMOL/L (ref 97–108)
CHLORIDE SERPL-SCNC: 119 MMOL/L (ref 97–108)
CO2 SERPL-SCNC: 23 MMOL/L (ref 21–32)
CO2 SERPL-SCNC: 26 MMOL/L (ref 21–32)
COMMENT, HOLDF: NORMAL
CREAT SERPL-MCNC: 1.13 MG/DL (ref 0.7–1.3)
CREAT SERPL-MCNC: 1.16 MG/DL (ref 0.7–1.3)
DIFFERENTIAL METHOD BLD: ABNORMAL
DIFFERENTIAL METHOD BLD: ABNORMAL
EOSINOPHIL # BLD: 0.6 K/UL (ref 0–0.4)
EOSINOPHIL # BLD: 0.8 K/UL (ref 0–0.4)
EOSINOPHIL NFR BLD: 5 % (ref 0–7)
EOSINOPHIL NFR BLD: 5 % (ref 0–7)
ERYTHROCYTE [DISTWIDTH] IN BLOOD BY AUTOMATED COUNT: 13.1 % (ref 11.5–14.5)
ERYTHROCYTE [DISTWIDTH] IN BLOOD BY AUTOMATED COUNT: 13.2 % (ref 11.5–14.5)
GLOBULIN SER CALC-MCNC: 3.7 G/DL (ref 2–4)
GLUCOSE BLD STRIP.AUTO-MCNC: 84 MG/DL (ref 65–100)
GLUCOSE SERPL-MCNC: 105 MG/DL (ref 65–100)
GLUCOSE SERPL-MCNC: 78 MG/DL (ref 65–100)
HCT VFR BLD AUTO: 30.2 % (ref 36.6–50.3)
HCT VFR BLD AUTO: 34.6 % (ref 36.6–50.3)
HGB BLD-MCNC: 10.7 G/DL (ref 12.1–17)
HGB BLD-MCNC: 9.7 G/DL (ref 12.1–17)
IMM GRANULOCYTES # BLD AUTO: 0.2 K/UL (ref 0–0.04)
IMM GRANULOCYTES # BLD AUTO: 0.7 K/UL (ref 0–0.04)
IMM GRANULOCYTES NFR BLD AUTO: 2 % (ref 0–0.5)
IMM GRANULOCYTES NFR BLD AUTO: 4 % (ref 0–0.5)
LACTATE SERPL-SCNC: 0.6 MMOL/L (ref 0.4–2)
LYMPHOCYTES # BLD: 1.2 K/UL (ref 0.8–3.5)
LYMPHOCYTES # BLD: 1.8 K/UL (ref 0.8–3.5)
LYMPHOCYTES NFR BLD: 10 % (ref 12–49)
LYMPHOCYTES NFR BLD: 11 % (ref 12–49)
MCH RBC QN AUTO: 33.3 PG (ref 26–34)
MCH RBC QN AUTO: 33.7 PG (ref 26–34)
MCHC RBC AUTO-ENTMCNC: 30.9 G/DL (ref 30–36.5)
MCHC RBC AUTO-ENTMCNC: 32.1 G/DL (ref 30–36.5)
MCV RBC AUTO: 104.9 FL (ref 80–99)
MCV RBC AUTO: 107.8 FL (ref 80–99)
MONOCYTES # BLD: 1.1 K/UL (ref 0–1)
MONOCYTES # BLD: 1.2 K/UL (ref 0–1)
MONOCYTES NFR BLD: 10 % (ref 5–13)
MONOCYTES NFR BLD: 7 % (ref 5–13)
NEUTS SEG # BLD: 11.8 K/UL (ref 1.8–8)
NEUTS SEG # BLD: 8.3 K/UL (ref 1.8–8)
NEUTS SEG NFR BLD: 71 % (ref 32–75)
NEUTS SEG NFR BLD: 71 % (ref 32–75)
NRBC # BLD: 0 K/UL (ref 0–0.01)
NRBC # BLD: 0 K/UL (ref 0–0.01)
NRBC BLD-RTO: 0 PER 100 WBC
NRBC BLD-RTO: 0 PER 100 WBC
PLATELET # BLD AUTO: 319 K/UL (ref 150–400)
PLATELET # BLD AUTO: 485 K/UL (ref 150–400)
PMV BLD AUTO: 9.4 FL (ref 8.9–12.9)
PMV BLD AUTO: 9.5 FL (ref 8.9–12.9)
POTASSIUM SERPL-SCNC: 3.3 MMOL/L (ref 3.5–5.1)
POTASSIUM SERPL-SCNC: 3.8 MMOL/L (ref 3.5–5.1)
PROCALCITONIN SERPL-MCNC: 0.47 NG/ML
PROT SERPL-MCNC: 6.1 G/DL (ref 6.4–8.2)
RBC # BLD AUTO: 2.88 M/UL (ref 4.1–5.7)
RBC # BLD AUTO: 3.21 M/UL (ref 4.1–5.7)
RBC MORPH BLD: ABNORMAL
RBC MORPH BLD: ABNORMAL
SAMPLES BEING HELD,HOLD: NORMAL
SERVICE CMNT-IMP: NORMAL
SERVICE CMNT-IMP: NORMAL
SODIUM SERPL-SCNC: 150 MMOL/L (ref 136–145)
SODIUM SERPL-SCNC: 151 MMOL/L (ref 136–145)
WBC # BLD AUTO: 11.6 K/UL (ref 4.1–11.1)
WBC # BLD AUTO: 16.6 K/UL (ref 4.1–11.1)

## 2020-12-24 PROCEDURE — 74011000258 HC RX REV CODE- 258: Performed by: EMERGENCY MEDICINE

## 2020-12-24 PROCEDURE — 83735 ASSAY OF MAGNESIUM: CPT

## 2020-12-24 PROCEDURE — 83605 ASSAY OF LACTIC ACID: CPT

## 2020-12-24 PROCEDURE — 93306 TTE W/DOPPLER COMPLETE: CPT | Performed by: INTERNAL MEDICINE

## 2020-12-24 PROCEDURE — 65620000000 HC RM CCU GENERAL

## 2020-12-24 PROCEDURE — 82962 GLUCOSE BLOOD TEST: CPT

## 2020-12-24 PROCEDURE — 36600 WITHDRAWAL OF ARTERIAL BLOOD: CPT

## 2020-12-24 PROCEDURE — 87070 CULTURE OTHR SPECIMN AEROBIC: CPT

## 2020-12-24 PROCEDURE — 85025 COMPLETE CBC W/AUTO DIFF WBC: CPT

## 2020-12-24 PROCEDURE — 74011250636 HC RX REV CODE- 250/636: Performed by: INTERNAL MEDICINE

## 2020-12-24 PROCEDURE — 74011000258 HC RX REV CODE- 258: Performed by: FAMILY MEDICINE

## 2020-12-24 PROCEDURE — 74011250636 HC RX REV CODE- 250/636: Performed by: FAMILY MEDICINE

## 2020-12-24 PROCEDURE — 77030040831 HC BAG URINE DRNG MDII -A

## 2020-12-24 PROCEDURE — 83880 ASSAY OF NATRIURETIC PEPTIDE: CPT

## 2020-12-24 PROCEDURE — 71045 X-RAY EXAM CHEST 1 VIEW: CPT

## 2020-12-24 PROCEDURE — 74011250636 HC RX REV CODE- 250/636: Performed by: ANESTHESIOLOGY

## 2020-12-24 PROCEDURE — 82330 ASSAY OF CALCIUM: CPT

## 2020-12-24 PROCEDURE — 74011000250 HC RX REV CODE- 250: Performed by: ANESTHESIOLOGY

## 2020-12-24 PROCEDURE — C8929 TTE W OR WO FOL WCON,DOPPLER: HCPCS

## 2020-12-24 PROCEDURE — 84145 PROCALCITONIN (PCT): CPT

## 2020-12-24 PROCEDURE — 74011250636 HC RX REV CODE- 250/636: Performed by: EMERGENCY MEDICINE

## 2020-12-24 PROCEDURE — 99222 1ST HOSP IP/OBS MODERATE 55: CPT | Performed by: INTERNAL MEDICINE

## 2020-12-24 PROCEDURE — 74011000250 HC RX REV CODE- 250: Performed by: STUDENT IN AN ORGANIZED HEALTH CARE EDUCATION/TRAINING PROGRAM

## 2020-12-24 PROCEDURE — 80053 COMPREHEN METABOLIC PANEL: CPT

## 2020-12-24 PROCEDURE — 82803 BLOOD GASES ANY COMBINATION: CPT

## 2020-12-24 PROCEDURE — 36415 COLL VENOUS BLD VENIPUNCTURE: CPT

## 2020-12-24 RX ORDER — POTASSIUM CHLORIDE 750 MG/1
20 TABLET, FILM COATED, EXTENDED RELEASE ORAL 3 TIMES DAILY
Status: DISCONTINUED | OUTPATIENT
Start: 2020-12-24 | End: 2020-12-26

## 2020-12-24 RX ORDER — FLUMAZENIL 0.1 MG/ML
0.2 INJECTION INTRAVENOUS ONCE
Status: COMPLETED | OUTPATIENT
Start: 2020-12-24 | End: 2020-12-24

## 2020-12-24 RX ORDER — DEXMEDETOMIDINE HYDROCHLORIDE 4 UG/ML
.2-1.4 INJECTION, SOLUTION INTRAVENOUS
Status: DISCONTINUED | OUTPATIENT
Start: 2020-12-24 | End: 2020-12-30

## 2020-12-24 RX ORDER — POTASSIUM CHLORIDE AND SODIUM CHLORIDE 450; 150 MG/100ML; MG/100ML
INJECTION, SOLUTION INTRAVENOUS CONTINUOUS
Status: DISCONTINUED | OUTPATIENT
Start: 2020-12-24 | End: 2020-12-26

## 2020-12-24 RX ORDER — DEXTROSE MONOHYDRATE AND SODIUM CHLORIDE 5; .45 G/100ML; G/100ML
50 INJECTION, SOLUTION INTRAVENOUS CONTINUOUS
Status: DISCONTINUED | OUTPATIENT
Start: 2020-12-24 | End: 2020-12-24

## 2020-12-24 RX ORDER — EPINEPHRINE 0.1 MG/ML
INJECTION INTRACARDIAC; INTRAVENOUS
Status: COMPLETED | OUTPATIENT
Start: 2020-12-24 | End: 2020-12-24

## 2020-12-24 RX ORDER — PROPRANOLOL HYDROCHLORIDE 20 MG/1
20 TABLET ORAL 3 TIMES DAILY
Status: DISCONTINUED | OUTPATIENT
Start: 2020-12-24 | End: 2020-12-26

## 2020-12-24 RX ADMIN — Medication 10 ML: at 15:34

## 2020-12-24 RX ADMIN — ENOXAPARIN SODIUM 40 MG: 40 INJECTION SUBCUTANEOUS at 08:20

## 2020-12-24 RX ADMIN — PIPERACILLIN AND TAZOBACTAM 3.38 G: 3; .375 INJECTION, POWDER, LYOPHILIZED, FOR SOLUTION INTRAVENOUS at 18:36

## 2020-12-24 RX ADMIN — THIAMINE HYDROCHLORIDE 100 MG: 100 INJECTION, SOLUTION INTRAMUSCULAR; INTRAVENOUS at 08:54

## 2020-12-24 RX ADMIN — FLUMAZENIL 0.2 MG: 0.1 INJECTION, SOLUTION INTRAVENOUS at 19:46

## 2020-12-24 RX ADMIN — SODIUM CHLORIDE 20 MG: 9 INJECTION INTRAMUSCULAR; INTRAVENOUS; SUBCUTANEOUS at 15:33

## 2020-12-24 RX ADMIN — FLUMAZENIL 0.2 MG: 0.1 INJECTION, SOLUTION INTRAVENOUS at 13:29

## 2020-12-24 RX ADMIN — POTASSIUM CHLORIDE AND SODIUM CHLORIDE: 450; 150 INJECTION, SOLUTION INTRAVENOUS at 14:37

## 2020-12-24 RX ADMIN — PIPERACILLIN AND TAZOBACTAM 3.38 G: 3; .375 INJECTION, POWDER, LYOPHILIZED, FOR SOLUTION INTRAVENOUS at 02:43

## 2020-12-24 RX ADMIN — LORAZEPAM 2 MG: 2 INJECTION INTRAMUSCULAR; INTRAVENOUS at 10:51

## 2020-12-24 RX ADMIN — EPINEPHRINE 1 MG: 0.1 INJECTION INTRACARDIAC; INTRAVENOUS at 12:48

## 2020-12-24 RX ADMIN — PIPERACILLIN AND TAZOBACTAM 3.38 G: 3; .375 INJECTION, POWDER, LYOPHILIZED, FOR SOLUTION INTRAVENOUS at 09:00

## 2020-12-24 NOTE — PROGRESS NOTES
Transition of Care:  SNF; pending referrals sent to [facility] and [facility] on 12/23 (Patients wife is [facility] [phone]); [facility] has accepted on 12/23 and [facility] is still pending     Transport Plan: BLS (not set up yet)       RUR: 17%     Dx: altered mental status     CM noted from chart.     1300: CM noted that code called for patient and he has been transferred to CCU at this time     Discharge pending:  -pending complex medical progress and care recommendations  - PT and OT pending recommendations and patient progression   -continuation of IV zosyn   -patient has been transferred to CCU for cardiac arrest 12/24      CM following  Christine Bowling RN, CRM

## 2020-12-24 NOTE — PROGRESS NOTES
Addendum #2  Patient cardiac arrested after receiving ativan. Jenniffer Meyer Responded to flumazenil/ cpr and excellent nursing resuscitation. Will transfer to unit.   intensivist to take over - Dr. Maura Murray

## 2020-12-24 NOTE — PROGRESS NOTES
ID Progress Note  2020    Subjective:     Afebrile. No dyspnea. He says he is not in pain. Objective:     Vitals:   Visit Vitals  BP (!) 162/103 (BP 1 Location: Left arm, BP Patient Position: At rest)   Pulse 76   Temp 98.7 °F (37.1 °C)   Resp 20   Ht 5' 11\" (1.803 m)   Wt 82.1 kg (181 lb)   SpO2 91%   BMI 25.24 kg/m²        Tmax:  Temp (24hrs), Av.1 °F (36.7 °C), Min:97.5 °F (36.4 °C), Max:98.7 °F (37.1 °C)      Exam:    Not in distress   Lungs: clear to auscultation  Heart: s1, s2, no murmurs   Abdomen: soft, nontender      Labs:   Lab Results   Component Value Date/Time    WBC 11.6 (H) 2020 05:42 AM    HGB (POC) 12.8 2017 10:27 AM    HGB 9.7 (L) 2020 05:42 AM    HCT (POC) 38.8 2017 10:27 AM    HCT 30.2 (L) 2020 05:42 AM    PLATELET 759  05:42 AM    .9 (H) 2020 05:42 AM     Lab Results   Component Value Date/Time    Sodium 151 (H) 2020 05:42 AM    Potassium 3.3 (L) 2020 05:42 AM    Chloride 119 (H) 2020 05:42 AM    CO2 23 2020 05:42 AM    Anion gap 9 2020 05:42 AM    Glucose 78 2020 05:42 AM    BUN 20 2020 05:42 AM    Creatinine 1.16 2020 05:42 AM    BUN/Creatinine ratio 17 2020 05:42 AM    GFR est AA >60 2020 05:42 AM    GFR est non-AA >60 2020 05:42 AM    Calcium 8.6 2020 05:42 AM    Bilirubin, total 0.6 12/15/2020 04:45 AM    Alk. phosphatase 54 12/15/2020 04:45 AM    Protein, total 6.0 (L) 12/15/2020 04:45 AM    Albumin 2.2 (L) 12/15/2020 04:45 AM    Globulin 3.8 12/15/2020 04:45 AM    A-G Ratio 0.6 (L) 12/15/2020 04:45 AM    ALT (SGPT) 30 12/15/2020 04:45 AM           Assessment:     #1 fever     #2 recent delirium tremens     #3 history of melanoma     #4 hypertension               Recommendations:     Temp curve is better. Wbc better. Continue zosyn. Team available over the holiday weekend if needed.      Tiara Millan MD
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
## 2020-12-24 NOTE — PROGRESS NOTES
Physical Therapy 12/24/2020    New orders received and chart reviewed up to date. Pt transferred to ICU s/p cardiac arrest on 12/24. Pt currently on BIPAP. Will hold and follow-up for PT re-evaluation as appropriate. Pt last seen by PT on 12/22/20 with recommendation for IPR. Recommend with nursing patient to complete as able in order to maintain strength, endurance and independence: chair position in bed as appropriate. Thank you.   Erick Oswald, PT, DPT

## 2020-12-24 NOTE — PROGRESS NOTES
Spiritual Care Assessment/Progress Note  [address]      NAME: Berna Mabry      MRN: [id number]  AGE: 76 y.o. SEX: male  Amish Affiliation: Other   Language: English     12/24/2020     Total Time (in minutes): 20     Spiritual Assessment begun in [address] through conversation with:         []Patient        [] Family    [] Friend(s)        Reason for Consult: Code Blue/99     Spiritual beliefs: (Please include comment if needed)     [] Identifies with a jose tradition:         [] Supported by a jose community:            [] Claims no spiritual orientation:           [] Seeking spiritual identity:                [] Adheres to an individual form of spirituality:           [] Not able to assess:                           Identified resources for coping:      [] Prayer                               [] Music                  [] Guided Imagery     [] Family/friends                 [] Pet visits     [] Devotional reading                         [x] Unknown     [] Other:                                           Interventions offered during this visit: (See comments for more details)    Patient Interventions: Crisis     Family/Friend(s): Other (comment)(Note left at bedside)     Plan of Care:     [] Support spiritual and/or cultural needs    [] Support AMD and/or advance care planning process      [] Support grieving process   [] Coordinate Rites and/or Rituals    [] Coordination with community clergy   [] No spiritual needs identified at this time   [] Detailed Plan of Care below (See Comments)  [] Make referral to Music Therapy  [] Make referral to Pet Therapy     [] Make referral to Addiction services  [] Make referral to [facility]  [] Make referral to Spiritual Care Partner  [] No future visits requested        [x] Follow up visits as needed     Responded to a Code Blue. Pt was being attended to by medical staff. Wife was present earlier and left not long prior to the code.    Renee Osorio Marisol Mccain MDiv, MS, BCC  [phone]

## 2020-12-24 NOTE — PROGRESS NOTES
Addendum- patient had 20+ beat run of Vtach this am.  Mag ordered. Increase K, start 1/2 ns for hypernatremia.  Cards consult ordered

## 2020-12-24 NOTE — PROGRESS NOTES
[facility]    ICU TEAM Progress Note    Name: Cari Garcia   : 1945   MRN: [id number]   Date: 2020      Assessment     ICU Problems:    1. Acute Hypoxic Respiratory Failure (secondary to Ativan --> Somnolence)  2. Aspiration Pneumonitis  3. Cardiac Arrest (due to hypoxia) - Sinus Junito/PEA  4. Acute Alcohol Withdrawal   5. NSVT  6. Acute Toxic Metabolic Encephalopathy  7. Acute Renal Failure    Imagin2020      ICU Comprehensive Plan of Care:     Plans for this Shift:     1. BiPAP  2. Flumazenil  3. If needed, Precedex  4. Stat CXR  5. Sputum/Lactate/Procal/BNP  6. Continue Zosyn  7. Low threshold to add Vancomycin  8. Spoke with wife at bedside  9. Thiamine/Folate  10. SBP Goal of: > 90 mmHg  11. MAP Goal of: > 65 mmHg  12. None - For above SBP/MAP goals  13. IVFs: 1/0MM75GIJ  14. Transfusion Trigger (Hgb): <7 g/dL  15. Respiratory Goals:  a. Head of bed > 30 degrees  b. Aggressive bronchopulmonary hygiene  c. Incentive spirometry  16. Pulmonary toilet: Incentive Spirometry   17. SpO2 Goal: > 92%  18. Keep K>4; Mg>2   19. PT/OT: PT consulted and on board and OT consulted and on board   20. Goals of Care Discussion with family Yes   24. Plan of Care/Code Status: Full Code  22. Discussed Care Plan with Bedside RN  23. Documentation of Current Medications  24.  Rest of Plan Below:    F - Feeding:  No   A - Analgesia: Acetaminophen  S - Sedation: None  T - DVT Prophylaxis: SCD's or Sequential Compression Device and Lovenox   H - Head of Bed: > 30 Degrees  U - Ulcer Prophylaxis: Pepcid (famotidine)   G - Glycemic Control: Insulin  S - Spontaneous Breathing Trial: N/A  B - Bowel Regimen: Senna and Docusate (Colace)  I - Indwelling Catheter:   Tubes: None  Lines: Peripheral IV  Drains: None  D - De-escalation of Antibiotics: Zosyn    Subjective:   Progress Note: 2020      Reason for ICU Admission: Acute Hypoxic Respiratory Failure --> Cardiac Arrest     HPI:  Oskar is a 75 y.o. with a history of melanoma, hypertension and alcohol abuse.  He suffered a blunt head injury after a ground-level fall on the evening of December 8.  He had tripped over a sidewalk resulting in him falling forward and landing on his right forehead.  Patient has felt disoriented and was having some memory issues.  For this reason, he was admitted on December 11.  He was placed on Rocephin empirically, but this was discontinued because no source of infection was found.  He developed delirium tremens and had to be intubated and admitted to the ICU.  He was eventually extubated a few days later.  In the past day, he developed fever and elevated white count prompting consult.  The patient tells me that he does not have any cough, dyspnea, abdominal pain, dysuria, headache or sore throat.  He does not have any rash.  He does not have any back pain or joint pain.  Dr. Leung documents that he is having diarrhea.  A C. difficile has been ordered.  Chest x-ray did not reveal any pneumonia.  Blood cultures have also been sent.  Urinalysis did not show any significant pyuria.  He was started on Zosyn and we are being asked to see him in consult.    Overnight Events:   12/24/2020  N/A    POD:  * No surgery found *    S/P:       Active Problem List:     Problem List  Date Reviewed: 8/2/2020          Codes Class    Alcohol withdrawal (HCC) ICD-10-CM: F10.239  ICD-9-CM: 291.81         UTI (urinary tract infection) ICD-10-CM: N39.0  ICD-9-CM: 599.0         Hypokalemia ICD-10-CM: E87.6  ICD-9-CM: 276.8         Thrombocytopenia (HCC) ICD-10-CM: D69.6  ICD-9-CM: 287.5         Anemia ICD-10-CM: D64.9  ICD-9-CM: 285.9         * (Principal) AMS (altered mental status) ICD-10-CM: R41.82  ICD-9-CM: 780.97         Excessive drinking of alcohol ICD-10-CM: F10.10  ICD-9-CM: 305.00         Hyponatremia ICD-10-CM: E87.1  ICD-9-CM: 276.1         HATTIE (acute kidney injury) (HCC) ICD-10-CM: N17.9  ICD-9-CM: 584.9       Essential hypertension ICD-10-CM: I10  ICD-9-CM: 401.9               Past Medical History:      has a past medical history of Cancer ([address] (~ ), Hypertension, and Other ill-defined conditions(799.89) (1960~). Past Surgical History:      has a past surgical history that includes hx other surgical (~ ) and colonoscopy (2011). Home Medications:     Prior to Admission medications    Medication Sig Start Date End Date Taking? Authorizing Provider   losartan (COZAAR) 100 mg tablet TAKE 1 TABLET BY MOUTH EVERY DAY 20  Yes Mikey Cabrera MD       Allergies/Social/Family History: Allergies   Allergen Reactions    Ace Inhibitors Cough    Thiazides Other (comments)     hyponatremia      Social History     Tobacco Use    Smoking status: Never Smoker    Smokeless tobacco: Never Used   Substance Use Topics    Alcohol use: Yes     Comment: occasional beer      Family History   Problem Relation Age of Onset    Hypertension Father        Review of Systems:     A comprehensive review of systems was negative except for that written in the HPI. Objective:   Vital Signs:  Visit Vitals  BP (!) 115/58   Pulse 96   Temp 97.6 °F (36.4 °C)   Resp 28   Ht 5' 11\" (1.803 m)   Wt 82.1 kg (181 lb)   SpO2 92%   BMI 25.24 kg/m²    O2 Flow Rate (L/min): 2 l/min O2 Device: Nasal cannula Temp (24hrs), Av.1 °F (36.7 °C), Min:97.6 °F (36.4 °C), Max:98.7 °F (37.1 °C)           Intake/Output:   No intake or output data in the 24 hours ending 20 1316    Physical Exam:    General:  Awakens with stimulation; look older than stated age   Eyes:  Sclera anicteric. Pupils equally round and reactive to light. Mouth/Throat: Mucous membranes normal, oral pharynx clear   Neck: Supple   Lungs:   Rhonchi bilaterally, good effort   CV:  Regular rate and rhythm,no murmur, click, rub or gallop   Abdomen:   Soft, non-tender.  bowel sounds normal. non-distended   Extremities: No cyanosis or edema   Skin: Skin color, texture, turgor normal. no acute rash or lesions   Lymph nodes: Cervical and supraclavicular normal   Musculoskeletal: No swelling or deformity   Lines/Devices:  Intact, no erythema, drainage or tenderness   Psych: Somnolent     LABS AND  DATA: Personally reviewed  Recent Labs     12/24/20  0542 12/23/20  0732   WBC 11.6* 14.1*   HGB 9.7* 9.6*   HCT 30.2* 29.1*    312     Recent Labs     12/24/20  0542 12/23/20  0732   * 149*   K 3.3* 3.4*   * 116*   CO2 23 26   BUN 20 19   CREA 1.16 1.51*   GLU 78 106*   CA 8.6 8.3*     No results for input(s): AP, TBIL, TP, ALB, GLOB, AML, LPSE in the last 72 hours. No lab exists for component: SGOT, GPT, AMYP  No results for input(s): INR, PTP, APTT, INREXT in the last 72 hours. No results for input(s): PHI, PCO2I, PO2I, FIO2I in the last 72 hours. No results for input(s): CPK, CKMB, TROIQ, BNPP in the last 72 hours. Hemodynamics:   PAP:   CO:     Wedge:   CI:     CVP:    SVR:       PVR:       Ventilator Settings:  Mode Rate Tidal Volume Pressure FiO2 PEEP   Assist control, Volume control   450 ml  5 cm H2O 50 % 5 cm H20     Peak airway pressure: 22 cm H2O    Minute ventilation: 10.2 l/min        MEDS: Reviewed    Chest X-Ray:  CXR Results  (Last 48 hours)               12/23/20 0815  XR CHEST PORT Final result    Impression:  IMPRESSION: Mild left basilar atelectasis. Narrative:  INDICATION: Fall, heart failure. Portable AP semiupright view of the chest.       Direct comparison made to prior chest x-ray dated December 16, 2020. Cardiomediastinal silhouette is stable. There is mild left basilar atelectasis. No pleural fluid is visualized. There is no pneumothorax. Multidisciplinary Rounds Completed:   Yes    ABCDEF Bundle/Checklist Completed:  Yes    SPECIAL EQUIPMENT  None    DISPOSITION  Stay in ICU    CRITICAL CARE CONSULTANT NOTE  I had a face to face encounter with the patient, reviewed and interpreted patient data including clinical events, labs, images, vital signs, I/O's, and examined patient. I have discussed the case and the plan and management of the patient's care with the consulting services, the bedside nurses and the respiratory therapist.      NOTE OF PERSONAL INVOLVEMENT IN CARE   This patient has a high probability of imminent, clinically significant deterioration, which requires the highest level of preparedness to intervene urgently. I participated in the decision-making and personally managed or directed the management of the following life and organ supporting interventions that required my frequent assessment to treat or prevent imminent deterioration. I personally spent 130 minutes of critical care time. This is time spent at this critically ill patient's bedside actively involved in patient care as well as the coordination of care. This does not include any procedural time which has been billed separately.     Shruthi Deng DO, MS  Staff Intensivist/Anesthesiologist  [facility]  12/24/2020

## 2020-12-24 NOTE — PROGRESS NOTES
Orders received, chart reviewed and patient just transferred to CCU after MI and is now on bipap. Will f/u Monday for OT re-evaluation. Per OT evaluation 12/21/2020: Recommend with nursing patient to complete as able in order to maintain strength, endurance and independence: ADLs with setup in bed, bed in chair position, patient mobilizes in bed with max of two people assisting. Thank you for your assistance.
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
## 2020-12-25 ENCOUNTER — APPOINTMENT (OUTPATIENT)
Dept: GENERAL RADIOLOGY | Age: 75
DRG: 896 | End: 2020-12-25
Attending: ANESTHESIOLOGY
Payer: MEDICARE

## 2020-12-25 LAB
ALBUMIN SERPL-MCNC: 2.3 G/DL (ref 3.5–5)
ALBUMIN/GLOB SERPL: 0.5 {RATIO} (ref 1.1–2.2)
ALP SERPL-CCNC: 75 U/L (ref 45–117)
ALT SERPL-CCNC: 68 U/L (ref 12–78)
AMMONIA PLAS-SCNC: 20 UMOL/L
ANION GAP SERPL CALC-SCNC: 9 MMOL/L (ref 5–15)
ARTERIAL PATENCY WRIST A: YES
AST SERPL-CCNC: 62 U/L (ref 15–37)
BASE DEFICIT BLD-SCNC: 2 MMOL/L
BASOPHILS # BLD: 0.2 K/UL (ref 0–0.1)
BASOPHILS NFR BLD: 1 % (ref 0–1)
BDY SITE: ABNORMAL
BILIRUB SERPL-MCNC: 0.4 MG/DL (ref 0.2–1)
BNP SERPL-MCNC: 2557 PG/ML
BUN SERPL-MCNC: 20 MG/DL (ref 6–20)
BUN/CREAT SERPL: 15 (ref 12–20)
CA-I BLD-SCNC: 1.17 MMOL/L (ref 1.12–1.32)
CALCIUM SERPL-MCNC: 8.2 MG/DL (ref 8.5–10.1)
CHLORIDE SERPL-SCNC: 119 MMOL/L (ref 97–108)
CO2 SERPL-SCNC: 23 MMOL/L (ref 21–32)
CREAT SERPL-MCNC: 1.34 MG/DL (ref 0.7–1.3)
DIFFERENTIAL METHOD BLD: ABNORMAL
EOSINOPHIL # BLD: 0.1 K/UL (ref 0–0.4)
EOSINOPHIL NFR BLD: 1 % (ref 0–7)
ERYTHROCYTE [DISTWIDTH] IN BLOOD BY AUTOMATED COUNT: 13.4 % (ref 11.5–14.5)
GAS FLOW.O2 O2 DELIVERY SYS: ABNORMAL L/MIN
GAS FLOW.O2 SETTING OXYMISER: 4 L/M
GLOBULIN SER CALC-MCNC: 4.2 G/DL (ref 2–4)
GLUCOSE SERPL-MCNC: 100 MG/DL (ref 65–100)
HCO3 BLD-SCNC: 21.6 MMOL/L (ref 22–26)
HCT VFR BLD AUTO: 31.6 % (ref 36.6–50.3)
HGB BLD-MCNC: 10 G/DL (ref 12.1–17)
IMM GRANULOCYTES # BLD AUTO: 0.2 K/UL (ref 0–0.04)
IMM GRANULOCYTES NFR BLD AUTO: 2 % (ref 0–0.5)
LACTATE SERPL-SCNC: 0.4 MMOL/L (ref 0.4–2)
LYMPHOCYTES # BLD: 1 K/UL (ref 0.8–3.5)
LYMPHOCYTES NFR BLD: 7 % (ref 12–49)
MCH RBC QN AUTO: 33.4 PG (ref 26–34)
MCHC RBC AUTO-ENTMCNC: 31.6 G/DL (ref 30–36.5)
MCV RBC AUTO: 105.7 FL (ref 80–99)
MONOCYTES # BLD: 1.1 K/UL (ref 0–1)
MONOCYTES NFR BLD: 8 % (ref 5–13)
NEUTS SEG # BLD: 12.3 K/UL (ref 1.8–8)
NEUTS SEG NFR BLD: 81 % (ref 32–75)
NRBC # BLD: 0 K/UL (ref 0–0.01)
NRBC BLD-RTO: 0 PER 100 WBC
PCO2 BLD: 31.6 MMHG (ref 35–45)
PH BLD: 7.44 [PH] (ref 7.35–7.45)
PLATELET # BLD AUTO: 353 K/UL (ref 150–400)
PMV BLD AUTO: 9.3 FL (ref 8.9–12.9)
PO2 BLD: 69 MMHG (ref 80–100)
POTASSIUM SERPL-SCNC: 3.8 MMOL/L (ref 3.5–5.1)
PROCALCITONIN SERPL-MCNC: 0.32 NG/ML
PROT SERPL-MCNC: 6.5 G/DL (ref 6.4–8.2)
RBC # BLD AUTO: 2.99 M/UL (ref 4.1–5.7)
SAO2 % BLD: 95 % (ref 92–97)
SODIUM SERPL-SCNC: 151 MMOL/L (ref 136–145)
SPECIMEN TYPE: ABNORMAL
WBC # BLD AUTO: 14.9 K/UL (ref 4.1–11.1)

## 2020-12-25 PROCEDURE — 85025 COMPLETE CBC W/AUTO DIFF WBC: CPT

## 2020-12-25 PROCEDURE — 36415 COLL VENOUS BLD VENIPUNCTURE: CPT

## 2020-12-25 PROCEDURE — 74011000258 HC RX REV CODE- 258: Performed by: FAMILY MEDICINE

## 2020-12-25 PROCEDURE — 74011250636 HC RX REV CODE- 250/636: Performed by: FAMILY MEDICINE

## 2020-12-25 PROCEDURE — 82140 ASSAY OF AMMONIA: CPT

## 2020-12-25 PROCEDURE — 74011250636 HC RX REV CODE- 250/636: Performed by: EMERGENCY MEDICINE

## 2020-12-25 PROCEDURE — 74011250636 HC RX REV CODE- 250/636: Performed by: ANESTHESIOLOGY

## 2020-12-25 PROCEDURE — 74011000258 HC RX REV CODE- 258: Performed by: EMERGENCY MEDICINE

## 2020-12-25 PROCEDURE — 83605 ASSAY OF LACTIC ACID: CPT

## 2020-12-25 PROCEDURE — 65620000000 HC RM CCU GENERAL

## 2020-12-25 PROCEDURE — 82803 BLOOD GASES ANY COMBINATION: CPT

## 2020-12-25 PROCEDURE — 80053 COMPREHEN METABOLIC PANEL: CPT

## 2020-12-25 PROCEDURE — 94660 CPAP INITIATION&MGMT: CPT

## 2020-12-25 PROCEDURE — 36600 WITHDRAWAL OF ARTERIAL BLOOD: CPT

## 2020-12-25 PROCEDURE — 84145 PROCALCITONIN (PCT): CPT

## 2020-12-25 PROCEDURE — 71045 X-RAY EXAM CHEST 1 VIEW: CPT

## 2020-12-25 PROCEDURE — 99232 SBSQ HOSP IP/OBS MODERATE 35: CPT | Performed by: INTERNAL MEDICINE

## 2020-12-25 PROCEDURE — 74011000250 HC RX REV CODE- 250: Performed by: ANESTHESIOLOGY

## 2020-12-25 PROCEDURE — 83880 ASSAY OF NATRIURETIC PEPTIDE: CPT

## 2020-12-25 PROCEDURE — 74011250636 HC RX REV CODE- 250/636: Performed by: INTERNAL MEDICINE

## 2020-12-25 RX ORDER — VANCOMYCIN HYDROCHLORIDE
1250
Status: DISCONTINUED | OUTPATIENT
Start: 2020-12-26 | End: 2020-12-28

## 2020-12-25 RX ORDER — VANCOMYCIN HYDROCHLORIDE
1250 ONCE
Status: DISCONTINUED | OUTPATIENT
Start: 2020-12-25 | End: 2020-12-25

## 2020-12-25 RX ORDER — VANCOMYCIN 2 GRAM/500 ML IN 0.9 % SODIUM CHLORIDE INTRAVENOUS
2000 ONCE
Status: COMPLETED | OUTPATIENT
Start: 2020-12-25 | End: 2020-12-25

## 2020-12-25 RX ADMIN — SODIUM CHLORIDE 20 MG: 9 INJECTION INTRAMUSCULAR; INTRAVENOUS; SUBCUTANEOUS at 14:00

## 2020-12-25 RX ADMIN — POTASSIUM CHLORIDE AND SODIUM CHLORIDE: 450; 150 INJECTION, SOLUTION INTRAVENOUS at 12:13

## 2020-12-25 RX ADMIN — ENOXAPARIN SODIUM 40 MG: 40 INJECTION SUBCUTANEOUS at 08:30

## 2020-12-25 RX ADMIN — PIPERACILLIN AND TAZOBACTAM 3.38 G: 3; .375 INJECTION, POWDER, LYOPHILIZED, FOR SOLUTION INTRAVENOUS at 09:30

## 2020-12-25 RX ADMIN — Medication 10 ML: at 14:00

## 2020-12-25 RX ADMIN — PIPERACILLIN AND TAZOBACTAM 3.38 G: 3; .375 INJECTION, POWDER, LYOPHILIZED, FOR SOLUTION INTRAVENOUS at 02:54

## 2020-12-25 RX ADMIN — Medication 10 ML: at 22:51

## 2020-12-25 RX ADMIN — LABETALOL HYDROCHLORIDE 10 MG: 5 INJECTION INTRAVENOUS at 16:27

## 2020-12-25 RX ADMIN — VANCOMYCIN HYDROCHLORIDE 2000 MG: 10 INJECTION, POWDER, LYOPHILIZED, FOR SOLUTION INTRAVENOUS at 17:30

## 2020-12-25 RX ADMIN — PIPERACILLIN AND TAZOBACTAM 3.38 G: 3; .375 INJECTION, POWDER, LYOPHILIZED, FOR SOLUTION INTRAVENOUS at 17:52

## 2020-12-25 RX ADMIN — Medication 10 ML: at 08:31

## 2020-12-25 RX ADMIN — THIAMINE HYDROCHLORIDE 100 MG: 100 INJECTION, SOLUTION INTRAMUSCULAR; INTRAVENOUS at 10:10

## 2020-12-25 RX ADMIN — SODIUM CHLORIDE 20 MG: 9 INJECTION INTRAMUSCULAR; INTRAVENOUS; SUBCUTANEOUS at 02:54

## 2020-12-25 NOTE — PROGRESS NOTES
Cardiology Progress Note      12/25/2020 9:59 AM    Admit Date: 12/11/2020    Admit Diagnosis: AMS (altered mental status) [R41.82]      Subjective:     Poppy Frank reviewed yesterday and overnight events. No events of telemetry. No recurrent VT.     Visit Vitals  BP (!) 141/73   Pulse 83   Temp 98.4 °F (36.9 °C)   Resp 12   Ht 5' 11\" (1.803 m)   Wt 181 lb 3.2 oz (82.2 kg)   SpO2 100%   BMI 25.27 kg/m²     Current Facility-Administered Medications   Medication Dose Route Frequency    potassium chloride SR (KLOR-CON 10) tablet 20 mEq  20 mEq Oral TID    propranoloL (INDERAL) tablet 20 mg  20 mg Oral TID    0.45% sodium chloride with KCl 20 mEq/L infusion   IntraVENous CONTINUOUS    famotidine (PF) (PEPCID) 20 mg in 0.9% sodium chloride 10 mL injection  20 mg IntraVENous Q12H    dexmedeTOMidine in 0.9 % NaCl (PRECEDEX) 400 mcg/100 mL (4 mcg/mL) infusion soln  0.2-1.4 mcg/kg/hr IntraVENous TITRATE    piperacillin-tazobactam (ZOSYN) 3.375 g in 0.9% sodium chloride (MBP/ADV) 100 mL MBP  3.375 g IntraVENous Q8H    chlordiazePOXIDE (LIBRIUM) capsule 5 mg  5 mg Oral Q12H    amLODIPine (NORVASC) tablet 10 mg  10 mg Oral DAILY    losartan (COZAAR) tablet 100 mg  100 mg Oral DAILY    hydrALAZINE (APRESOLINE) 20 mg/mL injection 20 mg  20 mg IntraVENous Q4H PRN    labetaloL (NORMODYNE;TRANDATE) injection 10 mg  10 mg IntraVENous Q2H PRN    enoxaparin (LOVENOX) injection 40 mg  40 mg SubCUTAneous Q24H    ELECTROLYTE REPLACEMENT PROTOCOL - Potassium and Magnesium  1 Each Other PRN    thiamine (B-1) 100 mg in 0.9% sodium chloride 50 mL IVPB  100 mg IntraVENous DAILY    cloNIDine HCL (CATAPRES) tablet 0.1 mg  0.1 mg Oral Q3H PRN    sodium chloride (NS) flush 5-40 mL  5-40 mL IntraVENous Q8H    sodium chloride (NS) flush 5-40 mL  5-40 mL IntraVENous PRN    potassium chloride 10 mEq in 100 ml IVPB  10 mEq IntraVENous PRN    acetaminophen (TYLENOL) tablet 650 mg  650 mg Oral Q6H PRN    Or    acetaminophen (TYLENOL) suppository 650 mg  650 mg Rectal Q6H PRN    polyethylene glycol (MIRALAX) packet 17 g  17 g Oral DAILY PRN    ondansetron (ZOFRAN ODT) tablet 4 mg  4 mg Oral Q8H PRN    Or    ondansetron (ZOFRAN) injection 4 mg  4 mg IntraVENous Q6H PRN    LORazepam (ATIVAN) injection 2 mg  2 mg IntraVENous Q1H PRN    LORazepam (ATIVAN) injection 4 mg  4 mg IntraVENous Q1H PRN    influenza vaccine 2020-21 (6 mos+)(PF) (FLUARIX/FLULAVAL/FLUZONE QUAD) injection 0.5 mL  0.5 mL IntraMUSCular PRIOR TO DISCHARGE         Objective:      Physical Exam:  Visit Vitals  BP (!) 141/73   Pulse 83   Temp 98.4 °F (36.9 °C)   Resp 12   Ht 5' 11\" (1.803 m)   Wt 181 lb 3.2 oz (82.2 kg)   SpO2 100%   BMI 25.27 kg/m²     General Appearance:  Resting comfortable, no acute distress. Ears/Nose/Mouth/Throat:   Hearing grossly normal.         Neck: Supple. Chest:   Lungs clear to auscultation bilaterally. Scattered rhonchi, no wheeze   Cardiovascular:  Regular rate and rhythm, S1, S2 normal, no murmur. Abdomen:   Soft, non-tender, bowel sounds are active. Extremities: No edema bilaterally. Skin: Warm and dry. Data Review:   Labs:    Recent Results (from the past 24 hour(s))   SAMPLES BEING HELD    Collection Time: 12/24/20  2:33 PM   Result Value Ref Range    SAMPLES BEING HELD 1RED,1PST,1LAV     COMMENT        Add-on orders for these samples will be processed based on acceptable specimen integrity and analyte stability, which may vary by analyte.    NT-PRO BNP    Collection Time: 12/24/20  2:33 PM   Result Value Ref Range    NT pro-BNP 1,815 (H) <785 PG/ML   METABOLIC PANEL, COMPREHENSIVE    Collection Time: 12/24/20  2:33 PM   Result Value Ref Range    Sodium 150 (H) 136 - 145 mmol/L    Potassium 3.8 3.5 - 5.1 mmol/L    Chloride 119 (H) 97 - 108 mmol/L    CO2 26 21 - 32 mmol/L    Anion gap 5 5 - 15 mmol/L    Glucose 105 (H) 65 - 100 mg/dL    BUN 20 6 - 20 MG/DL    Creatinine 1.13 0.70 - 1.30 MG/DL BUN/Creatinine ratio 18 12 - 20      GFR est AA >60 >60 ml/min/1.73m2    GFR est non-AA >60 >60 ml/min/1.73m2    Calcium 8.2 (L) 8.5 - 10.1 MG/DL    Bilirubin, total 0.3 0.2 - 1.0 MG/DL    ALT (SGPT) 77 12 - 78 U/L    AST (SGOT) 122 (H) 15 - 37 U/L    Alk. phosphatase 89 45 - 117 U/L    Protein, total 6.1 (L) 6.4 - 8.2 g/dL    Albumin 2.4 (L) 3.5 - 5.0 g/dL    Globulin 3.7 2.0 - 4.0 g/dL    A-G Ratio 0.6 (L) 1.1 - 2.2     PROCALCITONIN    Collection Time: 12/24/20  2:33 PM   Result Value Ref Range    Procalcitonin 0.47 ng/mL   CBC WITH AUTOMATED DIFF    Collection Time: 12/24/20  2:33 PM   Result Value Ref Range    WBC 16.6 (H) 4.1 - 11.1 K/uL    RBC 3.21 (L) 4.10 - 5.70 M/uL    HGB 10.7 (L) 12.1 - 17.0 g/dL    HCT 34.6 (L) 36.6 - 50.3 %    .8 (H) 80.0 - 99.0 FL    MCH 33.3 26.0 - 34.0 PG    MCHC 30.9 30.0 - 36.5 g/dL    RDW 13.1 11.5 - 14.5 %    PLATELET 052 (H) 267 - 400 K/uL    MPV 9.5 8.9 - 12.9 FL    NRBC 0.0 0  WBC    ABSOLUTE NRBC 0.00 0.00 - 0.01 K/uL    NEUTROPHILS 71 32 - 75 %    LYMPHOCYTES 11 (L) 12 - 49 %    MONOCYTES 7 5 - 13 %    EOSINOPHILS 5 0 - 7 %    BASOPHILS 2 (H) 0 - 1 %    IMMATURE GRANULOCYTES 4 (H) 0.0 - 0.5 %    ABS. NEUTROPHILS 11.8 (H) 1.8 - 8.0 K/UL    ABS. LYMPHOCYTES 1.8 0.8 - 3.5 K/UL    ABS. MONOCYTES 1.2 (H) 0.0 - 1.0 K/UL    ABS. EOSINOPHILS 0.8 (H) 0.0 - 0.4 K/UL    ABS. BASOPHILS 0.3 (H) 0.0 - 0.1 K/UL    ABS. IMM.  GRANS. 0.7 (H) 0.00 - 0.04 K/UL    DF SMEAR SCANNED      RBC COMMENTS MACROCYTOSIS  1+        RBC COMMENTS ANISOCYTOSIS  1+       LACTIC ACID    Collection Time: 12/24/20  2:36 PM   Result Value Ref Range    Lactic acid 0.6 0.4 - 2.0 MMOL/L   CULTURE, RESPIRATORY/SPUTUM/BRONCH W GRAM STAIN    Collection Time: 12/24/20  3:43 PM    Specimen: Sputum   Result Value Ref Range    Special Requests: NO SPECIAL REQUESTS      GRAM STAIN RARE WBCS SEEN      GRAM STAIN OCCASIONAL EPITHELIAL CELLS SEEN      GRAM STAIN OCCASIONAL YEAST      GRAM STAIN OCCASIONAL Nan Clap VARIABLE RODS      Culture result: PENDING    LACTIC ACID    Collection Time: 12/25/20  4:00 AM   Result Value Ref Range    Lactic acid 0.4 0.4 - 2.0 MMOL/L   PROCALCITONIN    Collection Time: 12/25/20  4:00 AM   Result Value Ref Range    Procalcitonin 0.32 ng/mL   NT-PRO BNP    Collection Time: 12/25/20  4:00 AM   Result Value Ref Range    NT pro-BNP 2,557 (H) <450 PG/ML   CBC WITH AUTOMATED DIFF    Collection Time: 12/25/20  4:00 AM   Result Value Ref Range    WBC 14.9 (H) 4.1 - 11.1 K/uL    RBC 2.99 (L) 4.10 - 5.70 M/uL    HGB 10.0 (L) 12.1 - 17.0 g/dL    HCT 31.6 (L) 36.6 - 50.3 %    .7 (H) 80.0 - 99.0 FL    MCH 33.4 26.0 - 34.0 PG    MCHC 31.6 30.0 - 36.5 g/dL    RDW 13.4 11.5 - 14.5 %    PLATELET 861 646 - 359 K/uL    MPV 9.3 8.9 - 12.9 FL    NRBC 0.0 0  WBC    ABSOLUTE NRBC 0.00 0.00 - 0.01 K/uL    NEUTROPHILS 81 (H) 32 - 75 %    LYMPHOCYTES 7 (L) 12 - 49 %    MONOCYTES 8 5 - 13 %    EOSINOPHILS 1 0 - 7 %    BASOPHILS 1 0 - 1 %    IMMATURE GRANULOCYTES 2 (H) 0.0 - 0.5 %    ABS. NEUTROPHILS 12.3 (H) 1.8 - 8.0 K/UL    ABS. LYMPHOCYTES 1.0 0.8 - 3.5 K/UL    ABS. MONOCYTES 1.1 (H) 0.0 - 1.0 K/UL    ABS. EOSINOPHILS 0.1 0.0 - 0.4 K/UL    ABS. BASOPHILS 0.2 (H) 0.0 - 0.1 K/UL    ABS. IMM. GRANS. 0.2 (H) 0.00 - 0.04 K/UL    DF AUTOMATED     METABOLIC PANEL, COMPREHENSIVE    Collection Time: 12/25/20  4:00 AM   Result Value Ref Range    Sodium 151 (H) 136 - 145 mmol/L    Potassium 3.8 3.5 - 5.1 mmol/L    Chloride 119 (H) 97 - 108 mmol/L    CO2 23 21 - 32 mmol/L    Anion gap 9 5 - 15 mmol/L    Glucose 100 65 - 100 mg/dL    BUN 20 6 - 20 MG/DL    Creatinine 1.34 (H) 0.70 - 1.30 MG/DL    BUN/Creatinine ratio 15 12 - 20      GFR est AA >60 >60 ml/min/1.73m2    GFR est non-AA 52 (L) >60 ml/min/1.73m2    Calcium 8.2 (L) 8.5 - 10.1 MG/DL    Bilirubin, total 0.4 0.2 - 1.0 MG/DL    ALT (SGPT) 68 12 - 78 U/L    AST (SGOT) 62 (H) 15 - 37 U/L    Alk.  phosphatase 75 45 - 117 U/L    Protein, total 6.5 6.4 - 8.2 g/dL Albumin 2.3 (L) 3.5 - 5.0 g/dL    Globulin 4.2 (H) 2.0 - 4.0 g/dL    A-G Ratio 0.5 (L) 1.1 - 2.2         Telemetry: normal sinus rhythm      Assessment:     Principal Problem:    AMS (altered mental status) (12/11/2020)    Active Problems:    Essential hypertension (11/21/2015)      Excessive drinking of alcohol (12/11/2020)      UTI (urinary tract infection) (12/12/2020)      Hypokalemia (12/12/2020)      Thrombocytopenia ([facility] [address] (12/12/2020)      Anemia (12/12/2020)      Alcohol withdrawal ([facility] [address] (12/21/2020)        Plan:     NSVT. Unclear etiology. No recurrent arrhythmia on telemetry. Echo was overall normal (limited views). Optimize electrolytes K>4,Mg>2. Continue BB. Dr. Dagoberto Martin to follow. Acute respiratory failure. Post ativan. Aspiration pneumonitis.   EtOH withdrawal.

## 2020-12-25 NOTE — PROGRESS NOTES
Pharmacist Note - Vancomycin Dosing    Consult provided for this 76 y.o. male for indication of HAP. Antibiotic regimen(s): Vancomycin + Zosyn  Patient on vancomycin PTA? NO     Recent Labs     20  0400 20  1433 20  0542   WBC 14.9* 16.6* 11.6*   CREA 1.34* 1.13 1.16   BUN 20 20 20     Frequency of BMP: daily thru   Height: 180.3 cm  Weight: 8282. kg  Est CrCl: 50.7 ml/min; UO: (not recorded) ml/kg/hr  Temp (24hrs), Av.3 °F (36.8 °C), Min:98.1 °F (36.7 °C), Max:98.4 °F (36.9 °C)    Cultures:   sputum - occasional gram variable rods, moderate possible staph species    Goal trough = 15 - 20 mcg/mL    Therapy will be initiated with a loading dose of 2000 mg IV x 1 to be followed by a maintenance dose of 1250 mg IV every 18 hours. Pharmacy to follow patient daily and order levels / make dose adjustments as appropriate.

## 2020-12-25 NOTE — PROGRESS NOTES
[facility]    ICU TEAM Progress Note    Name: Yaneth Driver   : 1945   MRN: [id number]   Date: 2020      Assessment     ICU Problems:    1. Acute Hypoxic Respiratory Failure (secondary to Ativan --> Somnolence)  2. Aspiration Pneumonitis  3. Cardiac Arrest (due to hypoxia) - Sinus Junito/PEA  4. Acute Alcohol Withdrawal   5. NSVT  6. Acute Toxic Metabolic Encephalopathy  7. Acute Renal Failure    [email or web address]      ICU Comprehensive Plan of Care:     Plans for this Shift:     1. BiPAP PRN  2. Flumazenil PRN  3. NO Ativan  4. If needed, Precedex  5. Sputum/Lactate/Procal/BNP  6. Insert August for accurate I/O  7. Continue Zosyn  8. Add Vancomycin  9. Updated wife  8. Thiamine/Folate  11. SBP Goal of: > 90 mmHg  12. MAP Goal of: > 65 mmHg  13. None - For above SBP/MAP goals  14. IVFs: 1/[id number]  15. Transfusion Trigger (Hgb): <7 g/dL  16. Respiratory Goals:  a. Head of bed > 30 degrees  b. Aggressive bronchopulmonary hygiene  c. Incentive spirometry  17. Pulmonary toilet: Incentive Spirometry   18. SpO2 Goal: > 92%  19. Keep K>4; Mg>2   20. PT/OT: PT consulted and on board and OT consulted and on board   21. Goals of Care Discussion with family Yes   25. Plan of Care/Code Status: Full Code  23. Discussed Care Plan with Bedside RN  24. Documentation of Current Medications  25.  Rest of Plan Below:    F - Feeding:  No   A - Analgesia: Acetaminophen  S - Sedation: None  T - DVT Prophylaxis: SCD's or Sequential Compression Device and Lovenox   H - Head of Bed: > 30 Degrees  U - Ulcer Prophylaxis: Pepcid (famotidine)   G - Glycemic Control: Insulin  S - Spontaneous Breathing Trial: N/A  B - Bowel Regimen: Senna and Docusate (Colace)  I - Indwelling Catheter:   Tubes: None  Lines: Peripheral IV  Drains: None  D - De-escalation of Antibiotics: Zosyn    Subjective:   Progress Note: 2020      Reason for ICU Admission: Acute Hypoxic Respiratory Failure --> Cardiac Arrest     HPI:  Andreia Cedillo is a 76 y.o. with a history of melanoma, hypertension and alcohol abuse. He suffered a blunt head injury after a ground-level fall on the evening of December 8. He had tripped over a sidewalk resulting in him falling forward and landing on his right forehead. Patient has felt disoriented and was having some memory issues. For this reason, he was admitted on December 11. He was placed on Rocephin empirically, but this was discontinued because no source of infection was found. He developed delirium tremens and had to be intubated and admitted to the ICU. He was eventually extubated a few days later. In the past day, he developed fever and elevated white count prompting consult. The patient tells me that he does not have any cough, dyspnea, abdominal pain, dysuria, headache or sore throat. He does not have any rash. He does not have any back pain or joint pain. Dr. Dimitri Graves documents that he is having diarrhea. A C. difficile has been ordered. Chest x-ray did not reveal any pneumonia. Blood cultures have also been sent. Urinalysis did not show any significant pyuria. He was started on Zosyn and we are being asked to see him in consult.     Overnight Events:   12/25/2020  More awake with BiPAP today    POD:  * No surgery found *    S/P:       Active Problem List:     Problem List  Date Reviewed: 8/2/2020          Codes Class    Alcohol withdrawal ([facility] ICD-10-CM: X80.414  ICD-9-CM: 291.81         UTI (urinary tract infection) ICD-10-CM: N39.0  ICD-9-CM: 599.0         Hypokalemia ICD-10-CM: E87.6  ICD-9-CM: 276.8         Thrombocytopenia (HCC) ICD-10-CM: D69.6  ICD-9-CM: 287.5         Anemia ICD-10-CM: D64.9  ICD-9-CM: 285.9         * (Principal) AMS (altered mental status) ICD-10-CM: R41.82  ICD-9-CM: 780.97         Excessive drinking of alcohol ICD-10-CM: F10.10  ICD-9-CM: 305.00         Hyponatremia ICD-10-CM: E87.1  ICD-9-CM: 276.1         HATTIE (acute kidney injury) ([facility] ICD-10-CM: N17.9  ICD-9-CM: 584.9         Essential hypertension ICD-10-CM: I10  ICD-9-CM: 401.9               Past Medical History:      has a past medical history of Cancer ([address] (~ ), Hypertension, and Other ill-defined conditions(799.89) (1960~). Past Surgical History:      has a past surgical history that includes hx other surgical (~ ) and colonoscopy (2011). Home Medications:     Prior to Admission medications    Medication Sig Start Date End Date Taking? Authorizing Provider   losartan (COZAAR) 100 mg tablet TAKE 1 TABLET BY MOUTH EVERY DAY 20  Yes Dina Kayser, MD       Allergies/Social/Family History: Allergies   Allergen Reactions    Ace Inhibitors Cough    Thiazides Other (comments)     hyponatremia      Social History     Tobacco Use    Smoking status: Never Smoker    Smokeless tobacco: Never Used   Substance Use Topics    Alcohol use: Yes     Comment: occasional beer      Family History   Problem Relation Age of Onset    Hypertension Father        Review of Systems:     A comprehensive review of systems was negative except for that written in the HPI. Objective:   Vital Signs:  Visit Vitals  BP (!) 141/73   Pulse 83   Temp 98.4 °F (36.9 °C)   Resp 12   Ht 5' 11\" (1.803 m)   Wt 82.2 kg (181 lb 3.2 oz)   SpO2 100%   BMI 25.27 kg/m²    O2 Flow Rate (L/min): 2 l/min O2 Device: BIPAP Temp (24hrs), Av °F (36.7 °C), Min:97.6 °F (36.4 °C), Max:98.4 °F (36.9 °C)           Intake/Output:     Intake/Output Summary (Last 24 hours) at 2020 7427  Last data filed at 2020 0400  Gross per 24 hour   Intake    Output 75 ml   Net -75 ml       Physical Exam:    General:  Awakens with stimulation; look older than stated age   Eyes:  Sclera anicteric. Pupils equally round and reactive to light.    Mouth/Throat: Mucous membranes normal, oral pharynx clear   Neck: Supple   Lungs:   Rhonchi bilaterally, good effort   CV:  Regular rate and rhythm,no murmur, click, rub or gallop   Abdomen:   Soft, non-tender. bowel sounds normal. non-distended   Extremities: No cyanosis or edema   Skin: Skin color, texture, turgor normal. no acute rash or lesions   Lymph nodes: Cervical and supraclavicular normal   Musculoskeletal: No swelling or deformity   Lines/Devices:  Intact, no erythema, drainage or tenderness   Psych: Somnolent     LABS AND  DATA: Personally reviewed  Recent Labs     12/25/20  0400 12/24/20  1433   WBC 14.9* 16.6*   HGB 10.0* 10.7*   HCT 31.6* 34.6*    485*     Recent Labs     12/25/20  0400 12/24/20  1433   * 150*   K 3.8 3.8   * 119*   CO2 23 26   BUN 20 20   CREA 1.34* 1.13    105*   CA 8.2* 8.2*     Recent Labs     12/25/20  0400 12/24/20  1433   AP 75 89   TP 6.5 6.1*   ALB 2.3* 2.4*   GLOB 4.2* 3.7     No results for input(s): INR, PTP, APTT, INREXT, INREXT in the last 72 hours. No results for input(s): PHI, PCO2I, PO2I, FIO2I in the last 72 hours. No results for input(s): CPK, CKMB, TROIQ, BNPP in the last 72 hours. Hemodynamics:   PAP:   CO:     Wedge:   CI:     CVP:    SVR:       PVR:       Ventilator Settings:  Mode Rate Tidal Volume Pressure FiO2 PEEP   Assist control, Volume control   450 ml  5 cm H2O 60 % 5 cm H20     Peak airway pressure: 22 cm H2O    Minute ventilation: 11.1 l/min        MEDS: Reviewed    Chest X-Ray:  CXR Results  (Last 48 hours)               12/24/20 1352  XR CHEST PORT Final result    Impression:  IMPRESSION: There is increasing bibasilar atelectasis/airspace disease left   greater than right with slight blunting of the CP angles. There is moderate   gaseous distention of the stomach. Narrative:  EXAM:  XR CHEST PORT       INDICATION:  Cardiac Arrest/AHRF       COMPARISON:  12/22/2020       FINDINGS: A portable AP radiograph of the chest was obtained at 1346 hours. The   patient is on a cardiac monitor. There is increasing bibasilar airspace   disease/atelectasis left right.   There is mild cardiomegaly. Bony structures   are unchanged. There is moderate gaseous distention of the stomach. Multidisciplinary Rounds Completed: Yes    ABCDEF Bundle/Checklist Completed:  Yes    SPECIAL EQUIPMENT  None    DISPOSITION  Stay in ICU    CRITICAL CARE CONSULTANT NOTE  I had a face to face encounter with the patient, reviewed and interpreted patient data including clinical events, labs, images, vital signs, I/O's, and examined patient. I have discussed the case and the plan and management of the patient's care with the consulting services, the bedside nurses and the respiratory therapist.      NOTE OF PERSONAL INVOLVEMENT IN CARE   This patient has a high probability of imminent, clinically significant deterioration, which requires the highest level of preparedness to intervene urgently. I participated in the decision-making and personally managed or directed the management of the following life and organ supporting interventions that required my frequent assessment to treat or prevent imminent deterioration. I personally spent 130 minutes of critical care time. This is time spent at this critically ill patient's bedside actively involved in patient care as well as the coordination of care. This does not include any procedural time which has been billed separately.     [facility], DO, MS  Staff Intensivist/Anesthesiologist  [facility]  12/25/2020

## 2020-12-25 NOTE — PROGRESS NOTES
ID Progress Note  2020    Subjective:     Afebrile. He arrested after he got ativan yesterday. Today he is confused. He tells me he does not have any shortness of breath. He does not have any abdominal pain, headache or sore throat      ROS: No anaphylaxis, seizures, syncope, hematemesis, hematochezia     Objective:     Vitals:   Visit Vitals  BP (!) 141/73   Pulse 83   Temp 98.4 °F (36.9 °C)   Resp 12   Ht 5' 11\" (1.803 m)   Wt 82.2 kg (181 lb 3.2 oz)   SpO2 100%   BMI 25.27 kg/m²        Tmax:  Temp (24hrs), Av.3 °F (36.8 °C), Min:98.1 °F (36.7 °C), Max:98.4 °F (36.9 °C)      Exam:    Not in distress  Pink conjunctivae, anicteric sclerae  No cervical lymphdenopathy   Lung clear, no rales, wheezes or rhonchi   Heart: s1, s2, RRR, no murmurs rubs or clicks  Abdomen: soft nontender, no guarding or rebound  Knees not warm or tender  Confused     Labs:   Lab Results   Component Value Date/Time    WBC 14.9 (H) 2020 04:00 AM    HGB (POC) 12.8 2017 10:27 AM    HGB 10.0 (L) 2020 04:00 AM    HCT (POC) 38.8 2017 10:27 AM    HCT 31.6 (L) 2020 04:00 AM    PLATELET 918  04:00 AM    .7 (H) 2020 04:00 AM     Lab Results   Component Value Date/Time    Sodium 151 (H) 2020 04:00 AM    Potassium 3.8 2020 04:00 AM    Chloride 119 (H) 2020 04:00 AM    CO2 23 2020 04:00 AM    Anion gap 9 2020 04:00 AM    Glucose 100 2020 04:00 AM    BUN 20 2020 04:00 AM    Creatinine 1.34 (H) 2020 04:00 AM    BUN/Creatinine ratio 15 2020 04:00 AM    GFR est AA >60 2020 04:00 AM    GFR est non-AA 52 (L) 2020 04:00 AM    Calcium 8.2 (L) 2020 04:00 AM    Bilirubin, total 0.4 2020 04:00 AM    Alk.  phosphatase 75 2020 04:00 AM    Protein, total 6.5 2020 04:00 AM    Albumin 2.3 (L) 2020 04:00 AM    Globulin 4.2 (H) 2020 04:00 AM    A-G Ratio 0.5 (L) 2020 04:00 AM    ALT (SGPT) 68 2020 04:00 AM           Assessment:     #1 fever     #2 recent delirium tremens     #3 history of melanoma     #4 hypertension    #5 mild renal insufficiency     #6 s/p arrest, possible aspiration                Recommendations:     Sputum growing staph species. Start vanc at this time. If coag neg staph grows out, would discontinue it. Watch cr     Dr. Yaniv Lafleur available over the weekend if needed.      Eri Moss MD

## 2020-12-25 NOTE — PROGRESS NOTES
0730 Bedside shift change report given to Gadiel Brooks (oncoming nurse) by Mila Estrada (offgoing nurse). Report included the following information SBAR, Kardex, Procedure Summary, Intake/Output, MAR, Recent Results and Cardiac Rhythm NSR.    0800 Pt given break from bipap. Tolerated mouth care well. NT suction through nasal trumpet yielded large amount of sputum. Pt able to follow some commands and state name. 1000 Pt agitated, pulling out IV and removing gown. New PIV inserted. 1400 Maldonado catheter placed. 1600  Pt agitated. Pulled out IV, attempting to removed maldonado. New PIV inserted. Christo Montana from intensivist to place mitts. 1930 Bedside shift change report given to Celeste (oncoming nurse) by Gadiel Brooks (offgoing nurse). Report included the following information SBAR, Kardex, Procedure Summary, Intake/Output, MAR, Recent Results and Cardiac Rhythm NSR.

## 2020-12-26 ENCOUNTER — APPOINTMENT (OUTPATIENT)
Dept: NON INVASIVE DIAGNOSTICS | Age: 75
DRG: 896 | End: 2020-12-26
Attending: NURSE PRACTITIONER
Payer: MEDICARE

## 2020-12-26 ENCOUNTER — APPOINTMENT (OUTPATIENT)
Dept: GENERAL RADIOLOGY | Age: 75
DRG: 896 | End: 2020-12-26
Attending: ANESTHESIOLOGY
Payer: MEDICARE

## 2020-12-26 LAB
ALBUMIN SERPL-MCNC: 2.1 G/DL (ref 3.5–5)
ALBUMIN/GLOB SERPL: 0.5 {RATIO} (ref 1.1–2.2)
ALP SERPL-CCNC: 63 U/L (ref 45–117)
ALT SERPL-CCNC: 42 U/L (ref 12–78)
ANION GAP SERPL CALC-SCNC: 8 MMOL/L (ref 5–15)
AST SERPL-CCNC: 33 U/L (ref 15–37)
BACTERIA SPEC CULT: ABNORMAL
BACTERIA SPEC CULT: ABNORMAL
BASOPHILS # BLD: 0.2 K/UL (ref 0–0.1)
BASOPHILS NFR BLD: 2 % (ref 0–1)
BILIRUB SERPL-MCNC: 0.4 MG/DL (ref 0.2–1)
BNP SERPL-MCNC: 1574 PG/ML
BUN SERPL-MCNC: 18 MG/DL (ref 6–20)
BUN/CREAT SERPL: 14 (ref 12–20)
CALCIUM SERPL-MCNC: 7.9 MG/DL (ref 8.5–10.1)
CHLORIDE SERPL-SCNC: 123 MMOL/L (ref 97–108)
CO2 SERPL-SCNC: 22 MMOL/L (ref 21–32)
CREAT SERPL-MCNC: 1.26 MG/DL (ref 0.7–1.3)
DIFFERENTIAL METHOD BLD: ABNORMAL
ECHO LV INTERNAL DIMENSION DIASTOLIC: 3.73 CM (ref 4.2–5.9)
ECHO LV INTERNAL DIMENSION SYSTOLIC: 2.28 CM
ECHO LV IVSD: 0.94 CM (ref 0.6–1)
ECHO LV MASS 2D: 102.8 G (ref 88–224)
ECHO LV MASS INDEX 2D: 51.4 G/M2 (ref 49–115)
ECHO LV POSTERIOR WALL DIASTOLIC: 0.92 CM (ref 0.6–1)
EOSINOPHIL # BLD: 0.5 K/UL (ref 0–0.4)
EOSINOPHIL NFR BLD: 5 % (ref 0–7)
ERYTHROCYTE [DISTWIDTH] IN BLOOD BY AUTOMATED COUNT: 13.4 % (ref 11.5–14.5)
GLOBULIN SER CALC-MCNC: 4.2 G/DL (ref 2–4)
GLUCOSE SERPL-MCNC: 83 MG/DL (ref 65–100)
GRAM STN SPEC: ABNORMAL
HCT VFR BLD AUTO: 29.3 % (ref 36.6–50.3)
HGB BLD-MCNC: 8.9 G/DL (ref 12.1–17)
IMM GRANULOCYTES # BLD AUTO: 0.2 K/UL (ref 0–0.04)
IMM GRANULOCYTES NFR BLD AUTO: 2 % (ref 0–0.5)
LACTATE SERPL-SCNC: 0.9 MMOL/L (ref 0.4–2)
LYMPHOCYTES # BLD: 1.1 K/UL (ref 0.8–3.5)
LYMPHOCYTES NFR BLD: 11 % (ref 12–49)
MAGNESIUM SERPL-MCNC: 1.8 MG/DL (ref 1.6–2.4)
MCH RBC QN AUTO: 32.7 PG (ref 26–34)
MCHC RBC AUTO-ENTMCNC: 30.4 G/DL (ref 30–36.5)
MCV RBC AUTO: 107.7 FL (ref 80–99)
MONOCYTES # BLD: 0.9 K/UL (ref 0–1)
MONOCYTES NFR BLD: 9 % (ref 5–13)
NEUTS SEG # BLD: 6.9 K/UL (ref 1.8–8)
NEUTS SEG NFR BLD: 71 % (ref 32–75)
NRBC # BLD: 0 K/UL (ref 0–0.01)
NRBC BLD-RTO: 0 PER 100 WBC
PLATELET # BLD AUTO: 284 K/UL (ref 150–400)
PMV BLD AUTO: 9.4 FL (ref 8.9–12.9)
POTASSIUM SERPL-SCNC: 3.3 MMOL/L (ref 3.5–5.1)
PROCALCITONIN SERPL-MCNC: 0.14 NG/ML
PROT SERPL-MCNC: 6.3 G/DL (ref 6.4–8.2)
RBC # BLD AUTO: 2.72 M/UL (ref 4.1–5.7)
SERVICE CMNT-IMP: ABNORMAL
SODIUM SERPL-SCNC: 153 MMOL/L (ref 136–145)
WBC # BLD AUTO: 9.7 K/UL (ref 4.1–11.1)

## 2020-12-26 PROCEDURE — 74011250636 HC RX REV CODE- 250/636: Performed by: ANESTHESIOLOGY

## 2020-12-26 PROCEDURE — 83735 ASSAY OF MAGNESIUM: CPT

## 2020-12-26 PROCEDURE — 74011250636 HC RX REV CODE- 250/636: Performed by: FAMILY MEDICINE

## 2020-12-26 PROCEDURE — 94660 CPAP INITIATION&MGMT: CPT

## 2020-12-26 PROCEDURE — 74011000250 HC RX REV CODE- 250: Performed by: NURSE PRACTITIONER

## 2020-12-26 PROCEDURE — 74011000258 HC RX REV CODE- 258: Performed by: ANESTHESIOLOGY

## 2020-12-26 PROCEDURE — 0DH67UZ INSERTION OF FEEDING DEVICE INTO STOMACH, VIA NATURAL OR ARTIFICIAL OPENING: ICD-10-PCS | Performed by: STUDENT IN AN ORGANIZED HEALTH CARE EDUCATION/TRAINING PROGRAM

## 2020-12-26 PROCEDURE — APPNB45 APP NON BILLABLE 31-45 MINUTES: Performed by: NURSE PRACTITIONER

## 2020-12-26 PROCEDURE — 74011000250 HC RX REV CODE- 250: Performed by: ANESTHESIOLOGY

## 2020-12-26 PROCEDURE — 84145 PROCALCITONIN (PCT): CPT

## 2020-12-26 PROCEDURE — 83880 ASSAY OF NATRIURETIC PEPTIDE: CPT

## 2020-12-26 PROCEDURE — 65620000000 HC RM CCU GENERAL

## 2020-12-26 PROCEDURE — 74011250637 HC RX REV CODE- 250/637: Performed by: ANESTHESIOLOGY

## 2020-12-26 PROCEDURE — 3E0G76Z INTRODUCTION OF NUTRITIONAL SUBSTANCE INTO UPPER GI, VIA NATURAL OR ARTIFICIAL OPENING: ICD-10-PCS | Performed by: STUDENT IN AN ORGANIZED HEALTH CARE EDUCATION/TRAINING PROGRAM

## 2020-12-26 PROCEDURE — 74011000258 HC RX REV CODE- 258: Performed by: EMERGENCY MEDICINE

## 2020-12-26 PROCEDURE — 36415 COLL VENOUS BLD VENIPUNCTURE: CPT

## 2020-12-26 PROCEDURE — 74011250636 HC RX REV CODE- 250/636: Performed by: NURSE PRACTITIONER

## 2020-12-26 PROCEDURE — 74011250636 HC RX REV CODE- 250/636: Performed by: INTERNAL MEDICINE

## 2020-12-26 PROCEDURE — C9113 INJ PANTOPRAZOLE SODIUM, VIA: HCPCS | Performed by: ANESTHESIOLOGY

## 2020-12-26 PROCEDURE — C8923 2D TTE W OR W/O FOL W/CON,CO: HCPCS

## 2020-12-26 PROCEDURE — 93308 TTE F-UP OR LMTD: CPT | Performed by: INTERNAL MEDICINE

## 2020-12-26 PROCEDURE — 80053 COMPREHEN METABOLIC PANEL: CPT

## 2020-12-26 PROCEDURE — 83605 ASSAY OF LACTIC ACID: CPT

## 2020-12-26 PROCEDURE — 77030018798 HC PMP KT ENTRL FED COVD -A

## 2020-12-26 PROCEDURE — 74340 X-RAY GUIDE FOR GI TUBE: CPT

## 2020-12-26 PROCEDURE — 77030004950 HC CATH ENTRL NG COVD -A

## 2020-12-26 PROCEDURE — 74011000636 HC RX REV CODE- 636

## 2020-12-26 PROCEDURE — 74011000258 HC RX REV CODE- 258: Performed by: FAMILY MEDICINE

## 2020-12-26 PROCEDURE — 99291 CRITICAL CARE FIRST HOUR: CPT | Performed by: INTERNAL MEDICINE

## 2020-12-26 PROCEDURE — 97164 PT RE-EVAL EST PLAN CARE: CPT

## 2020-12-26 PROCEDURE — 74011250636 HC RX REV CODE- 250/636: Performed by: EMERGENCY MEDICINE

## 2020-12-26 PROCEDURE — 97530 THERAPEUTIC ACTIVITIES: CPT

## 2020-12-26 PROCEDURE — 85025 COMPLETE CBC W/AUTO DIFF WBC: CPT

## 2020-12-26 RX ORDER — METOPROLOL TARTRATE 5 MG/5ML
5 INJECTION INTRAVENOUS EVERY 6 HOURS
Status: DISCONTINUED | OUTPATIENT
Start: 2020-12-26 | End: 2021-01-06

## 2020-12-26 RX ORDER — MAGNESIUM SULFATE HEPTAHYDRATE 40 MG/ML
2 INJECTION, SOLUTION INTRAVENOUS ONCE
Status: COMPLETED | OUTPATIENT
Start: 2020-12-26 | End: 2020-12-26

## 2020-12-26 RX ORDER — QUETIAPINE FUMARATE 25 MG/1
50 TABLET, FILM COATED ORAL
Status: DISCONTINUED | OUTPATIENT
Start: 2020-12-26 | End: 2020-12-28

## 2020-12-26 RX ORDER — SODIUM CHLORIDE, SODIUM LACTATE, POTASSIUM CHLORIDE, CALCIUM CHLORIDE 600; 310; 30; 20 MG/100ML; MG/100ML; MG/100ML; MG/100ML
75 INJECTION, SOLUTION INTRAVENOUS CONTINUOUS
Status: DISCONTINUED | OUTPATIENT
Start: 2020-12-26 | End: 2020-12-27

## 2020-12-26 RX ORDER — POTASSIUM CHLORIDE 14.9 MG/ML
10 INJECTION INTRAVENOUS
Status: COMPLETED | OUTPATIENT
Start: 2020-12-26 | End: 2020-12-26

## 2020-12-26 RX ORDER — LANOLIN ALCOHOL/MO/W.PET/CERES
3 CREAM (GRAM) TOPICAL
Status: DISCONTINUED | OUTPATIENT
Start: 2020-12-26 | End: 2020-12-28

## 2020-12-26 RX ADMIN — THIAMINE HYDROCHLORIDE 500 MG: 100 INJECTION, SOLUTION INTRAMUSCULAR; INTRAVENOUS at 15:40

## 2020-12-26 RX ADMIN — ENOXAPARIN SODIUM 40 MG: 40 INJECTION SUBCUTANEOUS at 09:53

## 2020-12-26 RX ADMIN — Medication 10 ML: at 21:02

## 2020-12-26 RX ADMIN — METOPROLOL TARTRATE 5 MG: 1 INJECTION, SOLUTION INTRAVENOUS at 15:39

## 2020-12-26 RX ADMIN — METOPROLOL TARTRATE 5 MG: 1 INJECTION, SOLUTION INTRAVENOUS at 21:03

## 2020-12-26 RX ADMIN — THIAMINE HYDROCHLORIDE 500 MG: 100 INJECTION, SOLUTION INTRAMUSCULAR; INTRAVENOUS at 21:53

## 2020-12-26 RX ADMIN — LABETALOL HYDROCHLORIDE 10 MG: 5 INJECTION INTRAVENOUS at 18:00

## 2020-12-26 RX ADMIN — PIPERACILLIN AND TAZOBACTAM 3.38 G: 3; .375 INJECTION, POWDER, LYOPHILIZED, FOR SOLUTION INTRAVENOUS at 18:00

## 2020-12-26 RX ADMIN — POTASSIUM CHLORIDE 10 MEQ: 200 INJECTION, SOLUTION INTRAVENOUS at 09:54

## 2020-12-26 RX ADMIN — IOPAMIDOL 100 ML: 612 INJECTION, SOLUTION INTRAVENOUS at 16:58

## 2020-12-26 RX ADMIN — LABETALOL HYDROCHLORIDE 10 MG: 5 INJECTION INTRAVENOUS at 00:23

## 2020-12-26 RX ADMIN — POTASSIUM CHLORIDE 10 MEQ: 200 INJECTION, SOLUTION INTRAVENOUS at 11:00

## 2020-12-26 RX ADMIN — QUETIAPINE FUMARATE 50 MG: 25 TABLET ORAL at 21:02

## 2020-12-26 RX ADMIN — PIPERACILLIN AND TAZOBACTAM 3.38 G: 3; .375 INJECTION, POWDER, LYOPHILIZED, FOR SOLUTION INTRAVENOUS at 02:54

## 2020-12-26 RX ADMIN — PERFLUTREN 2 ML: 6.52 INJECTION, SUSPENSION INTRAVENOUS at 11:55

## 2020-12-26 RX ADMIN — THIAMINE HYDROCHLORIDE 100 MG: 100 INJECTION, SOLUTION INTRAMUSCULAR; INTRAVENOUS at 09:56

## 2020-12-26 RX ADMIN — VANCOMYCIN HYDROCHLORIDE 1250 MG: 10 INJECTION, POWDER, LYOPHILIZED, FOR SOLUTION INTRAVENOUS at 12:12

## 2020-12-26 RX ADMIN — METOPROLOL TARTRATE 5 MG: 1 INJECTION, SOLUTION INTRAVENOUS at 09:54

## 2020-12-26 RX ADMIN — SODIUM CHLORIDE 40 MG: 9 INJECTION INTRAMUSCULAR; INTRAVENOUS; SUBCUTANEOUS at 21:03

## 2020-12-26 RX ADMIN — SODIUM CHLORIDE 40 MG: 9 INJECTION INTRAMUSCULAR; INTRAVENOUS; SUBCUTANEOUS at 09:54

## 2020-12-26 RX ADMIN — LABETALOL HYDROCHLORIDE 10 MG: 5 INJECTION INTRAVENOUS at 07:27

## 2020-12-26 RX ADMIN — Medication 10 ML: at 15:39

## 2020-12-26 RX ADMIN — Medication 3 MG: at 21:02

## 2020-12-26 RX ADMIN — SODIUM CHLORIDE 20 MG: 9 INJECTION INTRAMUSCULAR; INTRAVENOUS; SUBCUTANEOUS at 03:15

## 2020-12-26 RX ADMIN — Medication 10 ML: at 06:11

## 2020-12-26 RX ADMIN — PIPERACILLIN AND TAZOBACTAM 3.38 G: 3; .375 INJECTION, POWDER, LYOPHILIZED, FOR SOLUTION INTRAVENOUS at 09:54

## 2020-12-26 RX ADMIN — MAGNESIUM SULFATE HEPTAHYDRATE 2 G: 40 INJECTION, SOLUTION INTRAVENOUS at 11:00

## 2020-12-26 RX ADMIN — SODIUM CHLORIDE, POTASSIUM CHLORIDE, SODIUM LACTATE AND CALCIUM CHLORIDE 75 ML/HR: 600; 310; 30; 20 INJECTION, SOLUTION INTRAVENOUS at 09:53

## 2020-12-26 NOTE — PROGRESS NOTES
[facility]    ICU TEAM Progress Note    Name: Tasia Combs   : 1945   MRN: [id number]   Date: 2020      Assessment     ICU Problems:    1. Acute Hypoxic Respiratory Failure (secondary to Ativan --> Somnolence)  2. Aspiration Pneumonitis  3. Cardiac Arrest (due to hypoxia) - Sinus Junito/PEA  4. Acute Alcohol Withdrawal   5. NSVT  6. Acute Toxic Metabolic Encephalopathy  7. Acute Renal Failure    Imagin2020      ICU Comprehensive Plan of Care:     Plans for this Shift:     1. BiPAP PRN  2. Change fluids to LR (rising Na)  3. Place NG, start TF's  4. DC Losartan/Pepcid  5. Start Protonix  6. Vanc/Zosyn per ID  7. NO Ativan  8. If needed, Precedex  9. Updated wife  8. Thiamine/Folate  11. SBP Goal of: > 90 mmHg  12. MAP Goal of: > 65 mmHg  13. None - For above SBP/MAP goals  14. Transfusion Trigger (Hgb): <7 g/dL  15. Respiratory Goals:  a. Head of bed > 30 degrees  b. Aggressive bronchopulmonary hygiene  c. Incentive spirometry  16. Pulmonary toilet: Incentive Spirometry   17. SpO2 Goal: > 92%  18. Keep K>4; Mg>2   19. PT/OT: PT consulted and on board and OT consulted and on board   20. Goals of Care Discussion with family Yes   24. Plan of Care/Code Status: Full Code  22. Discussed Care Plan with Bedside RN  23. Documentation of Current Medications  24.  Rest of Plan Below:    F - Feeding:  No   A - Analgesia: Acetaminophen  S - Sedation: Precedex  T - DVT Prophylaxis: SCD's or Sequential Compression Device and Lovenox   H - Head of Bed: > 30 Degrees  U - Ulcer Prophylaxis: Protonix (pantoprazole)   G - Glycemic Control: Insulin  S - Spontaneous Breathing Trial: N/A  B - Bowel Regimen: Senna and Docusate (Colace)  I - Indwelling Catheter:   Tubes: None  Lines: Peripheral IV  Drains: None  D - De-escalation of Antibiotics:   Vancomycin  Zosyn    Subjective:   Progress Note: 2020      Reason for ICU Admission: Acute Hypoxic Respiratory Failure --> Cardiac Arrest     HPI:  Makeda Alejandro is a 76 y.o. with a history of melanoma, hypertension and alcohol abuse. He suffered a blunt head injury after a ground-level fall on the evening of December 8. He had tripped over a sidewalk resulting in him falling forward and landing on his right forehead. Patient has felt disoriented and was having some memory issues. For this reason, he was admitted on December 11. He was placed on Rocephin empirically, but this was discontinued because no source of infection was found. He developed delirium tremens and had to be intubated and admitted to the ICU. He was eventually extubated a few days later. In the past day, he developed fever and elevated white count prompting consult. The patient tells me that he does not have any cough, dyspnea, abdominal pain, dysuria, headache or sore throat. He does not have any rash. He does not have any back pain or joint pain. Dr. Nadya Li documents that he is having diarrhea. A C. difficile has been ordered. Chest x-ray did not reveal any pneumonia. Blood cultures have also been sent. Urinalysis did not show any significant pyuria. He was started on Zosyn and we are being asked to see him in consult.     Overnight Events:   12/26/2020  On BiPAP      Active Problem List:     Problem List  Date Reviewed: 8/2/2020          Codes Class    Alcohol withdrawal ([facility] ICD-10-CM: F10.239  ICD-9-CM: 291.81         UTI (urinary tract infection) ICD-10-CM: N39.0  ICD-9-CM: 599.0         Hypokalemia ICD-10-CM: E87.6  ICD-9-CM: 276.8         Thrombocytopenia (HCC) ICD-10-CM: D69.6  ICD-9-CM: 287.5         Anemia ICD-10-CM: D64.9  ICD-9-CM: 285.9         * (Principal) AMS (altered mental status) ICD-10-CM: R41.82  ICD-9-CM: 780.97         Excessive drinking of alcohol ICD-10-CM: F10.10  ICD-9-CM: 305.00         Hyponatremia ICD-10-CM: E87.1  ICD-9-CM: 276.1         HATTIE (acute kidney injury) ([facility] ICD-10-CM: N17.9  ICD-9-CM: 584.9         Essential hypertension ICD-10-CM: I10  ICD-9-CM: 401.9               Past Medical History:      has a past medical history of Cancer ([facility] (~ ), Hypertension, and Other ill-defined conditions(799.89) (1960~). Past Surgical History:      has a past surgical history that includes hx other surgical (~ ) and colonoscopy (2011). Home Medications:     Prior to Admission medications    Medication Sig Start Date End Date Taking? Authorizing Provider   losartan (COZAAR) 100 mg tablet TAKE 1 TABLET BY MOUTH EVERY DAY 20  Yes Bruno Segura MD       Allergies/Social/Family History: Allergies   Allergen Reactions    Ace Inhibitors Cough    Thiazides Other (comments)     hyponatremia      Social History     Tobacco Use    Smoking status: Never Smoker    Smokeless tobacco: Never Used   Substance Use Topics    Alcohol use: Yes     Comment: occasional beer      Family History   Problem Relation Age of Onset    Hypertension Father        Review of Systems:     A comprehensive review of systems was negative except for that written in the HPI. Objective:   Vital Signs:  Visit Vitals  BP (!) 178/84   Pulse 94   Temp 98.7 °F (37.1 °C)   Resp 19   Ht 5' 11\" (1.803 m)   Wt 80.2 kg (176 lb 12.9 oz)   SpO2 98%   BMI 24.66 kg/m²    O2 Flow Rate (L/min): 5 l/min O2 Device: Nasal cannula Temp (24hrs), Av.5 °F (36.9 °C), Min:98.1 °F (36.7 °C), Max:98.8 °F (37.1 °C)           Intake/Output:     Intake/Output Summary (Last 24 hours) at 2020 0956  Last data filed at 2020 0700  Gross per 24 hour   Intake 1450 ml   Output 425 ml   Net 1025 ml       Physical Exam:    General:  Awake, follows simple commands, confused; look older than stated age   Eyes:  Sclera anicteric. Pupils equally round and reactive to light.    Mouth/Throat: Mucous membranes normal, oral pharynx clear   Neck: Supple   Lungs:   Rhonchi bilaterally, good effort   CV:  Regular rate and rhythm,no murmur, click, rub or gallop   Abdomen: Soft, non-tender. bowel sounds normal. non-distended   Extremities: No cyanosis or edema   Skin: Skin color, texture, turgor normal. no acute rash or lesions   Lymph nodes: Cervical and supraclavicular normal   Musculoskeletal: No swelling or deformity   Lines/Devices:  Intact, no erythema, drainage or tenderness   Psych: Somnolent     LABS AND  DATA: Personally reviewed  Recent Labs     12/26/20  0545 12/25/20  0400   WBC 9.7 14.9*   HGB 8.9* 10.0*   HCT 29.3* 31.6*    353     Recent Labs     12/26/20  0645 12/26/20  0545 12/25/20  0400   NA  --  153* 151*   K  --  3.3* 3.8   CL  --  123* 119*   CO2  --  22 23   BUN  --  18 20   CREA  --  1.26 1.34*   GLU  --  83 100   CA  --  7.9* 8.2*   MG 1.8  --   --      Recent Labs     12/26/20  0545 12/25/20  0400   AP 63 75   TP 6.3* 6.5   ALB 2.1* 2.3*   GLOB 4.2* 4.2*     No results for input(s): INR, PTP, APTT, INREXT, INREXT in the last 72 hours. Recent Labs     12/25/20  1105   PHI 7.44   PCO2I 31.6*   PO2I 69*     No results for input(s): CPK, CKMB, TROIQ, BNPP in the last 72 hours. Hemodynamics:   PAP:   CO:     Wedge:   CI:     CVP:    SVR:       PVR:       Ventilator Settings:  Mode Rate Tidal Volume Pressure FiO2 PEEP   Assist control, Volume control   450 ml  5 cm H2O 50 % 5 cm H20     Peak airway pressure: 22 cm H2O    Minute ventilation: 9.1 l/min        MEDS: Reviewed    Chest X-Ray:  CXR Results  (Last 48 hours)               12/25/20 0754  XR CHEST PORT Final result    Impression:  IMPRESSION: Improved nonspecific bibasilar pulmonary densities. Narrative:  EXAM: XR CHEST PORT       INDICATION: Resp Fail       COMPARISON: 12/24/2020       FINDINGS: 2 AP portable radiographs of the chest were obtained at 0732 and 0733   hours hours. Trace left pleural fluid is demonstrated. There is interval   improvement in bibasilar opacifications with minimal residual. Cardiac and   mediastinal contours are stable.  .            12/24/20 1352  XR CHEST PORT Final result    Impression:  IMPRESSION: There is increasing bibasilar atelectasis/airspace disease left   greater than right with slight blunting of the CP angles. There is moderate   gaseous distention of the stomach.               Narrative:  EXAM:  XR CHEST PORT       INDICATION:  Cardiac Arrest/AHRF       COMPARISON:  12/22/2020       FINDINGS: A portable AP radiograph of the chest was obtained at 1346 hours. The   patient is on a cardiac monitor.  There is increasing bibasilar airspace   disease/atelectasis left right.  There is mild cardiomegaly.  Bony structures   are unchanged. There is moderate gaseous distention of the stomach.                Multidisciplinary Rounds Completed:  Yes    ABCDEF Bundle/Checklist Completed:  Yes    SPECIAL EQUIPMENT  None    DISPOSITION  Stay in ICU    CRITICAL CARE CONSULTANT NOTE  I had a face to face encounter with the patient, reviewed and interpreted patient data including clinical events, labs, images, vital signs, I/O's, and examined patient.  I have discussed the case and the plan and management of the patient's care with the consulting services, the bedside nurses and the respiratory therapist.      NOTE OF PERSONAL INVOLVEMENT IN CARE   This patient has a high probability of imminent, clinically significant deterioration, which requires the highest level of preparedness to intervene urgently. I participated in the decision-making and personally managed or directed the management of the following life and organ supporting interventions that required my frequent assessment to treat or prevent imminent deterioration.    I personally spent 65 minutes of critical care time.  This is time spent at this critically ill patient's bedside actively involved in patient care as well as the coordination of care.  This does not include any procedural time which has been billed separately.    Bert Mcmillan DO, MS  Staff Intensivist/Anesthesiologist  [facility]  12/26/2020

## 2020-12-26 NOTE — PROGRESS NOTES
Cardiology Progress Note      12/26/2020 9:59 AM    Admit Date: 12/11/2020    Admit Diagnosis: AMS (altered mental status) [R41.82]      Subjective:     Dotty Class s/p PEA arrest secondary to respiratory arrest.  Reviewed overnight events. No recurrent NSVT on telemetry. He cannot take PO medications currently. Denies chest pain or dyspnea at rest.  Speech is garbled and he is confused. He is awake and alert today says he is \"ready to box\"  Not able to take p.o. secondary to speech issues and some coughing.   Nursing at the bedside attempting to place a feeding tube  Labs are consistent with dehydration prolonged period without eating  Blood pressure is uncontrolled  He denies pain    Visit Vitals  BP (!) 178/84   Pulse 94   Temp 98.7 °F (37.1 °C)   Resp 19   Ht 5' 11\" (1.803 m)   Wt 176 lb 12.9 oz (80.2 kg)   SpO2 98%   BMI 24.66 kg/m²     Current Facility-Administered Medications   Medication Dose Route Frequency    pantoprazole (PROTONIX) 40 mg in 0.9% sodium chloride 10 mL injection  40 mg IntraVENous Q12H    lactated Ringers infusion  75 mL/hr IntraVENous CONTINUOUS    potassium chloride 10 mEq in 50 ml IVPB  10 mEq IntraVENous Q1H    metoprolol (LOPRESSOR) injection 5 mg  5 mg IntraVENous Q6H    Vancomycin - pharmacy to dose   Other Rx Dosing/Monitoring    vancomycin (VANCOCIN) 1250 mg in  ml infusion  1,250 mg IntraVENous Q18H    dexmedeTOMidine in 0.9 % NaCl (PRECEDEX) 400 mcg/100 mL (4 mcg/mL) infusion soln  0.2-1.4 mcg/kg/hr IntraVENous TITRATE    piperacillin-tazobactam (ZOSYN) 3.375 g in 0.9% sodium chloride (MBP/ADV) 100 mL MBP  3.375 g IntraVENous Q8H    chlordiazePOXIDE (LIBRIUM) capsule 5 mg  5 mg Oral Q12H    [Held by provider] amLODIPine (NORVASC) tablet 10 mg  10 mg Oral DAILY    hydrALAZINE (APRESOLINE) 20 mg/mL injection 20 mg  20 mg IntraVENous Q4H PRN    labetaloL (NORMODYNE;TRANDATE) injection 10 mg  10 mg IntraVENous Q2H PRN    enoxaparin (LOVENOX) injection 40 mg 40 mg SubCUTAneous Q24H    ELECTROLYTE REPLACEMENT PROTOCOL - Potassium and Magnesium  1 Each Other PRN    thiamine (B-1) 100 mg in 0.9% sodium chloride 50 mL IVPB  100 mg IntraVENous DAILY    cloNIDine HCL (CATAPRES) tablet 0.1 mg  0.1 mg Oral Q3H PRN    sodium chloride (NS) flush 5-40 mL  5-40 mL IntraVENous Q8H    sodium chloride (NS) flush 5-40 mL  5-40 mL IntraVENous PRN    potassium chloride 10 mEq in 100 ml IVPB  10 mEq IntraVENous PRN    acetaminophen (TYLENOL) tablet 650 mg  650 mg Oral Q6H PRN    Or    acetaminophen (TYLENOL) suppository 650 mg  650 mg Rectal Q6H PRN    polyethylene glycol (MIRALAX) packet 17 g  17 g Oral DAILY PRN    ondansetron (ZOFRAN ODT) tablet 4 mg  4 mg Oral Q8H PRN    Or    ondansetron (ZOFRAN) injection 4 mg  4 mg IntraVENous Q6H PRN    influenza vaccine 2020-21 (6 mos+)(PF) (FLUARIX/FLULAVAL/FLUZONE QUAD) injection 0.5 mL  0.5 mL IntraMUSCular PRIOR TO DISCHARGE         Objective:      Physical Exam:  Visit Vitals  BP (!) 178/84   Pulse 94   Temp 98.7 °F (37.1 °C)   Resp 19   Ht 5' 11\" (1.803 m)   Wt 176 lb 12.9 oz (80.2 kg)   SpO2 98%   BMI 24.66 kg/m²     General Appearance:  Agitated, confused, restless    Ears/Nose/Mouth/Throat:   Hearing grossly normal.         Neck: Supple. Chest:   Scattered rhonchi bilaterally    Cardiovascular:  Regular rate and rhythm, S1, S2 normal, no murmur. Abdomen:   Soft, non-tender, bowel sounds are active. Extremities: No edema bilaterally. Skin: Warm and dry.                Data Review:   Labs:    Recent Results (from the past 24 hour(s))   POC EG7    Collection Time: 12/25/20 11:05 AM   Result Value Ref Range    Calcium, ionized (POC) 1.17 1.12 - 1.32 mmol/L    pH (POC) 7.44 7.35 - 7.45      pCO2 (POC) 31.6 (L) 35.0 - 45.0 MMHG    pO2 (POC) 69 (L) 80 - 100 MMHG    HCO3 (POC) 21.6 (L) 22 - 26 MMOL/L    Base deficit (POC) 2 mmol/L    sO2 (POC) 95 92 - 97 %    Site LEFT RADIAL      Device: NASAL CANNULA      Flow rate (POC) 4 L/M    Allens test (POC) YES      Specimen type (POC) ARTERIAL     AMMONIA    Collection Time: 12/25/20  3:59 PM   Result Value Ref Range    Ammonia 20 <32 UMOL/L   PROCALCITONIN    Collection Time: 12/26/20  5:45 AM   Result Value Ref Range    Procalcitonin 0.14 ng/mL   NT-PRO BNP    Collection Time: 12/26/20  5:45 AM   Result Value Ref Range    NT pro-BNP 1,574 (H) <450 PG/ML   CBC WITH AUTOMATED DIFF    Collection Time: 12/26/20  5:45 AM   Result Value Ref Range    WBC 9.7 4.1 - 11.1 K/uL    RBC 2.72 (L) 4.10 - 5.70 M/uL    HGB 8.9 (L) 12.1 - 17.0 g/dL    HCT 29.3 (L) 36.6 - 50.3 %    .7 (H) 80.0 - 99.0 FL    MCH 32.7 26.0 - 34.0 PG    MCHC 30.4 30.0 - 36.5 g/dL    RDW 13.4 11.5 - 14.5 %    PLATELET 111 998 - 172 K/uL    MPV 9.4 8.9 - 12.9 FL    NRBC 0.0 0  WBC    ABSOLUTE NRBC 0.00 0.00 - 0.01 K/uL    NEUTROPHILS 71 32 - 75 %    LYMPHOCYTES 11 (L) 12 - 49 %    MONOCYTES 9 5 - 13 %    EOSINOPHILS 5 0 - 7 %    BASOPHILS 2 (H) 0 - 1 %    IMMATURE GRANULOCYTES 2 (H) 0.0 - 0.5 %    ABS. NEUTROPHILS 6.9 1.8 - 8.0 K/UL    ABS. LYMPHOCYTES 1.1 0.8 - 3.5 K/UL    ABS. MONOCYTES 0.9 0.0 - 1.0 K/UL    ABS. EOSINOPHILS 0.5 (H) 0.0 - 0.4 K/UL    ABS. BASOPHILS 0.2 (H) 0.0 - 0.1 K/UL    ABS. IMM. GRANS. 0.2 (H) 0.00 - 0.04 K/UL    DF AUTOMATED     METABOLIC PANEL, COMPREHENSIVE    Collection Time: 12/26/20  5:45 AM   Result Value Ref Range    Sodium 153 (H) 136 - 145 mmol/L    Potassium 3.3 (L) 3.5 - 5.1 mmol/L    Chloride 123 (H) 97 - 108 mmol/L    CO2 22 21 - 32 mmol/L    Anion gap 8 5 - 15 mmol/L    Glucose 83 65 - 100 mg/dL    BUN 18 6 - 20 MG/DL    Creatinine 1.26 0.70 - 1.30 MG/DL    BUN/Creatinine ratio 14 12 - 20      GFR est AA >60 >60 ml/min/1.73m2    GFR est non-AA 56 (L) >60 ml/min/1.73m2    Calcium 7.9 (L) 8.5 - 10.1 MG/DL    Bilirubin, total 0.4 0.2 - 1.0 MG/DL    ALT (SGPT) 42 12 - 78 U/L    AST (SGOT) 33 15 - 37 U/L    Alk.  phosphatase 63 45 - 117 U/L    Protein, total 6.3 (L) 6.4 - 8.2 g/dL Albumin 2.1 (L) 3.5 - 5.0 g/dL    Globulin 4.2 (H) 2.0 - 4.0 g/dL    A-G Ratio 0.5 (L) 1.1 - 2.2     LACTIC ACID    Collection Time: 12/26/20  5:46 AM   Result Value Ref Range    Lactic acid 0.9 0.4 - 2.0 MMOL/L   MAGNESIUM    Collection Time: 12/26/20  6:45 AM   Result Value Ref Range    Magnesium 1.8 1.6 - 2.4 mg/dL       Telemetry: normal sinus rhythm      Assessment & Plan:   1. NSVT. Unclear etiology. No recurrent arrhythmia on telemetry. Echo was normal pre-arrest, will plan to replete hypokalemia and check magnesium level and stop propanolol since patient cannot take PO medications currently, will begin IV metoprolol 5mg every 6 hours  2. Acute respiratory failure. Post ativan. Aspiration pneumonitis. S/p PEA arrest - unable to take PO medications at this time, on lovenox, will repeat TTE to reassess EF post arrest  3. EtOH withdrawal per primary team  4. Anemia per primary team  5. Hypokalemia - K 3.3, will give KCL 20meq IV now, recheck in AM  6.   HTN - unable to take PO medications, starting IV metoprolol as above, continue to monitor     Dony Aaron MD

## 2020-12-26 NOTE — PROGRESS NOTES
Cardiology Progress Note      12/26/2020 9:59 AM    Admit Date: 12/11/2020    Admit Diagnosis: AMS (altered mental status) [R41.82]      Subjective:     Yaneth Neisha s/p PEA arrest secondary to respiratory arrest.  Reviewed overnight events. No recurrent NSVT on telemetry. He cannot take PO medications currently. Denies chest pain or dyspnea at rest.  Speech is garbled and he is confused.     Visit Vitals  BP (!) 178/84   Pulse 94   Temp 98.7 °F (37.1 °C)   Resp 19   Ht 5' 11\" (1.803 m)   Wt 176 lb 12.9 oz (80.2 kg)   SpO2 98%   BMI 24.66 kg/m²     Current Facility-Administered Medications   Medication Dose Route Frequency    pantoprazole (PROTONIX) 40 mg in 0.9% sodium chloride 10 mL injection  40 mg IntraVENous Q12H    lactated Ringers infusion  75 mL/hr IntraVENous CONTINUOUS    potassium chloride 10 mEq in 50 ml IVPB  10 mEq IntraVENous Q1H    metoprolol (LOPRESSOR) injection 5 mg  5 mg IntraVENous Q6H    Vancomycin - pharmacy to dose   Other Rx Dosing/Monitoring    vancomycin (VANCOCIN) 1250 mg in  ml infusion  1,250 mg IntraVENous Q18H    dexmedeTOMidine in 0.9 % NaCl (PRECEDEX) 400 mcg/100 mL (4 mcg/mL) infusion soln  0.2-1.4 mcg/kg/hr IntraVENous TITRATE    piperacillin-tazobactam (ZOSYN) 3.375 g in 0.9% sodium chloride (MBP/ADV) 100 mL MBP  3.375 g IntraVENous Q8H    chlordiazePOXIDE (LIBRIUM) capsule 5 mg  5 mg Oral Q12H    [Held by provider] amLODIPine (NORVASC) tablet 10 mg  10 mg Oral DAILY    hydrALAZINE (APRESOLINE) 20 mg/mL injection 20 mg  20 mg IntraVENous Q4H PRN    labetaloL (NORMODYNE;TRANDATE) injection 10 mg  10 mg IntraVENous Q2H PRN    enoxaparin (LOVENOX) injection 40 mg  40 mg SubCUTAneous Q24H    ELECTROLYTE REPLACEMENT PROTOCOL - Potassium and Magnesium  1 Each Other PRN    thiamine (B-1) 100 mg in 0.9% sodium chloride 50 mL IVPB  100 mg IntraVENous DAILY    cloNIDine HCL (CATAPRES) tablet 0.1 mg  0.1 mg Oral Q3H PRN    sodium chloride (NS) flush 5-40 mL  5-40 mL IntraVENous Q8H    sodium chloride (NS) flush 5-40 mL  5-40 mL IntraVENous PRN    potassium chloride 10 mEq in 100 ml IVPB  10 mEq IntraVENous PRN    acetaminophen (TYLENOL) tablet 650 mg  650 mg Oral Q6H PRN    Or    acetaminophen (TYLENOL) suppository 650 mg  650 mg Rectal Q6H PRN    polyethylene glycol (MIRALAX) packet 17 g  17 g Oral DAILY PRN    ondansetron (ZOFRAN ODT) tablet 4 mg  4 mg Oral Q8H PRN    Or    ondansetron (ZOFRAN) injection 4 mg  4 mg IntraVENous Q6H PRN    influenza vaccine 2020-21 (6 mos+)(PF) (FLUARIX/FLULAVAL/FLUZONE QUAD) injection 0.5 mL  0.5 mL IntraMUSCular PRIOR TO DISCHARGE         Objective:      Physical Exam:  Visit Vitals  BP (!) 178/84   Pulse 94   Temp 98.7 °F (37.1 °C)   Resp 19   Ht 5' 11\" (1.803 m)   Wt 176 lb 12.9 oz (80.2 kg)   SpO2 98%   BMI 24.66 kg/m²     General Appearance:  Agitated, confused, restless    Ears/Nose/Mouth/Throat:   Hearing grossly normal.         Neck: Supple. Chest:   Scattered rhonchi bilaterally    Cardiovascular:  Regular rate and rhythm, S1, S2 normal, no murmur. Abdomen:   Soft, non-tender, bowel sounds are active. Extremities: No edema bilaterally. Skin: Warm and dry.                Data Review:   Labs:    Recent Results (from the past 24 hour(s))   POC EG7    Collection Time: 12/25/20 11:05 AM   Result Value Ref Range    Calcium, ionized (POC) 1.17 1.12 - 1.32 mmol/L    pH (POC) 7.44 7.35 - 7.45      pCO2 (POC) 31.6 (L) 35.0 - 45.0 MMHG    pO2 (POC) 69 (L) 80 - 100 MMHG    HCO3 (POC) 21.6 (L) 22 - 26 MMOL/L    Base deficit (POC) 2 mmol/L    sO2 (POC) 95 92 - 97 %    Site LEFT RADIAL      Device: NASAL CANNULA      Flow rate (POC) 4 L/M    Allens test (POC) YES      Specimen type (POC) ARTERIAL     AMMONIA    Collection Time: 12/25/20  3:59 PM   Result Value Ref Range    Ammonia 20 <32 UMOL/L   PROCALCITONIN    Collection Time: 12/26/20  5:45 AM   Result Value Ref Range    Procalcitonin 0.14 ng/mL   NT-PRO BNP    Collection Time: 12/26/20  5:45 AM   Result Value Ref Range    NT pro-BNP 1,574 (H) <450 PG/ML   CBC WITH AUTOMATED DIFF    Collection Time: 12/26/20  5:45 AM   Result Value Ref Range    WBC 9.7 4.1 - 11.1 K/uL    RBC 2.72 (L) 4.10 - 5.70 M/uL    HGB 8.9 (L) 12.1 - 17.0 g/dL    HCT 29.3 (L) 36.6 - 50.3 %    .7 (H) 80.0 - 99.0 FL    MCH 32.7 26.0 - 34.0 PG    MCHC 30.4 30.0 - 36.5 g/dL    RDW 13.4 11.5 - 14.5 %    PLATELET 721 953 - 611 K/uL    MPV 9.4 8.9 - 12.9 FL    NRBC 0.0 0  WBC    ABSOLUTE NRBC 0.00 0.00 - 0.01 K/uL    NEUTROPHILS 71 32 - 75 %    LYMPHOCYTES 11 (L) 12 - 49 %    MONOCYTES 9 5 - 13 %    EOSINOPHILS 5 0 - 7 %    BASOPHILS 2 (H) 0 - 1 %    IMMATURE GRANULOCYTES 2 (H) 0.0 - 0.5 %    ABS. NEUTROPHILS 6.9 1.8 - 8.0 K/UL    ABS. LYMPHOCYTES 1.1 0.8 - 3.5 K/UL    ABS. MONOCYTES 0.9 0.0 - 1.0 K/UL    ABS. EOSINOPHILS 0.5 (H) 0.0 - 0.4 K/UL    ABS. BASOPHILS 0.2 (H) 0.0 - 0.1 K/UL    ABS. IMM. GRANS. 0.2 (H) 0.00 - 0.04 K/UL    DF AUTOMATED     METABOLIC PANEL, COMPREHENSIVE    Collection Time: 12/26/20  5:45 AM   Result Value Ref Range    Sodium 153 (H) 136 - 145 mmol/L    Potassium 3.3 (L) 3.5 - 5.1 mmol/L    Chloride 123 (H) 97 - 108 mmol/L    CO2 22 21 - 32 mmol/L    Anion gap 8 5 - 15 mmol/L    Glucose 83 65 - 100 mg/dL    BUN 18 6 - 20 MG/DL    Creatinine 1.26 0.70 - 1.30 MG/DL    BUN/Creatinine ratio 14 12 - 20      GFR est AA >60 >60 ml/min/1.73m2    GFR est non-AA 56 (L) >60 ml/min/1.73m2    Calcium 7.9 (L) 8.5 - 10.1 MG/DL    Bilirubin, total 0.4 0.2 - 1.0 MG/DL    ALT (SGPT) 42 12 - 78 U/L    AST (SGOT) 33 15 - 37 U/L    Alk.  phosphatase 63 45 - 117 U/L    Protein, total 6.3 (L) 6.4 - 8.2 g/dL    Albumin 2.1 (L) 3.5 - 5.0 g/dL    Globulin 4.2 (H) 2.0 - 4.0 g/dL    A-G Ratio 0.5 (L) 1.1 - 2.2     LACTIC ACID    Collection Time: 12/26/20  5:46 AM   Result Value Ref Range    Lactic acid 0.9 0.4 - 2.0 MMOL/L   MAGNESIUM    Collection Time: 12/26/20  6:45 AM   Result Value Ref Range    Magnesium 1. 8 1.6 - 2.4 mg/dL       Telemetry: normal sinus rhythm      Assessment & Plan:   1. NSVT. Unclear etiology. No recurrent arrhythmia on telemetry. Echo was normal pre-arrest, will plan to replete hypokalemia and check magnesium level and stop propanolol since patient cannot take PO medications currently, will begin IV metoprolol 5mg every 6 hours  2. Acute respiratory failure. Post ativan. Aspiration pneumonitis. S/p PEA arrest - unable to take PO medications at this time, on lovenox, will repeat TTE to reassess EF post arrest  3. EtOH withdrawal per primary team  4. Anemia per primary team  5. Hypokalemia - K 3.3, will give KCL 20meq IV now, recheck in AM  6.   HTN - unable to take PO medications, starting IV metoprolol as above, continue to monitor     Juliana Bustos NP
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
## 2020-12-26 NOTE — PROCEDURES
Interventional Radiology  Procedure Note        12/26/2020 4:51 PM    Patient: Alex Harris     Informed consent obtained    Diagnosis: Altered mental status    Procedure(s): nasal feeding tube placed into duodenum.     Specimens removed:  none    Complications: None    Primary Physician: Alexandr Brown MD    Recommendations: OK to use tube for feeding immediatley    Discharge Disposition: stable; return to floor    Full dictated report to follow    Alexandr Brown MD  Interventional Radiology  [facility], [facility].  4:51 PM, 12/26/2020

## 2020-12-26 NOTE — PROGRESS NOTES
0730 Bedside shift change report given to Emma (oncoming nurse) by Fabián Vernon (offgoing nurse). Report included the following information SBAR, Kardex, Procedure Summary, Intake/Output, MAR, Recent Results and Cardiac Rhythm NSR.    0800 Unsuccessful NGT placement multiple times. Large amounts of black mucous plugs removed. 1000 Sitting EOB with PT.  1400 Unsuccessful dobhoff placement. Will update intensivist.  1700 Dr. Bethel Castleman with IR able to successfully place dobhoff into duodenum  1730 Tube feeds started    1930 Bedside shift change report given to Celeste (oncoming nurse) by Emma (offgoing nurse). Report included the following information SBAR, Kardex, Procedure Summary, Intake/Output, MAR, Recent Results and Cardiac Rhythm NSR.

## 2020-12-26 NOTE — PROGRESS NOTES
2000 - Report received from [facility]. VSS, pt on 5LNC, mouth care and CHG bath w/ sylvia care given for BMX1. Pt A&OX2, no c/o pain. 2300 - Pt on Bipap HS.  0000 - PRN Labetalol given for SBP >160.  0500 - AM labs drawn. 0600 - Pt back on NC, VSS. incontinent of BMx1. Pt more confused, oriented to self only, unable to recall wife's name. Resisting mouth & sylvia care. 0700 - PRN labetalol given for -178. Bedside and Verbal shift change report given to Belia (oncoming nurse) by [facility] (offgoing nurse). Report included the following information SBAR, Kardex, Intake/Output, MAR, Accordion, Recent Results, Cardiac Rhythm -NSR and Alarm Parameters .

## 2020-12-27 ENCOUNTER — APPOINTMENT (OUTPATIENT)
Dept: GENERAL RADIOLOGY | Age: 75
DRG: 896 | End: 2020-12-27
Attending: ANESTHESIOLOGY
Payer: MEDICARE

## 2020-12-27 LAB
25(OH)D3 SERPL-MCNC: 11.8 NG/ML (ref 30–100)
ALBUMIN SERPL-MCNC: 1.9 G/DL (ref 3.5–5)
ALBUMIN/GLOB SERPL: 0.6 {RATIO} (ref 1.1–2.2)
ALP SERPL-CCNC: 56 U/L (ref 45–117)
ALT SERPL-CCNC: 29 U/L (ref 12–78)
ANION GAP SERPL CALC-SCNC: 1 MMOL/L (ref 5–15)
ANION GAP SERPL CALC-SCNC: 4 MMOL/L (ref 5–15)
ARTERIAL PATENCY WRIST A: NO
AST SERPL-CCNC: 23 U/L (ref 15–37)
BASE DEFICIT BLDA-SCNC: 7.1 MMOL/L
BASOPHILS # BLD: 0.1 K/UL (ref 0–0.1)
BASOPHILS NFR BLD: 1 % (ref 0–1)
BDY SITE: ABNORMAL
BILIRUB SERPL-MCNC: 0.3 MG/DL (ref 0.2–1)
BNP SERPL-MCNC: 2085 PG/ML
BUN SERPL-MCNC: 11 MG/DL (ref 6–20)
BUN SERPL-MCNC: 13 MG/DL (ref 6–20)
BUN/CREAT SERPL: 12 (ref 12–20)
BUN/CREAT SERPL: 8 (ref 12–20)
CA-I BLD-SCNC: 1.24 MMOL/L (ref 1.13–1.32)
CALCIUM SERPL-MCNC: 7.6 MG/DL (ref 8.5–10.1)
CALCIUM SERPL-MCNC: 7.9 MG/DL (ref 8.5–10.1)
CHLORIDE SERPL-SCNC: 121 MMOL/L (ref 97–108)
CHLORIDE SERPL-SCNC: 124 MMOL/L (ref 97–108)
CHOLEST SERPL-MCNC: 115 MG/DL
CO2 SERPL-SCNC: 25 MMOL/L (ref 21–32)
CO2 SERPL-SCNC: 33 MMOL/L (ref 21–32)
CREAT SERPL-MCNC: 1.1 MG/DL (ref 0.7–1.3)
CREAT SERPL-MCNC: 1.34 MG/DL (ref 0.7–1.3)
DIFFERENTIAL METHOD BLD: ABNORMAL
EOSINOPHIL # BLD: 0.5 K/UL (ref 0–0.4)
EOSINOPHIL NFR BLD: 6 % (ref 0–7)
ERYTHROCYTE [DISTWIDTH] IN BLOOD BY AUTOMATED COUNT: 13.3 % (ref 11.5–14.5)
GLOBULIN SER CALC-MCNC: 3.3 G/DL (ref 2–4)
GLUCOSE SERPL-MCNC: 113 MG/DL (ref 65–100)
GLUCOSE SERPL-MCNC: 116 MG/DL (ref 65–100)
HCO3 BLDA-SCNC: 21 MMOL/L (ref 22–26)
HCT VFR BLD AUTO: 25 % (ref 36.6–50.3)
HDLC SERPL-MCNC: 22 MG/DL
HDLC SERPL: 5.2 {RATIO} (ref 0–5)
HGB BLD-MCNC: 7.7 G/DL (ref 12.1–17)
IMM GRANULOCYTES # BLD AUTO: 0.1 K/UL (ref 0–0.04)
IMM GRANULOCYTES NFR BLD AUTO: 2 % (ref 0–0.5)
LACTATE SERPL-SCNC: 2 MMOL/L (ref 0.4–2)
LDLC SERPL CALC-MCNC: 65.8 MG/DL (ref 0–100)
LIPID PROFILE,FLP: ABNORMAL
LYMPHOCYTES # BLD: 1 K/UL (ref 0.8–3.5)
LYMPHOCYTES NFR BLD: 12 % (ref 12–49)
MAGNESIUM SERPL-MCNC: 1.8 MG/DL (ref 1.6–2.4)
MAGNESIUM SERPL-MCNC: 2.2 MG/DL (ref 1.6–2.4)
MCH RBC QN AUTO: 33 PG (ref 26–34)
MCHC RBC AUTO-ENTMCNC: 30.8 G/DL (ref 30–36.5)
MCV RBC AUTO: 107.3 FL (ref 80–99)
MONOCYTES # BLD: 0.7 K/UL (ref 0–1)
MONOCYTES NFR BLD: 9 % (ref 5–13)
NEUTS SEG # BLD: 5.6 K/UL (ref 1.8–8)
NEUTS SEG NFR BLD: 70 % (ref 32–75)
NRBC # BLD: 0 K/UL (ref 0–0.01)
NRBC BLD-RTO: 0 PER 100 WBC
PCO2 BLDA: 49 MMHG (ref 35–45)
PH BLDA: 7.24 [PH] (ref 7.35–7.45)
PLATELET # BLD AUTO: 240 K/UL (ref 150–400)
PMV BLD AUTO: 9.8 FL (ref 8.9–12.9)
PO2 BLDA: 79 MMHG (ref 80–100)
POTASSIUM SERPL-SCNC: 3.5 MMOL/L (ref 3.5–5.1)
POTASSIUM SERPL-SCNC: 3.6 MMOL/L (ref 3.5–5.1)
PROCALCITONIN SERPL-MCNC: 0.06 NG/ML
PROT SERPL-MCNC: 5.2 G/DL (ref 6.4–8.2)
RBC # BLD AUTO: 2.33 M/UL (ref 4.1–5.7)
SAO2 % BLD: 94 % (ref 92–97)
SODIUM SERPL-SCNC: 153 MMOL/L (ref 136–145)
SODIUM SERPL-SCNC: 155 MMOL/L (ref 136–145)
SPECIMEN SITE: ABNORMAL
TRIGL SERPL-MCNC: 136 MG/DL (ref ?–150)
VIT B12 SERPL-MCNC: 1189 PG/ML (ref 193–986)
VLDLC SERPL CALC-MCNC: 27.2 MG/DL
WBC # BLD AUTO: 8 K/UL (ref 4.1–11.1)

## 2020-12-27 PROCEDURE — 94660 CPAP INITIATION&MGMT: CPT

## 2020-12-27 PROCEDURE — 74011250637 HC RX REV CODE- 250/637: Performed by: INTERNAL MEDICINE

## 2020-12-27 PROCEDURE — C9113 INJ PANTOPRAZOLE SODIUM, VIA: HCPCS | Performed by: ANESTHESIOLOGY

## 2020-12-27 PROCEDURE — 82803 BLOOD GASES ANY COMBINATION: CPT

## 2020-12-27 PROCEDURE — 36600 WITHDRAWAL OF ARTERIAL BLOOD: CPT

## 2020-12-27 PROCEDURE — 74011000258 HC RX REV CODE- 258: Performed by: ANESTHESIOLOGY

## 2020-12-27 PROCEDURE — 84145 PROCALCITONIN (PCT): CPT

## 2020-12-27 PROCEDURE — 74011250636 HC RX REV CODE- 250/636: Performed by: FAMILY MEDICINE

## 2020-12-27 PROCEDURE — 93005 ELECTROCARDIOGRAM TRACING: CPT

## 2020-12-27 PROCEDURE — 74011250636 HC RX REV CODE- 250/636: Performed by: ANESTHESIOLOGY

## 2020-12-27 PROCEDURE — 71045 X-RAY EXAM CHEST 1 VIEW: CPT

## 2020-12-27 PROCEDURE — 85025 COMPLETE CBC W/AUTO DIFF WBC: CPT

## 2020-12-27 PROCEDURE — 74011000258 HC RX REV CODE- 258: Performed by: FAMILY MEDICINE

## 2020-12-27 PROCEDURE — 74011250636 HC RX REV CODE- 250/636: Performed by: INTERNAL MEDICINE

## 2020-12-27 PROCEDURE — 83735 ASSAY OF MAGNESIUM: CPT

## 2020-12-27 PROCEDURE — 82180 ASSAY OF ASCORBIC ACID: CPT

## 2020-12-27 PROCEDURE — 36415 COLL VENOUS BLD VENIPUNCTURE: CPT

## 2020-12-27 PROCEDURE — 83880 ASSAY OF NATRIURETIC PEPTIDE: CPT

## 2020-12-27 PROCEDURE — 80053 COMPREHEN METABOLIC PANEL: CPT

## 2020-12-27 PROCEDURE — 74011000250 HC RX REV CODE- 250: Performed by: ANESTHESIOLOGY

## 2020-12-27 PROCEDURE — 82306 VITAMIN D 25 HYDROXY: CPT

## 2020-12-27 PROCEDURE — 74011250636 HC RX REV CODE- 250/636: Performed by: EMERGENCY MEDICINE

## 2020-12-27 PROCEDURE — 80061 LIPID PANEL: CPT

## 2020-12-27 PROCEDURE — 83605 ASSAY OF LACTIC ACID: CPT

## 2020-12-27 PROCEDURE — 65620000000 HC RM CCU GENERAL

## 2020-12-27 PROCEDURE — 74011250637 HC RX REV CODE- 250/637: Performed by: ANESTHESIOLOGY

## 2020-12-27 PROCEDURE — 74011000250 HC RX REV CODE- 250: Performed by: NURSE PRACTITIONER

## 2020-12-27 PROCEDURE — 82607 VITAMIN B-12: CPT

## 2020-12-27 RX ORDER — POTASSIUM CHLORIDE 20MEQ/15ML
20 LIQUID (ML) ORAL 2 TIMES DAILY
Status: DISCONTINUED | OUTPATIENT
Start: 2020-12-27 | End: 2020-12-27 | Stop reason: RX

## 2020-12-27 RX ORDER — BUMETANIDE 0.25 MG/ML
2 INJECTION INTRAMUSCULAR; INTRAVENOUS ONCE
Status: COMPLETED | OUTPATIENT
Start: 2020-12-27 | End: 2020-12-27

## 2020-12-27 RX ORDER — ETOMIDATE 2 MG/ML
20 INJECTION INTRAVENOUS ONCE
Status: DISCONTINUED | OUTPATIENT
Start: 2020-12-27 | End: 2020-12-27

## 2020-12-27 RX ORDER — MAGNESIUM SULFATE HEPTAHYDRATE 40 MG/ML
2 INJECTION, SOLUTION INTRAVENOUS ONCE
Status: COMPLETED | OUTPATIENT
Start: 2020-12-27 | End: 2020-12-27

## 2020-12-27 RX ORDER — QUETIAPINE FUMARATE 25 MG/1
25 TABLET, FILM COATED ORAL
Status: DISCONTINUED | OUTPATIENT
Start: 2020-12-27 | End: 2020-12-28

## 2020-12-27 RX ORDER — ROCURONIUM BROMIDE 10 MG/ML
100 INJECTION, SOLUTION INTRAVENOUS
Status: DISCONTINUED | OUTPATIENT
Start: 2020-12-27 | End: 2020-12-27

## 2020-12-27 RX ORDER — CHOLECALCIFEROL (VITAMIN D3) 10(400)/ML
30 DROPS ORAL DAILY
Status: DISCONTINUED | OUTPATIENT
Start: 2020-12-27 | End: 2021-01-07

## 2020-12-27 RX ORDER — CHLORHEXIDINE GLUCONATE 0.12 MG/ML
15 RINSE ORAL EVERY 12 HOURS
Status: DISCONTINUED | OUTPATIENT
Start: 2020-12-27 | End: 2021-01-13 | Stop reason: HOSPADM

## 2020-12-27 RX ADMIN — PIPERACILLIN AND TAZOBACTAM 3.38 G: 3; .375 INJECTION, POWDER, LYOPHILIZED, FOR SOLUTION INTRAVENOUS at 02:12

## 2020-12-27 RX ADMIN — THIAMINE HYDROCHLORIDE 500 MG: 100 INJECTION, SOLUTION INTRAMUSCULAR; INTRAVENOUS at 21:23

## 2020-12-27 RX ADMIN — SODIUM CHLORIDE 40 MG: 9 INJECTION INTRAMUSCULAR; INTRAVENOUS; SUBCUTANEOUS at 20:09

## 2020-12-27 RX ADMIN — VANCOMYCIN HYDROCHLORIDE 1250 MG: 10 INJECTION, POWDER, LYOPHILIZED, FOR SOLUTION INTRAVENOUS at 05:55

## 2020-12-27 RX ADMIN — QUETIAPINE FUMARATE 50 MG: 25 TABLET ORAL at 20:01

## 2020-12-27 RX ADMIN — METOPROLOL TARTRATE 5 MG: 1 INJECTION, SOLUTION INTRAVENOUS at 15:25

## 2020-12-27 RX ADMIN — POTASSIUM BICARBONATE 20 MEQ: 782 TABLET, EFFERVESCENT ORAL at 13:20

## 2020-12-27 RX ADMIN — THIAMINE HYDROCHLORIDE 500 MG: 100 INJECTION, SOLUTION INTRAMUSCULAR; INTRAVENOUS at 14:00

## 2020-12-27 RX ADMIN — Medication 10 ML: at 05:55

## 2020-12-27 RX ADMIN — POTASSIUM BICARBONATE 20 MEQ: 782 TABLET, EFFERVESCENT ORAL at 16:24

## 2020-12-27 RX ADMIN — CHLORHEXIDINE GLUCONATE 15 ML: 0.12 RINSE ORAL at 08:48

## 2020-12-27 RX ADMIN — METOPROLOL TARTRATE 5 MG: 1 INJECTION, SOLUTION INTRAVENOUS at 08:49

## 2020-12-27 RX ADMIN — CHLORHEXIDINE GLUCONATE 15 ML: 0.12 RINSE ORAL at 20:01

## 2020-12-27 RX ADMIN — MAGNESIUM SULFATE HEPTAHYDRATE 2 G: 40 INJECTION, SOLUTION INTRAVENOUS at 16:16

## 2020-12-27 RX ADMIN — ACETAZOLAMIDE SODIUM 500 MG: 500 INJECTION, POWDER, LYOPHILIZED, FOR SOLUTION INTRAVENOUS at 17:29

## 2020-12-27 RX ADMIN — Medication 10 ML: at 21:23

## 2020-12-27 RX ADMIN — Medication 3 MG: at 20:01

## 2020-12-27 RX ADMIN — ENOXAPARIN SODIUM 40 MG: 40 INJECTION SUBCUTANEOUS at 08:48

## 2020-12-27 RX ADMIN — PIPERACILLIN AND TAZOBACTAM 3.38 G: 3; .375 INJECTION, POWDER, LYOPHILIZED, FOR SOLUTION INTRAVENOUS at 17:29

## 2020-12-27 RX ADMIN — POTASSIUM BICARBONATE 20 MEQ: 782 TABLET, EFFERVESCENT ORAL at 16:23

## 2020-12-27 RX ADMIN — METOPROLOL TARTRATE 5 MG: 1 INJECTION, SOLUTION INTRAVENOUS at 20:10

## 2020-12-27 RX ADMIN — METOPROLOL TARTRATE 5 MG: 1 INJECTION, SOLUTION INTRAVENOUS at 02:12

## 2020-12-27 RX ADMIN — THIAMINE HYDROCHLORIDE 500 MG: 100 INJECTION, SOLUTION INTRAMUSCULAR; INTRAVENOUS at 05:56

## 2020-12-27 RX ADMIN — SODIUM CHLORIDE 40 MG: 9 INJECTION INTRAMUSCULAR; INTRAVENOUS; SUBCUTANEOUS at 08:49

## 2020-12-27 RX ADMIN — PIPERACILLIN AND TAZOBACTAM 3.38 G: 3; .375 INJECTION, POWDER, LYOPHILIZED, FOR SOLUTION INTRAVENOUS at 09:00

## 2020-12-27 RX ADMIN — Medication 30 MCG: at 10:00

## 2020-12-27 RX ADMIN — BUMETANIDE 2 MG: 0.25 INJECTION, SOLUTION INTRAMUSCULAR; INTRAVENOUS at 07:17

## 2020-12-27 RX ADMIN — Medication 10 ML: at 15:25

## 2020-12-27 NOTE — PROGRESS NOTES
Speech pathology note  Patient currently on BiPAP and not appropriate for swallowing evaluation. SLP will continue to follow as respiratory status allows. Thank you.     Armando Gray, Shaniqua Mcconnell., CCC-SLP

## 2020-12-27 NOTE — PROGRESS NOTES
[facility]    ICU TEAM Progress Note    Name: Kevin Fernandez   : 1945   MRN: [id number]   Date: 2020      Assessment     ICU Problems:    1. Acute Hypoxic Respiratory Failure (secondary to Ativan --> Somnolence)  2. Volume Overload  3. Aspiration Pneumonitis  4. Cardiac Arrest (due to hypoxia) - Sinus Junito/PEA  5. Acute Alcohol Withdrawal   6. NSVT  7. Acute Toxic Metabolic Encephalopathy  8. Acute Renal Failure    Imagin2020      ICU Comprehensive Plan of Care:     Plans for this Shift:     1. Bumex 2 mg IV now; will most likely replete  2. Mag, BMP at 1400  3. Stop IVF  4. Continue TF's  5. Vanc/Zosyn per ID  6. Plan to update wife  7. Thiamine/Folate  8. SBP Goal of: > 90 mmHg  9. MAP Goal of: > 65 mmHg  10. None - For above SBP/MAP goals  11. Transfusion Trigger (Hgb): <7 g/dL  12. Respiratory Goals:  a. Head of bed > 30 degrees  b. Aggressive bronchopulmonary hygiene  c. Incentive spirometry  13. Pulmonary toilet: Incentive Spirometry   14. SpO2 Goal: > 92%  15. Keep K>4; Mg>2   16. PT/OT: PT consulted and on board and OT consulted and on board   17. Goals of Care Discussion with family Yes   25. Plan of Care/Code Status: Full Code  19. Discussed Care Plan with Bedside RN  20. Documentation of Current Medications  21.  Rest of Plan Below:    F - Feeding:  No   A - Analgesia: Acetaminophen  S - Sedation: Precedex  T - DVT Prophylaxis: SCD's or Sequential Compression Device and Lovenox   H - Head of Bed: > 30 Degrees  U - Ulcer Prophylaxis: Protonix (pantoprazole)   G - Glycemic Control: Insulin  S - Spontaneous Breathing Trial: N/A  B - Bowel Regimen: Senna and Docusate (Colace)  I - Indwelling Catheter:   Tubes: None  Lines: Peripheral IV  Drains: None  D - De-escalation of Antibiotics:   Vancomycin  Zosyn    Subjective:   Progress Note: 2020      Reason for ICU Admission: Acute Hypoxic Respiratory Failure --> Cardiac Arrest     HPI:  Morgan Fernández is a 76 y.o. with a history of melanoma, hypertension and alcohol abuse. He suffered a blunt head injury after a ground-level fall on the evening of December 8. He had tripped over a sidewalk resulting in him falling forward and landing on his right forehead. Patient has felt disoriented and was having some memory issues. For this reason, he was admitted on December 11. He was placed on Rocephin empirically, but this was discontinued because no source of infection was found. He developed delirium tremens and had to be intubated and admitted to the ICU. He was eventually extubated a few days later. In the past day, he developed fever and elevated white count prompting consult. The patient tells me that he does not have any cough, dyspnea, abdominal pain, dysuria, headache or sore throat. He does not have any rash. He does not have any back pain or joint pain. Dr. Johnnie Combs documents that he is having diarrhea. A C. difficile has been ordered. Chest x-ray did not reveal any pneumonia. Blood cultures have also been sent. Urinalysis did not show any significant pyuria. He was started on Zosyn and we are being asked to see him in consult.     Overnight Events:   12/27/2020  On BiPAP, worsening oxygenation, CXR pulm edema      Active Problem List:     Problem List  Date Reviewed: 8/2/2020          Codes Class    Alcohol withdrawal ([facility] ICD-10-CM: F10.239  ICD-9-CM: 291.81         UTI (urinary tract infection) ICD-10-CM: N39.0  ICD-9-CM: 599.0         Hypokalemia ICD-10-CM: E87.6  ICD-9-CM: 276.8         Thrombocytopenia (HCC) ICD-10-CM: D69.6  ICD-9-CM: 287.5         Anemia ICD-10-CM: D64.9  ICD-9-CM: 285.9         * (Principal) AMS (altered mental status) ICD-10-CM: R41.82  ICD-9-CM: 780.97         Excessive drinking of alcohol ICD-10-CM: F10.10  ICD-9-CM: 305.00         Hyponatremia ICD-10-CM: E87.1  ICD-9-CM: 276.1         HATTIE (acute kidney injury) ([facility] ICD-10-CM: N17.9  ICD-9-CM: 584.9         Essential hypertension ICD-10-CM: I10  ICD-9-CM: 401.9               Past Medical History:      has a past medical history of Cancer ([address] (~ ), Hypertension, and Other ill-defined conditions(799.89) (1960~). Past Surgical History:      has a past surgical history that includes hx other surgical (~ ) and colonoscopy (2011). Home Medications:     Prior to Admission medications    Medication Sig Start Date End Date Taking? Authorizing Provider   losartan (COZAAR) 100 mg tablet TAKE 1 TABLET BY MOUTH EVERY DAY 20  Yes Dina Kayser, MD       Allergies/Social/Family History: Allergies   Allergen Reactions    Ace Inhibitors Cough    Thiazides Other (comments)     hyponatremia      Social History     Tobacco Use    Smoking status: Never Smoker    Smokeless tobacco: Never Used   Substance Use Topics    Alcohol use: Yes     Comment: occasional beer      Family History   Problem Relation Age of Onset    Hypertension Father        Review of Systems:     A comprehensive review of systems was negative except for that written in the HPI. Objective:   Vital Signs:  Visit Vitals  /76   Pulse 70   Temp 97.8 °F (36.6 °C)   Resp 15   Ht 5' 11\" (1.803 m)   Wt 83.6 kg (184 lb 4.9 oz)   SpO2 99%   BMI 25.71 kg/m²    O2 Flow Rate (L/min): 2 l/min O2 Device: BIPAP Temp (24hrs), Av.9 °F (36.6 °C), Min:97.6 °F (36.4 °C), Max:98.6 °F (37 °C)           Intake/Output:     Intake/Output Summary (Last 24 hours) at 2020 0706  Last data filed at 2020 0400  Gross per 24 hour   Intake 2868.75 ml   Output 330 ml   Net 2538.75 ml       Physical Exam:    General:  Awake, follows simple commands, confused; look older than stated age   Eyes:  Sclera anicteric. Pupils equally round and reactive to light.    Mouth/Throat: Mucous membranes normal, oral pharynx clear   Neck: Supple   Lungs:   Rhonchi bilaterally, good effort   CV:  Regular rate and rhythm,no murmur, click, rub or gallop   Abdomen:   Soft, non-tender. bowel sounds normal. non-distended   Extremities: No cyanosis or edema   Skin: Skin color, texture, turgor normal. no acute rash or lesions   Lymph nodes: Cervical and supraclavicular normal   Musculoskeletal: No swelling or deformity   Lines/Devices:  Intact, no erythema, drainage or tenderness   Psych: Somnolent     LABS AND  DATA: Personally reviewed  Recent Labs     12/27/20 0459 12/26/20  0545   WBC 8.0 9.7   HGB 7.7* 8.9*   HCT 25.0* 29.3*    284     Recent Labs     12/27/20 0459 12/26/20  0645 12/26/20  0545   *  --  153*   K 3.5  --  3.3*   *  --  123*   CO2 25  --  22   BUN 13  --  18   CREA 1.10  --  1.26   *  --  83   CA 7.6*  --  7.9*   MG 2.2 1.8  --      Recent Labs     12/27/20 0459 12/26/20  0545   AP 56 63   TP 5.2* 6.3*   ALB 1.9* 2.1*   GLOB 3.3 4.2*     No results for input(s): INR, PTP, APTT, INREXT, INREXT in the last 72 hours. Recent Labs     12/25/20  1105   PHI 7.44   PCO2I 31.6*   PO2I 69*     No results for input(s): CPK, CKMB, TROIQ, BNPP in the last 72 hours. Hemodynamics:   PAP:   CO:     Wedge:   CI:     CVP:    SVR:       PVR:       Ventilator Settings:  Mode Rate Tidal Volume Pressure FiO2 PEEP   Assist control, Volume control   450 ml  5 cm H2O 50 % 5 cm H20     Peak airway pressure: 22 cm H2O    Minute ventilation: 5.3 l/min        MEDS: Reviewed    Chest X-Ray:  CXR Results  (Last 48 hours)               12/25/20 0754  XR CHEST PORT Final result    Impression:  IMPRESSION: Improved nonspecific bibasilar pulmonary densities. Narrative:  EXAM: XR CHEST PORT       INDICATION: Resp Fail       COMPARISON: 12/24/2020       FINDINGS: 2 AP portable radiographs of the chest were obtained at 0732 and 0733   hours hours. Trace left pleural fluid is demonstrated. There is interval   improvement in bibasilar opacifications with minimal residual. Cardiac and   mediastinal contours are stable. Ramírez Stephens Multidisciplinary Rounds Completed: Yes    ABCDEF Bundle/Checklist Completed:  Yes    SPECIAL EQUIPMENT  None    DISPOSITION  Stay in ICU    CRITICAL CARE CONSULTANT NOTE  I had a face to face encounter with the patient, reviewed and interpreted patient data including clinical events, labs, images, vital signs, I/O's, and examined patient. I have discussed the case and the plan and management of the patient's care with the consulting services, the bedside nurses and the respiratory therapist.      NOTE OF PERSONAL INVOLVEMENT IN CARE   This patient has a high probability of imminent, clinically significant deterioration, which requires the highest level of preparedness to intervene urgently. I participated in the decision-making and personally managed or directed the management of the following life and organ supporting interventions that required my frequent assessment to treat or prevent imminent deterioration. I personally spent 85 minutes of critical care time. This is time spent at this critically ill patient's bedside actively involved in patient care as well as the coordination of care. This does not include any procedural time which has been billed separately.     Radha Morales DO, MS  Staff Intensivist/Anesthesiologist  [facility]  12/27/2020

## 2020-12-27 NOTE — PROGRESS NOTES
0730 Bedside shift change report given to Emma (oncoming nurse) by Roaxna Sepulveda (offgoing nurse). Report included the following information SBAR, Kardex, Procedure Summary, Intake/Output, MAR, Recent Results and Cardiac Rhythm NSR.    0800 Pt on bipap, VSS.  Opens eyes to sternal rub.  0900 Opening eyes to voice

## 2020-12-27 NOTE — PROGRESS NOTES
2000 - Report received from Belia, [facility]. Pt on room air, O2 sat 85-90%, expiratory wheeze noted, increased WOB & RR. Pt suctioned & placed back on Bipap, O2 sats and WOB improved. All other VSS, pt oriented to self, no c/o.  2100 - Incontinent of BMx1, CHG bath given. TF advanced per orders. 0100 - VSS, no c/o. Incontinent of BMx1. Continue to advance TF per orders, pt tolerating well.  0400 - AM CXR completed. AM labs drawn. PIVx2 inserted. Pt drowsy but arousable. Remains alert to self.  0600 - Decreased LOC noted, pt droswy but arousable, remains alert only to self. Responds to pain, voice, stimuli, purposeful movement, pupils reactive. Increased WOB, RR 30s-40s w/ accessory muscles, sat 95% on Bipap. Pt deep suctioned for large amount tan secretions. O2 sats desat to 80s w/o Bipap. TF held. MD made aware. STAT ABG drawn by RT. Results relayed to MD. Bipap up to 100%. Orders to diurese w/ Bumex 2mg and re-access per MD. Chace Patience w/ wife, update given. 0700 - Bumex given. RR &WOB improved to 100% & RR 18-20s. Bedside and Verbal shift change report given to Belia (oncoming nurse) by Ana Greenfield (offgoing nurse). Report included the following information SBAR, Kardex, Intake/Output, MAR, Accordion, Recent Results, Cardiac Rhythm -NSR and Alarm Parameters .

## 2020-12-28 ENCOUNTER — APPOINTMENT (OUTPATIENT)
Dept: GENERAL RADIOLOGY | Age: 75
DRG: 896 | End: 2020-12-28
Attending: ANESTHESIOLOGY
Payer: MEDICARE

## 2020-12-28 LAB
ALBUMIN SERPL-MCNC: 1.9 G/DL (ref 3.5–5)
ALBUMIN/GLOB SERPL: 0.5 {RATIO} (ref 1.1–2.2)
ALP SERPL-CCNC: 59 U/L (ref 45–117)
ALT SERPL-CCNC: 26 U/L (ref 12–78)
ANION GAP SERPL CALC-SCNC: 4 MMOL/L (ref 5–15)
ARTERIAL PATENCY WRIST A: YES
ARTERIAL PATENCY WRIST A: YES
AST SERPL-CCNC: 18 U/L (ref 15–37)
ATRIAL RATE: 76 BPM
BACTERIA SPEC CULT: NORMAL
BASE DEFICIT BLD-SCNC: 1 MMOL/L
BASE EXCESS BLD CALC-SCNC: 2 MMOL/L
BASOPHILS # BLD: 0.1 K/UL (ref 0–0.1)
BASOPHILS NFR BLD: 1 % (ref 0–1)
BDY SITE: ABNORMAL
BDY SITE: ABNORMAL
BILIRUB SERPL-MCNC: 0.4 MG/DL (ref 0.2–1)
BNP SERPL-MCNC: 1515 PG/ML
BUN SERPL-MCNC: 10 MG/DL (ref 6–20)
BUN/CREAT SERPL: 8 (ref 12–20)
CA-I BLD-SCNC: 1.21 MMOL/L (ref 1.12–1.32)
CA-I BLD-SCNC: 1.24 MMOL/L (ref 1.12–1.32)
CALCIUM SERPL-MCNC: 7.8 MG/DL (ref 8.5–10.1)
CALCULATED P AXIS, ECG09: 28 DEGREES
CALCULATED R AXIS, ECG10: -6 DEGREES
CALCULATED T AXIS, ECG11: 42 DEGREES
CHLORIDE SERPL-SCNC: 121 MMOL/L (ref 97–108)
CO2 SERPL-SCNC: 28 MMOL/L (ref 21–32)
CREAT SERPL-MCNC: 1.25 MG/DL (ref 0.7–1.3)
DIAGNOSIS, 93000: NORMAL
DIFFERENTIAL METHOD BLD: ABNORMAL
EOSINOPHIL # BLD: 0.7 K/UL (ref 0–0.4)
EOSINOPHIL NFR BLD: 7 % (ref 0–7)
ERYTHROCYTE [DISTWIDTH] IN BLOOD BY AUTOMATED COUNT: 13.1 % (ref 11.5–14.5)
GAS FLOW.O2 O2 DELIVERY SYS: ABNORMAL L/MIN
GAS FLOW.O2 O2 DELIVERY SYS: ABNORMAL L/MIN
GLOBULIN SER CALC-MCNC: 3.9 G/DL (ref 2–4)
GLUCOSE SERPL-MCNC: 128 MG/DL (ref 65–100)
HCO3 BLD-SCNC: 26.4 MMOL/L (ref 22–26)
HCO3 BLD-SCNC: 28.2 MMOL/L (ref 22–26)
HCT VFR BLD AUTO: 28.4 % (ref 36.6–50.3)
HGB BLD-MCNC: 8.8 G/DL (ref 12.1–17)
IMM GRANULOCYTES # BLD AUTO: 0.1 K/UL (ref 0–0.04)
IMM GRANULOCYTES NFR BLD AUTO: 1 % (ref 0–0.5)
LACTATE SERPL-SCNC: 0.9 MMOL/L (ref 0.4–2)
LYMPHOCYTES # BLD: 1.1 K/UL (ref 0.8–3.5)
LYMPHOCYTES NFR BLD: 10 % (ref 12–49)
MCH RBC QN AUTO: 33.5 PG (ref 26–34)
MCHC RBC AUTO-ENTMCNC: 31 G/DL (ref 30–36.5)
MCV RBC AUTO: 108 FL (ref 80–99)
MONOCYTES # BLD: 0.7 K/UL (ref 0–1)
MONOCYTES NFR BLD: 7 % (ref 5–13)
NEUTS SEG # BLD: 7.6 K/UL (ref 1.8–8)
NEUTS SEG NFR BLD: 74 % (ref 32–75)
NRBC # BLD: 0 K/UL (ref 0–0.01)
NRBC BLD-RTO: 0 PER 100 WBC
O2/TOTAL GAS SETTING VFR VENT: 100 %
O2/TOTAL GAS SETTING VFR VENT: 60 %
P-R INTERVAL, ECG05: 106 MS
PCO2 BLD: 55.1 MMHG (ref 35–45)
PCO2 BLD: 59 MMHG (ref 35–45)
PEEP RESPIRATORY: 6 CMH2O
PEEP RESPIRATORY: 6 CMH2O
PH BLD: 7.26 [PH] (ref 7.35–7.45)
PH BLD: 7.32 [PH] (ref 7.35–7.45)
PIP ISTAT,IPIP: 16
PIP ISTAT,IPIP: 16
PLATELET # BLD AUTO: 239 K/UL (ref 150–400)
PMV BLD AUTO: 9.6 FL (ref 8.9–12.9)
PO2 BLD: 105 MMHG (ref 80–100)
PO2 BLD: 87 MMHG (ref 80–100)
POTASSIUM SERPL-SCNC: 3.6 MMOL/L (ref 3.5–5.1)
PROCALCITONIN SERPL-MCNC: 0.1 NG/ML
PROT SERPL-MCNC: 5.8 G/DL (ref 6.4–8.2)
Q-T INTERVAL, ECG07: 458 MS
QRS DURATION, ECG06: 74 MS
QTC CALCULATION (BEZET), ECG08: 515 MS
RBC # BLD AUTO: 2.63 M/UL (ref 4.1–5.7)
SAO2 % BLD: 95 % (ref 92–97)
SAO2 % BLD: 97 % (ref 92–97)
SERVICE CMNT-IMP: NORMAL
SODIUM SERPL-SCNC: 153 MMOL/L (ref 136–145)
SPECIMEN TYPE: ABNORMAL
SPECIMEN TYPE: ABNORMAL
VENTRICULAR RATE, ECG03: 76 BPM
WBC # BLD AUTO: 10.4 K/UL (ref 4.1–11.1)

## 2020-12-28 PROCEDURE — 94660 CPAP INITIATION&MGMT: CPT

## 2020-12-28 PROCEDURE — 74011250636 HC RX REV CODE- 250/636: Performed by: EMERGENCY MEDICINE

## 2020-12-28 PROCEDURE — 74011250636 HC RX REV CODE- 250/636: Performed by: INTERNAL MEDICINE

## 2020-12-28 PROCEDURE — 36415 COLL VENOUS BLD VENIPUNCTURE: CPT

## 2020-12-28 PROCEDURE — 85025 COMPLETE CBC W/AUTO DIFF WBC: CPT

## 2020-12-28 PROCEDURE — 74011000250 HC RX REV CODE- 250: Performed by: NURSE PRACTITIONER

## 2020-12-28 PROCEDURE — 99232 SBSQ HOSP IP/OBS MODERATE 35: CPT | Performed by: INTERNAL MEDICINE

## 2020-12-28 PROCEDURE — 74011250636 HC RX REV CODE- 250/636: Performed by: ANESTHESIOLOGY

## 2020-12-28 PROCEDURE — 97530 THERAPEUTIC ACTIVITIES: CPT

## 2020-12-28 PROCEDURE — 80053 COMPREHEN METABOLIC PANEL: CPT

## 2020-12-28 PROCEDURE — 74011000258 HC RX REV CODE- 258: Performed by: ANESTHESIOLOGY

## 2020-12-28 PROCEDURE — 74011000258 HC RX REV CODE- 258: Performed by: FAMILY MEDICINE

## 2020-12-28 PROCEDURE — 84145 PROCALCITONIN (PCT): CPT

## 2020-12-28 PROCEDURE — 2709999900 HC NON-CHARGEABLE SUPPLY

## 2020-12-28 PROCEDURE — 77010033711 HC HIGH FLOW OXYGEN

## 2020-12-28 PROCEDURE — 74011250637 HC RX REV CODE- 250/637: Performed by: ANESTHESIOLOGY

## 2020-12-28 PROCEDURE — 83880 ASSAY OF NATRIURETIC PEPTIDE: CPT

## 2020-12-28 PROCEDURE — 74011250636 HC RX REV CODE- 250/636: Performed by: FAMILY MEDICINE

## 2020-12-28 PROCEDURE — 94762 N-INVAS EAR/PLS OXIMTRY CONT: CPT

## 2020-12-28 PROCEDURE — 83605 ASSAY OF LACTIC ACID: CPT

## 2020-12-28 PROCEDURE — 97168 OT RE-EVAL EST PLAN CARE: CPT

## 2020-12-28 PROCEDURE — 71045 X-RAY EXAM CHEST 1 VIEW: CPT

## 2020-12-28 PROCEDURE — 74011000250 HC RX REV CODE- 250: Performed by: ANESTHESIOLOGY

## 2020-12-28 PROCEDURE — 65620000000 HC RM CCU GENERAL

## 2020-12-28 PROCEDURE — C9113 INJ PANTOPRAZOLE SODIUM, VIA: HCPCS | Performed by: ANESTHESIOLOGY

## 2020-12-28 RX ORDER — BUMETANIDE 0.25 MG/ML
2 INJECTION INTRAMUSCULAR; INTRAVENOUS ONCE
Status: COMPLETED | OUTPATIENT
Start: 2020-12-28 | End: 2020-12-28

## 2020-12-28 RX ADMIN — THIAMINE HYDROCHLORIDE 500 MG: 100 INJECTION, SOLUTION INTRAMUSCULAR; INTRAVENOUS at 05:20

## 2020-12-28 RX ADMIN — Medication 10 ML: at 05:22

## 2020-12-28 RX ADMIN — CHLOROTHIAZIDE SODIUM 500 MG: 500 INJECTION, POWDER, LYOPHILIZED, FOR SOLUTION INTRAVENOUS at 13:07

## 2020-12-28 RX ADMIN — ENOXAPARIN SODIUM 40 MG: 40 INJECTION SUBCUTANEOUS at 08:22

## 2020-12-28 RX ADMIN — PIPERACILLIN AND TAZOBACTAM 3.38 G: 3; .375 INJECTION, POWDER, LYOPHILIZED, FOR SOLUTION INTRAVENOUS at 18:30

## 2020-12-28 RX ADMIN — CHLORHEXIDINE GLUCONATE 15 ML: 0.12 RINSE ORAL at 08:23

## 2020-12-28 RX ADMIN — SODIUM CHLORIDE 40 MG: 9 INJECTION INTRAMUSCULAR; INTRAVENOUS; SUBCUTANEOUS at 20:42

## 2020-12-28 RX ADMIN — METOPROLOL TARTRATE 5 MG: 1 INJECTION, SOLUTION INTRAVENOUS at 03:00

## 2020-12-28 RX ADMIN — VANCOMYCIN HYDROCHLORIDE 1250 MG: 10 INJECTION, POWDER, LYOPHILIZED, FOR SOLUTION INTRAVENOUS at 00:27

## 2020-12-28 RX ADMIN — METOPROLOL TARTRATE 5 MG: 1 INJECTION, SOLUTION INTRAVENOUS at 16:17

## 2020-12-28 RX ADMIN — BUMETANIDE 2 MG: 0.25 INJECTION, SOLUTION INTRAMUSCULAR; INTRAVENOUS at 13:36

## 2020-12-28 RX ADMIN — POTASSIUM BICARBONATE 20 MEQ: 782 TABLET, EFFERVESCENT ORAL at 18:30

## 2020-12-28 RX ADMIN — CHLORHEXIDINE GLUCONATE 15 ML: 0.12 RINSE ORAL at 20:42

## 2020-12-28 RX ADMIN — POTASSIUM BICARBONATE 40 MEQ: 782 TABLET, EFFERVESCENT ORAL at 08:22

## 2020-12-28 RX ADMIN — Medication 10 ML: at 21:42

## 2020-12-28 RX ADMIN — METOPROLOL TARTRATE 5 MG: 1 INJECTION, SOLUTION INTRAVENOUS at 08:23

## 2020-12-28 RX ADMIN — Medication 10 ML: at 16:17

## 2020-12-28 RX ADMIN — METOPROLOL TARTRATE 5 MG: 1 INJECTION, SOLUTION INTRAVENOUS at 20:42

## 2020-12-28 RX ADMIN — Medication 30 MCG: at 08:31

## 2020-12-28 RX ADMIN — POTASSIUM BICARBONATE 20 MEQ: 782 TABLET, EFFERVESCENT ORAL at 08:24

## 2020-12-28 RX ADMIN — PIPERACILLIN AND TAZOBACTAM 3.38 G: 3; .375 INJECTION, POWDER, LYOPHILIZED, FOR SOLUTION INTRAVENOUS at 10:13

## 2020-12-28 RX ADMIN — POTASSIUM BICARBONATE 40 MEQ: 782 TABLET, EFFERVESCENT ORAL at 18:30

## 2020-12-28 RX ADMIN — PIPERACILLIN AND TAZOBACTAM 3.38 G: 3; .375 INJECTION, POWDER, LYOPHILIZED, FOR SOLUTION INTRAVENOUS at 01:44

## 2020-12-28 RX ADMIN — SODIUM CHLORIDE 40 MG: 9 INJECTION INTRAMUSCULAR; INTRAVENOUS; SUBCUTANEOUS at 08:22

## 2020-12-28 NOTE — PROGRESS NOTES
ID Progress Note  2020    Subjective:     Afebrile. Says he is not dyspneic though he is on high flow. He says he is not in pain. ROS: No anaphylaxis, seizures, syncope, hematemesis, hematochezia     Objective:     Vitals:   Visit Vitals  /64   Pulse 80   Temp 98 °F (36.7 °C)   Resp 13   Ht 5' 11\" (1.803 m)   Wt 83.5 kg (184 lb 1.4 oz)   SpO2 98%   BMI 25.67 kg/m²        Tmax:  Temp (24hrs), Av.1 °F (36.7 °C), Min:97.5 °F (36.4 °C), Max:98.5 °F (36.9 °C)      Exam:    Not in distress  Pink conjunctivae, anicteric sclerae  No cervical lymphdenopathy   Lung clear, no rales, wheezes or rhonchi   Heart: s1, s2, RRR, no murmurs rubs or clicks  Abdomen: soft nontender, no guarding or rebound  Knees not warm or tender  Speech understandable. Labs:   Lab Results   Component Value Date/Time    WBC 10.4 2020 05:01 AM    HGB (POC) 12.8 2017 10:27 AM    HGB 8.8 (L) 2020 05:01 AM    HCT (POC) 38.8 2017 10:27 AM    HCT 28.4 (L) 2020 05:01 AM    PLATELET 045  05:01 AM    .0 (H) 2020 05:01 AM     Lab Results   Component Value Date/Time    Sodium 153 (H) 2020 05:01 AM    Potassium 3.6 2020 05:01 AM    Chloride 121 (H) 2020 05:01 AM    CO2 28 2020 05:01 AM    Anion gap 4 (L) 2020 05:01 AM    Glucose 128 (H) 2020 05:01 AM    BUN 10 2020 05:01 AM    Creatinine 1.25 2020 05:01 AM    BUN/Creatinine ratio 8 (L) 2020 05:01 AM    GFR est AA >60 2020 05:01 AM    GFR est non-AA 56 (L) 2020 05:01 AM    Calcium 7.8 (L) 2020 05:01 AM    Bilirubin, total 0.4 2020 05:01 AM    Alk.  phosphatase 59 2020 05:01 AM    Protein, total 5.8 (L) 2020 05:01 AM    Albumin 1.9 (L) 2020 05:01 AM    Globulin 3.9 2020 05:01 AM    A-G Ratio 0.5 (L) 2020 05:01 AM    ALT (SGPT) 26 2020 05:01 AM           Assessment:     #1 fever - resolved      #2 recent delirium tremens     #3 history of melanoma     #4 hypertension    #5 mild renal insufficiency     #6 s/p arrest, possible aspiration                Recommendations:       No MRSA on sputum.  [address] Lawrence Caballero MD

## 2020-12-28 NOTE — PROGRESS NOTES
Comprehensive Nutrition Assessment    Type and Reason for Visit: Consult    Nutrition Recommendations/Plan:      Check phosphorus and magnesium with am labs    Nutrition Assessment:    Pt admtited with AMS (recent fall). PMHx: Melanoma, HTN, Alcohol abuse. Intubated on admission for acute respiratory failure and DT's. Extubated-went to floor. Only eating ~10% of meals. 12/24-PEA arrest 2/2 hypoxia-requiring continuous BiPAP support. DHT placed by IR 12/26. Sodium elevated @ 153 for several days. Water flush being increased frequently; current order will provide 1800 ml per day of free water. Magnesium replaced yesterday. Recommend checking magnesium and phosphorus with am labs tomorrow-pt eating poorly prior to arrest and history of alcohol abuse. Replace lytes prn. Potassium replaced today. Tube feeding at goal of 55 ml/hr will provide 1320 ml, 1980 calories, 84 gm protein and 2800 ml free water (tube feeding/flush) per day to meet % estimated protein and energy needs, respectively. Recommend Prosource vs Beneprotein for protein supplementation (liquid vs powder form). If 2 packets are added per day-this will bring up total protein to 114 gm per day to meet protein needs (will also increase total calories to 2100 per day). Malnutrition Assessment:  Malnutrition Status: Moderate malnutrition    Context:  Acute illness     Findings of the 6 clinical characteristics of malnutrition:   Energy Intake:  7 - 50% or less of est energy requirements for 5 or more days  Weight Loss:  Unable to assess     Body Fat Loss:  1 - Mild body fat loss, Fat overlying ribs   Muscle Mass Loss:  1 - Mild muscle mass loss, Thigh (quadraceps), Calf  Fluid Accumulation:  1 - Mild, Extremities, Generalized   Strength:  Not performed       Nutritionally Significant Medications:   Vit D3, Protonix, Effer-K    Estimated Daily Nutrient Needs:  Energy (kcal): 6626-3897 (MSJ x 1.2-1.3);  Weight Used for Energy Requirements: Current(84 kg)  Protein (g): 101-109 (1.2-1.3g/kg); Weight Used for Protein Requirements: Current(84 kg)  Fluid (ml/day): ~2000 ml; Method Used for Fluid Requirements: 1 ml/kcal    Nutrition Related Findings:       BM: 12/26  Edema: NP generalized, 1+ BUE and BLE  Wounds:  None       Current Nutrition Therapies:   Diet: NPO  Tube Feeding: Jevity 1.5 @ 30 ml/hr with protein packets and 300 ml water flush q 4 hr; Goal rate: 55 ml/hr     Anthropometric Measures:  · Height:  5' 11\" (180.3 cm)  · Current Body Wt:  83.5 kg (184 lb 1.4 oz)   · Admission Body Wt:  205 lb 0.4 oz       · Ideal Body Wt:   :  107 %     · BMI Categories:  Overweight (BMI 25.0-29. 9)     Wt Readings from Last 10 Encounters:   12/28/20 83.5 kg (184 lb 1.4 oz)   12/11/20 93 kg (205 lb 0.4 oz)   07/07/20 93 kg (205 lb)   09/04/19 93 kg (205 lb)   08/09/19 93 kg (205 lb)   08/03/19 90.8 kg (200 lb 3.2 oz)   08/01/19 93.4 kg (206 lb)   07/25/19 93.4 kg (206 lb)   06/12/19 93.4 kg (206 lb)   04/11/19 92.5 kg (204 lb)       Nutrition Diagnosis:   · Inadequate oral intake related to impaired respiratory function as evidenced by NPO or clear liquid status due to medical condition, nutrition support-enteral nutrition    Nutrition Interventions:   Food and/or Nutrient Delivery: Modify tube feeding  Nutrition Education and Counseling: No recommendations at this time  Coordination of Nutrition Care: Continue to monitor while inpatient, Interdisciplinary rounds    Goals:  Tube feeding to meet 90% estimated needs x 5-7 days or until able to safely advance diet. Nutrition Monitoring and Evaluation:   Behavioral-Environmental Outcomes: None identified  Food/Nutrient Intake Outcomes: Enteral nutrition intake/tolerance  Physical Signs/Symptoms Outcomes: Fluid status or edema, Hemodynamic status, Biochemical data, Weight, GI status    Discharge Planning:     Too soon to determine     Julio Schuster RD CNSC  Contact: Rodolfo Shea

## 2020-12-28 NOTE — PROGRESS NOTES
SLP Contact Note    Noted pt remains on BiPAP and thus is not appropriate for SLP at this time. Will hold for now.     Thank you,  ALTON AlonzoEd, [id number] [facility]  Speech-Language Pathologist

## 2020-12-28 NOTE — PROGRESS NOTES
0730 Received verbal bedside report from [facility]. Assumed care of the pt. Mouth Care performed. 0800 Dr. Carlin Ken, intensivist at the bedside. Orders received to increase water flush to 300. Rate increased. 1330 RT at the bedside. Pt placed on hi flow. Shift Summary: Pt has been more alert throughout shift. He us able to follow commands and speak. Speech is garbled and hard to understand but at times comprehendible, may be due to extreme dry mouth. Extensive mouth care has been performed throughout shift. TF has been increased per order, pt tolerated well.

## 2020-12-28 NOTE — PROGRESS NOTES
Assumed care of patient at [address], assessed per CCU protocol see secondary record for details. 2200: Patient noted to be only responding to pain, Dr. Sierra Alexander made aware, ABG done, No new orders at this time. Patient RR and SpO2 WNL. Bedside shift change report given to Gabriela (oncoming nurse) by Daren Rea (offgoing nurse). Report included the following information SBAR, Kardex, Intake/Output, MAR, Accordion, Recent Results, Med Rec Status, Cardiac Rhythm NSR and Alarm Parameters .

## 2020-12-28 NOTE — PROGRESS NOTES
12/28/2020 -   NICOLASA:  - RUR: 23%  - Disposition is TBD: likely placement at [facility]  - Patient will need a negative COVID test within 72 hours of discharge  - Patient will need BLS transport  - Patient will need IM Letter prior to discharge    - Patient is S/P Cardiac Arrest 12/24  - ABX continue  - SLP, PT, and OT pending medical appropriateness  - Tube Feeds have been started  - Plan is to attempt weaning patient from BiPAP to Hi Flow O2 today, 12/28  - Patient's mental status continues to wax and wane  CRM: Keri Meeks, MPH, [place]; Z: [phone]

## 2020-12-28 NOTE — PROGRESS NOTES
Problem: Mobility Impaired (Adult and Pediatric)  Goal: *Acute Goals and Plan of Care (Insert Text)  Description: FUNCTIONAL STATUS PRIOR TO ADMISSION: Patient was independent and active without use of DME.    HOME SUPPORT PRIOR TO ADMISSION: The patient lived with his wife but did not require assist.    Physical Therapy Goals  Initiated 12/12/2020 and re-evaluated/downgraded 12/16/2020; goals reviewed on 12/26/20 and remain appropriate. 1.  Patient will move from supine to sit and sit to supine , scoot up and down, and roll side to side in bed with minimal assistance/contact guard assist within 7 day(s). 2.  Patient will transfer from bed to chair and chair to bed with minimal assistance/contact guard assist using the least restrictive device within 7 day(s). 3.  Patient will perform sit to stand with minimal assistance/contact guard assist within 7 day(s). 4.  Patient will ambulate with minimal assistance/contact guard assist for 50 feet with the least restrictive device within 7 day(s). Outcome: Not Met   PHYSICAL THERAPY TREATMENT  Patient: Lurdes Trammell (21 y.o. male)  Date: 12/28/2020  Diagnosis: AMS (altered mental status) [R41.82] AMS (altered mental status)       Precautions: Fall  Chart, physical therapy assessment, plan of care and goals were reviewed. ASSESSMENT  Patient continues with skilled PT services and is slowly progressing towards goals. Received patient sleeping, on 25L HiFlow O2, difficult to wake (required verbal and tactile stimulation). Once awake, patient remained lethargic closing his eyes throughout this visit though able to follow commands with extra time. He is max assist x2 for bed mobility and supine<->sit.   He tolerated sitting EOB about 5 mins with poor sitting balance requiring min to max assist.  He is able to partially correct his sitting posture with cues provided, does not achieve full upright sitting without assist.  When asked if he was tired and ready to return to bed, he nodded his head \"yes\" to both. SpO2 % during therapy activity. Current Level of Function Impacting Discharge (mobility/balance): Max assist x2    Other factors to consider for discharge: PLOF indep         PLAN :  Patient continues to benefit from skilled intervention to address the above impairments. Continue treatment per established plan of care. to address goals. Recommendation for discharge: (in order for the patient to meet his/her long term goals)  Therapy 3 hours per day 5-7 days per week    This discharge recommendation:  Has been made in collaboration with the attending provider and/or case management    IF patient discharges home will need the following DME: to be determined (TBD)       SUBJECTIVE:   Nods head in response to questions asked. Non verbal at this time d/t dry mouth. RN already aware. OBJECTIVE DATA SUMMARY:   Critical Behavior:  Neurologic State: Drowsy, Lethargic, Eyes open to voice  Orientation Level: Unable to verbalize  Cognition: Follows commands with extra time  Safety/Judgement: Decreased awareness of environment, Decreased awareness of need for assistance, Decreased awareness of need for safety, Decreased insight into deficits  Functional Mobility Training:  Bed Mobility:  Rolling: Maximum assistance  Supine to Sit: Maximum assistance;Assist x2  Sit to Supine: Maximum assistance;Assist x2  Scooting: Maximum assistance;Assist x2        Transfers:  Unable to attempt today d/t lethargy, difficulty staying awake, decreased command following, poor sitting balance                                Balance:  Sitting: Impaired; Without support  Sitting - Static: Poor (constant support)  Worked on sitting balance providing verbal and tactile cues. Pt partially corrects, does achieve full upright sitting, does not maintain unsupported sitting balance today.                 Pain Rating:  None indicated    Activity Tolerance:   Fair, desaturates with exertion and requires oxygen, and requires rest breaks    After treatment patient left in no apparent distress:   Supine in bed, Heels elevated for pressure relief, Call bell within reach, Side rails x 3, and HOB 35 degs, feeding resumed    COMMUNICATION/COLLABORATION:   The patients plan of care was discussed with: Registered nurse.      Nikunj Dickinson, PT   Time Calculation: 21 mins

## 2020-12-28 NOTE — PROGRESS NOTES
Problem: Self Care Deficits Care Plan (Adult)  Goal: *Acute Goals and Plan of Care (Insert Text)  Description:   FUNCTIONAL STATUS PRIOR TO ADMISSION: Patient independent. Family assist with shoes and socks PRN. HOME SUPPORT: The patient lived with wife and two teenage daughters. Occupational Therapy Goals  Initiated 12/21/2020, reviewed 12/28/2020  1. Patient will perform grooming sitting unsupported with minimal assistance within 7 day(s). 2.  Patient will perform anterior neck to thigh bathing sitting unsupported with minimal assistance within 7 day(s). 3.  Patient will perform lower body dressing with moderate assistance within 7 day(s). 4.  Patient will perform toilet transfers to/from [facility] with moderate assistance within 7 day(s). 5.  Patient will perform all aspects of toileting with moderate assistance  within 7 day(s). 6.  Patient will participate in upper extremity therapeutic exercise/activities with supervision/set-up for 5 minutes within 7 day(s). 7.  Patient will utilize energy conservation techniques during functional activities with verbal cues within 7 day(s). Outcome: Progressing Towards Goal   OCCUPATIONAL THERAPY RE-EVALUATION  Patient: Delon Rose (76 y.o. male)  Date: 12/28/2020  Diagnosis: AMS (altered mental status) [R41.82] AMS (altered mental status)       Precautions: Fall  Chart, occupational therapy assessment, plan of care, and goals were reviewed. ASSESSMENT  Based on the objective data described below, with 12/24/2020 PEA and transfer to CCU on bipap. ADLs limited by active ROM, strength, sitting balance, cognition, cardiopulmonary tolerance, and endurance. Patient when awake very participatory.     Current Level of Function Impacting Discharge (ADLs): max A upper and lower body ADLs    Other factors to consider for discharge: wife and daughters supportive         PLAN :  Recommendations and Planned Interventions: self care training, functional mobility training, therapeutic exercise, balance training, therapeutic activities, cognitive retraining, endurance activities, patient education, home safety training, and family training/education    Frequency/Duration: Patient will be followed by occupational therapy 5 times a week to address goals. Recommend with staff: bed in chair position, sherry to chair with bed alarm    Recommend next OT session: EOB when awake    Recommendation for discharge: (in order for the patient to meet his/her long term goals)  Therapy 3 hours per day 5-7 days per week    This discharge recommendation:  Has not yet been discussed the attending provider and/or case management    Equipment recommendations for successful discharge (if) home: TBD       SUBJECTIVE:   Patient thumbs up    OBJECTIVE DATA SUMMARY:   Hospital course since last seen and reason for reevaluation: 12/24/2020 PEA and transfer to CCU on bipap    Cognitive/Behavioral Status:  Neurologic State: Drowsy; Lethargic;Eyes open to voice  Orientation Level: Unable to verbalize  Cognition: Follows commands             Skin: intact    Edema: intact    Hearing: Auditory  Auditory Impairment: Hard of hearing, bilateral    Vision/Perceptual:                                     Range of Motion:    AROM: Generally decreased, functional                         Strength:    Strength: Generally decreased, functional                Coordination:  Coordination: Generally decreased, functional  Fine Motor Skills-Upper: Left Impaired;Right Impaired    Gross Motor Skills-Upper: Left Impaired;Right Impaired    Tone & Sensation:                                Functional Mobility and Transfers for ADLs:      Transfers:             Balance:  Sitting: Impaired; Without support  Sitting - Static: Poor (constant support)  Sitting - Dynamic: Poor (constant support)    ADL Assessment:  Feeding:  Total assistance NPO    Oral Facial Hygiene/Grooming: Maximum assistance wash face, proximal support    Bathing: Total assistance    Upper Body Dressing: Total assistance    Lower Body Dressing: Maximum assistance when then awake patient tailor sitting and able to touch ankles    Toileting: Total assistance      Received with wife present. Informed of OT and benefits. Stating  completing movement when awake, currently not awake. Informed of benefits staying awake during day, willing to try. Wife participated in cognitive and physical engagement to increase knowledge and training to interact with spouse. Patient tolerated bed placed in chair position, setup hands on bed rails to self adjust and trunk flexion total A. Patient opening eyes to sternal rub but not keeping eyes opening during this time. Upon walking by patient then awake, conversant and performing ROM. ADL Intervention and task modifications:                                          Therapeutic Exercises:   Instructed and demonstrated B UEs and LEs stretch and hold hip ER 10 seconds each with total A. Functional Measure:      Pain:    Activity Tolerance:   Fair  Bipap    HR 72  O2 100%  20 RR  After treatment patient left in no apparent distress:   Supine in bed, Call bell within reach, and Side rails x 3    COMMUNICATION/COLLABORATION:   The patients plan of care was discussed with: Registered nurse.      Alize Becker  Time Calculation: 22 mins

## 2020-12-28 NOTE — PROGRESS NOTES
Cardiology Progress Note      12/28/2020 9:59 AM    Admit Date: 12/11/2020    Admit Diagnosis: AMS (altered mental status) [R41.82]      Subjective:     Joselyncliff Sandoval s/p PEA arrest secondary to respiratory arrest.  Minimal further NSVT on telemetry. Encephalopathic and not properly responsive on BIPAP.   Visit Vitals  /64   Pulse 80   Temp 98 °F (36.7 °C)   Resp 13   Ht 5' 11\" (1.803 m)   Wt 184 lb 1.4 oz (83.5 kg)   SpO2 98%   BMI 25.67 kg/m²     Current Facility-Administered Medications   Medication Dose Route Frequency    potassium bicarb-citric acid (EFFER-K) tablet 20 mEq  20 mEq Oral BID    potassium bicarb-citric acid (EFFER-K) tablet 40 mEq  40 mEq Oral BID    Vancomycin Trough at 1800 today   Other ONCE    chlorhexidine (ORAL CARE KIT) 0.12 % mouthwash 15 mL  15 mL Oral Q12H    cholecalciferol (vitamin D3) 10 mcg/mL (400 unit/mL) oral liquid 30 mcg  30 mcg Oral DAILY    pantoprazole (PROTONIX) 40 mg in 0.9% sodium chloride 10 mL injection  40 mg IntraVENous Q12H    metoprolol (LOPRESSOR) injection 5 mg  5 mg IntraVENous Q6H    Vancomycin - pharmacy to dose   Other Rx Dosing/Monitoring    vancomycin (VANCOCIN) 1250 mg in  ml infusion  1,250 mg IntraVENous Q18H    dexmedeTOMidine in 0.9 % NaCl (PRECEDEX) 400 mcg/100 mL (4 mcg/mL) infusion soln  0.2-1.4 mcg/kg/hr IntraVENous TITRATE    piperacillin-tazobactam (ZOSYN) 3.375 g in 0.9% sodium chloride (MBP/ADV) 100 mL MBP  3.375 g IntraVENous Q8H    [Held by provider] amLODIPine (NORVASC) tablet 10 mg  10 mg Oral DAILY    enoxaparin (LOVENOX) injection 40 mg  40 mg SubCUTAneous Q24H    ELECTROLYTE REPLACEMENT PROTOCOL - Potassium and Magnesium  1 Each Other PRN    sodium chloride (NS) flush 5-40 mL  5-40 mL IntraVENous Q8H    sodium chloride (NS) flush 5-40 mL  5-40 mL IntraVENous PRN    potassium chloride 10 mEq in 100 ml IVPB  10 mEq IntraVENous PRN    acetaminophen (TYLENOL) tablet 650 mg  650 mg Oral Q6H PRN    Or    acetaminophen (TYLENOL) suppository 650 mg  650 mg Rectal Q6H PRN    polyethylene glycol (MIRALAX) packet 17 g  17 g Oral DAILY PRN    ondansetron (ZOFRAN ODT) tablet 4 mg  4 mg Oral Q8H PRN    Or    ondansetron (ZOFRAN) injection 4 mg  4 mg IntraVENous Q6H PRN    influenza vaccine 2020-21 (6 mos+)(PF) (FLUARIX/FLULAVAL/FLUZONE QUAD) injection 0.5 mL  0.5 mL IntraMUSCular PRIOR TO DISCHARGE         Objective:      Physical Exam:  Visit Vitals  /64   Pulse 80   Temp 98 °F (36.7 °C)   Resp 13   Ht 5' 11\" (1.803 m)   Wt 184 lb 1.4 oz (83.5 kg)   SpO2 98%   BMI 25.67 kg/m²     General Appearance:  Agitated, confused, restless    Ears/Nose/Mouth/Throat:   Hearing grossly normal.         Neck: Supple. Chest:   Scattered rhonchi bilaterally    Cardiovascular:  Regular rate and rhythm, S1, S2 normal, no murmur. Abdomen:   Soft, non-tender, bowel sounds are active. Extremities: No edema bilaterally. Skin: Warm and dry.                Data Review:   Labs:    Recent Results (from the past 24 hour(s))   EKG, 12 LEAD, INITIAL    Collection Time: 12/27/20  6:33 PM   Result Value Ref Range    Ventricular Rate 76 BPM    Atrial Rate 76 BPM    P-R Interval 106 ms    QRS Duration 74 ms    Q-T Interval 458 ms    QTC Calculation (Bezet) 515 ms    Calculated P Axis 28 degrees    Calculated R Axis -6 degrees    Calculated T Axis 42 degrees    Diagnosis       Accelerated Junctional rhythm  Cannot rule out Inferior infarct , age undetermined  Prolonged QT  When compared with ECG of 11-DEC-2020 12:29,  Junctional rhythm has replaced Sinus rhythm  Nonspecific T wave abnormality now evident in Lateral leads     POC EG7    Collection Time: 12/27/20 10:18 PM   Result Value Ref Range    Calcium, ionized (POC) 1.21 1.12 - 1.32 mmol/L    FIO2 (POC) 100 %    pH (POC) 7.32 (L) 7.35 - 7.45      pCO2 (POC) 55.1 (H) 35.0 - 45.0 MMHG    pO2 (POC) 105 (H) 80 - 100 MMHG    HCO3 (POC) 28.2 (H) 22 - 26 MMOL/L    Base excess (POC) 2 mmol/L sO2 (POC) 97 92 - 97 %    Site LEFT RADIAL      Device: BIPAP      PEEP/CPAP (POC) 6 cmH2O    PIP (POC) 16      Allens test (POC) YES      Specimen type (POC) ARTERIAL     LACTIC ACID    Collection Time: 12/28/20  5:01 AM   Result Value Ref Range    Lactic acid 0.9 0.4 - 2.0 MMOL/L   PROCALCITONIN    Collection Time: 12/28/20  5:01 AM   Result Value Ref Range    Procalcitonin 0.10 ng/mL   NT-PRO BNP    Collection Time: 12/28/20  5:01 AM   Result Value Ref Range    NT pro-BNP 1,515 (H) <450 PG/ML   CBC WITH AUTOMATED DIFF    Collection Time: 12/28/20  5:01 AM   Result Value Ref Range    WBC 10.4 4.1 - 11.1 K/uL    RBC 2.63 (L) 4.10 - 5.70 M/uL    HGB 8.8 (L) 12.1 - 17.0 g/dL    HCT 28.4 (L) 36.6 - 50.3 %    .0 (H) 80.0 - 99.0 FL    MCH 33.5 26.0 - 34.0 PG    MCHC 31.0 30.0 - 36.5 g/dL    RDW 13.1 11.5 - 14.5 %    PLATELET 965 774 - 132 K/uL    MPV 9.6 8.9 - 12.9 FL    NRBC 0.0 0  WBC    ABSOLUTE NRBC 0.00 0.00 - 0.01 K/uL    NEUTROPHILS 74 32 - 75 %    LYMPHOCYTES 10 (L) 12 - 49 %    MONOCYTES 7 5 - 13 %    EOSINOPHILS 7 0 - 7 %    BASOPHILS 1 0 - 1 %    IMMATURE GRANULOCYTES 1 (H) 0.0 - 0.5 %    ABS. NEUTROPHILS 7.6 1.8 - 8.0 K/UL    ABS. LYMPHOCYTES 1.1 0.8 - 3.5 K/UL    ABS. MONOCYTES 0.7 0.0 - 1.0 K/UL    ABS. EOSINOPHILS 0.7 (H) 0.0 - 0.4 K/UL    ABS. BASOPHILS 0.1 0.0 - 0.1 K/UL    ABS. IMM.  GRANS. 0.1 (H) 0.00 - 0.04 K/UL    DF AUTOMATED     METABOLIC PANEL, COMPREHENSIVE    Collection Time: 12/28/20  5:01 AM   Result Value Ref Range    Sodium 153 (H) 136 - 145 mmol/L    Potassium 3.6 3.5 - 5.1 mmol/L    Chloride 121 (H) 97 - 108 mmol/L    CO2 28 21 - 32 mmol/L    Anion gap 4 (L) 5 - 15 mmol/L    Glucose 128 (H) 65 - 100 mg/dL    BUN 10 6 - 20 MG/DL    Creatinine 1.25 0.70 - 1.30 MG/DL    BUN/Creatinine ratio 8 (L) 12 - 20      GFR est AA >60 >60 ml/min/1.73m2    GFR est non-AA 56 (L) >60 ml/min/1.73m2    Calcium 7.8 (L) 8.5 - 10.1 MG/DL    Bilirubin, total 0.4 0.2 - 1.0 MG/DL    ALT (SGPT) 26 12 - 78 U/L    AST (SGOT) 18 15 - 37 U/L    Alk. phosphatase 59 45 - 117 U/L    Protein, total 5.8 (L) 6.4 - 8.2 g/dL    Albumin 1.9 (L) 3.5 - 5.0 g/dL    Globulin 3.9 2.0 - 4.0 g/dL    A-G Ratio 0.5 (L) 1.1 - 2.2         Telemetry: normal sinus rhythm      Assessment & Plan:   1. NSVT. Unclear etiology. No recurrent arrhythmia on telemetry. Echo was normal pre-arrest, will plan to replete hypokalemia and check magnesium level and stop propanolol since patient cannot take PO medications currently, will begin IV metoprolol 5mg every 6 hours  2. Acute respiratory failure. Post ativan. Aspiration pneumonitis. S/P Bradycardia arrest from hypoxemia/hypercarbia/acidosis. Does not require any further immediate cardiac intervention  3. EtOH withdrawal per primary team  4. Anemia per primary team  5. Hypokalemia - K 3.3, will give KCL 20meq IV now, recheck in AM  6. HTN -defer to primary team at this time. Cardiology will sign off at this time.     Hanna Triana MD

## 2020-12-28 NOTE — PROGRESS NOTES
[facility]    ICU TEAM Progress Note    Name: Nathan Thomas   : 1945   MRN: [id number]   Date: 2020      Assessment     ICU Problems:    1. Acute Hypoxic Respiratory Failure (secondary to Ativan --> Somnolence)  2. Volume Overload  3. Aspiration Pneumonitis  4. Cardiac Arrest (due to hypoxia) - Sinus Junito/PEA  5. Acute Alcohol Withdrawal   6. NSVT  7. Acute Toxic Metabolic Encephalopathy  8. Acute Renal Failure    Imagin2020      ICU Comprehensive Plan of Care:     Plans for this Shift:     1. Attempt to go to HFNC from BiPAP  2. Precedex tonight 9PM --> 6AM  3. Lights on during the day, TV on   4. Increase free water flushes  5. Diuril/Bumex combo  6. Continue TF's  7. Vanc/Zosyn per ID - possible plans to de-escalate  8. Update wife  5. Thiamine/Folate  10. SBP Goal of: > 90 mmHg  11. MAP Goal of: > 65 mmHg  12. None - For above SBP/MAP goals  13. Transfusion Trigger (Hgb): <7 g/dL  14. Respiratory Goals:  a. Head of bed > 30 degrees  b. Aggressive bronchopulmonary hygiene  c. Incentive spirometry  15. Pulmonary toilet: Incentive Spirometry   16. SpO2 Goal: > 92%  17. Keep K>4; Mg>2   18. PT/OT: PT consulted and on board and OT consulted and on board   19. Goals of Care Discussion with family Yes   21. Plan of Care/Code Status: Full Code  21. Discussed Care Plan with Bedside RN  22. Documentation of Current Medications  23.  Rest of Plan Below:    F - Feeding:  No   A - Analgesia: Acetaminophen  S - Sedation: Precedex  T - DVT Prophylaxis: SCD's or Sequential Compression Device and Lovenox   H - Head of Bed: > 30 Degrees  U - Ulcer Prophylaxis: Protonix (pantoprazole)   G - Glycemic Control: Insulin  S - Spontaneous Breathing Trial: N/A  B - Bowel Regimen: Senna and Docusate (Colace)  I - Indwelling Catheter:   Tubes: None  Lines: Peripheral IV  Drains: None  D - De-escalation of Antibiotics: Vancomycin  Zosyn    Subjective:   Progress Note: 12/28/2020      Reason for ICU Admission: Acute Hypoxic Respiratory Failure --> Cardiac Arrest     HPI:  Cam Hatchet is a 76 y.o. with a history of melanoma, hypertension and alcohol abuse. He suffered a blunt head injury after a ground-level fall on the evening of December 8. He had tripped over a sidewalk resulting in him falling forward and landing on his right forehead. Patient has felt disoriented and was having some memory issues. For this reason, he was admitted on December 11. He was placed on Rocephin empirically, but this was discontinued because no source of infection was found. He developed delirium tremens and had to be intubated and admitted to the ICU. He was eventually extubated a few days later. In the past day, he developed fever and elevated white count prompting consult. The patient tells me that he does not have any cough, dyspnea, abdominal pain, dysuria, headache or sore throat. He does not have any rash. He does not have any back pain or joint pain. Dr. Eusebio Hernadez documents that he is having diarrhea. A C. difficile has been ordered. Chest x-ray did not reveal any pneumonia. Blood cultures have also been sent. Urinalysis did not show any significant pyuria. He was started on Zosyn and we are being asked to see him in consult.     Overnight Events:   12/28/2020  On BiPAP, CXR improving      Active Problem List:     Problem List  Date Reviewed: 8/2/2020          Codes Class    Alcohol withdrawal ([facility] ICD-10-CM: F10.239  ICD-9-CM: 291.81         UTI (urinary tract infection) ICD-10-CM: N39.0  ICD-9-CM: 599.0         Hypokalemia ICD-10-CM: E87.6  ICD-9-CM: 276.8         Thrombocytopenia ([facility] ICD-10-CM: D69.6  ICD-9-CM: 287.5         Anemia ICD-10-CM: D64.9  ICD-9-CM: 285.9         * (Principal) AMS (altered mental status) ICD-10-CM: R41.82  ICD-9-CM: 780.97         Excessive drinking of alcohol ICD-10-CM: F10.10  ICD-9-CM: 305.00 Hyponatremia ICD-10-CM: E87.1  ICD-9-CM: 276.1         HATTIE (acute kidney injury) ([facility] ICD-10-CM: N17.9  ICD-9-CM: 584.9         Essential hypertension ICD-10-CM: I10  ICD-9-CM: 401.9               Past Medical History:      has a past medical history of Cancer ([facility] (~ ), Hypertension, and Other ill-defined conditions(799.89) (1960~). Past Surgical History:      has a past surgical history that includes hx other surgical (~ ) and colonoscopy (2011). Home Medications:     Prior to Admission medications    Medication Sig Start Date End Date Taking? Authorizing Provider   losartan (COZAAR) 100 mg tablet TAKE 1 TABLET BY MOUTH EVERY DAY 20  Yes Jessica Willett MD       Allergies/Social/Family History: Allergies   Allergen Reactions    Ace Inhibitors Cough    Thiazides Other (comments)     hyponatremia      Social History     Tobacco Use    Smoking status: Never Smoker    Smokeless tobacco: Never Used   Substance Use Topics    Alcohol use: Yes     Comment: occasional beer      Family History   Problem Relation Age of Onset    Hypertension Father        Review of Systems:     A comprehensive review of systems was negative except for that written in the HPI. Objective:   Vital Signs:  Visit Vitals  /66   Pulse 82   Temp 97.9 °F (36.6 °C)   Resp 24   Ht 5' 11\" (1.803 m)   Wt 83.5 kg (184 lb 1.4 oz)   SpO2 100%   BMI 25.67 kg/m²    O2 Flow Rate (L/min): 4 l/min O2 Device: BIPAP Temp (24hrs), Av °F (36.7 °C), Min:97.8 °F (36.6 °C), Max:98.5 °F (36.9 °C)           Intake/Output:     Intake/Output Summary (Last 24 hours) at 2020 0716  Last data filed at 2020 0600  Gross per 24 hour   Intake 2600 ml   Output 3425 ml   Net -825 ml       Physical Exam:    General:  Awake, follows simple commands, confused; look older than stated age   Eyes:  Sclera anicteric. Pupils equally round and reactive to light.    Mouth/Throat: Mucous membranes normal, oral pharynx clear   Neck: Supple   Lungs:   Rhonchi bilaterally, good effort   CV:  Regular rate and rhythm,no murmur, click, rub or gallop   Abdomen:   Soft, non-tender. bowel sounds normal. non-distended   Extremities: No cyanosis or edema   Skin: Skin color, texture, turgor normal. no acute rash or lesions   Lymph nodes: Cervical and supraclavicular normal   Musculoskeletal: No swelling or deformity   Lines/Devices:  Intact, no erythema, drainage or tenderness   Psych: Somnolent     LABS AND  DATA: Personally reviewed  Recent Labs     12/28/20  0501 12/27/20  0459   WBC 10.4 8.0   HGB 8.8* 7.7*   HCT 28.4* 25.0*    240     Recent Labs     12/28/20  0501 12/27/20  1524 12/27/20  0459   * 155* 153*   K 3.6 3.6 3.5   * 121* 124*   CO2 28 33* 25   BUN 10 11 13   CREA 1.25 1.34* 1.10   * 116* 113*   CA 7.8* 7.9* 7.6*   MG  --  1.8 2.2     Recent Labs     12/28/20  0501 12/27/20  0459   AP 59 56   TP 5.8* 5.2*   ALB 1.9* 1.9*   GLOB 3.9 3.3     No results for input(s): INR, PTP, APTT, INREXT, INREXT in the last 72 hours. Recent Labs     12/25/20  1105   PHI 7.44   PCO2I 31.6*   PO2I 69*     No results for input(s): CPK, CKMB, TROIQ, BNPP in the last 72 hours. Hemodynamics:   PAP:   CO:     Wedge:   CI:     CVP:    SVR:       PVR:       Ventilator Settings:  Mode Rate Tidal Volume Pressure FiO2 PEEP   Assist control, Volume control   450 ml  5 cm H2O 100 % 5 cm H20     Peak airway pressure: 22 cm H2O    Minute ventilation: 8.5 l/min        MEDS: Reviewed    Chest X-Ray:  CXR Results  (Last 48 hours)               12/27/20 0452  XR CHEST PORT Final result    Impression:  IMPRESSION:    Interval worsening versus radiographic blossoming of left lower lobe airspace   disease. Small bilateral pleural effusions. Narrative:  EXAM: XR CHEST PORT       INDICATION: Respiratory failure       COMPARISON: 12/25/2020       FINDINGS: A portable AP radiograph of the chest was obtained at 0410 hours.  The   patient is on a cardiac monitor. The Dobbhoff tube extends below the   hemidiaphragm. There are small bilateral pleural effusions. A patchy left   basilar airspace process is seen. Multidisciplinary Rounds Completed: Yes    ABCDEF Bundle/Checklist Completed:  Yes    SPECIAL EQUIPMENT  None    DISPOSITION  Stay in ICU    CRITICAL CARE CONSULTANT NOTE  I had a face to face encounter with the patient, reviewed and interpreted patient data including clinical events, labs, images, vital signs, I/O's, and examined patient. I have discussed the case and the plan and management of the patient's care with the consulting services, the bedside nurses and the respiratory therapist.      NOTE OF PERSONAL INVOLVEMENT IN CARE   This patient has a high probability of imminent, clinically significant deterioration, which requires the highest level of preparedness to intervene urgently. I participated in the decision-making and personally managed or directed the management of the following life and organ supporting interventions that required my frequent assessment to treat or prevent imminent deterioration. I personally spent 85 minutes of critical care time. This is time spent at this critically ill patient's bedside actively involved in patient care as well as the coordination of care. This does not include any procedural time which has been billed separately.     Norina Snellen, DO, MS  Staff Intensivist/Anesthesiologist  [facility]  12/28/2020

## 2020-12-29 LAB
ALBUMIN SERPL-MCNC: 1.9 G/DL (ref 3.5–5)
ALBUMIN/GLOB SERPL: 0.5 {RATIO} (ref 1.1–2.2)
ALP SERPL-CCNC: 62 U/L (ref 45–117)
ALT SERPL-CCNC: 20 U/L (ref 12–78)
ANION GAP SERPL CALC-SCNC: 3 MMOL/L (ref 5–15)
ANION GAP SERPL CALC-SCNC: 6 MMOL/L (ref 5–15)
AST SERPL-CCNC: 17 U/L (ref 15–37)
BASOPHILS # BLD: 0.1 K/UL (ref 0–0.1)
BASOPHILS NFR BLD: 1 % (ref 0–1)
BILIRUB SERPL-MCNC: 0.3 MG/DL (ref 0.2–1)
BNP SERPL-MCNC: 1035 PG/ML
BUN SERPL-MCNC: 12 MG/DL (ref 6–20)
BUN SERPL-MCNC: 16 MG/DL (ref 6–20)
BUN/CREAT SERPL: 11 (ref 12–20)
BUN/CREAT SERPL: 9 (ref 12–20)
CALCIUM SERPL-MCNC: 8 MG/DL (ref 8.5–10.1)
CALCIUM SERPL-MCNC: 8.5 MG/DL (ref 8.5–10.1)
CHLORIDE SERPL-SCNC: 110 MMOL/L (ref 97–108)
CHLORIDE SERPL-SCNC: 117 MMOL/L (ref 97–108)
CO2 SERPL-SCNC: 29 MMOL/L (ref 21–32)
CO2 SERPL-SCNC: 33 MMOL/L (ref 21–32)
CREAT SERPL-MCNC: 1.29 MG/DL (ref 0.7–1.3)
CREAT SERPL-MCNC: 1.48 MG/DL (ref 0.7–1.3)
DIFFERENTIAL METHOD BLD: ABNORMAL
EOSINOPHIL # BLD: 0.7 K/UL (ref 0–0.4)
EOSINOPHIL NFR BLD: 7 % (ref 0–7)
ERYTHROCYTE [DISTWIDTH] IN BLOOD BY AUTOMATED COUNT: 13.1 % (ref 11.5–14.5)
GLOBULIN SER CALC-MCNC: 4.2 G/DL (ref 2–4)
GLUCOSE SERPL-MCNC: 102 MG/DL (ref 65–100)
GLUCOSE SERPL-MCNC: 145 MG/DL (ref 65–100)
HCT VFR BLD AUTO: 27.4 % (ref 36.6–50.3)
HGB BLD-MCNC: 8.5 G/DL (ref 12.1–17)
IMM GRANULOCYTES # BLD AUTO: 0.2 K/UL (ref 0–0.04)
IMM GRANULOCYTES NFR BLD AUTO: 2 % (ref 0–0.5)
LACTATE SERPL-SCNC: 1.5 MMOL/L (ref 0.4–2)
LYMPHOCYTES # BLD: 1.4 K/UL (ref 0.8–3.5)
LYMPHOCYTES NFR BLD: 14 % (ref 12–49)
MAGNESIUM SERPL-MCNC: 2.1 MG/DL (ref 1.6–2.4)
MCH RBC QN AUTO: 33.2 PG (ref 26–34)
MCHC RBC AUTO-ENTMCNC: 31 G/DL (ref 30–36.5)
MCV RBC AUTO: 107 FL (ref 80–99)
MONOCYTES # BLD: 0.8 K/UL (ref 0–1)
MONOCYTES NFR BLD: 8 % (ref 5–13)
NEUTS SEG # BLD: 7 K/UL (ref 1.8–8)
NEUTS SEG NFR BLD: 68 % (ref 32–75)
NRBC # BLD: 0 K/UL (ref 0–0.01)
NRBC BLD-RTO: 0 PER 100 WBC
PHOSPHATE SERPL-MCNC: 3.1 MG/DL (ref 2.6–4.7)
PLATELET # BLD AUTO: 209 K/UL (ref 150–400)
PMV BLD AUTO: 10 FL (ref 8.9–12.9)
POTASSIUM SERPL-SCNC: 3.6 MMOL/L (ref 3.5–5.1)
POTASSIUM SERPL-SCNC: 3.6 MMOL/L (ref 3.5–5.1)
PROCALCITONIN SERPL-MCNC: 0.11 NG/ML
PROT SERPL-MCNC: 6.1 G/DL (ref 6.4–8.2)
RBC # BLD AUTO: 2.56 M/UL (ref 4.1–5.7)
SODIUM SERPL-SCNC: 146 MMOL/L (ref 136–145)
SODIUM SERPL-SCNC: 152 MMOL/L (ref 136–145)
WBC # BLD AUTO: 10.2 K/UL (ref 4.1–11.1)

## 2020-12-29 PROCEDURE — 74011000250 HC RX REV CODE- 250: Performed by: ANESTHESIOLOGY

## 2020-12-29 PROCEDURE — 92610 EVALUATE SWALLOWING FUNCTION: CPT

## 2020-12-29 PROCEDURE — 85025 COMPLETE CBC W/AUTO DIFF WBC: CPT

## 2020-12-29 PROCEDURE — 74011250637 HC RX REV CODE- 250/637: Performed by: ANESTHESIOLOGY

## 2020-12-29 PROCEDURE — 74011250636 HC RX REV CODE- 250/636: Performed by: FAMILY MEDICINE

## 2020-12-29 PROCEDURE — 83735 ASSAY OF MAGNESIUM: CPT

## 2020-12-29 PROCEDURE — 74011250636 HC RX REV CODE- 250/636: Performed by: ANESTHESIOLOGY

## 2020-12-29 PROCEDURE — 74011250636 HC RX REV CODE- 250/636: Performed by: INTERNAL MEDICINE

## 2020-12-29 PROCEDURE — 74011000258 HC RX REV CODE- 258: Performed by: INTERNAL MEDICINE

## 2020-12-29 PROCEDURE — 74011000250 HC RX REV CODE- 250: Performed by: NURSE PRACTITIONER

## 2020-12-29 PROCEDURE — 83880 ASSAY OF NATRIURETIC PEPTIDE: CPT

## 2020-12-29 PROCEDURE — 84145 PROCALCITONIN (PCT): CPT

## 2020-12-29 PROCEDURE — 65620000000 HC RM CCU GENERAL

## 2020-12-29 PROCEDURE — 74011250636 HC RX REV CODE- 250/636: Performed by: EMERGENCY MEDICINE

## 2020-12-29 PROCEDURE — 83605 ASSAY OF LACTIC ACID: CPT

## 2020-12-29 PROCEDURE — 36415 COLL VENOUS BLD VENIPUNCTURE: CPT

## 2020-12-29 PROCEDURE — 97530 THERAPEUTIC ACTIVITIES: CPT

## 2020-12-29 PROCEDURE — 80053 COMPREHEN METABOLIC PANEL: CPT

## 2020-12-29 PROCEDURE — 74011000258 HC RX REV CODE- 258: Performed by: FAMILY MEDICINE

## 2020-12-29 PROCEDURE — 84100 ASSAY OF PHOSPHORUS: CPT

## 2020-12-29 PROCEDURE — C9113 INJ PANTOPRAZOLE SODIUM, VIA: HCPCS | Performed by: ANESTHESIOLOGY

## 2020-12-29 RX ORDER — BUMETANIDE 0.25 MG/ML
2 INJECTION INTRAMUSCULAR; INTRAVENOUS EVERY 12 HOURS
Status: DISCONTINUED | OUTPATIENT
Start: 2020-12-29 | End: 2020-12-30

## 2020-12-29 RX ADMIN — PIPERACILLIN AND TAZOBACTAM 3.38 G: 3; .375 INJECTION, POWDER, LYOPHILIZED, FOR SOLUTION INTRAVENOUS at 02:21

## 2020-12-29 RX ADMIN — ACETAZOLAMIDE SODIUM 500 MG: 500 INJECTION, POWDER, LYOPHILIZED, FOR SOLUTION INTRAVENOUS at 14:06

## 2020-12-29 RX ADMIN — METOPROLOL TARTRATE 5 MG: 1 INJECTION, SOLUTION INTRAVENOUS at 14:08

## 2020-12-29 RX ADMIN — CHLORHEXIDINE GLUCONATE 15 ML: 0.12 RINSE ORAL at 20:48

## 2020-12-29 RX ADMIN — METOPROLOL TARTRATE 5 MG: 1 INJECTION, SOLUTION INTRAVENOUS at 02:21

## 2020-12-29 RX ADMIN — Medication 30 MCG: at 09:53

## 2020-12-29 RX ADMIN — PIPERACILLIN AND TAZOBACTAM 3.38 G: 3; .375 INJECTION, POWDER, LYOPHILIZED, FOR SOLUTION INTRAVENOUS at 09:18

## 2020-12-29 RX ADMIN — POTASSIUM BICARBONATE 40 MEQ: 782 TABLET, EFFERVESCENT ORAL at 09:53

## 2020-12-29 RX ADMIN — BUMETANIDE 2 MG: 0.25 INJECTION, SOLUTION INTRAMUSCULAR; INTRAVENOUS at 09:18

## 2020-12-29 RX ADMIN — BUMETANIDE 2 MG: 0.25 INJECTION, SOLUTION INTRAMUSCULAR; INTRAVENOUS at 21:23

## 2020-12-29 RX ADMIN — CHLOROTHIAZIDE SODIUM 500 MG: 500 INJECTION, POWDER, LYOPHILIZED, FOR SOLUTION INTRAVENOUS at 08:13

## 2020-12-29 RX ADMIN — PIPERACILLIN AND TAZOBACTAM 3.38 G: 3; .375 INJECTION, POWDER, LYOPHILIZED, FOR SOLUTION INTRAVENOUS at 17:30

## 2020-12-29 RX ADMIN — Medication 10 ML: at 14:08

## 2020-12-29 RX ADMIN — CHLOROTHIAZIDE SODIUM 500 MG: 500 INJECTION, POWDER, LYOPHILIZED, FOR SOLUTION INTRAVENOUS at 20:41

## 2020-12-29 RX ADMIN — Medication 10 ML: at 21:24

## 2020-12-29 RX ADMIN — Medication 10 ML: at 05:52

## 2020-12-29 RX ADMIN — METOPROLOL TARTRATE 5 MG: 1 INJECTION, SOLUTION INTRAVENOUS at 20:42

## 2020-12-29 RX ADMIN — ENOXAPARIN SODIUM 40 MG: 40 INJECTION SUBCUTANEOUS at 08:19

## 2020-12-29 RX ADMIN — POTASSIUM BICARBONATE 40 MEQ: 782 TABLET, EFFERVESCENT ORAL at 17:31

## 2020-12-29 RX ADMIN — SODIUM CHLORIDE 40 MG: 9 INJECTION INTRAMUSCULAR; INTRAVENOUS; SUBCUTANEOUS at 08:19

## 2020-12-29 RX ADMIN — CHLORHEXIDINE GLUCONATE 15 ML: 0.12 RINSE ORAL at 08:20

## 2020-12-29 RX ADMIN — METOPROLOL TARTRATE 5 MG: 1 INJECTION, SOLUTION INTRAVENOUS at 08:19

## 2020-12-29 RX ADMIN — SODIUM CHLORIDE 40 MG: 9 INJECTION INTRAMUSCULAR; INTRAVENOUS; SUBCUTANEOUS at 20:42

## 2020-12-29 NOTE — PROGRESS NOTES
[facility]    ICU TEAM Progress Note    Name: Kevin Fernandez   : 1945   MRN: [id number]   Date: 2020      Assessment     ICU Problems:    1. Acute Hypoxic Respiratory Failure (secondary to Ativan --> Somnolence)  2. Volume Overload  3. Aspiration Pneumonitis  4. Cardiac Arrest (due to hypoxia) - Sinus Junito/PEA  5. Acute Alcohol Withdrawal   6. NSVT  7. Acute Toxic Metabolic Encephalopathy  8. Acute Renal Failure    Imagin2020      ICU Comprehensive Plan of Care:     Plans for this Shift:     1. Avoid sedation if able   2. If needed, low dose Precedex  3. Attempt HFNC to Midflow to NC as able  4. Aggressive PT/OT   5. Lights on during the day, TV on   6. Increase free water flushes again  7. Diuril/Bumex combo q12 again  8. Add Diamox  9. Continue TF's  10. Stopped Vanc  11. Zosyn per ID (last dose today)  12. Update wife  15. Thiamine/Folate  14. SBP Goal of: > 90 mmHg  15. MAP Goal of: > 65 mmHg  16. None - For above SBP/MAP goals  17. Transfusion Trigger (Hgb): <7 g/dL  18. Respiratory Goals:  a. Head of bed > 30 degrees  b. Aggressive bronchopulmonary hygiene  c. Incentive spirometry  19. Pulmonary toilet: Incentive Spirometry   20. SpO2 Goal: > 92%  21. Keep K>4; Mg>2   22. PT/OT: PT consulted and on board and OT consulted and on board   23. Goals of Care Discussion with family Yes   25. Plan of Care/Code Status: Full Code  25. Discussed Care Plan with Bedside RN  26. Documentation of Current Medications  27.  Rest of Plan Below:    F - Feeding:  No   A - Analgesia: Acetaminophen  S - Sedation: None  T - DVT Prophylaxis: SCD's or Sequential Compression Device and Lovenox   H - Head of Bed: > 30 Degrees  U - Ulcer Prophylaxis: Protonix (pantoprazole)   G - Glycemic Control: Insulin  S - Spontaneous Breathing Trial: N/A  B - Bowel Regimen: Senna and Docusate (Colace)  I - Indwelling Catheter:   Tubes: None  Lines: Peripheral IV  Drains: None  D - De-escalation of Antibiotics:   Zosyn    Subjective:   Progress Note: 12/29/2020      Reason for ICU Admission: Acute Hypoxic Respiratory Failure --> Cardiac Arrest     HPI:  Herminio Izquierdo is a 76 y.o. with a history of melanoma, hypertension and alcohol abuse. He suffered a blunt head injury after a ground-level fall on the evening of December 8. He had tripped over a sidewalk resulting in him falling forward and landing on his right forehead. Patient has felt disoriented and was having some memory issues. For this reason, he was admitted on December 11. He was placed on Rocephin empirically, but this was discontinued because no source of infection was found. He developed delirium tremens and had to be intubated and admitted to the ICU. He was eventually extubated a few days later. In the past day, he developed fever and elevated white count prompting consult. The patient tells me that he does not have any cough, dyspnea, abdominal pain, dysuria, headache or sore throat. He does not have any rash. He does not have any back pain or joint pain. Dr. Katarina Carreon documents that he is having diarrhea. A C. difficile has been ordered. Chest x-ray did not reveal any pneumonia. Blood cultures have also been sent. Urinalysis did not show any significant pyuria. He was started on Zosyn and we are being asked to see him in consult.     Overnight Events:   12/29/2020  HFNC, diuresing well      Active Problem List:     Problem List  Date Reviewed: 8/2/2020          Codes Class    Alcohol withdrawal ([facility] ICD-10-CM: S61.425  ICD-9-CM: 291.81         UTI (urinary tract infection) ICD-10-CM: N39.0  ICD-9-CM: 599.0         Hypokalemia ICD-10-CM: E87.6  ICD-9-CM: 276.8         Thrombocytopenia ([facility] ICD-10-CM: D69.6  ICD-9-CM: 287.5         Anemia ICD-10-CM: D64.9  ICD-9-CM: 285.9         * (Principal) AMS (altered mental status) ICD-10-CM: R41.82  ICD-9-CM: 780.97         Excessive drinking of alcohol ICD-10-CM: F10.10  ICD-9-CM: 305.00         Hyponatremia ICD-10-CM: E87.1  ICD-9-CM: 276.1         HATTIE (acute kidney injury) ([facility] ICD-10-CM: N17.9  ICD-9-CM: 584.9         Essential hypertension ICD-10-CM: I10  ICD-9-CM: 401.9               Past Medical History:      has a past medical history of Cancer ([facility] (~ ), Hypertension, and Other ill-defined conditions(799.89) (1960~). Past Surgical History:      has a past surgical history that includes hx other surgical (~ ) and colonoscopy (2011). Home Medications:     Prior to Admission medications    Medication Sig Start Date End Date Taking? Authorizing Provider   losartan (COZAAR) 100 mg tablet TAKE 1 TABLET BY MOUTH EVERY DAY 20  Yes Kevin Jansen MD       Allergies/Social/Family History: Allergies   Allergen Reactions    Ace Inhibitors Cough    Thiazides Other (comments)     hyponatremia      Social History     Tobacco Use    Smoking status: Never Smoker    Smokeless tobacco: Never Used   Substance Use Topics    Alcohol use: Yes     Comment: occasional beer      Family History   Problem Relation Age of Onset    Hypertension Father        Review of Systems:     A comprehensive review of systems was negative except for that written in the HPI. Objective:   Vital Signs:  Visit Vitals  BP (!) 140/79   Pulse 80   Temp 97.8 °F (36.6 °C)   Resp 17   Ht 5' 11\" (1.803 m)   Wt 83.6 kg (184 lb 4.9 oz)   SpO2 97%   BMI 25.71 kg/m²    O2 Flow Rate (L/min): 6 l/min O2 Device: Hi flow nasal cannula Temp (24hrs), Av.1 °F (36.7 °C), Min:97.5 °F (36.4 °C), Max:98.9 °F (37.2 °C)           Intake/Output:     Intake/Output Summary (Last 24 hours) at 2020 0725  Last data filed at 2020 0700  Gross per 24 hour   Intake 3800 ml   Output 3850 ml   Net -50 ml       Physical Exam:    General:  Awake, follows simple commands, confused; look older than stated age   Eyes:  Sclera anicteric.  Pupils equally round and reactive to light. Mouth/Throat: Mucous membranes normal, oral pharynx clear   Neck: Supple   Lungs:   Rhonchi bilaterally, good effort   CV:  Regular rate and rhythm,no murmur, click, rub or gallop   Abdomen:   Soft, non-tender. bowel sounds normal. non-distended   Extremities: No cyanosis or edema   Skin: Skin color, texture, turgor normal. no acute rash or lesions   Lymph nodes: Cervical and supraclavicular normal   Musculoskeletal: No swelling or deformity   Lines/Devices:  Intact, no erythema, drainage or tenderness   Psych: Somnolent     LABS AND  DATA: Personally reviewed  Recent Labs     12/29/20 0413 12/28/20  0501   WBC 10.2 10.4   HGB 8.5* 8.8*   HCT 27.4* 28.4*    239     Recent Labs     12/29/20  0413 12/28/20  0501 12/27/20  1524   * 153* 155*   K 3.6 3.6 3.6   * 121* 121*   CO2 29 28 33*   BUN 12 10 11   CREA 1.29 1.25 1.34*   * 128* 116*   CA 8.0* 7.8* 7.9*   MG 2.1  --  1.8   PHOS 3.1  --   --      Recent Labs     12/29/20 0413 12/28/20  0501   AP 62 59   TP 6.1* 5.8*   ALB 1.9* 1.9*   GLOB 4.2* 3.9     No results for input(s): INR, PTP, APTT, INREXT, INREXT in the last 72 hours. Recent Labs     12/27/20  2218 12/27/20  0637   PHI 7.32* 7.26*   PCO2I 55.1* 59.0*   PO2I 105* 87   FIO2I 100 60     No results for input(s): CPK, CKMB, TROIQ, BNPP in the last 72 hours. Hemodynamics:   PAP:   CO:     Wedge:   CI:     CVP:    SVR:       PVR:       Ventilator Settings:  Mode Rate Tidal Volume Pressure FiO2 PEEP   Assist control, Volume control   450 ml  5 cm H2O 65 % 5 cm H20     Peak airway pressure: 22 cm H2O    Minute ventilation: 10.3 l/min        MEDS: Reviewed    Chest X-Ray:  CXR Results  (Last 48 hours)               12/28/20 0725  XR CHEST PORT Final result    Impression:  IMPRESSION:       Decreased small pleural effusions and decreased bilateral interstitial and   patchy airspace opacities.            Narrative:  EXAM:  XR CHEST PORT       INDICATION: Small pleural effusions and bilateral opacities. COMPARISON: 12/27/2020 at 0410 hours       TECHNIQUE: Portable AP upright chest view at 0719 hours       FINDINGS: The enteric tube is stable. The cardiomediastinal contours are stable. The pulmonary vasculature is within normal limits. There are decreased bilateral interstitial and patchy airspace opacities. Small   pleural effusions are decreased. There is no pneumothorax. The bones and upper   abdomen are stable. Multidisciplinary Rounds Completed: Yes    ABCDEF Bundle/Checklist Completed:  Yes    SPECIAL EQUIPMENT  None    DISPOSITION  Stay in ICU    CRITICAL CARE CONSULTANT NOTE  I had a face to face encounter with the patient, reviewed and interpreted patient data including clinical events, labs, images, vital signs, I/O's, and examined patient. I have discussed the case and the plan and management of the patient's care with the consulting services, the bedside nurses and the respiratory therapist.      NOTE OF PERSONAL INVOLVEMENT IN CARE   This patient has a high probability of imminent, clinically significant deterioration, which requires the highest level of preparedness to intervene urgently. I participated in the decision-making and personally managed or directed the management of the following life and organ supporting interventions that required my frequent assessment to treat or prevent imminent deterioration. I personally spent 65 minutes of critical care time. This is time spent at this critically ill patient's bedside actively involved in patient care as well as the coordination of care. This does not include any procedural time which has been billed separately.     800 Jamel Reilly DO, MS  Staff Intensivist/Anesthesiologist  [facility]  12/29/2020

## 2020-12-29 NOTE — PROGRESS NOTES
Problem: Mobility Impaired (Adult and Pediatric)  Goal: *Acute Goals and Plan of Care (Insert Text)  Description: FUNCTIONAL STATUS PRIOR TO ADMISSION: Patient was independent and active without use of DME.    HOME SUPPORT PRIOR TO ADMISSION: The patient lived with his wife but did not require assist.    Physical Therapy Goals  Initiated 12/12/2020 and re-evaluated/downgraded 12/16/2020; goals reviewed on 12/26/20 and remain appropriate. 1.  Patient will move from supine to sit and sit to supine , scoot up and down, and roll side to side in bed with minimal assistance/contact guard assist within 7 day(s). 2.  Patient will transfer from bed to chair and chair to bed with minimal assistance/contact guard assist using the least restrictive device within 7 day(s). 3.  Patient will perform sit to stand with minimal assistance/contact guard assist within 7 day(s). 4.  Patient will ambulate with minimal assistance/contact guard assist for 50 feet with the least restrictive device within 7 day(s). Outcome: Not Met   PHYSICAL THERAPY TREATMENT  Patient: Marty Seymour (72 y.o. male)  Date: 12/29/2020  Diagnosis: AMS (altered mental status) [R41.82] AMS (altered mental status)       Precautions: Fall  Chart, physical therapy assessment, plan of care and goals were reviewed. ASSESSMENT  Patient continues with skilled PT services and is slowly progressing towards goals. Report received from RN indicating increased alertness earlier today. Received patient sleeping in bed this afternoon, difficult to wake and falling asleep throughout the visit. Patient with no active participation today. He was max assist for supine<->sit and sitting EOB. Eyes remained closed throughout most of this visit, not following commands. Patient's only response to questions asked was a yes response when asked if he wanted to return to bed.  Of note, patient sat up in the bed in modified chair position with RN assist 2x today and likely fatigued by the time of this late afternoon visit. Therapy will attempt to see patient earlier in the day to improve patient participation. Current Level of Function Impacting Discharge (mobility/balance): max assist    Other factors to consider for discharge: PLOF indep         PLAN :  Patient continues to benefit from skilled intervention to address the above impairments. Continue treatment per established plan of care. to address goals. Recommendation for discharge: (in order for the patient to meet his/her long term goals)  Working towards tolerating therapy 3 hours per day 5-7 days per week    This discharge recommendation:  Has been made in collaboration with the attending provider and/or case management    IF patient discharges home will need the following DME: to be determined (TBD)       SUBJECTIVE:   Non verbal.      OBJECTIVE DATA SUMMARY:   Critical Behavior:  Neurologic State: Confused  Orientation Level: Oriented to person, Disoriented to place, Disoriented to situation, Disoriented to time  Cognition: Decreased attention/concentration  Safety/Judgement: Decreased awareness of environment, Decreased awareness of need for assistance, Decreased awareness of need for safety, Decreased insight into deficits  Functional Mobility Training:  Bed Mobility:  Rolling: Maximum assistance;Assist x2  Supine to Sit: Maximum assistance;Assist x2  Sit to Supine: Maximum assistance;Assist x2  Scooting: Maximum assistance;Assist x2        Transfers:                                   Balance:  Sitting: Impaired; With support  Sitting - Static: Poor (constant support)  Sitting - Dynamic: Poor (constant support)  Ambulation/Gait Training:                                                  Therapeutic Exercises:   PROM BLE in an attempt to elicit patient participation, there was none   Pain Rating:  None indicated    Activity Tolerance:   Poor and lethargy, difficult to wake    After treatment patient left in no apparent distress:   Supine in bed, Heels elevated for pressure relief, Call bell within reach and Side rails x 3    COMMUNICATION/COLLABORATION:   The patients plan of care was discussed with: Registered nurse.      Nikunj Dickinson, PT   Time Calculation: 15 mins

## 2020-12-29 NOTE — PROGRESS NOTES
Consult received by Palliative Medicine, will see patient on 12/30/20. Thank you for involving us in this patient's care.
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
## 2020-12-29 NOTE — PROGRESS NOTES
Spiritual Care Assessment/Progress Note  [address]      NAME: Poppy Frank      MRN: [id number]  AGE: 76 y.o.  SEX: male  Pentecostalism Affiliation: Other   Language: English     12/29/2020     Total Time (in minutes): 20     Spiritual Assessment begun in [facility] CORONARY CARE through conversation with:         [x]Patient        [] Family    [] Friend(s)        Reason for Consult: Palliative Care, Initial/Spiritual Assessment     Spiritual beliefs: (Please include comment if needed)     [x] Identifies with a jose tradition:         [] Supported by a jose community:            [] Claims no spiritual orientation:           [] Seeking spiritual identity:                [] Adheres to an individual form of spirituality:           [] Not able to assess:                           Identified resources for coping:      [x] Prayer                               [] Music                  [] Guided Imagery     [] Family/friends                 [] Pet visits     [] Devotional reading                         [] Unknown     [] Other:                                            Interventions offered during this visit: (See comments for more details)    Patient Interventions: Affirmation of emotions/emotional suffering, Catharsis/review of pertinent events in supportive environment, Initial/Spiritual assessment, Critical care, Iconic (affirming the presence of God/Higher Power), Prayer (assurance of), Prayer (actual)     Family/Friend(s): Other (comment)(Note left at bedside)     Plan of Care:     [x] Support spiritual and/or cultural needs    [] Support AMD and/or advance care planning process      [] Support grieving process   [] Coordinate Rites and/or Rituals    [] Coordination with community clergy   [] No spiritual needs identified at this time   [] Detailed Plan of Care below (See Comments)  [] Make referral to Music Therapy  [] Make referral to Pet Therapy     [] Make referral to Addiction services  [] Make referral to [facility]  [] Make referral to Spiritual Care Partner  [] No future visits requested        [x] Follow up upon further referrals     Comments: Visited Mr Anne-Marie Nieves in [address] [place] [address] for initial Palliative Care spiritual assessment. Mr Anne-Marie Nieves was sitting up in bed and appeared in pretty good spirits. Provided pastoral presence and active listening as Mr Anne-Marie Nieves shared that he was feeling so much better than when initially admitted. Georgia Hernandez he was so glad to have the breathing tube out. Mr Anne-Marie Nieves shared that he had grown up in the [employer] but had not been active in many years. He said that he really appreciated prayers. Had prayer for healing at his request and assured him of ongoing  availability for support. : Rev. Fabby Marinelli.  Lay Morrell; [facility], to contact [phone] [address] call: [phone]

## 2020-12-29 NOTE — PROGRESS NOTES
0730 Received verbal bedside report from [place], [facility]. Assumed Care of the pt.      0800 Dr. Rehan Marshall, intensivist at the bedside. Orders received to increase water flush to 350mL, q4. Flush increased. 1930 Bedside and Verbal shift change report given to ESTRADA Trujillo (oncoming nurse) by Summer Bourgeois RN (offgoing nurse). Report included the following information SBAR, Kardex, Procedure Summary, Intake/Output, MAR, Recent Results and Cardiac Rhythm NSR.

## 2020-12-29 NOTE — PROGRESS NOTES
12/29/2020 -   NICOLASA:  - RUR: 22%  - Disposition is TBD dependent on progression: IPR vs SNF placement likely (Was [facility])  - Patient will need a negative COVID test prior to discharge  - Patient will need BLS transport  - Patient will need IM Letter prior to discharge    - Patient continues with Hi Flow O2 at 6L  - ABX were stopped 12/28  - Cardio cleared patient      09:25 -   CM notes that patient now has IPR recommendation. CM contacted Kade Hand Melania: [phone]) to verify that in order for a patient to qualify for IPR placement, patient must be on no more than 4-5L O2 via normal NC. CM to discuss the above with medical team during IDRs.   CRM: Martin Sarmiento, MPH, [address] [place] [address]; Z: [phone]

## 2020-12-29 NOTE — PROGRESS NOTES
ID Progress Note  2020       vanco   -    Zosyn    - present    Subjective:     Afebrile. Says he has no complaints. He says he is not in pain. Objective:     Vitals:   Visit Vitals  /87   Pulse 74   Temp 99.5 °F (37.5 °C)   Resp 14   Ht 5' 11\" (1.803 m)   Wt 83.6 kg (184 lb 4.9 oz)   SpO2 98%   BMI 25.71 kg/m²        Tmax:  Temp (24hrs), Av.4 °F (36.9 °C), Min:97.6 °F (36.4 °C), Max:99.5 °F (37.5 °C)      Exam:    Not in distress  Lung clear, no rales, wheezes or rhonchi   Heart: s1, s2, RRR, no murmurs rubs or clicks  Abdomen: soft nontender, no guarding or rebound  Speech understandable. Labs:   Lab Results   Component Value Date/Time    WBC 10.2 2020 04:13 AM    HGB (POC) 12.8 2017 10:27 AM    HGB 8.5 (L) 2020 04:13 AM    HCT (POC) 38.8 2017 10:27 AM    HCT 27.4 (L) 2020 04:13 AM    PLATELET 958  04:13 AM    .0 (H) 2020 04:13 AM     Lab Results   Component Value Date/Time    Sodium 152 (H) 2020 04:13 AM    Potassium 3.6 2020 04:13 AM    Chloride 117 (H) 2020 04:13 AM    CO2 29 2020 04:13 AM    Anion gap 6 2020 04:13 AM    Glucose 102 (H) 2020 04:13 AM    BUN 12 2020 04:13 AM    Creatinine 1.29 2020 04:13 AM    BUN/Creatinine ratio 9 (L) 2020 04:13 AM    GFR est AA >60 2020 04:13 AM    GFR est non-AA 54 (L) 2020 04:13 AM    Calcium 8.0 (L) 2020 04:13 AM    Bilirubin, total 0.3 2020 04:13 AM    Alk.  phosphatase 62 2020 04:13 AM    Protein, total 6.1 (L) 2020 04:13 AM    Albumin 1.9 (L) 2020 04:13 AM    Globulin 4.2 (H) 2020 04:13 AM    A-G Ratio 0.5 (L) 2020 04:13 AM    ALT (SGPT) 20 2020 04:13 AM           Assessment:     #1 fever - resolved      #2 recent delirium tremens     #3 history of melanoma     #4 hypertension    #5 mild renal insufficiency     #6 s/p arrest, possible aspiration              Recommendations:       Discontinue zosyn after today's doses         Sara Land MD

## 2020-12-29 NOTE — PROGRESS NOTES
SLP Contact Note    SLP evaluation complete. Recommend ice chips during the day today after oral for swallow rehab, and SLP will plan to complete a Fiberoptic Endoscopic Evaluation of Swallow (FEES) tomorrow 12/30. Full note to follow.       Thank you,  ALTON WhalenEd, [address] [facility], [place], operated by [facility]  Speech-Language Pathologist

## 2020-12-29 NOTE — PROGRESS NOTES
Assumed care of patient at [facility], assessed per CCU protocol see secondary record for details. Patient on HFNC overnight, SpO2 and RR WNL. Patient reoriented multiple times overnight due to confusion. Bedside shift change report given to Gabriela (oncoming nurse) by Kimberly Martins (offgoing nurse). Report included the following information SBAR, Kardex, Intake/Output, MAR, Accordion, Recent Results, Med Rec Status, Cardiac Rhythm NSR and Alarm Parameters .

## 2020-12-30 LAB
ALBUMIN SERPL-MCNC: 2.2 G/DL (ref 3.5–5)
ALBUMIN/GLOB SERPL: 0.5 {RATIO} (ref 1.1–2.2)
ALP SERPL-CCNC: 69 U/L (ref 45–117)
ALT SERPL-CCNC: 17 U/L (ref 12–78)
ANION GAP SERPL CALC-SCNC: 5 MMOL/L (ref 5–15)
AST SERPL-CCNC: 15 U/L (ref 15–37)
BASOPHILS # BLD: 0.2 K/UL (ref 0–0.1)
BASOPHILS NFR BLD: 2 % (ref 0–1)
BILIRUB SERPL-MCNC: 0.3 MG/DL (ref 0.2–1)
BNP SERPL-MCNC: 1142 PG/ML
BUN SERPL-MCNC: 19 MG/DL (ref 6–20)
BUN/CREAT SERPL: 13 (ref 12–20)
CALCIUM SERPL-MCNC: 8.6 MG/DL (ref 8.5–10.1)
CHLORIDE SERPL-SCNC: 106 MMOL/L (ref 97–108)
CO2 SERPL-SCNC: 32 MMOL/L (ref 21–32)
CREAT SERPL-MCNC: 1.45 MG/DL (ref 0.7–1.3)
DIFFERENTIAL METHOD BLD: ABNORMAL
EOSINOPHIL # BLD: 0.9 K/UL (ref 0–0.4)
EOSINOPHIL NFR BLD: 8 % (ref 0–7)
ERYTHROCYTE [DISTWIDTH] IN BLOOD BY AUTOMATED COUNT: 13.2 % (ref 11.5–14.5)
GLOBULIN SER CALC-MCNC: 4.4 G/DL (ref 2–4)
GLUCOSE SERPL-MCNC: 135 MG/DL (ref 65–100)
HCT VFR BLD AUTO: 31.5 % (ref 36.6–50.3)
HGB BLD-MCNC: 10.1 G/DL (ref 12.1–17)
IMM GRANULOCYTES # BLD AUTO: 0.2 K/UL (ref 0–0.04)
IMM GRANULOCYTES NFR BLD AUTO: 2 % (ref 0–0.5)
LACTATE SERPL-SCNC: 1.1 MMOL/L (ref 0.4–2)
LYMPHOCYTES # BLD: 1.3 K/UL (ref 0.8–3.5)
LYMPHOCYTES NFR BLD: 12 % (ref 12–49)
MAGNESIUM RBC-SCNC: 5.7 MG/DL (ref 4.2–6.8)
MCH RBC QN AUTO: 32.9 PG (ref 26–34)
MCHC RBC AUTO-ENTMCNC: 32.1 G/DL (ref 30–36.5)
MCV RBC AUTO: 102.6 FL (ref 80–99)
MONOCYTES # BLD: 1 K/UL (ref 0–1)
MONOCYTES NFR BLD: 9 % (ref 5–13)
NEUTS SEG # BLD: 7.4 K/UL (ref 1.8–8)
NEUTS SEG NFR BLD: 67 % (ref 32–75)
NRBC # BLD: 0 K/UL (ref 0–0.01)
NRBC BLD-RTO: 0 PER 100 WBC
PLATELET # BLD AUTO: 233 K/UL (ref 150–400)
PMV BLD AUTO: 9.9 FL (ref 8.9–12.9)
POTASSIUM SERPL-SCNC: 3.3 MMOL/L (ref 3.5–5.1)
PROCALCITONIN SERPL-MCNC: 0.12 NG/ML
PROT SERPL-MCNC: 6.6 G/DL (ref 6.4–8.2)
RBC # BLD AUTO: 3.07 M/UL (ref 4.1–5.7)
SODIUM SERPL-SCNC: 143 MMOL/L (ref 136–145)
VIT C SERPL-MCNC: 0.3 MG/DL (ref 0.4–2)
WBC # BLD AUTO: 10.9 K/UL (ref 4.1–11.1)

## 2020-12-30 PROCEDURE — 74011000250 HC RX REV CODE- 250: Performed by: ANESTHESIOLOGY

## 2020-12-30 PROCEDURE — 74011250636 HC RX REV CODE- 250/636: Performed by: EMERGENCY MEDICINE

## 2020-12-30 PROCEDURE — 80053 COMPREHEN METABOLIC PANEL: CPT

## 2020-12-30 PROCEDURE — 84145 PROCALCITONIN (PCT): CPT

## 2020-12-30 PROCEDURE — 85025 COMPLETE CBC W/AUTO DIFF WBC: CPT

## 2020-12-30 PROCEDURE — 83880 ASSAY OF NATRIURETIC PEPTIDE: CPT

## 2020-12-30 PROCEDURE — 74011000250 HC RX REV CODE- 250: Performed by: NURSE PRACTITIONER

## 2020-12-30 PROCEDURE — 74011250637 HC RX REV CODE- 250/637: Performed by: ANESTHESIOLOGY

## 2020-12-30 PROCEDURE — 92612 ENDOSCOPY SWALLOW (FEES) VID: CPT

## 2020-12-30 PROCEDURE — 97112 NEUROMUSCULAR REEDUCATION: CPT

## 2020-12-30 PROCEDURE — 83605 ASSAY OF LACTIC ACID: CPT

## 2020-12-30 PROCEDURE — 36415 COLL VENOUS BLD VENIPUNCTURE: CPT

## 2020-12-30 PROCEDURE — 97535 SELF CARE MNGMENT TRAINING: CPT

## 2020-12-30 PROCEDURE — 97530 THERAPEUTIC ACTIVITIES: CPT

## 2020-12-30 PROCEDURE — 65620000000 HC RM CCU GENERAL

## 2020-12-30 PROCEDURE — 74011250636 HC RX REV CODE- 250/636: Performed by: INTERNAL MEDICINE

## 2020-12-30 PROCEDURE — C9113 INJ PANTOPRAZOLE SODIUM, VIA: HCPCS | Performed by: ANESTHESIOLOGY

## 2020-12-30 PROCEDURE — 74011250636 HC RX REV CODE- 250/636: Performed by: ANESTHESIOLOGY

## 2020-12-30 RX ORDER — ASCORBIC ACID 500 MG
500 TABLET ORAL 2 TIMES DAILY
Status: DISCONTINUED | OUTPATIENT
Start: 2020-12-30 | End: 2021-01-07

## 2020-12-30 RX ADMIN — OXYCODONE HYDROCHLORIDE AND ACETAMINOPHEN 500 MG: 500 TABLET ORAL at 18:16

## 2020-12-30 RX ADMIN — POTASSIUM CHLORIDE 10 MEQ: 7.46 INJECTION, SOLUTION INTRAVENOUS at 06:56

## 2020-12-30 RX ADMIN — Medication 30 MCG: at 08:10

## 2020-12-30 RX ADMIN — Medication 10 ML: at 16:15

## 2020-12-30 RX ADMIN — Medication 30 ML: at 10:11

## 2020-12-30 RX ADMIN — Medication 10 ML: at 21:01

## 2020-12-30 RX ADMIN — CHLORHEXIDINE GLUCONATE 15 ML: 0.12 RINSE ORAL at 08:10

## 2020-12-30 RX ADMIN — CHLORHEXIDINE GLUCONATE 15 ML: 0.12 RINSE ORAL at 21:00

## 2020-12-30 RX ADMIN — METOPROLOL TARTRATE 5 MG: 1 INJECTION, SOLUTION INTRAVENOUS at 03:53

## 2020-12-30 RX ADMIN — POTASSIUM BICARBONATE 40 MEQ: 782 TABLET, EFFERVESCENT ORAL at 18:16

## 2020-12-30 RX ADMIN — METOPROLOL TARTRATE 5 MG: 1 INJECTION, SOLUTION INTRAVENOUS at 16:15

## 2020-12-30 RX ADMIN — METOPROLOL TARTRATE 5 MG: 1 INJECTION, SOLUTION INTRAVENOUS at 08:10

## 2020-12-30 RX ADMIN — SODIUM CHLORIDE 40 MG: 9 INJECTION INTRAMUSCULAR; INTRAVENOUS; SUBCUTANEOUS at 20:55

## 2020-12-30 RX ADMIN — POTASSIUM CHLORIDE 10 MEQ: 7.46 INJECTION, SOLUTION INTRAVENOUS at 08:28

## 2020-12-30 RX ADMIN — POTASSIUM BICARBONATE 40 MEQ: 782 TABLET, EFFERVESCENT ORAL at 08:10

## 2020-12-30 RX ADMIN — OXYCODONE HYDROCHLORIDE AND ACETAMINOPHEN 500 MG: 500 TABLET ORAL at 10:14

## 2020-12-30 RX ADMIN — POTASSIUM CHLORIDE 10 MEQ: 7.46 INJECTION, SOLUTION INTRAVENOUS at 05:48

## 2020-12-30 RX ADMIN — POTASSIUM CHLORIDE 10 MEQ: 7.46 INJECTION, SOLUTION INTRAVENOUS at 10:11

## 2020-12-30 RX ADMIN — SODIUM CHLORIDE 40 MG: 9 INJECTION INTRAMUSCULAR; INTRAVENOUS; SUBCUTANEOUS at 08:10

## 2020-12-30 RX ADMIN — METOPROLOL TARTRATE 5 MG: 1 INJECTION, SOLUTION INTRAVENOUS at 20:55

## 2020-12-30 RX ADMIN — ENOXAPARIN SODIUM 40 MG: 40 INJECTION SUBCUTANEOUS at 08:10

## 2020-12-30 RX ADMIN — Medication 10 ML: at 05:24

## 2020-12-30 NOTE — PROGRESS NOTES
[facility]    ICU TEAM Progress Note    Name: Kevin Fernandez   : 1945   MRN: [id number]   Date: 2020      Assessment     ICU Problems:    1. Acute Hypoxic Respiratory Failure (secondary to Ativan --> Somnolence)  2. Volume Overload  3. Aspiration Pneumonitis  4. Cardiac Arrest (due to hypoxia) - Sinus Junito/PEA  5. Acute Alcohol Withdrawal   6. NSVT  7. Acute Toxic Metabolic Encephalopathy  8. Acute Renal Failure    Imagin2020      ICU Comprehensive Plan of Care:     Plans for this Shift:     1. Add MVI  2. Add Vitamin C  3. Avoid sedatives   4. Hold diuresis  5. Decrease free water flushes  6. Wean oxygen for SpO2 > 90%  7. Aggressive PT/OT   8. Lights on during the day, TV on   9. Continue TF's  10. Update wife  6. SBP Goal of: > 90 mmHg  12. MAP Goal of: > 65 mmHg  13. None - For above SBP/MAP goals  14. Transfusion Trigger (Hgb): <7 g/dL  15. Respiratory Goals:  a. Head of bed > 30 degrees  b. Aggressive bronchopulmonary hygiene  c. Incentive spirometry  16. Pulmonary toilet: Incentive Spirometry   17. SpO2 Goal: > 92%  18. Keep K>4; Mg>2   19. PT/OT: PT consulted and on board and OT consulted and on board   20. Goals of Care Discussion with family Yes   24. Plan of Care/Code Status: Full Code  22. Discussed Care Plan with Bedside RN  23. Documentation of Current Medications  24.  Rest of Plan Below:    F - Feeding:  No   A - Analgesia: Acetaminophen  S - Sedation: None  T - DVT Prophylaxis: SCD's or Sequential Compression Device and Lovenox   H - Head of Bed: > 30 Degrees  U - Ulcer Prophylaxis: Protonix (pantoprazole)   G - Glycemic Control: Insulin  S - Spontaneous Breathing Trial: N/A  B - Bowel Regimen: Senna and Docusate (Colace)  I - Indwelling Catheter:   Tubes: None  Lines: Peripheral IV  Drains: None      Subjective:   Progress Note: 2020      Reason for ICU Admission: Acute Hypoxic Respiratory Failure --> Cardiac Arrest     HPI:  Jasmine Rivera is a 76 y.o. with a history of melanoma, hypertension and alcohol abuse. He suffered a blunt head injury after a ground-level fall on the evening of December 8. He had tripped over a sidewalk resulting in him falling forward and landing on his right forehead. Patient has felt disoriented and was having some memory issues. For this reason, he was admitted on December 11. He was placed on Rocephin empirically, but this was discontinued because no source of infection was found. He developed delirium tremens and had to be intubated and admitted to the ICU. He was eventually extubated a few days later. In the past day, he developed fever and elevated white count prompting consult. The patient tells me that he does not have any cough, dyspnea, abdominal pain, dysuria, headache or sore throat. He does not have any rash. He does not have any back pain or joint pain. Dr. Alphonso Aguilera documents that he is having diarrhea. A C. difficile has been ordered. Chest x-ray did not reveal any pneumonia. Blood cultures have also been sent. Urinalysis did not show any significant pyuria. He was started on Zosyn and we are being asked to see him in consult. Overnight Events:   12/30/2020  HFNC, diuresing well.  Improving       Active Problem List:     Problem List  Date Reviewed: 8/2/2020          Codes Class    Alcohol withdrawal ([facility] ICD-10-CM: E66.400  ICD-9-CM: 291.81         UTI (urinary tract infection) ICD-10-CM: N39.0  ICD-9-CM: 599.0         Hypokalemia ICD-10-CM: E87.6  ICD-9-CM: 276.8         Thrombocytopenia (HCC) ICD-10-CM: D69.6  ICD-9-CM: 287.5         Anemia ICD-10-CM: D64.9  ICD-9-CM: 285.9         * (Principal) AMS (altered mental status) ICD-10-CM: R41.82  ICD-9-CM: 780.97         Excessive drinking of alcohol ICD-10-CM: F10.10  ICD-9-CM: 305.00         Hyponatremia ICD-10-CM: E87.1  ICD-9-CM: 276.1         HATTIE (acute kidney injury) ([facility] ICD-10-CM: N17.9  ICD-9-CM: 584.9         Essential hypertension ICD-10-CM: I10  ICD-9-CM: 401.9               Past Medical History:      has a past medical history of Cancer ([address] (~ ), Hypertension, and Other ill-defined conditions(799.89) (1960~). Past Surgical History:      has a past surgical history that includes hx other surgical (~ ) and colonoscopy (2011). Home Medications:     Prior to Admission medications    Medication Sig Start Date End Date Taking? Authorizing Provider   losartan (COZAAR) 100 mg tablet TAKE 1 TABLET BY MOUTH EVERY DAY 20  Yes Heidy Padilla MD       Allergies/Social/Family History: Allergies   Allergen Reactions    Ace Inhibitors Cough    Thiazides Other (comments)     hyponatremia      Social History     Tobacco Use    Smoking status: Never Smoker    Smokeless tobacco: Never Used   Substance Use Topics    Alcohol use: Yes     Comment: occasional beer      Family History   Problem Relation Age of Onset    Hypertension Father        Review of Systems:     A comprehensive review of systems was negative except for that written in the HPI. Objective:   Vital Signs:  Visit Vitals  BP (!) 149/61   Pulse 85   Temp 98.7 °F (37.1 °C)   Resp 16   Ht 5' 11\" (1.803 m)   Wt 80.7 kg (177 lb 14.6 oz)   SpO2 97%   BMI 24.81 kg/m²    O2 Flow Rate (L/min): 40 l/min O2 Device: Hi flow nasal cannula Temp (24hrs), Av.9 °F (37.2 °C), Min:98.7 °F (37.1 °C), Max:99.5 °F (37.5 °C)           Intake/Output:     Intake/Output Summary (Last 24 hours) at 2020 0735  Last data filed at 2020 0400  Gross per 24 hour   Intake 4430 ml   Output 5235 ml   Net -805 ml       Physical Exam:    General:  Awake, follows simple commands, confused; look older than stated age   Eyes:  Sclera anicteric. Pupils equally round and reactive to light.    Mouth/Throat: Mucous membranes normal, oral pharynx clear   Neck: Supple   Lungs:   Rhonchi bilaterally, good effort   CV:  Regular rate and rhythm,no murmur, click, rub or gallop   Abdomen:   Soft, non-tender. bowel sounds normal. non-distended   Extremities: No cyanosis or edema   Skin: Skin color, texture, turgor normal. no acute rash or lesions   Lymph nodes: Cervical and supraclavicular normal   Musculoskeletal: No swelling or deformity   Lines/Devices:  Intact, no erythema, drainage or tenderness   Psych: Somnolent     LABS AND  DATA: Personally reviewed  Recent Labs     12/30/20 0409 12/29/20 0413   WBC 10.9 10.2   HGB 10.1* 8.5*   HCT 31.5* 27.4*    209     Recent Labs     12/30/20 0409 12/29/20  1724 12/29/20  0413 12/27/20  1524 12/27/20  1524    146* 152*   < > 155*   K 3.3* 3.6 3.6   < > 3.6    110* 117*   < > 121*   CO2 32 33* 29   < > 33*   BUN 19 16 12   < > 11   CREA 1.45* 1.48* 1.29   < > 1.34*   * 145* 102*   < > 116*   CA 8.6 8.5 8.0*   < > 7.9*   MG  --   --  2.1  --  1.8   PHOS  --   --  3.1  --   --     < > = values in this interval not displayed. Recent Labs     12/30/20 0409 12/29/20 0413   AP 69 62   TP 6.6 6.1*   ALB 2.2* 1.9*   GLOB 4.4* 4.2*     No results for input(s): INR, PTP, APTT, INREXT, INREXT in the last 72 hours. Recent Labs     12/27/20  2218   PHI 7.32*   PCO2I 55.1*   PO2I 105*   FIO2I 100     No results for input(s): CPK, CKMB, TROIQ, BNPP in the last 72 hours. Hemodynamics:   PAP:   CO:     Wedge:   CI:     CVP:    SVR:       PVR:       Ventilator Settings:  Mode Rate Tidal Volume Pressure FiO2 PEEP   Assist control, Volume control   450 ml  5 cm H2O 64 % 5 cm H20     Peak airway pressure: 22 cm H2O    Minute ventilation: 10.3 l/min        MEDS: Reviewed    Chest X-Ray:  CXR Results  (Last 48 hours)    None        Multidisciplinary Rounds Completed:   Yes    ABCDEF Bundle/Checklist Completed:  Yes    SPECIAL EQUIPMENT  None    DISPOSITION  Transfer to non-ICU bed    CRITICAL CARE CONSULTANT NOTE  I had a face to face encounter with the patient, reviewed and interpreted patient data including clinical events, labs, images, vital signs, I/O's, and examined patient. I have discussed the case and the plan and management of the patient's care with the consulting services, the bedside nurses and the respiratory therapist.      NOTE OF PERSONAL INVOLVEMENT IN CARE   This patient has a high probability of imminent, clinically significant deterioration, which requires the highest level of preparedness to intervene urgently. I participated in the decision-making and personally managed or directed the management of the following life and organ supporting interventions that required my frequent assessment to treat or prevent imminent deterioration. I personally spent 65 minutes of critical care time. This is time spent at this critically ill patient's bedside actively involved in patient care as well as the coordination of care. This does not include any procedural time which has been billed separately.     Emma Sotomayor DO, MS  Staff Intensivist/Anesthesiologist  [facility]  12/30/2020

## 2020-12-30 NOTE — PROGRESS NOTES
Problem: Self Care Deficits Care Plan (Adult)  Goal: *Acute Goals and Plan of Care (Insert Text)  Description:   FUNCTIONAL STATUS PRIOR TO ADMISSION: Patient independent. Family assist with shoes and socks PRN. HOME SUPPORT: The patient lived with wife and two teenage daughters. Occupational Therapy Goals  Initiated 12/21/2020, reviewed 12/28/2020  1. Patient will perform grooming sitting unsupported with minimal assistance within 7 day(s). 2.  Patient will perform anterior neck to thigh bathing sitting unsupported with minimal assistance within 7 day(s). 3.  Patient will perform lower body dressing with moderate assistance within 7 day(s). 4.  Patient will perform toilet transfers to/from [facility] with moderate assistance within 7 day(s). 5.  Patient will perform all aspects of toileting with moderate assistance  within 7 day(s). 6.  Patient will participate in upper extremity therapeutic exercise/activities with supervision/set-up for 5 minutes within 7 day(s). 7.  Patient will utilize energy conservation techniques during functional activities with verbal cues within 7 day(s). Outcome: Progressing Towards Goal   OCCUPATIONAL THERAPY TREATMENT  Patient: Tasia Combs (36 y.o. male)  Date: 12/30/2020  Diagnosis: AMS (altered mental status) [R41.82] AMS (altered mental status)       Precautions: Fall  Chart, occupational therapy assessment, plan of care, and goals were reviewed. ASSESSMENT  Patient continues with skilled OT services and is progressing towards goals. Noted patient s/p cardiac arrest and with admission for altered mental status and blunt head injury s/p mechanical ground-level fall that occurred on the evening of Tuesday, December 8. Patient received supine in bed, with strong R gaze and head turned R. Patient alert and said hello to both PT and OT. Much more interactive this date than previously documented sessions.  Patient able to progress to sitting EOB (max Ax2), and able to hold 30 seconds at best unsupported but with increasing R sided lean. OT attempted to educated patient on jana-dressing technique due to LUE weakness however patient MAX A x 2 for sitting balance/UB dressing tasks at this time with inability to attend to UB dressing task. Able to self correct sitting balance ~50% of the time with verbal cues. Attempted to encourage patient to correct gaze for upright and midline when sitting EOB (responded well to shoulder retraction stretching but posture improvement only lasted 30 seconds). Unable to scoot along EOB with assist x2. Returned to supine and propped with towel roll to increase midline alignment of head at rest (R sided lateral neck musculature getting tight). Current Level of Function Impacting Discharge (ADLs): MAX A x 2 for UB dressing EOB    Other factors to consider for discharge: chronic alcohol abuse; hi-flow O2 40L/min at 66% FiO2         PLAN :  Patient continues to benefit from skilled intervention to address the above impairments. Continue treatment per established plan of care. to address goals. Recommend with staff: OOB x 3 to chair    Recommend next OT session: sitting balance in prep for ADLs    Recommendation for discharge: (in order for the patient to meet his/her long term goals)  Therapy 3 hours per day 5-7 days per week    This discharge recommendation:  Has been made in collaboration with the attending provider and/or case management    IF patient discharges home will need the following DME: TBD       SUBJECTIVE:   Patient stated I am at home just watching football (reoriented patient).     OBJECTIVE DATA SUMMARY:   Cognitive/Behavioral Status:  Neurologic State: Drowsy  Orientation Level: Oriented to person;Disoriented to place; Disoriented to situation;Disoriented to time  Cognition: Decreased command following;Decreased attention/concentration  Perception: Appears intact  Perseveration: No perseveration noted  Safety/Judgement: Decreased insight into deficits; Decreased awareness of need for safety;Decreased awareness of need for assistance;Decreased awareness of environment    Functional Mobility and Transfers for ADLs:  Bed Mobility:  Rolling: Maximum assistance  Supine to Sit: Maximum assistance;Assist x2  Sit to Supine: Maximum assistance;Assist x2  Scooting: Maximum assistance;Assist x2      Balance:  Sitting: Impaired; Without support  Sitting - Static: Fair (occasional); Poor (constant support)  Sitting - Dynamic: Poor (constant support)    ADL Intervention:                      Upper Body Dressing Assistance  [facility]: Maximum assistance; Compensatory technique training(X2; edu on jana-dressing techniques)              Cognitive Retraining  Safety/Judgement: Decreased insight into deficits; Decreased awareness of need for safety;Decreased awareness of need for assistance;Decreased awareness of environment      Activity Tolerance:   Fair, desaturates with exertion and requires oxygen, and fatigues and unable to maintain sitting balance; hi-flow O2 40L/min at 66% FiO2    After treatment patient left in no apparent distress:   Supine in bed and Call bell within reach    COMMUNICATION/COLLABORATION:   The patients plan of care was discussed with: Physical therapist and Registered nurse.      Law Mitchell  Time Calculation: 28 mins

## 2020-12-30 NOTE — PROGRESS NOTES
0730 Bedside shift change report given to ESTRADA Rosales (oncoming nurse) by Malinda Kelly RN (offgoing nurse). Report included the following information SBAR, Kardex, Procedure Summary, Intake/Output, MAR, Recent Results and Cardiac Rhythm NSR.   1300 Speech at bedside   1400 PT/OT at bedside   1645 Palliative at bedside meeting with pt & pts wife  1930 Bedside and Verbal shift change report given to Toribio Shi RN (oncoming nurse) by Sudarshan Kidd RN (offgoing nurse). Report included the following information SBAR, Kardex, Procedure Summary, Intake/Output, MAR, Recent Results and Cardiac Rhythm NSR.

## 2020-12-30 NOTE — PROGRESS NOTES
Problem: Mobility Impaired (Adult and Pediatric)  Goal: *Acute Goals and Plan of Care (Insert Text)  Description: FUNCTIONAL STATUS PRIOR TO ADMISSION: Patient was independent and active without use of DME.    HOME SUPPORT PRIOR TO ADMISSION: The patient lived with his wife but did not require assist.    Physical Therapy Goals  Initiated 12/12/2020 and re-evaluated/downgraded 12/16/2020; goals reviewed on 12/26/20 and remain appropriate. 1.  Patient will move from supine to sit and sit to supine , scoot up and down, and roll side to side in bed with minimal assistance/contact guard assist within 7 day(s). 2.  Patient will transfer from bed to chair and chair to bed with minimal assistance/contact guard assist using the least restrictive device within 7 day(s). 3.  Patient will perform sit to stand with minimal assistance/contact guard assist within 7 day(s). 4.  Patient will ambulate with minimal assistance/contact guard assist for 50 feet with the least restrictive device within 7 day(s). Outcome: Progressing Towards Goal   PHYSICAL THERAPY TREATMENT  Patient: Cezar Herron (20 y.o. male)  Date: 12/30/2020  Diagnosis: AMS (altered mental status) [R41.82] AMS (altered mental status)       Precautions: Fall  Chart, physical therapy assessment, plan of care and goals were reviewed. ASSESSMENT  Patient continues with skilled PT services and is progressing towards goals. Patient received supine in bed, strong R gaze and head turned R. Patient alert and said hello to both PT and OT. Much more interactive this date than previously documented sessions. Patient able to progress to sitting EOB (max Ax2), and able to hold 30 seconds at best unsupported but with increasing R sided lean. Able to self correct sitting balance ~50% of the time with verbal cues.  Attempted to encourage patient to correct gaze for upright and midline when sitting EOB (responded well to shoulder retraction stretching but posture improvement only lasted 30 seconds). Unable to scoot along EOB with assist x2. Returned to supine and propped with towel roll to increase midline alignment of head at rest (R sided lateral neck musculature getting tight). Current Level of Function Impacting Discharge (mobility/balance): max Ax2 bed mobility    Other factors to consider for discharge: previously independent          PLAN :  Patient continues to benefit from skilled intervention to address the above impairments. Continue treatment per established plan of care. to address goals. Recommendation for discharge: (in order for the patient to meet his/her long term goals)  Therapy 3 hours per day 5-7 days per week    This discharge recommendation:  Has been made in collaboration with the attending provider and/or case management    IF patient discharges home will need the following DME: to be determined (TBD)       SUBJECTIVE:   Patient stated [facility]! Thank you girls! after session    OBJECTIVE DATA SUMMARY:   Critical Behavior:  Orientation Level: Oriented to person, Disoriented to situation, Disoriented to time  Cognition: Follows commands  Safety/Judgement: Decreased awareness of environment, Decreased awareness of need for assistance, Decreased awareness of need for safety, Decreased insight into deficits  Functional Mobility Training:  Bed Mobility:  Rolling: Maximum assistance  Supine to Sit: Maximum assistance;Assist x2  Sit to Supine: Maximum assistance;Assist x2  Scooting: Maximum assistance;Assist x2  Balance:  Sitting: Impaired; Without support  Sitting - Static: Fair (occasional); Poor (constant support)  Sitting - Dynamic: Poor (constant support)  Pain Rating:  Denied pain    Activity Tolerance:   Good and requires rest breaks    After treatment patient left in no apparent distress:   Supine in bed, Heels elevated for pressure relief, Call bell within reach, and Side rails x 3    COMMUNICATION/COLLABORATION:   The patients plan of care was discussed with: Occupational therapist and Registered nurse.      Ang Salguero, PT, DPT   Time Calculation: 29 mins

## 2020-12-30 NOTE — PROGRESS NOTES
12/30/2020 -   NICOLASA:  - RUR: 20%  - Disposition is TBD dependent on progression: IPR vs SNF placement likely (Was accepted to [facility])  - Patient will need a negative COVID test prior to discharge  - Patient will need BLS transport  - Patient will need IM Letter prior to discharge    - Patient to have swallow study today, 12/30  - Diuresing on hold  - O2 is being weaned  CRM: Estevan Whittaker, MPH, [address] [place] [address]; Z: [phone]

## 2020-12-30 NOTE — PROGRESS NOTES
ID Progress Note  2020       vanco   -    Alicia    -     Subjective:     Afebrile. Says he has no complaints. Objective:     Vitals:   Visit Vitals  /83   Pulse 92   Temp (P) 98.4 °F (36.9 °C)   Resp 24   Ht 5' 11\" (1.803 m)   Wt 80.7 kg (177 lb 14.6 oz)   SpO2 95%   BMI 24.81 kg/m²        Tmax:  Temp (24hrs), Av.7 °F (37.1 °C), Min:98.3 °F (36.8 °C), Max:99 °F (37.2 °C)      Exam:    Not in distress  Lung clear, no rales, wheezes or rhonchi   Heart: s1, s2, RRR, no murmurs rubs or clicks  Abdomen: soft nontender, no guarding or rebound  Speech understandable. Labs:   Lab Results   Component Value Date/Time    WBC 10.9 2020 04:09 AM    HGB (POC) 12.8 2017 10:27 AM    HGB 10.1 (L) 2020 04:09 AM    HCT (POC) 38.8 2017 10:27 AM    HCT 31.5 (L) 2020 04:09 AM    PLATELET 983 96/15/0451 04:09 AM    .6 (H) 2020 04:09 AM     Lab Results   Component Value Date/Time    Sodium 143 2020 04:09 AM    Potassium 3.3 (L) 2020 04:09 AM    Chloride 106 2020 04:09 AM    CO2 32 2020 04:09 AM    Anion gap 5 2020 04:09 AM    Glucose 135 (H) 2020 04:09 AM    BUN 19 2020 04:09 AM    Creatinine 1.45 (H) 2020 04:09 AM    BUN/Creatinine ratio 13 2020 04:09 AM    GFR est AA 58 (L) 2020 04:09 AM    GFR est non-AA 47 (L) 2020 04:09 AM    Calcium 8.6 2020 04:09 AM    Bilirubin, total 0.3 2020 04:09 AM    Alk.  phosphatase 69 2020 04:09 AM    Protein, total 6.6 2020 04:09 AM    Albumin 2.2 (L) 2020 04:09 AM    Globulin 4.4 (H) 2020 04:09 AM    A-G Ratio 0.5 (L) 2020 04:09 AM    ALT (SGPT) 17 2020 04:09 AM           Assessment:     #1 fever - resolved      #2 recent delirium tremens     #3 history of melanoma     #4 hypertension    #5 mild renal insufficiency     #6 s/p arrest, possible aspiration                Recommendations:       Observe off abx Jen Samaniego MD
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
## 2020-12-30 NOTE — PROGRESS NOTES
SLP Contact Note    Discussed pt with ESTRADA Rosales. Will plan on completing Fiberoptic Endoscopic Evaluation of Swallow (FEES) this afternoon to objectively assess safety of swallow physiology.     Thank you,  ALTON CramerEd, [id number] [facility]  Speech-Language Pathologist
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
## 2020-12-31 ENCOUNTER — APPOINTMENT (OUTPATIENT)
Dept: GENERAL RADIOLOGY | Age: 75
DRG: 896 | End: 2020-12-31
Attending: ANESTHESIOLOGY
Payer: MEDICARE

## 2020-12-31 LAB
ALBUMIN SERPL-MCNC: 2.3 G/DL (ref 3.5–5)
ALBUMIN/GLOB SERPL: 0.5 {RATIO} (ref 1.1–2.2)
ALP SERPL-CCNC: 64 U/L (ref 45–117)
ALT SERPL-CCNC: 16 U/L (ref 12–78)
ANION GAP SERPL CALC-SCNC: 4 MMOL/L (ref 5–15)
AST SERPL-CCNC: 16 U/L (ref 15–37)
BASOPHILS # BLD: 0.1 K/UL (ref 0–0.1)
BASOPHILS NFR BLD: 1 % (ref 0–1)
BILIRUB SERPL-MCNC: 0.4 MG/DL (ref 0.2–1)
BUN SERPL-MCNC: 21 MG/DL (ref 6–20)
BUN/CREAT SERPL: 16 (ref 12–20)
CALCIUM SERPL-MCNC: 8.5 MG/DL (ref 8.5–10.1)
CHLORIDE SERPL-SCNC: 106 MMOL/L (ref 97–108)
CO2 SERPL-SCNC: 30 MMOL/L (ref 21–32)
CREAT SERPL-MCNC: 1.32 MG/DL (ref 0.7–1.3)
DIFFERENTIAL METHOD BLD: ABNORMAL
EOSINOPHIL # BLD: 0.8 K/UL (ref 0–0.4)
EOSINOPHIL NFR BLD: 6 % (ref 0–7)
ERYTHROCYTE [DISTWIDTH] IN BLOOD BY AUTOMATED COUNT: 12.8 % (ref 11.5–14.5)
GLOBULIN SER CALC-MCNC: 5 G/DL (ref 2–4)
GLUCOSE SERPL-MCNC: 100 MG/DL (ref 65–100)
HCT VFR BLD AUTO: 33.6 % (ref 36.6–50.3)
HGB BLD-MCNC: 10.8 G/DL (ref 12.1–17)
IMM GRANULOCYTES # BLD AUTO: 0.2 K/UL (ref 0–0.04)
IMM GRANULOCYTES NFR BLD AUTO: 1 % (ref 0–0.5)
LYMPHOCYTES # BLD: 1.8 K/UL (ref 0.8–3.5)
LYMPHOCYTES NFR BLD: 13 % (ref 12–49)
MCH RBC QN AUTO: 32.5 PG (ref 26–34)
MCHC RBC AUTO-ENTMCNC: 32.1 G/DL (ref 30–36.5)
MCV RBC AUTO: 101.2 FL (ref 80–99)
MONOCYTES # BLD: 1.4 K/UL (ref 0–1)
MONOCYTES NFR BLD: 10 % (ref 5–13)
NEUTS SEG # BLD: 9.5 K/UL (ref 1.8–8)
NEUTS SEG NFR BLD: 69 % (ref 32–75)
NRBC # BLD: 0 K/UL (ref 0–0.01)
NRBC BLD-RTO: 0 PER 100 WBC
PLATELET # BLD AUTO: 217 K/UL (ref 150–400)
PMV BLD AUTO: 10.2 FL (ref 8.9–12.9)
POTASSIUM SERPL-SCNC: 4 MMOL/L (ref 3.5–5.1)
PROT SERPL-MCNC: 7.3 G/DL (ref 6.4–8.2)
RBC # BLD AUTO: 3.32 M/UL (ref 4.1–5.7)
SODIUM SERPL-SCNC: 140 MMOL/L (ref 136–145)
WBC # BLD AUTO: 13.8 K/UL (ref 4.1–11.1)

## 2020-12-31 PROCEDURE — 74011250637 HC RX REV CODE- 250/637: Performed by: ANESTHESIOLOGY

## 2020-12-31 PROCEDURE — 74011000250 HC RX REV CODE- 250: Performed by: ANESTHESIOLOGY

## 2020-12-31 PROCEDURE — 74011000250 HC RX REV CODE- 250: Performed by: NURSE PRACTITIONER

## 2020-12-31 PROCEDURE — 80053 COMPREHEN METABOLIC PANEL: CPT

## 2020-12-31 PROCEDURE — 85025 COMPLETE CBC W/AUTO DIFF WBC: CPT

## 2020-12-31 PROCEDURE — 92526 ORAL FUNCTION THERAPY: CPT | Performed by: SPEECH-LANGUAGE PATHOLOGIST

## 2020-12-31 PROCEDURE — 36415 COLL VENOUS BLD VENIPUNCTURE: CPT

## 2020-12-31 PROCEDURE — 77010033711 HC HIGH FLOW OXYGEN

## 2020-12-31 PROCEDURE — 71045 X-RAY EXAM CHEST 1 VIEW: CPT

## 2020-12-31 PROCEDURE — 74018 RADEX ABDOMEN 1 VIEW: CPT

## 2020-12-31 PROCEDURE — 65620000000 HC RM CCU GENERAL

## 2020-12-31 PROCEDURE — C9113 INJ PANTOPRAZOLE SODIUM, VIA: HCPCS | Performed by: ANESTHESIOLOGY

## 2020-12-31 PROCEDURE — 74011250636 HC RX REV CODE- 250/636: Performed by: ANESTHESIOLOGY

## 2020-12-31 PROCEDURE — 97530 THERAPEUTIC ACTIVITIES: CPT

## 2020-12-31 PROCEDURE — 74011250636 HC RX REV CODE- 250/636: Performed by: EMERGENCY MEDICINE

## 2020-12-31 RX ORDER — DEXMEDETOMIDINE HYDROCHLORIDE 4 UG/ML
.2-1.4 INJECTION, SOLUTION INTRAVENOUS
Status: DISPENSED | OUTPATIENT
Start: 2020-12-31 | End: 2021-01-01

## 2020-12-31 RX ADMIN — SODIUM CHLORIDE 40 MG: 9 INJECTION INTRAMUSCULAR; INTRAVENOUS; SUBCUTANEOUS at 21:28

## 2020-12-31 RX ADMIN — SODIUM CHLORIDE 40 MG: 9 INJECTION INTRAMUSCULAR; INTRAVENOUS; SUBCUTANEOUS at 08:14

## 2020-12-31 RX ADMIN — Medication 30 MCG: at 12:25

## 2020-12-31 RX ADMIN — ENOXAPARIN SODIUM 40 MG: 40 INJECTION SUBCUTANEOUS at 08:13

## 2020-12-31 RX ADMIN — ACETAZOLAMIDE SODIUM 500 MG: 500 INJECTION, POWDER, LYOPHILIZED, FOR SOLUTION INTRAVENOUS at 21:28

## 2020-12-31 RX ADMIN — DEXMEDETOMIDINE HYDROCHLORIDE 0.4 MCG/KG/HR: 400 INJECTION INTRAVENOUS at 18:22

## 2020-12-31 RX ADMIN — Medication 30 ML: at 12:24

## 2020-12-31 RX ADMIN — POTASSIUM BICARBONATE 40 MEQ: 782 TABLET, EFFERVESCENT ORAL at 17:31

## 2020-12-31 RX ADMIN — METOPROLOL TARTRATE 5 MG: 1 INJECTION, SOLUTION INTRAVENOUS at 23:59

## 2020-12-31 RX ADMIN — METOPROLOL TARTRATE 5 MG: 1 INJECTION, SOLUTION INTRAVENOUS at 08:14

## 2020-12-31 RX ADMIN — OXYCODONE HYDROCHLORIDE AND ACETAMINOPHEN 500 MG: 500 TABLET ORAL at 17:31

## 2020-12-31 RX ADMIN — POTASSIUM BICARBONATE 40 MEQ: 782 TABLET, EFFERVESCENT ORAL at 12:24

## 2020-12-31 RX ADMIN — OXYCODONE HYDROCHLORIDE AND ACETAMINOPHEN 500 MG: 500 TABLET ORAL at 12:25

## 2020-12-31 RX ADMIN — METOPROLOL TARTRATE 5 MG: 1 INJECTION, SOLUTION INTRAVENOUS at 03:38

## 2020-12-31 RX ADMIN — ACETAZOLAMIDE SODIUM 500 MG: 500 INJECTION, POWDER, LYOPHILIZED, FOR SOLUTION INTRAVENOUS at 08:12

## 2020-12-31 RX ADMIN — Medication 10 ML: at 13:10

## 2020-12-31 RX ADMIN — METOPROLOL TARTRATE 5 MG: 1 INJECTION, SOLUTION INTRAVENOUS at 15:09

## 2020-12-31 NOTE — PROGRESS NOTES
1930 received bedside report from [facility] Gaurav ISSA, pt pulled out Dobhoff at shift change. 2000 pt alert on assessment, oriented to self and place, restless but able to reorient    0000 no change on reassessment    0030 incontinence care completed for large BM    0330 labs sent per order    0400 no change on reassessment    0730 Bedside shift change report given to [facility] (oncoming nurse) by Alice Owusu RN  (offgoing nurse). Report included the following information SBAR, Kardex, Procedure Summary, Intake/Output, MAR, Recent Results and Cardiac Rhythm NSR.

## 2020-12-31 NOTE — PROGRESS NOTES
12/31/2020 -   NICOLASA:  - RUR: 23%  - Disposition is TBD dependent on progression: IPR vs SNF placement (Accepted to [facility], Referral pending to [facility] [place])  - Patient will likely need BLS transport    - New Dobhoff was placed today, 12/31  - Patient has completed ABX course and is being monitored      09:05 -   CM contacted patient's spouse ED [facility]: [phone]) to discuss disposition planning. CM discussed updated recommendations of IPR. Spouse is in agreement and selected [facility]. Referral to be submitted to [facility] [place] via [employer]. Transition of Care Plan:     The Plan for Transition of Care is related to the following treatment goals: Rehab     The Patient and/or patient representative spouse, [facility], was provided with a choice of provider and agrees  with the discharge plan. Yes [x] No []    A Freedom of choice list was provided with basic dialogue that supports the patient's individualized plan of care/goals and shares the quality data associated with the providers.        Yes [x] No []   CRM: London Corbin, MPH, [address] [place], [place] [address]; Z: [phone]

## 2020-12-31 NOTE — PROGRESS NOTES
Problem: Dysphagia (Adult)  Goal: *Acute Goals and Plan of Care (Insert Text)  Description: Speech Therapy Goals  Initiated 12/29/2020    1. Patient will participate in objective imaging of the swallow within 3 days. Outcome: Progressing Towards Goal   SPEECH LANGUAGE PATHOLOGY DYSPHAGIA TREATMENT  Patient: Cari Garcia (43 y.o. male)  Date: 12/31/2020  Diagnosis: AMS (altered mental status) [R41.82] AMS (altered mental status)       Precautions:   Fall    ASSESSMENT:  Patient continues to demonstrate significant pharyngeal dysphagia. Clinically, there are numerous swallows per bolus noted, followed by intermittent throat clearing. He was awake and alert throughout session. Family present. Will benefit from continued SLP services to address these deficits. PLAN:  Recommendations and Planned Interventions:  Continue strict oral care. NPO with ice chips. Patient continues to benefit from skilled intervention to address the above impairments. Continue treatment per established plan of care. Discharge Recommendations: To Be Determined     SUBJECTIVE:   Patient stated I'm just gonna run to the bathroom, I'll be right back. OBJECTIVE:   Cognitive and Communication Status:  Neurologic State: Confused, Drowsy, Sleeping  Orientation Level: Disoriented to person, Disoriented to place, Oriented to situation, Oriented to time  Cognition: Impaired decision making, Follows commands, Impulsive, Memory loss, Poor safety awareness  Perception: Appears intact  Perseveration: No perseveration noted  Safety/Judgement: Decreased insight into deficits, Decreased awareness of need for safety, Decreased awareness of need for assistance, Decreased awareness of environment  Dysphagia Treatment:  Oral Assessment:   Dry, clean  P.O.  Trials:     Vocal quality prior to P.O.:        clear                                                 Exercises:  Laryngeal Exercises:                    Effortful Swallow: Yes  Sets : 2  Reps : 10      Facilitated with ice chips  Intermittent coughing vs throat clear noted  Wet vocal quality                                                                                                  Pain:  Pain Scale 1: Numeric (0 - 10)  Pain Intensity 1: 0       After treatment:   Patient left in no apparent distress in bed, Call bell within reach, Nursing notified, and Caregiver / family present    COMMUNICATION/EDUCATION:   Patient's family educated regarding patient's performance today, along with purpose of SLP session. She verbalized understanding. The patient's plan of care including recommendations, planned interventions, and recommended diet changes were discussed with: Registered nurse.      ZENY Jones  Time Calculation: 15 mins

## 2020-12-31 NOTE — PROGRESS NOTES
ID Progress Note  2020       vanco   -    Alicia    -     Subjective:     Afebrile. Says he has no complaints. Objective:     Vitals:   Visit Vitals  /75   Pulse 97   Temp 99.1 °F (37.3 °C)   Resp 20   Ht 5' 11\" (1.803 m)   Wt 80.7 kg (177 lb 14.6 oz)   SpO2 96%   BMI 24.81 kg/m²        Tmax:  Temp (24hrs), Av.5 °F (36.9 °C), Min:98.1 °F (36.7 °C), Max:99.1 °F (37.3 °C)      Exam:    Not in distress  Lung clear, no rales, wheezes or rhonchi   Heart: s1, s2, RRR, no murmurs rubs or clicks  Abdomen: soft nontender, no guarding or rebound  Speech understandable. Labs:   Lab Results   Component Value Date/Time    WBC 13.8 (H) 2020 03:39 AM    HGB (POC) 12.8 2017 10:27 AM    HGB 10.8 (L) 2020 03:39 AM    HCT (POC) 38.8 2017 10:27 AM    HCT 33.6 (L) 2020 03:39 AM    PLATELET 100  03:39 AM    .2 (H) 2020 03:39 AM     Lab Results   Component Value Date/Time    Sodium 140 2020 03:39 AM    Potassium 4.0 2020 03:39 AM    Chloride 106 2020 03:39 AM    CO2 30 2020 03:39 AM    Anion gap 4 (L) 2020 03:39 AM    Glucose 100 2020 03:39 AM    BUN 21 (H) 2020 03:39 AM    Creatinine 1.32 (H) 2020 03:39 AM    BUN/Creatinine ratio 16 2020 03:39 AM    GFR est AA >60 2020 03:39 AM    GFR est non-AA 53 (L) 2020 03:39 AM    Calcium 8.5 2020 03:39 AM    Bilirubin, total 0.4 2020 03:39 AM    Alk. phosphatase 64 2020 03:39 AM    Protein, total 7.3 2020 03:39 AM    Albumin 2.3 (L) 2020 03:39 AM    Globulin 5.0 (H) 2020 03:39 AM    A-G Ratio 0.5 (L) 2020 03:39 AM    ALT (SGPT) 16 2020 03:39 AM           Assessment:     #1 fever - resolved      #2 recent delirium tremens     #3 history of melanoma     #4 hypertension    #5 mild renal insufficiency     #6 s/p arrest, possible aspiration                Recommendations:       Observe off abx.  He has decreasing o2 requirements though cxr looks a little worse today.  Will discuss with Dr. Delta Pyle MD

## 2020-12-31 NOTE — ROUTINE PROCESS
08:00 SBAR from [employer] 09:30 Left nare Dobbhoff placed, stat KUB ordered 12:00 Tube feeds restarted, wife at bedside, he is pleasantly confused, remains in mitt restraints for safety. 18:30 Precedex started per order, he remains pleasant, hoping for sleep this PM. 
 
19:30 Bedside shift change report given to [address] [place] [address] (oncoming nurse) by BODØ (offgoing nurse).  Report included the following information SBAR, Kardex, Procedure Summary, Intake/Output, MAR, Accordion, Recent Results, Med Rec Status, Cardiac Rhythm NSR, Alarm Parameters  and Pre Procedure Checklist.

## 2020-12-31 NOTE — PROGRESS NOTES
Problem: Mobility Impaired (Adult and Pediatric)  Goal: *Acute Goals and Plan of Care (Insert Text)  Description: FUNCTIONAL STATUS PRIOR TO ADMISSION: Patient was independent and active without use of DME.    HOME SUPPORT PRIOR TO ADMISSION: The patient lived with his wife but did not require assist.    Physical Therapy Goals  Initiated 12/12/2020 and re-evaluated/downgraded 12/16/2020; goals reviewed on 12/26/20 and remain appropriate. 1.  Patient will move from supine to sit and sit to supine , scoot up and down, and roll side to side in bed with minimal assistance/contact guard assist within 7 day(s). 2.  Patient will transfer from bed to chair and chair to bed with minimal assistance/contact guard assist using the least restrictive device within 7 day(s). 3.  Patient will perform sit to stand with minimal assistance/contact guard assist within 7 day(s). 4.  Patient will ambulate with minimal assistance/contact guard assist for 50 feet with the least restrictive device within 7 day(s). Outcome: Progressing Towards Goal    PHYSICAL THERAPY TREATMENT  Patient: Juan Luis Anderson (74 y.o. male)  Date: 12/31/2020  Diagnosis: AMS (altered mental status) [R41.82] AMS (altered mental status)       Precautions: Fall  Chart, physical therapy assessment, plan of care and goals were reviewed. ASSESSMENT  Patient continues with skilled PT services and is not progressing towards goals. Pt continues to to require max A x2 for mobility. No attempt to self correct LOB. Pt did not command follow. Pt has the ability to move extremities but not on command. Pt was sleepy on arrival. Pt required v.c to open eyes. Pt inconsistent with task. Pt may need frequency decreased due to limited to no progression towards goals    Current Level of Function Impacting Discharge (mobility/balance):  Max Ax2    Other factors to consider for discharge:          PLAN :  Patient continues to benefit from skilled intervention to address the above impairments. Continue treatment per established plan of care. to address goals. Recommendation for discharge: (in order for the patient to meet his/her long term goals)  Therapy up to 5 days/week in SNF setting    This discharge recommendation:  Has not yet been discussed the attending provider and/or case management    IF patient discharges home will need the following DME: to be determined (TBD)       SUBJECTIVE:   Patient did not speak. OBJECTIVE DATA SUMMARY:   Critical Behavior:  Neurologic State: Confused, Drowsy, Sleeping  Orientation Level: Disoriented to person, Disoriented to place, Oriented to situation, Oriented to time  Cognition: Impaired decision making, Follows commands, Impulsive, Memory loss, Poor safety awareness  Safety/Judgement: Decreased insight into deficits, Decreased awareness of need for safety, Decreased awareness of need for assistance, Decreased awareness of environment  Functional Mobility Training:  Bed Mobility:  Rolling: Maximum assistance  Supine to Sit: Maximum assistance;Assist x2  Sit to Supine: Maximum assistance;Assist x2           Transfers:                                   Balance:  Sitting: Impaired  Sitting - Static: Poor (constant support)  Sitting - Dynamic: Poor (constant support)  Ambulation/Gait Training:                                                        Stairs: Therapeutic Exercises:   No command following     Pain Rating:  Non expressed     Activity Tolerance:   Poor    After treatment patient left in no apparent distress:   Supine in bed, Heels elevated for pressure relief, and Call bell within reach    COMMUNICATION/COLLABORATION:   The patients plan of care was discussed with: Registered nurse.      Veronica Schmidt PTA   Time Calculation: 17 mins

## 2020-12-31 NOTE — PROGRESS NOTES
[facility]    ICU TEAM Progress Note    Name: Jacqueline Cifuentes   : 1945   MRN: [id number]   Date: 2020      Assessment     ICU Problems:    1. Acute Hypoxic Respiratory Failure (secondary to Ativan --> Somnolence)  2. Volume Overload  3. Aspiration Pneumonitis  4. Cardiac Arrest (due to hypoxia) - Sinus Junito/PEA  5. Acute Alcohol Withdrawal   6. NSVT  7. Acute Toxic Metabolic Encephalopathy  8. Acute Renal Failure    Imagin2020      ICU Comprehensive Plan of Care:     Plans for this Shift:     1. Decrease free water flushes  2. Diamox q12  3. Wean oxygen for SpO2 > 90%  4. Aggressive PT/OT   5. Lights on during the day, TV on   6. Continue TF's  7. Update wife  8. SBP Goal of: > 90 mmHg  9. MAP Goal of: > 65 mmHg  10. None - For above SBP/MAP goals  11. Transfusion Trigger (Hgb): <7 g/dL  12. Respiratory Goals:  a. Head of bed > 30 degrees  b. Aggressive bronchopulmonary hygiene  c. Incentive spirometry  13. Pulmonary toilet: Incentive Spirometry   14. SpO2 Goal: > 92%  15. Keep K>4; Mg>2   16. PT/OT: PT consulted and on board and OT consulted and on board   17. Goals of Care Discussion with family Yes   25. Plan of Care/Code Status: Full Code  19. Discussed Care Plan with Bedside RN  20. Documentation of Current Medications  21.  Rest of Plan Below:    F - Feeding:  No   A - Analgesia: Acetaminophen  S - Sedation: None  T - DVT Prophylaxis: SCD's or Sequential Compression Device and Lovenox   H - Head of Bed: > 30 Degrees  U - Ulcer Prophylaxis: Protonix (pantoprazole)   G - Glycemic Control: Insulin  S - Spontaneous Breathing Trial: N/A  B - Bowel Regimen: Senna and Docusate (Colace)  I - Indwelling Catheter:   Tubes: None  Lines: Peripheral IV  Drains: None      Subjective:   Progress Note: 2020      Reason for ICU Admission: Acute Hypoxic Respiratory Failure --> Cardiac Arrest HPI:  Cam Hatchet is a 76 y.o. with a history of melanoma, hypertension and alcohol abuse. He suffered a blunt head injury after a ground-level fall on the evening of December 8. He had tripped over a sidewalk resulting in him falling forward and landing on his right forehead. Patient has felt disoriented and was having some memory issues. For this reason, he was admitted on December 11. He was placed on Rocephin empirically, but this was discontinued because no source of infection was found. He developed delirium tremens and had to be intubated and admitted to the ICU. He was eventually extubated a few days later. In the past day, he developed fever and elevated white count prompting consult. The patient tells me that he does not have any cough, dyspnea, abdominal pain, dysuria, headache or sore throat. He does not have any rash. He does not have any back pain or joint pain. Dr. Eusebio Hernadez documents that he is having diarrhea. A C. difficile has been ordered. Chest x-ray did not reveal any pneumonia. Blood cultures have also been sent. Urinalysis did not show any significant pyuria. He was started on Zosyn and we are being asked to see him in consult. Overnight Events:   12/31/2020  HFNC, diuresing well. Continues to improve.       Active Problem List:     Problem List  Date Reviewed: 8/2/2020          Codes Class    Acute respiratory failure with hypoxia (HCC) ICD-10-CM: J96.01  ICD-9-CM: 518.81         Alcoholism ([facility] ICD-10-CM: F10.20  ICD-9-CM: 303.90         Goals of care, counseling/discussion ICD-10-CM: Z71.89  ICD-9-CM: V65.49         Alcohol withdrawal ([facility] ICD-10-CM: F73.958  ICD-9-CM: 291.81         UTI (urinary tract infection) ICD-10-CM: N39.0  ICD-9-CM: 599.0         Hypokalemia ICD-10-CM: E87.6  ICD-9-CM: 276.8         Thrombocytopenia ([facility] ICD-10-CM: D69.6  ICD-9-CM: 287.5         Anemia ICD-10-CM: D64.9  ICD-9-CM: 285.9         * (Principal) AMS (altered mental status) ICD-10-CM: R41.82  ICD-9-CM: 780.97         Excessive drinking of alcohol ICD-10-CM: F10.10  ICD-9-CM: 305.00         Hyponatremia ICD-10-CM: E87.1  ICD-9-CM: 276.1         HATTIE (acute kidney injury) ([facility] ICD-10-CM: N17.9  ICD-9-CM: 584.9         Essential hypertension ICD-10-CM: I10  ICD-9-CM: 401.9               Past Medical History:      has a past medical history of Cancer ([facility] (~ ), Hypertension, and Other ill-defined conditions(799.89) (1960~). Past Surgical History:      has a past surgical history that includes hx other surgical (~ ) and colonoscopy (2011). Home Medications:     Prior to Admission medications    Medication Sig Start Date End Date Taking? Authorizing Provider   losartan (COZAAR) 100 mg tablet TAKE 1 TABLET BY MOUTH EVERY DAY 20  Yes Asia Saini MD       Allergies/Social/Family History: Allergies   Allergen Reactions    Ace Inhibitors Cough    Thiazides Other (comments)     hyponatremia      Social History     Tobacco Use    Smoking status: Never Smoker    Smokeless tobacco: Never Used   Substance Use Topics    Alcohol use: Yes     Comment: occasional beer      Family History   Problem Relation Age of Onset    Hypertension Father        Review of Systems:     A comprehensive review of systems was negative except for that written in the HPI.     Objective:   Vital Signs:  Visit Vitals  /77   Pulse 87   Temp 98.1 °F (36.7 °C)   Resp 17   Ht 5' 11\" (1.803 m)   Wt 80.7 kg (177 lb 14.6 oz)   SpO2 96%   BMI 24.81 kg/m²    O2 Flow Rate (L/min): 30 l/min O2 Device: Hi flow nasal cannula Temp (24hrs), Av.5 °F (36.9 °C), Min:98.1 °F (36.7 °C), Max:99 °F (37.2 °C)           Intake/Output:     Intake/Output Summary (Last 24 hours) at 2020 1052  Last data filed at 2020 0800  Gross per 24 hour   Intake 1045 ml   Output 1005 ml   Net 40 ml       Physical Exam:    General:  Awake, follows simple commands, confused; look older than stated age   Eyes:  Sclera anicteric. Pupils equally round and reactive to light. Mouth/Throat: Mucous membranes normal, oral pharynx clear   Neck: Supple   Lungs:   Rhonchi bilaterally, good effort   CV:  Regular rate and rhythm,no murmur, click, rub or gallop   Abdomen:   Soft, non-tender. bowel sounds normal. non-distended   Extremities: No cyanosis or edema   Skin: Skin color, texture, turgor normal. no acute rash or lesions   Lymph nodes: Cervical and supraclavicular normal   Musculoskeletal: No swelling or deformity   Lines/Devices:  Intact, no erythema, drainage or tenderness   Psych: Somnolent     LABS AND  DATA: Personally reviewed  Recent Labs     12/31/20 0339 12/30/20 0409   WBC 13.8* 10.9   HGB 10.8* 10.1*   HCT 33.6* 31.5*    233     Recent Labs     12/31/20 0339 12/30/20 0409 12/29/20 0413 12/29/20  0413    143   < > 152*   K 4.0 3.3*   < > 3.6    106   < > 117*   CO2 30 32   < > 29   BUN 21* 19   < > 12   CREA 1.32* 1.45*   < > 1.29    135*   < > 102*   CA 8.5 8.6   < > 8.0*   MG  --   --   --  2.1   PHOS  --   --   --  3.1    < > = values in this interval not displayed. Recent Labs     12/31/20 0339 12/30/20 0409   AP 64 69   TP 7.3 6.6   ALB 2.3* 2.2*   GLOB 5.0* 4.4*     No results for input(s): INR, PTP, APTT, INREXT, INREXT in the last 72 hours. No results for input(s): PHI, PCO2I, PO2I, FIO2I in the last 72 hours. No results for input(s): CPK, CKMB, TROIQ, BNPP in the last 72 hours.     Hemodynamics:   PAP:   CO:     Wedge:   CI:     CVP:    SVR:       PVR:       Ventilator Settings:  Mode Rate Tidal Volume Pressure FiO2 PEEP   Assist control, Volume control   450 ml  5 cm H2O 30 % 5 cm H20     Peak airway pressure: 22 cm H2O    Minute ventilation: 10.3 l/min        MEDS: Reviewed    Chest X-Ray:  CXR Results  (Last 48 hours)               12/31/20 0983  XR CHEST PORT Final result    Impression:  IMPRESSION:   New patchy airspace opacities in the right upper lobe and right lower lobe   consistent with pneumonia. Follow-up to resolution is suggested. Narrative:  EXAM: XR CHEST PORT       INDICATION: Respiratory failure       COMPARISON: 12/28/2020       FINDINGS: A portable AP radiograph of the chest was obtained at 0902 hours. The   patient is on a cardiac monitor. There are new patchy airspace opacities laterally in the right upper lobe and   right lower lobe this is consistent with pneumonia. Clinical correlation close   follow-up is suggested. Left lung is hypoaerated. No pulmonary edema. No pneumothorax. Multidisciplinary Rounds Completed: Yes    ABCDEF Bundle/Checklist Completed:  Yes    SPECIAL EQUIPMENT  None    DISPOSITION  Transfer to non-ICU bed    CRITICAL CARE CONSULTANT NOTE  I had a face to face encounter with the patient, reviewed and interpreted patient data including clinical events, labs, images, vital signs, I/O's, and examined patient. I have discussed the case and the plan and management of the patient's care with the consulting services, the bedside nurses and the respiratory therapist.      NOTE OF PERSONAL INVOLVEMENT IN CARE   This patient has a high probability of imminent, clinically significant deterioration, which requires the highest level of preparedness to intervene urgently. I participated in the decision-making and personally managed or directed the management of the following life and organ supporting interventions that required my frequent assessment to treat or prevent imminent deterioration. I personally spent 60 minutes of critical care time. This is time spent at this critically ill patient's bedside actively involved in patient care as well as the coordination of care. This does not include any procedural time which has been billed separately.     Joselyn Gandhi DO, MS  Staff Intensivist/Anesthesiologist  [facility]  12/31/2020

## 2021-01-01 LAB
ALBUMIN SERPL-MCNC: 2.3 G/DL (ref 3.5–5)
ALBUMIN/GLOB SERPL: 0.5 {RATIO} (ref 1.1–2.2)
ALP SERPL-CCNC: 70 U/L (ref 45–117)
ALT SERPL-CCNC: 14 U/L (ref 12–78)
ANION GAP SERPL CALC-SCNC: 4 MMOL/L (ref 5–15)
AST SERPL-CCNC: 14 U/L (ref 15–37)
BASOPHILS # BLD: 0.1 K/UL (ref 0–0.1)
BASOPHILS NFR BLD: 1 % (ref 0–1)
BILIRUB SERPL-MCNC: 0.3 MG/DL (ref 0.2–1)
BUN SERPL-MCNC: 33 MG/DL (ref 6–20)
BUN/CREAT SERPL: 23 (ref 12–20)
CALCIUM SERPL-MCNC: 8.8 MG/DL (ref 8.5–10.1)
CHLORIDE SERPL-SCNC: 108 MMOL/L (ref 97–108)
CO2 SERPL-SCNC: 27 MMOL/L (ref 21–32)
CREAT SERPL-MCNC: 1.42 MG/DL (ref 0.7–1.3)
DIFFERENTIAL METHOD BLD: ABNORMAL
EOSINOPHIL # BLD: 0.7 K/UL (ref 0–0.4)
EOSINOPHIL NFR BLD: 5 % (ref 0–7)
ERYTHROCYTE [DISTWIDTH] IN BLOOD BY AUTOMATED COUNT: 13.1 % (ref 11.5–14.5)
GLOBULIN SER CALC-MCNC: 4.8 G/DL (ref 2–4)
GLUCOSE SERPL-MCNC: 152 MG/DL (ref 65–100)
HCT VFR BLD AUTO: 30 % (ref 36.6–50.3)
HGB BLD-MCNC: 9.8 G/DL (ref 12.1–17)
IMM GRANULOCYTES # BLD AUTO: 0.1 K/UL (ref 0–0.04)
IMM GRANULOCYTES NFR BLD AUTO: 1 % (ref 0–0.5)
LYMPHOCYTES # BLD: 1.7 K/UL (ref 0.8–3.5)
LYMPHOCYTES NFR BLD: 13 % (ref 12–49)
MCH RBC QN AUTO: 33.2 PG (ref 26–34)
MCHC RBC AUTO-ENTMCNC: 32.7 G/DL (ref 30–36.5)
MCV RBC AUTO: 101.7 FL (ref 80–99)
MONOCYTES # BLD: 1.3 K/UL (ref 0–1)
MONOCYTES NFR BLD: 10 % (ref 5–13)
NEUTS SEG # BLD: 8.7 K/UL (ref 1.8–8)
NEUTS SEG NFR BLD: 70 % (ref 32–75)
NRBC # BLD: 0 K/UL (ref 0–0.01)
NRBC BLD-RTO: 0 PER 100 WBC
PLATELET # BLD AUTO: 206 K/UL (ref 150–400)
PMV BLD AUTO: 10.6 FL (ref 8.9–12.9)
POTASSIUM SERPL-SCNC: 3.8 MMOL/L (ref 3.5–5.1)
PROT SERPL-MCNC: 7.1 G/DL (ref 6.4–8.2)
RBC # BLD AUTO: 2.95 M/UL (ref 4.1–5.7)
SODIUM SERPL-SCNC: 139 MMOL/L (ref 136–145)
WBC # BLD AUTO: 12.6 K/UL (ref 4.1–11.1)

## 2021-01-01 PROCEDURE — 85025 COMPLETE CBC W/AUTO DIFF WBC: CPT

## 2021-01-01 PROCEDURE — 74011250636 HC RX REV CODE- 250/636: Performed by: ANESTHESIOLOGY

## 2021-01-01 PROCEDURE — 74011000250 HC RX REV CODE- 250: Performed by: ANESTHESIOLOGY

## 2021-01-01 PROCEDURE — 77030018798 HC PMP KT ENTRL FED COVD -A

## 2021-01-01 PROCEDURE — 74011250637 HC RX REV CODE- 250/637: Performed by: ANESTHESIOLOGY

## 2021-01-01 PROCEDURE — C9113 INJ PANTOPRAZOLE SODIUM, VIA: HCPCS | Performed by: ANESTHESIOLOGY

## 2021-01-01 PROCEDURE — 80053 COMPREHEN METABOLIC PANEL: CPT

## 2021-01-01 PROCEDURE — 74011250636 HC RX REV CODE- 250/636: Performed by: EMERGENCY MEDICINE

## 2021-01-01 PROCEDURE — 74011000250 HC RX REV CODE- 250: Performed by: NURSE PRACTITIONER

## 2021-01-01 PROCEDURE — 65620000000 HC RM CCU GENERAL

## 2021-01-01 PROCEDURE — 36415 COLL VENOUS BLD VENIPUNCTURE: CPT

## 2021-01-01 RX ORDER — DEXMEDETOMIDINE HYDROCHLORIDE 4 UG/ML
.1-1.5 INJECTION, SOLUTION INTRAVENOUS
Status: DISPENSED | OUTPATIENT
Start: 2021-01-01 | End: 2021-01-02

## 2021-01-01 RX ORDER — SODIUM CHLORIDE 9 MG/ML
100 INJECTION, SOLUTION INTRAVENOUS CONTINUOUS
Status: DISCONTINUED | OUTPATIENT
Start: 2021-01-01 | End: 2021-01-01

## 2021-01-01 RX ORDER — SODIUM CHLORIDE 9 MG/ML
50 INJECTION, SOLUTION INTRAVENOUS CONTINUOUS
Status: DISCONTINUED | OUTPATIENT
Start: 2021-01-01 | End: 2021-01-01

## 2021-01-01 RX ORDER — DEXMEDETOMIDINE HYDROCHLORIDE 4 UG/ML
.2-1.4 INJECTION, SOLUTION INTRAVENOUS
Status: DISCONTINUED | OUTPATIENT
Start: 2021-01-01 | End: 2021-01-01

## 2021-01-01 RX ADMIN — CHLORHEXIDINE GLUCONATE 15 ML: 0.12 RINSE ORAL at 20:18

## 2021-01-01 RX ADMIN — Medication 10 ML: at 14:09

## 2021-01-01 RX ADMIN — OXYCODONE HYDROCHLORIDE AND ACETAMINOPHEN 500 MG: 500 TABLET ORAL at 17:23

## 2021-01-01 RX ADMIN — DEXMEDETOMIDINE HYDROCHLORIDE 0.2 MCG/KG/HR: 400 INJECTION INTRAVENOUS at 18:49

## 2021-01-01 RX ADMIN — POTASSIUM BICARBONATE 40 MEQ: 782 TABLET, EFFERVESCENT ORAL at 08:46

## 2021-01-01 RX ADMIN — Medication 30 ML: at 09:10

## 2021-01-01 RX ADMIN — Medication 10 ML: at 10:00

## 2021-01-01 RX ADMIN — Medication 10 ML: at 12:00

## 2021-01-01 RX ADMIN — CHLORHEXIDINE GLUCONATE 15 ML: 0.12 RINSE ORAL at 09:13

## 2021-01-01 RX ADMIN — Medication 10 ML: at 20:23

## 2021-01-01 RX ADMIN — METOPROLOL TARTRATE 5 MG: 1 INJECTION, SOLUTION INTRAVENOUS at 08:59

## 2021-01-01 RX ADMIN — METOPROLOL TARTRATE 5 MG: 1 INJECTION, SOLUTION INTRAVENOUS at 14:01

## 2021-01-01 RX ADMIN — OXYCODONE HYDROCHLORIDE AND ACETAMINOPHEN 500 MG: 500 TABLET ORAL at 08:46

## 2021-01-01 RX ADMIN — Medication 10 ML: at 09:00

## 2021-01-01 RX ADMIN — POTASSIUM BICARBONATE 40 MEQ: 782 TABLET, EFFERVESCENT ORAL at 17:24

## 2021-01-01 RX ADMIN — SODIUM CHLORIDE 40 MG: 9 INJECTION INTRAMUSCULAR; INTRAVENOUS; SUBCUTANEOUS at 20:23

## 2021-01-01 RX ADMIN — SODIUM CHLORIDE 100 ML/HR: 9 INJECTION, SOLUTION INTRAVENOUS at 08:46

## 2021-01-01 RX ADMIN — ENOXAPARIN SODIUM 40 MG: 40 INJECTION SUBCUTANEOUS at 08:53

## 2021-01-01 RX ADMIN — CHLORHEXIDINE GLUCONATE 15 ML: 0.12 RINSE ORAL at 00:03

## 2021-01-01 RX ADMIN — SODIUM CHLORIDE 40 MG: 9 INJECTION INTRAMUSCULAR; INTRAVENOUS; SUBCUTANEOUS at 08:43

## 2021-01-01 RX ADMIN — METOPROLOL TARTRATE 5 MG: 1 INJECTION, SOLUTION INTRAVENOUS at 20:23

## 2021-01-01 RX ADMIN — Medication 10 ML: at 00:02

## 2021-01-01 RX ADMIN — Medication 30 MCG: at 09:10

## 2021-01-01 NOTE — PROGRESS NOTES
2000 Assumed care of patient from [address] shift summary:  Patient rested over night on precedex. Decreased to off with RASS -2. Patient responds to voice and remains oriented to self. Stated he was at [employer], reoriented and fell back asleep. POX 93-95% overnight. RT decreased high flow liters to 20L/37%, POX 93-94    0800 Bedside and Verbal shift change report given to [address] [place] [address] Way (oncoming nurse) by Sita Tatum RN (offgoing nurse). Report included the following information SBAR, Kardex, MAR, Recent Results and Cardiac Rhythm NSR.
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
## 2021-01-01 NOTE — PROGRESS NOTES
Hospitalist Progress Note    NAME: Loc Concepcion   :     MRN:  [id number]     I reviewed with Dr. Nahed Amaya about the medical history and the findings on the physical examination. I discussed with Dr. Nahed Amaya the patient's diagnosis and concur with the plan. Interim Hospital Summary: 76 y.o. male whom presented on 2019 with alcohol withdrawal. Pt was discharged from the hospital on 2019 and 2019 after being treated for alcohol withdrawal and UTI. Assessment / Plan:  Pt was living by himself, he is unable to take care of himself at this time. CM currently working LTC placement. Pt is medically stable to be discharged to LTC once the place is confirmed. Shock, multifactorial (sepsis/acute blood loss)- resolved   UTI (completed the therapy)  Lactic acidosis- resolved   Cellulitis of head - no s/sx of infection  - no leukocytosis, remain afebrile    Procalcitonin <0.05    Lactic acid 0.8    Blood Cx & urine cx (1/10/2020) NGTD    Tissue culture (2020) with RARE STAPHYLOCOCCUS SIMULANS. Anaerobic cx no growth; was treated with IV zosyn. No further ABX tmt needed at this time. scalp eczema  - ketoconazole shampoo now    GIB, resolved  Diverticulosis  Acute blood loss anemia  Diarrhea, resolved  - CT abdomen/pelvis 20:  1. No acute GI bleeding site demonstrated. 2. Colonic diverticulosis. 3. Moderate bilateral pleural effusions and adjacent atelectasis. 4. Recent right axillary femoral and cross femoral bypass. Anasarca. 5. No acute findings in the abdomen or pelvis. 6. Possible cholelithiasis. EGD 20:  Normal upper endoscopy. Enteric panel 20:  Negative. Patient received total of 2 units PRBCs during this admission    hgb stable at 9.2; no s/sx of bleeding    Continue with PPI    Continue to hold DVT chemoprophylaxis, ongoing evaluation to determine if safe to resume. Not a candidate for SCDs 2/2 severe PVD.      Appreciate GI input; [facility]    ICU TEAM Progress Note    Name: Nikolai Kirkpatrick   : 1945   MRN: [id number]   Date: 2021      Assessment     ICU Problems:    1. Acute Hypoxic Respiratory Failure (secondary to Ativan --> Somnolence)  2. Volume Overload  3. Aspiration Pneumonitis  4. Cardiac Arrest (due to hypoxia) - Sinus Junito/PEA  5. Acute Alcohol Withdrawal   6. NSVT  7. Acute Toxic Metabolic Encephalopathy  8. Acute Renal Failure    Imagin2020      ICU Comprehensive Plan of Care:     Plans for this Shift:     1. MRI Brain with/without contrast  2. Precedex for Sedation MRI if needed  3. Precedex for Sedation tonight 6 PM to 6 AM  4. NS at 100 cc/hr prior to and after MR - would stop 6 hours after  5. Wean oxygen for SpO2 > 90%  6. Aggressive PT/OT   7. Lights on during the day, TV on   8. Continue TF's  9. Update wife at bedside  10. SBP Goal of: > 90 mmHg  11. MAP Goal of: > 65 mmHg  12. None - For above SBP/MAP goals  13. Transfusion Trigger (Hgb): <7 g/dL  14. Respiratory Goals:  a. Head of bed > 30 degrees  b. Aggressive bronchopulmonary hygiene  c. Incentive spirometry  15. Pulmonary toilet: Incentive Spirometry   16. SpO2 Goal: > 92%  17. Keep K>4; Mg>2   18. PT/OT: PT consulted and on board and OT consulted and on board   19. Goals of Care Discussion with family Yes   21. Plan of Care/Code Status: Full Code  21. Discussed Care Plan with Bedside RN  22. Documentation of Current Medications  23.  Rest of Plan Below:    F - Feeding:  Yes   A - Analgesia: Acetaminophen  S - Sedation: Precedex  T - DVT Prophylaxis: SCD's or Sequential Compression Device and Lovenox   H - Head of Bed: > 30 Degrees  U - Ulcer Prophylaxis: Protonix (pantoprazole)   G - Glycemic Control: Insulin  S - Spontaneous Breathing Trial: N/A  B - Bowel Regimen: Senna and Docusate (Colace)  I - Indwelling Catheter:   Tubes: Dobhoff  Lines: Peripheral IV  Drains: None      Subjective:   Progress Note: 1/1/2021      Reason for ICU Admission: Acute Hypoxic Respiratory Failure --> Cardiac Arrest     HPI:  Andrew Huertas is a 76 y.o. with a history of melanoma, hypertension and alcohol abuse. He suffered a blunt head injury after a ground-level fall on the evening of December 8. He had tripped over a sidewalk resulting in him falling forward and landing on his right forehead. Patient has felt disoriented and was having some memory issues. For this reason, he was admitted on December 11. He was placed on Rocephin empirically, but this was discontinued because no source of infection was found. He developed delirium tremens and had to be intubated and admitted to the ICU. He was eventually extubated a few days later. In the past day, he developed fever and elevated white count prompting consult. The patient tells me that he does not have any cough, dyspnea, abdominal pain, dysuria, headache or sore throat. He does not have any rash. He does not have any back pain or joint pain. Dr. Joe Murray documents that he is having diarrhea. A C. difficile has been ordered. Chest x-ray did not reveal any pneumonia. Blood cultures have also been sent. Urinalysis did not show any significant pyuria. He was started on Zosyn and we are being asked to see him in consult. Overnight Events:   1/1/2021  HFNC. Slept on Precedex infusion.        Active Problem List:     Problem List  Date Reviewed: 8/2/2020          Codes Class    Acute respiratory failure with hypoxia (HCC) ICD-10-CM: J96.01  ICD-9-CM: 518.81         Alcoholism ([facility] ICD-10-CM: F10.20  ICD-9-CM: 303.90         Goals of care, counseling/discussion ICD-10-CM: Z71.89  ICD-9-CM: V65.49         Alcohol withdrawal ([facility] ICD-10-CM: S66.788  ICD-9-CM: 291.81         UTI (urinary tract infection) ICD-10-CM: N39.0  ICD-9-CM: 599.0         Hypokalemia ICD-10-CM: E87.6  ICD-9-CM: 276.8         Thrombocytopenia ([facility] recommend outpatient colonoscopy    PVD (POA)  Peripheral neuropathy  - Vascular surgery input appreciated;      S/P Axillo-Bifemoral bypass on 01/02/20, incisions look good. 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 Continue asa 81 mg po daily. Continue gabapentin 100 mg po bid and 300 mg po at hs. Electrolyte imbalance (low Mg)  - replaced. Scheduled mg supplement. Will check tomorrow     Anasarca, improving   Pulmonary edema  Pleural effusions  Severe hyperalbuminemia   Echo 01/04/20:  · Normal cavity size and systolic function (ejection fraction normal). Increased wall thickness. Estimated left ventricular ejection fraction is 50 - 55%. Left ventricular diastolic dysfunction. · Trace mitral valve regurgitation is present. · Mild tricuspid valve regurgitation is present. · Small-to-moderate pericardial effusion. There is left pleural effusion. Continue with furosemide and lasix    Severe hypoalbuminemia likely contributing to 3rd spacing. Severe protein calorie malnutrition   - Appreciate dietician input    Continue nutritional supplements       ETOH withdrawal with DT's- resolved   Tobacco abuse   - Patient educated on the importance of smoking cessation and the health benefits associated with quitting. Pt hasn't been smoking since the admission    Continue Nicotine patch     Continue folic acid, multivitamin, and vitamin B      HTN   Dyslipidemia   - started on losartan    Continue statin and ASA     COPD, not in exacerbation   - PRN nebs      BPH  Urinary retention   - Continue flomax     25.0 - 29.9 Overweight / Body mass index is 25.05 kg/m².     Code status: DNR  Prophylaxis: On hold secondary to anemia   Recommended Disposition: SNF/LTC       Subjective:     Chief Complaint / Reason for Physician Visit  \"my scalp is itching really bad\". Discussed with RN events overnight.      Review of Systems:  Symptom Y/N Comments  Symptom Y/N Comments   Fever/Chills n   Chest Pain n ICD-10-CM: D69.6  ICD-9-CM: 287.5         Anemia ICD-10-CM: D64.9  ICD-9-CM: 285.9         * (Principal) AMS (altered mental status) ICD-10-CM: R41.82  ICD-9-CM: 780.97         Excessive drinking of alcohol ICD-10-CM: F10.10  ICD-9-CM: 305.00         Hyponatremia ICD-10-CM: E87.1  ICD-9-CM: 276.1         HATTIE (acute kidney injury) ([facility] ICD-10-CM: N17.9  ICD-9-CM: 584.9         Essential hypertension ICD-10-CM: I10  ICD-9-CM: 401.9               Past Medical History:      has a past medical history of Cancer ([facility] (~ ), Hypertension, and Other ill-defined conditions(799.89) (1960~). Past Surgical History:      has a past surgical history that includes hx other surgical (~ ) and colonoscopy (2011). Home Medications:     Prior to Admission medications    Medication Sig Start Date End Date Taking? Authorizing Provider   losartan (COZAAR) 100 mg tablet TAKE 1 TABLET BY MOUTH EVERY DAY 20  Yes Jordy Song MD       Allergies/Social/Family History: Allergies   Allergen Reactions    Ace Inhibitors Cough    Thiazides Other (comments)     hyponatremia      Social History     Tobacco Use    Smoking status: Never Smoker    Smokeless tobacco: Never Used   Substance Use Topics    Alcohol use: Yes     Comment: occasional beer      Family History   Problem Relation Age of Onset    Hypertension Father        Review of Systems:     A comprehensive review of systems was negative except for that written in the HPI.     Objective:   Vital Signs:  Visit Vitals  /63   Pulse (!) 107   Temp 97.5 °F (36.4 °C)   Resp 24   Ht 5' 11\" (1.803 m)   Wt 78.1 kg (172 lb 2.9 oz)   SpO2 92%   BMI 24.01 kg/m²    O2 Flow Rate (L/min): 30 l/min O2 Device: Hi flow nasal cannula Temp (24hrs), Av.8 °F (37.1 °C), Min:97.5 °F (36.4 °C), Max:100.1 °F (37.8 °C)           Intake/Output:     Intake/Output Summary (Last 24 hours) at 2021 1002  Last data filed at 2021 0920  Gross per 24 hour   Intake 1921.84 ml Poor Appetite    Edema     Cough    Abdominal Pain     Sputum    Joint Pain     SOB/[place] n   Pruritis/Rash     Nausea/vomit n   Tolerating PT/OT     Diarrhea n   Tolerating Diet     Constipation n   Other       Could NOT obtain due to:      Objective:     VITALS:   Last 24hrs VS reviewed since prior progress note. Most recent are:  Patient Vitals for the past 24 hrs:   Temp Pulse Resp BP SpO2   01/18/20 1103 97.7 °F (36.5 °C) 91 18 156/74 95 %   01/18/20 0807 98.9 °F (37.2 °C) 94 18 153/68 95 %   01/18/20 0348 97.8 °F (36.6 °C) 90 16 146/74 96 %   01/17/20 2249 97.9 °F (36.6 °C) 89 16 149/83 99 %   01/17/20 1958 98.9 °F (37.2 °C) 89 16 157/83 94 %   01/17/20 1844 98 °F (36.7 °C) 78 18 179/88 99 %       Intake/Output Summary (Last 24 hours) at 1/18/2020 1634  Last data filed at 1/18/2020 1135  Gross per 24 hour   Intake 540 ml   Output    Net 540 ml        PHYSICAL EXAM:  General: WD, WN. Alert, cooperative, no acute distress    EENT:  EOMI. Anicteric sclerae. MMM  Resp:  CTA bilaterally, no wheezing or rales. No accessory muscle use  CV:  Regular  rhythm,  No edema  GI:  Soft, Non distended, Non tender. +Bowel sounds  Neurologic:  Alert and oriented X 3, normal speech,   Psych:   Good insight. Not anxious nor agitated  Skin:  Dry, flaky scalp. Left TMA noted well healed. Right toes/foot dressed post-operatively. AO-Bifem surgical sites well approximated without erythema/exudate. Left lateral lower leg with breakdown is healing.  No jaundice    Reviewed most current lab test results and cultures  YES  Reviewed most current radiology test results   YES  Review and summation of old records today    NO  Reviewed patient's current orders and MAR    YES  PMH/SH reviewed - no change compared to H&P  ________________________________________________________________________  Care Plan discussed with:    Comments   Patient y    Family      RN y    Care Manager     Consultant                        Multidiciplinary team Output 875 ml   Net 1046.84 ml       Physical Exam:    General:  Awake, follows simple commands, confused; look older than stated age   Eyes:  Sclera anicteric. Pupils equally round and reactive to light. Mouth/Throat: Mucous membranes normal, oral pharynx clear   Neck: Supple   Lungs:   Rhonchi bilaterally, good effort   CV:  Regular rate and rhythm,no murmur, click, rub or gallop   Abdomen:   Soft, non-tender. bowel sounds normal. non-distended   Extremities: No cyanosis or edema   Skin: Skin color, texture, turgor normal. no acute rash or lesions   Lymph nodes: Cervical and supraclavicular normal   Musculoskeletal: No swelling or deformity   Lines/Devices:  Intact, no erythema, drainage or tenderness   Psych: Arousable     LABS AND  DATA: Personally reviewed  Recent Labs     01/01/21 0411 12/31/20 0339   WBC 12.6* 13.8*   HGB 9.8* 10.8*   HCT 30.0* 33.6*    217     Recent Labs     01/01/21 0411 12/31/20 0339    140   K 3.8 4.0    106   CO2 27 30   BUN 33* 21*   CREA 1.42* 1.32*   * 100   CA 8.8 8.5     Recent Labs     01/01/21 0411 12/31/20 0339   AP 70 64   TP 7.1 7.3   ALB 2.3* 2.3*   GLOB 4.8* 5.0*     No results for input(s): INR, PTP, APTT, INREXT, INREXT in the last 72 hours. No results for input(s): PHI, PCO2I, PO2I, FIO2I in the last 72 hours. No results for input(s): CPK, CKMB, TROIQ, BNPP in the last 72 hours. Hemodynamics:   PAP:   CO:     Wedge:   CI:     CVP:    SVR:       PVR:       Ventilator Settings:  Mode Rate Tidal Volume Pressure FiO2 PEEP   Assist control, Volume control   450 ml  5 cm H2O 34 % 5 cm H20     Peak airway pressure: 22 cm H2O    Minute ventilation: 10.3 l/min        MEDS: Reviewed    Chest X-Ray:  CXR Results  (Last 48 hours)               12/31/20 0907  XR CHEST PORT Final result    Impression:  IMPRESSION:   New patchy airspace opacities in the right upper lobe and right lower lobe   consistent with pneumonia.  Follow-up to resolution is rounds were held today with , nursing, pharmacist and clinical coordinator. Patient's plan of care was discussed; medications were reviewed and discharge planning was addressed. _________________________________________________________________________________________________________________________________________  Chela Peralta NP     Procedures: see electronic medical records for all procedures/Xrays and details which were not copied into this note but were reviewed prior to creation of Plan. LABS:  I reviewed today's most current labs and imaging studies.   Pertinent labs include:  Recent Labs     01/18/20 0339 01/17/20  0340   WBC 10.0 9.5   HGB 9.2* 9.0*   HCT 27.7* 27.6*    349     Recent Labs     01/18/20 0339 01/17/20 0340 01/16/20  0352    139 139   K 3.6 3.7 3.3*    108 108   CO2 26 26 26   GLU 78 74 79   BUN 8 8 8   CREA 0.61* 0.60* 0.58*   CA 8.6 8.4* 8.3*   MG 1.2* 1.2* 1.6       Signed: )Camille Dickens NP suggested. Narrative:  EXAM: XR CHEST PORT       INDICATION: Respiratory failure       COMPARISON: 12/28/2020       FINDINGS: A portable AP radiograph of the chest was obtained at 0902 hours. The   patient is on a cardiac monitor. There are new patchy airspace opacities laterally in the right upper lobe and   right lower lobe this is consistent with pneumonia. Clinical correlation close   follow-up is suggested. Left lung is hypoaerated. No pulmonary edema. No pneumothorax. Multidisciplinary Rounds Completed: Yes    ABCDEF Bundle/Checklist Completed:  Yes    SPECIAL EQUIPMENT  None    DISPOSITION  Transfer to non-ICU bed    CRITICAL CARE CONSULTANT NOTE  I had a face to face encounter with the patient, reviewed and interpreted patient data including clinical events, labs, images, vital signs, I/O's, and examined patient. I have discussed the case and the plan and management of the patient's care with the consulting services, the bedside nurses and the respiratory therapist.      NOTE OF PERSONAL INVOLVEMENT IN CARE   This patient has a high probability of imminent, clinically significant deterioration, which requires the highest level of preparedness to intervene urgently. I participated in the decision-making and personally managed or directed the management of the following life and organ supporting interventions that required my frequent assessment to treat or prevent imminent deterioration. I personally spent 60 minutes of critical care time. This is time spent at this critically ill patient's bedside actively involved in patient care as well as the coordination of care. This does not include any procedural time which has been billed separately.     Werner Isabel DO, MS  Staff Intensivist/Anesthesiologist  [facility]  1/1/2021

## 2021-01-02 ENCOUNTER — APPOINTMENT (OUTPATIENT)
Dept: MRI IMAGING | Age: 76
DRG: 896 | End: 2021-01-02
Attending: ANESTHESIOLOGY
Payer: MEDICARE

## 2021-01-02 LAB
ALBUMIN SERPL-MCNC: 2.1 G/DL (ref 3.5–5)
ALBUMIN/GLOB SERPL: 0.4 {RATIO} (ref 1.1–2.2)
ALP SERPL-CCNC: 70 U/L (ref 45–117)
ALT SERPL-CCNC: 13 U/L (ref 12–78)
ANION GAP SERPL CALC-SCNC: 3 MMOL/L (ref 5–15)
ANION GAP SERPL CALC-SCNC: 5 MMOL/L (ref 5–15)
AST SERPL-CCNC: 13 U/L (ref 15–37)
BASOPHILS # BLD: 0.1 K/UL (ref 0–0.1)
BASOPHILS NFR BLD: 1 % (ref 0–1)
BILIRUB SERPL-MCNC: 0.2 MG/DL (ref 0.2–1)
BUN SERPL-MCNC: 32 MG/DL (ref 6–20)
BUN SERPL-MCNC: 37 MG/DL (ref 6–20)
BUN/CREAT SERPL: 26 (ref 12–20)
BUN/CREAT SERPL: 29 (ref 12–20)
CALCIUM SERPL-MCNC: 8.8 MG/DL (ref 8.5–10.1)
CALCIUM SERPL-MCNC: 9 MG/DL (ref 8.5–10.1)
CHLORIDE SERPL-SCNC: 107 MMOL/L (ref 97–108)
CHLORIDE SERPL-SCNC: 109 MMOL/L (ref 97–108)
CO2 SERPL-SCNC: 27 MMOL/L (ref 21–32)
CO2 SERPL-SCNC: 29 MMOL/L (ref 21–32)
CREAT SERPL-MCNC: 1.24 MG/DL (ref 0.7–1.3)
CREAT SERPL-MCNC: 1.29 MG/DL (ref 0.7–1.3)
DIFFERENTIAL METHOD BLD: ABNORMAL
EOSINOPHIL # BLD: 0.7 K/UL (ref 0–0.4)
EOSINOPHIL NFR BLD: 7 % (ref 0–7)
ERYTHROCYTE [DISTWIDTH] IN BLOOD BY AUTOMATED COUNT: 12.9 % (ref 11.5–14.5)
GLOBULIN SER CALC-MCNC: 4.7 G/DL (ref 2–4)
GLUCOSE SERPL-MCNC: 118 MG/DL (ref 65–100)
GLUCOSE SERPL-MCNC: 128 MG/DL (ref 65–100)
HCT VFR BLD AUTO: 28 % (ref 36.6–50.3)
HGB BLD-MCNC: 8.9 G/DL (ref 12.1–17)
IMM GRANULOCYTES # BLD AUTO: 0.1 K/UL (ref 0–0.04)
IMM GRANULOCYTES NFR BLD AUTO: 1 % (ref 0–0.5)
LYMPHOCYTES # BLD: 1.6 K/UL (ref 0.8–3.5)
LYMPHOCYTES NFR BLD: 15 % (ref 12–49)
MAGNESIUM SERPL-MCNC: 2.3 MG/DL (ref 1.6–2.4)
MCH RBC QN AUTO: 32.6 PG (ref 26–34)
MCHC RBC AUTO-ENTMCNC: 31.8 G/DL (ref 30–36.5)
MCV RBC AUTO: 102.6 FL (ref 80–99)
MONOCYTES # BLD: 1.2 K/UL (ref 0–1)
MONOCYTES NFR BLD: 11 % (ref 5–13)
NEUTS SEG # BLD: 7 K/UL (ref 1.8–8)
NEUTS SEG NFR BLD: 65 % (ref 32–75)
NRBC # BLD: 0 K/UL (ref 0–0.01)
NRBC BLD-RTO: 0 PER 100 WBC
PLATELET # BLD AUTO: 174 K/UL (ref 150–400)
PMV BLD AUTO: 10.6 FL (ref 8.9–12.9)
POTASSIUM SERPL-SCNC: 3.7 MMOL/L (ref 3.5–5.1)
POTASSIUM SERPL-SCNC: 4 MMOL/L (ref 3.5–5.1)
PROT SERPL-MCNC: 6.8 G/DL (ref 6.4–8.2)
RBC # BLD AUTO: 2.73 M/UL (ref 4.1–5.7)
SODIUM SERPL-SCNC: 139 MMOL/L (ref 136–145)
SODIUM SERPL-SCNC: 141 MMOL/L (ref 136–145)
WBC # BLD AUTO: 10.7 K/UL (ref 4.1–11.1)

## 2021-01-02 PROCEDURE — 70553 MRI BRAIN STEM W/O & W/DYE: CPT

## 2021-01-02 PROCEDURE — 74011250636 HC RX REV CODE- 250/636: Performed by: ANESTHESIOLOGY

## 2021-01-02 PROCEDURE — 74011000250 HC RX REV CODE- 250: Performed by: NURSE PRACTITIONER

## 2021-01-02 PROCEDURE — 74011250636 HC RX REV CODE- 250/636: Performed by: EMERGENCY MEDICINE

## 2021-01-02 PROCEDURE — 77010033711 HC HIGH FLOW OXYGEN

## 2021-01-02 PROCEDURE — 77010033678 HC OXYGEN DAILY

## 2021-01-02 PROCEDURE — C9113 INJ PANTOPRAZOLE SODIUM, VIA: HCPCS | Performed by: ANESTHESIOLOGY

## 2021-01-02 PROCEDURE — 85025 COMPLETE CBC W/AUTO DIFF WBC: CPT

## 2021-01-02 PROCEDURE — 80053 COMPREHEN METABOLIC PANEL: CPT

## 2021-01-02 PROCEDURE — 36415 COLL VENOUS BLD VENIPUNCTURE: CPT

## 2021-01-02 PROCEDURE — 83735 ASSAY OF MAGNESIUM: CPT

## 2021-01-02 PROCEDURE — 74011250637 HC RX REV CODE- 250/637: Performed by: ANESTHESIOLOGY

## 2021-01-02 PROCEDURE — 65620000000 HC RM CCU GENERAL

## 2021-01-02 PROCEDURE — 74011000250 HC RX REV CODE- 250: Performed by: ANESTHESIOLOGY

## 2021-01-02 PROCEDURE — A9575 INJ GADOTERATE MEGLUMI 0.1ML: HCPCS | Performed by: ANESTHESIOLOGY

## 2021-01-02 RX ORDER — BUMETANIDE 0.25 MG/ML
2 INJECTION INTRAMUSCULAR; INTRAVENOUS ONCE
Status: COMPLETED | OUTPATIENT
Start: 2021-01-02 | End: 2021-01-02

## 2021-01-02 RX ORDER — DEXMEDETOMIDINE HYDROCHLORIDE 4 UG/ML
.1-1.5 INJECTION, SOLUTION INTRAVENOUS
Status: ACTIVE | OUTPATIENT
Start: 2021-01-02 | End: 2021-01-02

## 2021-01-02 RX ORDER — GADOTERATE MEGLUMINE 376.9 MG/ML
15 INJECTION INTRAVENOUS
Status: COMPLETED | OUTPATIENT
Start: 2021-01-02 | End: 2021-01-02

## 2021-01-02 RX ORDER — SODIUM CHLORIDE 0.9 % (FLUSH) 0.9 %
10 SYRINGE (ML) INJECTION
Status: COMPLETED | OUTPATIENT
Start: 2021-01-02 | End: 2021-01-02

## 2021-01-02 RX ADMIN — BUMETANIDE 2 MG: 0.25 INJECTION, SOLUTION INTRAMUSCULAR; INTRAVENOUS at 14:23

## 2021-01-02 RX ADMIN — METOPROLOL TARTRATE 5 MG: 1 INJECTION, SOLUTION INTRAVENOUS at 21:33

## 2021-01-02 RX ADMIN — GADOTERATE MEGLUMINE 15 ML: 376.9 INJECTION INTRAVENOUS at 11:04

## 2021-01-02 RX ADMIN — Medication 30 ML: at 08:38

## 2021-01-02 RX ADMIN — OXYCODONE HYDROCHLORIDE AND ACETAMINOPHEN 500 MG: 500 TABLET ORAL at 18:36

## 2021-01-02 RX ADMIN — POTASSIUM BICARBONATE 40 MEQ: 782 TABLET, EFFERVESCENT ORAL at 18:36

## 2021-01-02 RX ADMIN — POTASSIUM BICARBONATE 40 MEQ: 782 TABLET, EFFERVESCENT ORAL at 08:38

## 2021-01-02 RX ADMIN — ACETAZOLAMIDE SODIUM 500 MG: 500 INJECTION, POWDER, LYOPHILIZED, FOR SOLUTION INTRAVENOUS at 17:13

## 2021-01-02 RX ADMIN — Medication 10 ML: at 11:04

## 2021-01-02 RX ADMIN — Medication 30 MCG: at 08:39

## 2021-01-02 RX ADMIN — CHLORHEXIDINE GLUCONATE 15 ML: 0.12 RINSE ORAL at 21:39

## 2021-01-02 RX ADMIN — Medication 10 ML: at 05:23

## 2021-01-02 RX ADMIN — METOPROLOL TARTRATE 5 MG: 1 INJECTION, SOLUTION INTRAVENOUS at 15:19

## 2021-01-02 RX ADMIN — SODIUM CHLORIDE 40 MG: 9 INJECTION INTRAMUSCULAR; INTRAVENOUS; SUBCUTANEOUS at 08:38

## 2021-01-02 RX ADMIN — METOPROLOL TARTRATE 5 MG: 1 INJECTION, SOLUTION INTRAVENOUS at 02:06

## 2021-01-02 RX ADMIN — SODIUM CHLORIDE 40 MG: 9 INJECTION INTRAMUSCULAR; INTRAVENOUS; SUBCUTANEOUS at 21:33

## 2021-01-02 RX ADMIN — Medication 10 ML: at 13:27

## 2021-01-02 RX ADMIN — CHLORHEXIDINE GLUCONATE 15 ML: 0.12 RINSE ORAL at 08:39

## 2021-01-02 RX ADMIN — OXYCODONE HYDROCHLORIDE AND ACETAMINOPHEN 500 MG: 500 TABLET ORAL at 08:38

## 2021-01-02 RX ADMIN — Medication 10 ML: at 21:34

## 2021-01-02 RX ADMIN — METOPROLOL TARTRATE 5 MG: 1 INJECTION, SOLUTION INTRAVENOUS at 08:38

## 2021-01-02 RX ADMIN — CHLOROTHIAZIDE SODIUM 500 MG: 500 INJECTION, POWDER, LYOPHILIZED, FOR SOLUTION INTRAVENOUS at 13:23

## 2021-01-02 RX ADMIN — ENOXAPARIN SODIUM 40 MG: 40 INJECTION SUBCUTANEOUS at 08:30

## 2021-01-02 NOTE — PROGRESS NOTES
0730: Bedside shift change report given to 26[address] Sincere Nixon RN (oncoming nurse) by Richelle Yeboah RN (offgoing nurse). Report included the following information SBAR, Kardex, ED Summary, OR Summary, Procedure Summary, Intake/Output, MAR, Recent Results, Cardiac Rhythm NSR and Dual Neuro Assessment. 0915: Summer, pt's wife, @ the bedside. 0945: RN @ the bedside to trial pt on NRB @ 15L for 10 mins r/t transporting to MRI. 1015: RN and PCT transported pt to MRI. 1145: RN and PCT returned to CCU with pt.     1630: RN @ the bedside to transition pt from [facility] to [place] @ 6L. Pt tolerated very well.    1700: RN @ the bedside to titrate NC to 5L.     1800: RN @ the bedside to titrate NC to 4L. 1930: Bedside shift change report given to Shanice Holcomb RN (oncoming nurse) by [address] Sincere Nixon RN (offgoing nurse). Report included the following information SBAR, Kardex, ED Summary, OR Summary, Procedure Summary, Intake/Output, MAR, Recent Results, Cardiac Rhythm NSR and Dual Neuro Assessment.

## 2021-01-02 NOTE — PROGRESS NOTES
[facility]    ICU TEAM Progress Note    Name: Joselyn Sandoval   : 1945   MRN: [id number]   Date: 2021      Assessment     ICU Problems:    1. Acute Hypoxic Respiratory Failure (secondary to Ativan --> Somnolence)  2. Volume Overload  3. Aspiration Pneumonitis  4. Cardiac Arrest (due to hypoxia) - Sinus Junito/PEA  5. Acute Alcohol Withdrawal   6. NSVT  7. Acute Toxic Metabolic Encephalopathy  8. Acute Renal Failure    Imagin2020      ICU Comprehensive Plan of Care:     Plans for this Shift:     1. MRI Brain with/without contrast -- 2020  2. Precedex for Sedation MRI if needed  3. Aggressive diuresis once done with MRI  4. Wean oxygen for SpO2 > 90%  5. Aggressive PT/OT   6. Lights on during the day, TV on   7. Precedex for Sedation tonight 6 PM to 6 AM  8. Continue TF's  9. Update wife at bedside  10. SBP Goal of: > 90 mmHg  11. MAP Goal of: > 65 mmHg  12. None - For above SBP/MAP goals  13. Transfusion Trigger (Hgb): <7 g/dL  14. Respiratory Goals:  a. Head of bed > 30 degrees  b. Aggressive bronchopulmonary hygiene  c. Incentive spirometry  15. Pulmonary toilet: Incentive Spirometry   16. SpO2 Goal: > 92%  17. Keep K>4; Mg>2   18. PT/OT: PT consulted and on board and OT consulted and on board   19. Goals of Care Discussion with family Yes   21. Plan of Care/Code Status: Full Code  21. Discussed Care Plan with Bedside RN  22. Documentation of Current Medications  23.  Rest of Plan Below:    F - Feeding:  Yes   A - Analgesia: Acetaminophen  S - Sedation: Precedex  T - DVT Prophylaxis: SCD's or Sequential Compression Device and Lovenox   H - Head of Bed: > 30 Degrees  U - Ulcer Prophylaxis: Protonix (pantoprazole)   G - Glycemic Control: Insulin  S - Spontaneous Breathing Trial: N/A  B - Bowel Regimen: Senna and Docusate (Colace)  I - Indwelling Catheter:   Tubes: Dobhoff  Lines: Peripheral IV  Drains: None      Subjective:   Progress Note: 1/2/2021      Reason for ICU Admission: Acute Hypoxic Respiratory Failure --> Cardiac Arrest     HPI:  Rabia Kilgore is a 76 y.o. with a history of melanoma, hypertension and alcohol abuse. He suffered a blunt head injury after a ground-level fall on the evening of December 8. He had tripped over a sidewalk resulting in him falling forward and landing on his right forehead. Patient has felt disoriented and was having some memory issues. For this reason, he was admitted on December 11. He was placed on Rocephin empirically, but this was discontinued because no source of infection was found. He developed delirium tremens and had to be intubated and admitted to the ICU. He was eventually extubated a few days later. In the past day, he developed fever and elevated white count prompting consult. The patient tells me that he does not have any cough, dyspnea, abdominal pain, dysuria, headache or sore throat. He does not have any rash. He does not have any back pain or joint pain. Dr. Chris Perez documents that he is having diarrhea. A C. difficile has been ordered. Chest x-ray did not reveal any pneumonia. Blood cultures have also been sent. Urinalysis did not show any significant pyuria. He was started on Zosyn and we are being asked to see him in consult. Overnight Events:   1/2/2021  HFNC. Slept on Precedex infusion. Interacting more so than day before.       Active Problem List:     Problem List  Date Reviewed: 8/2/2020          Codes Class    Acute respiratory failure with hypoxia (HCC) ICD-10-CM: J96.01  ICD-9-CM: 518.81         Alcoholism ([facility] ICD-10-CM: F10.20  ICD-9-CM: 303.90         Goals of care, counseling/discussion ICD-10-CM: Z71.89  ICD-9-CM: V65.49         Alcohol withdrawal ([facility] ICD-10-CM: I44.908  ICD-9-CM: 291.81         UTI (urinary tract infection) ICD-10-CM: N39.0  ICD-9-CM: 599.0         Hypokalemia ICD-10-CM: E87.6  ICD-9-CM: 276.8 Thrombocytopenia (HCC) ICD-10-CM: D69.6  ICD-9-CM: 287.5         Anemia ICD-10-CM: D64.9  ICD-9-CM: 285.9         * (Principal) AMS (altered mental status) ICD-10-CM: R41.82  ICD-9-CM: 780.97         Excessive drinking of alcohol ICD-10-CM: F10.10  ICD-9-CM: 305.00         Hyponatremia ICD-10-CM: E87.1  ICD-9-CM: 276.1         HATTIE (acute kidney injury) ([facility] ICD-10-CM: N17.9  ICD-9-CM: 584.9         Essential hypertension ICD-10-CM: I10  ICD-9-CM: 401.9               Past Medical History:      has a past medical history of Cancer ([facility] (~ ), Hypertension, and Other ill-defined conditions(799.89) (1960~). Past Surgical History:      has a past surgical history that includes hx other surgical (~ ) and colonoscopy (2011). Home Medications:     Prior to Admission medications    Medication Sig Start Date End Date Taking? Authorizing Provider   losartan (COZAAR) 100 mg tablet TAKE 1 TABLET BY MOUTH EVERY DAY 20  Yes Martínez Greco MD       Allergies/Social/Family History: Allergies   Allergen Reactions    Ace Inhibitors Cough    Thiazides Other (comments)     hyponatremia      Social History     Tobacco Use    Smoking status: Never Smoker    Smokeless tobacco: Never Used   Substance Use Topics    Alcohol use: Yes     Comment: occasional beer      Family History   Problem Relation Age of Onset    Hypertension Father        Review of Systems:     A comprehensive review of systems was negative except for that written in the HPI.     Objective:   Vital Signs:  Visit Vitals  BP (!) 117/55   Pulse 71   Temp 98.2 °F (36.8 °C)   Resp 19   Ht 5' 11\" (1.803 m)   Wt 78.2 kg (172 lb 6.4 oz)   SpO2 97%   BMI 24.04 kg/m²    O2 Flow Rate (L/min): 35 l/min O2 Device: Hi flow nasal cannula, Heated Temp (24hrs), Av.5 °F (36.9 °C), Min:97.5 °F (36.4 °C), Max:99.7 °F (37.6 °C)           Intake/Output:     Intake/Output Summary (Last 24 hours) at 2021 0743  Last data filed at 2021 0700  Gross per 24 hour   Intake 3105.24 ml   Output 810 ml   Net 2295.24 ml       Physical Exam:    General:  Knows name, knows he's in a health care facility, doesn't know year. Awake, follows simple commands, confused; look older than stated age   Eyes:  Sclera anicteric. Pupils equally round and reactive to light. Mouth/Throat: Mucous membranes normal, oral pharynx clear   Neck: Supple   Lungs:   Rhonchi bilaterally, good effort   CV:  Regular rate and rhythm,no murmur, click, rub or gallop   Abdomen:   Soft, non-tender. bowel sounds normal. non-distended   Extremities: No cyanosis or edema   Skin: Skin color, texture, turgor normal. no acute rash or lesions   Lymph nodes: Cervical and supraclavicular normal   Musculoskeletal: No swelling or deformity   Lines/Devices:  Intact, no erythema, drainage or tenderness   Psych: Pleasantly confused     LABS AND  DATA: Personally reviewed  Recent Labs     01/02/21  0208 01/01/21  0411   WBC 10.7 12.6*   HGB 8.9* 9.8*   HCT 28.0* 30.0*    206     Recent Labs     01/02/21  0208 01/01/21  0411    139   K 3.7 3.8   * 108   CO2 27 27   BUN 37* 33*   CREA 1.29 1.42*   * 152*   CA 8.8 8.8     Recent Labs     01/02/21  0208 01/01/21  0411   AP 70 70   TP 6.8 7.1   ALB 2.1* 2.3*   GLOB 4.7* 4.8*     No results for input(s): INR, PTP, APTT, INREXT, INREXT in the last 72 hours. No results for input(s): PHI, PCO2I, PO2I, FIO2I in the last 72 hours. No results for input(s): CPK, CKMB, TROIQ, BNPP in the last 72 hours.     Hemodynamics:   PAP:   CO:     Wedge:   CI:     CVP:    SVR:       PVR:       Ventilator Settings:  Mode Rate Tidal Volume Pressure FiO2 PEEP   Assist control, Volume control   450 ml  5 cm H2O 30 % 5 cm H20     Peak airway pressure: 22 cm H2O    Minute ventilation: 10.3 l/min        MEDS: Reviewed    Chest X-Ray:  CXR Results  (Last 48 hours)               12/31/20 0907  XR CHEST PORT Final result    Impression:  IMPRESSION:   New patchy airspace opacities in the right upper lobe and right lower lobe   consistent with pneumonia. Follow-up to resolution is suggested. Narrative:  EXAM: XR CHEST PORT       INDICATION: Respiratory failure       COMPARISON: 12/28/2020       FINDINGS: A portable AP radiograph of the chest was obtained at 0902 hours. The   patient is on a cardiac monitor. There are new patchy airspace opacities laterally in the right upper lobe and   right lower lobe this is consistent with pneumonia. Clinical correlation close   follow-up is suggested. Left lung is hypoaerated. No pulmonary edema. No pneumothorax. Multidisciplinary Rounds Completed: Yes    ABCDEF Bundle/Checklist Completed:  Yes    SPECIAL EQUIPMENT  None    DISPOSITION  Transfer to non-ICU bed    CRITICAL CARE CONSULTANT NOTE  I had a face to face encounter with the patient, reviewed and interpreted patient data including clinical events, labs, images, vital signs, I/O's, and examined patient. I have discussed the case and the plan and management of the patient's care with the consulting services, the bedside nurses and the respiratory therapist.      NOTE OF PERSONAL INVOLVEMENT IN CARE   This patient has a high probability of imminent, clinically significant deterioration, which requires the highest level of preparedness to intervene urgently. I participated in the decision-making and personally managed or directed the management of the following life and organ supporting interventions that required my frequent assessment to treat or prevent imminent deterioration. I personally spent 60 minutes of critical care time. This is time spent at this critically ill patient's bedside actively involved in patient care as well as the coordination of care. This does not include any procedural time which has been billed separately.     Rocky Schmid DO, MS  Staff Intensivist/Anesthesiologist  [facility]  1/2/2021

## 2021-01-02 NOTE — PROGRESS NOTES
ID Progress Note  2021       vanco   -    Alicia    -     Subjective:     Afebrile. Says he has no complaints. o2 down to 15l. Objective:     Vitals:   Visit Vitals  /65 (BP 1 Location: Left arm, BP Patient Position: At rest)   Pulse 78   Temp 98 °F (36.7 °C)   Resp 18   Ht 5' 11\" (1.803 m)   Wt 78.2 kg (172 lb 6.4 oz)   SpO2 91%   BMI 24.04 kg/m²        Tmax:  Temp (24hrs), Av.6 °F (37 °C), Min:98 °F (36.7 °C), Max:99.7 °F (37.6 °C)      Exam:    Not in distress  Lung clear, no rales, wheezes or rhonchi   Heart: s1, s2, RRR, no murmurs rubs or clicks  Abdomen: soft nontender, no guarding or rebound  Speech understandable. Labs:   Lab Results   Component Value Date/Time    WBC 10.7 2021 02:08 AM    HGB (POC) 12.8 2017 10:27 AM    HGB 8.9 (L) 2021 02:08 AM    HCT (POC) 38.8 2017 10:27 AM    HCT 28.0 (L) 2021 02:08 AM    PLATELET 613  02:08 AM    .6 (H) 2021 02:08 AM     Lab Results   Component Value Date/Time    Sodium 141 2021 02:08 AM    Potassium 3.7 2021 02:08 AM    Chloride 109 (H) 2021 02:08 AM    CO2 27 2021 02:08 AM    Anion gap 5 2021 02:08 AM    Glucose 128 (H) 2021 02:08 AM    BUN 37 (H) 2021 02:08 AM    Creatinine 1.29 2021 02:08 AM    BUN/Creatinine ratio 29 (H) 2021 02:08 AM    GFR est AA >60 2021 02:08 AM    GFR est non-AA 54 (L) 2021 02:08 AM    Calcium 8.8 2021 02:08 AM    Bilirubin, total 0.2 2021 02:08 AM    Alk.  phosphatase 70 2021 02:08 AM    Protein, total 6.8 2021 02:08 AM    Albumin 2.1 (L) 2021 02:08 AM    Globulin 4.7 (H) 2021 02:08 AM    A-G Ratio 0.4 (L) 2021 02:08 AM    ALT (SGPT) 13 2021 02:08 AM           Assessment:     #1 fever - resolved      #2 recent delirium tremens     #3 history of melanoma     #4 hypertension    #5 mild renal insufficiency     #6 s/p arrest, possible aspiration                Recommendations:       Observe off abx. He has decreasing o2 requirements.      Hortensia Stanton MD

## 2021-01-03 ENCOUNTER — APPOINTMENT (OUTPATIENT)
Dept: MRI IMAGING | Age: 76
DRG: 896 | End: 2021-01-03
Attending: PSYCHIATRY & NEUROLOGY
Payer: MEDICARE

## 2021-01-03 LAB
ALBUMIN SERPL-MCNC: 2.4 G/DL (ref 3.5–5)
ALBUMIN SERPL-MCNC: 2.5 G/DL (ref 3.5–5)
ALBUMIN/GLOB SERPL: 0.5 {RATIO} (ref 1.1–2.2)
ALBUMIN/GLOB SERPL: 0.5 {RATIO} (ref 1.1–2.2)
ALP SERPL-CCNC: 100 U/L (ref 45–117)
ALP SERPL-CCNC: 94 U/L (ref 45–117)
ALT SERPL-CCNC: 16 U/L (ref 12–78)
ALT SERPL-CCNC: 18 U/L (ref 12–78)
AMMONIA PLAS-SCNC: 18 UMOL/L
ANION GAP SERPL CALC-SCNC: 4 MMOL/L (ref 5–15)
ANION GAP SERPL CALC-SCNC: 7 MMOL/L (ref 5–15)
ARTERIAL PATENCY WRIST A: ABNORMAL
AST SERPL-CCNC: 15 U/L (ref 15–37)
AST SERPL-CCNC: 20 U/L (ref 15–37)
BASE EXCESS BLD CALC-SCNC: 2 MMOL/L
BASOPHILS # BLD: 0.1 K/UL (ref 0–0.1)
BASOPHILS NFR BLD: 1 % (ref 0–1)
BDY SITE: ABNORMAL
BILIRUB SERPL-MCNC: 0.3 MG/DL (ref 0.2–1)
BILIRUB SERPL-MCNC: 0.3 MG/DL (ref 0.2–1)
BUN SERPL-MCNC: 32 MG/DL (ref 6–20)
BUN SERPL-MCNC: 34 MG/DL (ref 6–20)
BUN/CREAT SERPL: 25 (ref 12–20)
BUN/CREAT SERPL: 26 (ref 12–20)
CA-I BLD-SCNC: 1.19 MMOL/L (ref 1.12–1.32)
CALCIUM SERPL-MCNC: 9.2 MG/DL (ref 8.5–10.1)
CALCIUM SERPL-MCNC: 9.3 MG/DL (ref 8.5–10.1)
CHLORIDE SERPL-SCNC: 105 MMOL/L (ref 97–108)
CHLORIDE SERPL-SCNC: 106 MMOL/L (ref 97–108)
CO2 SERPL-SCNC: 28 MMOL/L (ref 21–32)
CO2 SERPL-SCNC: 29 MMOL/L (ref 21–32)
CREAT SERPL-MCNC: 1.22 MG/DL (ref 0.7–1.3)
CREAT SERPL-MCNC: 1.35 MG/DL (ref 0.7–1.3)
DIFFERENTIAL METHOD BLD: ABNORMAL
EOSINOPHIL # BLD: 0.7 K/UL (ref 0–0.4)
EOSINOPHIL NFR BLD: 6 % (ref 0–7)
ERYTHROCYTE [DISTWIDTH] IN BLOOD BY AUTOMATED COUNT: 12.7 % (ref 11.5–14.5)
GAS FLOW.O2 O2 DELIVERY SYS: ABNORMAL L/MIN
GAS FLOW.O2 SETTING OXYMISER: 5 L/M
GLOBULIN SER CALC-MCNC: 5.3 G/DL (ref 2–4)
GLOBULIN SER CALC-MCNC: 5.3 G/DL (ref 2–4)
GLUCOSE BLD STRIP.AUTO-MCNC: 136 MG/DL (ref 65–100)
GLUCOSE SERPL-MCNC: 119 MG/DL (ref 65–100)
GLUCOSE SERPL-MCNC: 132 MG/DL (ref 65–100)
HCO3 BLD-SCNC: 25.8 MMOL/L (ref 22–26)
HCT VFR BLD AUTO: 31.2 % (ref 36.6–50.3)
HGB BLD-MCNC: 10.3 G/DL (ref 12.1–17)
IMM GRANULOCYTES # BLD AUTO: 0.1 K/UL (ref 0–0.04)
IMM GRANULOCYTES NFR BLD AUTO: 1 % (ref 0–0.5)
LYMPHOCYTES # BLD: 1.5 K/UL (ref 0.8–3.5)
LYMPHOCYTES NFR BLD: 13 % (ref 12–49)
MCH RBC QN AUTO: 32.5 PG (ref 26–34)
MCHC RBC AUTO-ENTMCNC: 33 G/DL (ref 30–36.5)
MCV RBC AUTO: 98.4 FL (ref 80–99)
MONOCYTES # BLD: 0.9 K/UL (ref 0–1)
MONOCYTES NFR BLD: 8 % (ref 5–13)
NEUTS SEG # BLD: 8.1 K/UL (ref 1.8–8)
NEUTS SEG NFR BLD: 71 % (ref 32–75)
NRBC # BLD: 0 K/UL (ref 0–0.01)
NRBC BLD-RTO: 0 PER 100 WBC
PCO2 BLD: 36.2 MMHG (ref 35–45)
PH BLD: 7.46 [PH] (ref 7.35–7.45)
PLATELET # BLD AUTO: 219 K/UL (ref 150–400)
PMV BLD AUTO: 10.6 FL (ref 8.9–12.9)
PO2 BLD: 100 MMHG (ref 80–100)
POTASSIUM SERPL-SCNC: 4 MMOL/L (ref 3.5–5.1)
POTASSIUM SERPL-SCNC: 4.5 MMOL/L (ref 3.5–5.1)
PROT SERPL-MCNC: 7.7 G/DL (ref 6.4–8.2)
PROT SERPL-MCNC: 7.8 G/DL (ref 6.4–8.2)
RBC # BLD AUTO: 3.17 M/UL (ref 4.1–5.7)
SAO2 % BLD: 98 % (ref 92–97)
SERVICE CMNT-IMP: ABNORMAL
SODIUM SERPL-SCNC: 138 MMOL/L (ref 136–145)
SODIUM SERPL-SCNC: 141 MMOL/L (ref 136–145)
SPECIMEN TYPE: ABNORMAL
T3FREE SERPL-MCNC: 2.6 PG/ML (ref 2.2–4)
T4 FREE SERPL-MCNC: 1 NG/DL (ref 0.8–1.5)
TOTAL RESP. RATE, ITRR: 20
TSH SERPL DL<=0.05 MIU/L-ACNC: 2.98 UIU/ML (ref 0.36–3.74)
WBC # BLD AUTO: 11.5 K/UL (ref 4.1–11.1)

## 2021-01-03 PROCEDURE — 80053 COMPREHEN METABOLIC PANEL: CPT

## 2021-01-03 PROCEDURE — 77010033678 HC OXYGEN DAILY

## 2021-01-03 PROCEDURE — 82803 BLOOD GASES ANY COMBINATION: CPT

## 2021-01-03 PROCEDURE — 84482 T3 REVERSE: CPT

## 2021-01-03 PROCEDURE — 36415 COLL VENOUS BLD VENIPUNCTURE: CPT

## 2021-01-03 PROCEDURE — 74011250636 HC RX REV CODE- 250/636: Performed by: EMERGENCY MEDICINE

## 2021-01-03 PROCEDURE — 84439 ASSAY OF FREE THYROXINE: CPT

## 2021-01-03 PROCEDURE — 74011000250 HC RX REV CODE- 250: Performed by: ANESTHESIOLOGY

## 2021-01-03 PROCEDURE — 85025 COMPLETE CBC W/AUTO DIFF WBC: CPT

## 2021-01-03 PROCEDURE — 82140 ASSAY OF AMMONIA: CPT

## 2021-01-03 PROCEDURE — 94762 N-INVAS EAR/PLS OXIMTRY CONT: CPT

## 2021-01-03 PROCEDURE — 99233 SBSQ HOSP IP/OBS HIGH 50: CPT | Performed by: PSYCHIATRY & NEUROLOGY

## 2021-01-03 PROCEDURE — 84481 FREE ASSAY (FT-3): CPT

## 2021-01-03 PROCEDURE — 70544 MR ANGIOGRAPHY HEAD W/O DYE: CPT

## 2021-01-03 PROCEDURE — 82962 GLUCOSE BLOOD TEST: CPT

## 2021-01-03 PROCEDURE — 84443 ASSAY THYROID STIM HORMONE: CPT

## 2021-01-03 PROCEDURE — 65620000000 HC RM CCU GENERAL

## 2021-01-03 PROCEDURE — 74011250637 HC RX REV CODE- 250/637: Performed by: ANESTHESIOLOGY

## 2021-01-03 PROCEDURE — 74011250636 HC RX REV CODE- 250/636: Performed by: ANESTHESIOLOGY

## 2021-01-03 PROCEDURE — 74011000250 HC RX REV CODE- 250: Performed by: NURSE PRACTITIONER

## 2021-01-03 PROCEDURE — C9113 INJ PANTOPRAZOLE SODIUM, VIA: HCPCS | Performed by: ANESTHESIOLOGY

## 2021-01-03 RX ORDER — GUAIFENESIN 100 MG/5ML
81 LIQUID (ML) ORAL DAILY
Status: DISCONTINUED | OUTPATIENT
Start: 2021-01-04 | End: 2021-01-03

## 2021-01-03 RX ORDER — GUAIFENESIN 100 MG/5ML
81 LIQUID (ML) ORAL DAILY
Status: DISCONTINUED | OUTPATIENT
Start: 2021-01-03 | End: 2021-01-07

## 2021-01-03 RX ORDER — PANTOPRAZOLE SODIUM 40 MG/1
40 TABLET, DELAYED RELEASE ORAL
Status: DISCONTINUED | OUTPATIENT
Start: 2021-01-04 | End: 2021-01-07

## 2021-01-03 RX ADMIN — ENOXAPARIN SODIUM 40 MG: 40 INJECTION SUBCUTANEOUS at 08:14

## 2021-01-03 RX ADMIN — METOPROLOL TARTRATE 5 MG: 1 INJECTION, SOLUTION INTRAVENOUS at 08:13

## 2021-01-03 RX ADMIN — Medication 10 ML: at 22:00

## 2021-01-03 RX ADMIN — METOPROLOL TARTRATE 5 MG: 1 INJECTION, SOLUTION INTRAVENOUS at 21:45

## 2021-01-03 RX ADMIN — OXYCODONE HYDROCHLORIDE AND ACETAMINOPHEN 500 MG: 500 TABLET ORAL at 08:14

## 2021-01-03 RX ADMIN — SODIUM CHLORIDE 40 MG: 9 INJECTION INTRAMUSCULAR; INTRAVENOUS; SUBCUTANEOUS at 08:13

## 2021-01-03 RX ADMIN — Medication 30 MCG: at 08:14

## 2021-01-03 RX ADMIN — CHLORHEXIDINE GLUCONATE 15 ML: 0.12 RINSE ORAL at 08:26

## 2021-01-03 RX ADMIN — Medication 30 ML: at 08:14

## 2021-01-03 RX ADMIN — CHLORHEXIDINE GLUCONATE 15 ML: 0.12 RINSE ORAL at 22:00

## 2021-01-03 RX ADMIN — Medication 10 ML: at 08:14

## 2021-01-03 RX ADMIN — METOPROLOL TARTRATE 5 MG: 1 INJECTION, SOLUTION INTRAVENOUS at 02:41

## 2021-01-03 RX ADMIN — POTASSIUM BICARBONATE 40 MEQ: 782 TABLET, EFFERVESCENT ORAL at 08:13

## 2021-01-03 RX ADMIN — METOPROLOL TARTRATE 5 MG: 1 INJECTION, SOLUTION INTRAVENOUS at 15:30

## 2021-01-03 NOTE — CONSULTS
Neurology Progress Note    Patient ID:  Sophia Humphrey  [id number]  42 y.o.  1945    Chief Complaint: Confusion    Subjective:     Sly Clifton" is a 51-year-old gentleman who was admitted on December 11 for an alcohol induced fall with subsequent concussion. He is a new patient for me. He was seen by Dr. Sergio Ramos initially and diagnosed with concussion recommending MRI which was benign at the time. Thereafter he remained in the hospital for various issues to include fevers, metabolic abnormalities, persistent confusion. His hospital course has also been remarkable for intubation due to delirium tremens. He was seen again by Dr. Tony Powell on December 21 due to continued encephalopathy. I was reconsulted on the case due to the same issue but also because of an MRI finding showing a new stroke. He is currently in the CCU awake and alert. Nursing reports speaking be very pleasant but when he is tired he is not so. He is not very good at giving any historical details.     Objective:       ROS:  Cannot obtain secondary to patient medical condition      Meds:  Current Facility-Administered Medications   Medication Dose Route Frequency    multivit-folic acid-herbal 209 (WELLESSE PLUS) oral liquid 30 mL  30 mL Oral DAILY    ascorbic acid (vitamin C) (VITAMIN C) tablet 500 mg  500 mg Oral BID    potassium bicarb-citric acid (EFFER-K) tablet 40 mEq  40 mEq Oral BID    chlorhexidine (ORAL CARE KIT) 0.12 % mouthwash 15 mL  15 mL Oral Q12H    cholecalciferol (vitamin D3) 10 mcg/mL (400 unit/mL) oral liquid 30 mcg  30 mcg Oral DAILY    pantoprazole (PROTONIX) 40 mg in 0.9% sodium chloride 10 mL injection  40 mg IntraVENous Q12H    metoprolol (LOPRESSOR) injection 5 mg  5 mg IntraVENous Q6H    [Held by provider] amLODIPine (NORVASC) tablet 10 mg  10 mg Oral DAILY    enoxaparin (LOVENOX) injection 40 mg  40 mg SubCUTAneous Q24H    ELECTROLYTE REPLACEMENT PROTOCOL - Potassium and Magnesium  1 Each Other PRN    sodium chloride (NS) flush 5-40 mL  5-40 mL IntraVENous Q8H    sodium chloride (NS) flush 5-40 mL  5-40 mL IntraVENous PRN    potassium chloride 10 mEq in 100 ml IVPB  10 mEq IntraVENous PRN    acetaminophen (TYLENOL) tablet 650 mg  650 mg Oral Q6H PRN    Or    acetaminophen (TYLENOL) suppository 650 mg  650 mg Rectal Q6H PRN    polyethylene glycol (MIRALAX) packet 17 g  17 g Oral DAILY PRN    ondansetron (ZOFRAN ODT) tablet 4 mg  4 mg Oral Q8H PRN    Or    ondansetron (ZOFRAN) injection 4 mg  4 mg IntraVENous Q6H PRN    influenza vaccine 2020-21 (6 mos+)(PF) (FLUARIX/FLULAVAL/FLUZONE QUAD) injection 0.5 mL  0.5 mL IntraMUSCular PRIOR TO DISCHARGE       MRI Results (maximum last 3): Results from [place] encounter on 12/11/20   MRI BRAIN W WO CONT    Narrative *PRELIMINARY REPORT*    MR of the brain demonstrate slight prominence to ventricles and sulci and slight  changes small vessel disease periventricular white matter. No hemorrhage or mass  is identified. On diffusion imaging, there is question of a punctate focus of  increased signal intensity left posterior medial lobe may represent a tiny  infarct. There is mucosal thickening ethmoidal air cells, left maxillary sinus  and sphenoid sinus. There is increased signal intensity in the mastoid air  cells. Preliminary report was provided by Dr. Serge Shelby, the on-call radiologist, at 2:30    Final report to follow. *END PRELIMINARY REPORT*                MRI BRAIN W WO CONT    Narrative EXAM:  MRI BRAIN W WO CONT    INDICATION:    Amnesia after closed head injury. Evaluate for micro hemorrhage  or subarachnoid hemorrhage. COMPARISON:  CT head on 12/11/2020 at 12:35 PM.    CONTRAST: 20 ml Dotarem. TECHNIQUE:    Multiplanar multisequence acquisition without and with contrast of the brain. FINDINGS: No susceptibility artifact, no evidence of microhemorrhage. Volume loss is unchanged. No hydrocephalus.  There is no acute infarct,  hemorrhage, extra-axial fluid collection, or mass effect. Chronic microvascular  ischemic disease is mild. Expected arterial flow-voids are present. No evidence  of abnormal enhancement. Sagittal midline structures are within normal limits. Medial temporal lobes are  symmetric. Inflammation within the right maxillary sinus is unchanged. Impression IMPRESSION:   No acute infarct, pathologic enhancement, or intracranial hemorrhage. Mild chronic microvascular ischemic disease. Lab Review   Recent Results (from the past 24 hour(s))   MAGNESIUM    Collection Time: 01/02/21  6:17 PM   Result Value Ref Range    Magnesium 2.3 1.6 - 2.4 mg/dL   METABOLIC PANEL, BASIC    Collection Time: 01/02/21  6:17 PM   Result Value Ref Range    Sodium 139 136 - 145 mmol/L    Potassium 4.0 3.5 - 5.1 mmol/L    Chloride 107 97 - 108 mmol/L    CO2 29 21 - 32 mmol/L    Anion gap 3 (L) 5 - 15 mmol/L    Glucose 118 (H) 65 - 100 mg/dL    BUN 32 (H) 6 - 20 MG/DL    Creatinine 1.24 0.70 - 1.30 MG/DL    BUN/Creatinine ratio 26 (H) 12 - 20      GFR est AA >60 >60 ml/min/1.73m2    GFR est non-AA 57 (L) >60 ml/min/1.73m2    Calcium 9.0 8.5 - 10.1 MG/DL   CBC WITH AUTOMATED DIFF    Collection Time: 01/03/21  2:38 AM   Result Value Ref Range    WBC 11.5 (H) 4.1 - 11.1 K/uL    RBC 3.17 (L) 4.10 - 5.70 M/uL    HGB 10.3 (L) 12.1 - 17.0 g/dL    HCT 31.2 (L) 36.6 - 50.3 %    MCV 98.4 80.0 - 99.0 FL    MCH 32.5 26.0 - 34.0 PG    MCHC 33.0 30.0 - 36.5 g/dL    RDW 12.7 11.5 - 14.5 %    PLATELET 666 081 - 347 K/uL    MPV 10.6 8.9 - 12.9 FL    NRBC 0.0 0  WBC    ABSOLUTE NRBC 0.00 0.00 - 0.01 K/uL    NEUTROPHILS 71 32 - 75 %    LYMPHOCYTES 13 12 - 49 %    MONOCYTES 8 5 - 13 %    EOSINOPHILS 6 0 - 7 %    BASOPHILS 1 0 - 1 %    IMMATURE GRANULOCYTES 1 (H) 0.0 - 0.5 %    ABS. NEUTROPHILS 8.1 (H) 1.8 - 8.0 K/UL    ABS. LYMPHOCYTES 1.5 0.8 - 3.5 K/UL    ABS. MONOCYTES 0.9 0.0 - 1.0 K/UL    ABS. EOSINOPHILS 0.7 (H) 0.0 - 0.4 K/UL    ABS.  BASOPHILS 0.1 0.0 - 0.1 K/UL    ABS. IMM. GRANS. 0.1 (H) 0.00 - 0.04 K/UL    DF AUTOMATED     METABOLIC PANEL, COMPREHENSIVE    Collection Time: 01/03/21  2:38 AM   Result Value Ref Range    Sodium 141 136 - 145 mmol/L    Potassium 4.0 3.5 - 5.1 mmol/L    Chloride 105 97 - 108 mmol/L    CO2 29 21 - 32 mmol/L    Anion gap 7 5 - 15 mmol/L    Glucose 119 (H) 65 - 100 mg/dL    BUN 34 (H) 6 - 20 MG/DL    Creatinine 1.35 (H) 0.70 - 1.30 MG/DL    BUN/Creatinine ratio 25 (H) 12 - 20      GFR est AA >60 >60 ml/min/1.73m2    GFR est non-AA 52 (L) >60 ml/min/1.73m2    Calcium 9.2 8.5 - 10.1 MG/DL    Bilirubin, total 0.3 0.2 - 1.0 MG/DL    ALT (SGPT) 18 12 - 78 U/L    AST (SGOT) 20 15 - 37 U/L    Alk. phosphatase 94 45 - 117 U/L    Protein, total 7.7 6.4 - 8.2 g/dL    Albumin 2.4 (L) 3.5 - 5.0 g/dL    Globulin 5.3 (H) 2.0 - 4.0 g/dL    A-G Ratio 0.5 (L) 1.1 - 2.2         Additional comments:I reviewed the patients new imaging test results. Patient Vitals for the past 8 hrs:   BP Temp Pulse Resp SpO2 Weight   01/03/21 0953     99 %    01/03/21 0600 (!) 150/70  88 17 91 %    01/03/21 0500 (!) 151/65  87 21 95 %    01/03/21 0400 137/73 98.7 °F (37.1 °C) 86 17 97 % 171 lb 8.3 oz (77.8 kg)       No intake/output data recorded. 01/01 1901 - 01/03 0700  In: 3949.5 [I.V.:79.5]  Out: 3165 [Urine:3165]    Exam:  Visit Vitals  BP (!) 150/70   Pulse 88   Temp 98.7 °F (37.1 °C)   Resp 17   Ht 5' 11\" (1.803 m)   Wt 171 lb 8.3 oz (77.8 kg)   SpO2 99%   BMI 23.92 kg/m²     Gen: Well developed  CV: RRR  Lungs: non labored breathing  Abd: soft, non distended  Neuro: Awake and alert but slow to respond. He is disinhibited referring to my clothing and some degree and smiling. Speech is very slurred. He does not know the name of the hospital he is in at the moment. He does not follow commands consistently. CN II-XII: PERRL, face asymmetric, tongue/palate midline  Motor: Needs a lot of encouragement to activate his limbs. Poor effort.   Sensory: Unreliable  DTRs: symmetric  COOR: no limb/truncal ataxia  Gait: Deferred    PROBLEM LIST:     Patient Active Problem List   Diagnosis Code    Essential hypertension I10    Hyponatremia E87.1    HATTIE (acute kidney injury) ([facility] N17.9    AMS (altered mental status) R41.82    Excessive drinking of alcohol F10.10    UTI (urinary tract infection) N39.0    Hypokalemia E87.6    Thrombocytopenia ([facility]) D69.6    Anemia D64.9    Alcohol withdrawal ([facility]) F10.239    Acute respiratory failure with hypoxia ([facility]) J96.01    Alcoholism ([facility] F10.20    Goals of care, counseling/discussion Z71.89       Assessment/Plan:      51-year-old gentleman who has had a very long hospital course that was initiated by a fall and concussion followed by subsequent delirium tremens/withdrawal and intubation. MRI does show a very tiny subacute appearing infarct in the left PCA territory which was not present on initial imaging. This infarct has no clinical significance to the overall picture at this time. However he should be on a daily aspirin 81 mg for heart health and stroke prevention. He should have vascular imaging done. I will place an order for MRA of the head. As far as his cognitive status, I think the persistent encephalopathy is multifactorial and could represent the accumulation of a long alcohol dependent history aggravated with the current hospital course and presentation. Ultimately he is going to need very likely formal neuropsychological testing as an outpatient to clarify the cognitive impairment. Hospital setting is not the right environment for this case. He needs to finish the hospitalization and ultimately reestablish at his new baseline and then we can proceed with additional cognitive assessment and diagnostic testing. The stroke itself is of minor impact to the current situation. We will try to stop I discussed with the wife tomorrow. We will follow-up once the MRA is completed.       This clinical note was dictated with an electronic dictation software that can make unintentional errors. If there are any questions, please contact me directly for clarification.       Signed:  [facility], DO  1/3/2021  11:28 AM

## 2021-01-03 NOTE — PROGRESS NOTES
0730 Bedside shift change report given to Dominguez Thorne (oncoming nurse) by Waynette Fothergill (offgoing nurse). Report included the following information SBAR, Kardex, Procedure Summary, Intake/Output, MAR, Recent Results and Cardiac Rhythm NSR     0800 Pt alert and oriented to self, year, and knows he is in a hospital. Repositioned    1300 Pt is less responsive, but VSS. Very large amount of mucous(?) appearing plugs removed from back of throat. Blood sugar wnl, page out to Dr. Baez Memory, orders received. Down to MRI. 1530 Pt awake and alert, oriented x3. Pt has pulled out dobhoff, but spo2 100% on room air. Encouraged to sit edge of bed. Able to sit to stand 3 times with 1 person. Able to chew and swallow ice    1930 Bedside shift change report given to Waynette Fothergill (oncoming nurse) by Dominguez Thorne (offgoing nurse). Report included the following information SBAR, Kardex, Procedure Summary, Intake/Output, MAR, Recent Results and Cardiac Rhythm NSR.

## 2021-01-03 NOTE — PROGRESS NOTES
1930 received bedside report from [facility] pt alert on assessment, oriented to self only, VSS    0000 no change on reassessment    0200 labs sent per order    0400 no change on reassessment    0730 Bedside shift change report given to Isabel Díaz (oncoming nurse) by Lucia Landeros RN (offgoing nurse). Report included the following information SBAR, Kardex, Procedure Summary, Intake/Output, MAR, Recent Results and Cardiac Rhythm NSR.

## 2021-01-03 NOTE — PROGRESS NOTES
[facility]    ICU TEAM Progress Note    Name: Pam Lyles   : 1945   MRN: [id number]   Date: 1/3/2021      Assessment     ICU Problems:    1. Acute Hypoxic Respiratory Failure (secondary to Ativan --> Somnolence - resolving)  2. Aspiration Pneumonitis  3. Cardiac Arrest (due to hypoxia) - Sinus Junito/PEA  4. NSVT  5. Acute Alcohol Withdrawal   6. Acute Encephalopathy  a. ? MRI findings of stroke  b. ? Concussion from fall - day of admission  c. ? Hyper/hypoactive delirium  d. ?ETOH history, chronic changes from drinking  7. Acute Renal Failure (stable)    Imaging:  MRI -- Prelim  2021  MR of the brain demonstrate slight prominence to ventricles and sulci and slight changes small vessel disease periventricular white matter. No hemorrhage or mass is identified. On diffusion imaging, there is question of a punctate focus of increased signal intensity left posterior medial lobe may represent a tiny infarct. There is mucosal thickening ethmoidal air cells, left maxillary sinus and sphenoid sinus. There is increased signal intensity in the mastoid air cells. ICU Comprehensive Plan of Care:     Plans for this Shift:     1. Asked that Neurology be re-consulted to give guidance and finality of MRI findings & ongoing confusion  2. Wean oxygen for SpO2 > 90%  3. Aggressive PT/OT   4. Lights on during the day, TV on   5. Continue TF's  6. Updated wife at bedside  7. SBP Goal of: > 90 mmHg  8. Respiratory Goals:  a. Head of bed > 30 degrees  b. Aggressive bronchopulmonary hygiene  c. Incentive spirometry  9. Pulmonary toilet: Incentive Spirometry   10. SpO2 Goal: > 92%  11. Keep K>4; Mg>2   12. PT/OT: PT consulted and on board and OT consulted and on board  13. Goals of Care Discussion with family Yes   15. Plan of Care/Code Status: Full Code  15. Discussed Care Plan with Bedside RN  16. Documentation of Current Medications  17.  Rest of Plan Below:    F - Feeding:  Yes   A - Analgesia: Acetaminophen  S - Sedation: Precedex  T - DVT Prophylaxis: SCD's or Sequential Compression Device and Lovenox   H - Head of Bed: > 30 Degrees  U - Ulcer Prophylaxis: Protonix (pantoprazole)   G - Glycemic Control: Insulin  S - Spontaneous Breathing Trial: N/A  B - Bowel Regimen: Senna and Docusate (Colace)  I - Indwelling Catheter:   Tubes: Dobhoff  Lines: Peripheral IV  Drains: None      Subjective:   Progress Note: 1/3/2021      Reason for ICU Admission: Acute Hypoxic Respiratory Failure --> Cardiac Arrest     HPI:  Jacqui Perez is a 76 y.o. with a history of melanoma, hypertension and alcohol abuse. He suffered a blunt head injury after a ground-level fall on the evening of December 8. He had tripped over a sidewalk resulting in him falling forward and landing on his right forehead. Patient has felt disoriented and was having some memory issues. For this reason, he was admitted on December 11. He was placed on Rocephin empirically, but this was discontinued because no source of infection was found. He developed delirium tremens and had to be intubated and admitted to the ICU. He was eventually extubated a few days later. In the past day, he developed fever and elevated white count prompting consult. The patient tells me that he does not have any cough, dyspnea, abdominal pain, dysuria, headache or sore throat. He does not have any rash. He does not have any back pain or joint pain. Dr. Jam Li documents that he is having diarrhea. A C. difficile has been ordered. Chest x-ray did not reveal any pneumonia. Blood cultures have also been sent. Urinalysis did not show any significant pyuria. He was started on Zosyn and we are being asked to see him in consult. Overnight Events:   1/3/2021  HFNC. Slept on Precedex infusion. Interacting more so than day before.       Active Problem List:     Problem List  Date Reviewed: 8/2/2020          Codes Class    Acute respiratory failure with hypoxia ([facility] [address] ICD-10-CM: J96.01  ICD-9-CM: 518.81         Alcoholism ([facility] ICD-10-CM: F10.20  ICD-9-CM: 303.90         Goals of care, counseling/discussion ICD-10-CM: Z71.89  ICD-9-CM: V65.49         Alcohol withdrawal (HCC) ICD-10-CM: J55.657  ICD-9-CM: 291.81         UTI (urinary tract infection) ICD-10-CM: N39.0  ICD-9-CM: 599.0         Hypokalemia ICD-10-CM: E87.6  ICD-9-CM: 276.8         Thrombocytopenia (HCC) ICD-10-CM: D69.6  ICD-9-CM: 287.5         Anemia ICD-10-CM: D64.9  ICD-9-CM: 285.9         * (Principal) AMS (altered mental status) ICD-10-CM: R41.82  ICD-9-CM: 780.97         Excessive drinking of alcohol ICD-10-CM: F10.10  ICD-9-CM: 305.00         Hyponatremia ICD-10-CM: E87.1  ICD-9-CM: 276.1         HATTIE (acute kidney injury) ([facility] ICD-10-CM: N17.9  ICD-9-CM: 584.9         Essential hypertension ICD-10-CM: I10  ICD-9-CM: 401.9               Past Medical History:      has a past medical history of Cancer ([facility] (~ 2008), Hypertension, and Other ill-defined conditions(799.89) (1960~). Past Surgical History:      has a past surgical history that includes hx other surgical (~ 2008) and colonoscopy (1/6/2011). Home Medications:     Prior to Admission medications    Medication Sig Start Date End Date Taking? Authorizing Provider   losartan (COZAAR) 100 mg tablet TAKE 1 TABLET BY MOUTH EVERY DAY 6/14/20  Yes Gage Markham MD       Allergies/Social/Family History: Allergies   Allergen Reactions    Ace Inhibitors Cough    Thiazides Other (comments)     hyponatremia      Social History     Tobacco Use    Smoking status: Never Smoker    Smokeless tobacco: Never Used   Substance Use Topics    Alcohol use: Yes     Comment: occasional beer      Family History   Problem Relation Age of Onset    Hypertension Father        Review of Systems:     A comprehensive review of systems was negative except for that written in the HPI.     Objective:   Vital Signs:  Visit Vitals  BP (!) 150/70   Pulse 88   Temp 98.7 °F (37.1 °C)   Resp 17   Ht 5' 11\" (1.803 m)   Wt 77.8 kg (171 lb 8.3 oz)   SpO2 91%   BMI 23.92 kg/m²    O2 Flow Rate (L/min): 5 l/min O2 Device: Nasal cannula Temp (24hrs), Av.5 °F (36.9 °C), Min:98 °F (36.7 °C), Max:98.7 °F (37.1 °C)           Intake/Output:     Intake/Output Summary (Last 24 hours) at 1/3/2021 0848  Last data filed at 1/3/2021 0600  Gross per 24 hour   Intake 2105 ml   Output 2590 ml   Net -485 ml       Physical Exam:    General:  Knows name, knows he's in a health care facility, doesn't know year. Awake, follows simple commands, confused; look older than stated age   Eyes:  Sclera anicteric. Pupils equally round and reactive to light. Mouth/Throat: Mucous membranes normal, oral pharynx clear   Neck: Supple   Lungs:   Rhonchi bilaterally, good effort   CV:  Regular rate and rhythm,no murmur, click, rub or gallop   Abdomen:   Soft, non-tender. bowel sounds normal. non-distended   Extremities: No cyanosis or edema   Skin: Skin color, texture, turgor normal. no acute rash or lesions   Lymph nodes: Cervical and supraclavicular normal   Musculoskeletal: No swelling or deformity   Lines/Devices:  Intact, no erythema, drainage or tenderness   Psych: Pleasantly confused     LABS AND  DATA: Personally reviewed  Recent Labs     218 21  0208   WBC 11.5* 10.7   HGB 10.3* 8.9*   HCT 31.2* 28.0*    174     Recent Labs     218 21  1817    139   K 4.0 4.0    107   CO2 29 29   BUN 34* 32*   CREA 1.35* 1.24   * 118*   CA 9.2 9.0   MG  --  2.3     Recent Labs     218 21  0208   AP 94 70   TP 7.7 6.8   ALB 2.4* 2.1*   GLOB 5.3* 4.7*     No results for input(s): INR, PTP, APTT, INREXT, INREXT in the last 72 hours. No results for input(s): PHI, PCO2I, PO2I, FIO2I in the last 72 hours. No results for input(s): CPK, CKMB, TROIQ, BNPP in the last 72 hours.     Hemodynamics:   PAP:   CO:     Wedge:   CI:     CVP:    SVR:       PVR: Ventilator Settings:  Mode Rate Tidal Volume Pressure FiO2 PEEP   Assist control, Volume control   450 ml  5 cm H2O 27 % 5 cm H20     Peak airway pressure: 22 cm H2O    Minute ventilation: 10.3 l/min        MEDS: Reviewed    Chest X-Ray:  CXR Results  (Last 48 hours)    None        Multidisciplinary Rounds Completed: Yes    ABCDEF Bundle/Checklist Completed:  Yes    SPECIAL EQUIPMENT  None    DISPOSITION  Transfer to non-ICU bed    CRITICAL CARE CONSULTANT NOTE  I had a face to face encounter with the patient, reviewed and interpreted patient data including clinical events, labs, images, vital signs, I/O's, and examined patient. I have discussed the case and the plan and management of the patient's care with the consulting services, the bedside nurses and the respiratory therapist.      NOTE OF PERSONAL INVOLVEMENT IN CARE   This patient has a high probability of imminent, clinically significant deterioration, which requires the highest level of preparedness to intervene urgently. I participated in the decision-making and personally managed or directed the management of the following life and organ supporting interventions that required my frequent assessment to treat or prevent imminent deterioration. I personally spent 45 minutes of critical care time. This is time spent at this critically ill patient's bedside actively involved in patient care as well as the coordination of care. This does not include any procedural time which has been billed separately.     Leena Kimbrough DO, MS  Staff Intensivist/Anesthesiologist  [facility] [place] [facility]  1/3/2021

## 2021-01-04 ENCOUNTER — TELEPHONE (OUTPATIENT)
Dept: PALLATIVE CARE | Age: 76
End: 2021-01-04

## 2021-01-04 LAB
ALBUMIN SERPL-MCNC: 2.5 G/DL (ref 3.5–5)
ALBUMIN/GLOB SERPL: 0.5 {RATIO} (ref 1.1–2.2)
ALP SERPL-CCNC: 95 U/L (ref 45–117)
ALT SERPL-CCNC: 17 U/L (ref 12–78)
ANION GAP SERPL CALC-SCNC: 7 MMOL/L (ref 5–15)
AST SERPL-CCNC: 18 U/L (ref 15–37)
BASOPHILS # BLD: 0.1 K/UL (ref 0–0.1)
BASOPHILS NFR BLD: 1 % (ref 0–1)
BILIRUB SERPL-MCNC: 0.4 MG/DL (ref 0.2–1)
BUN SERPL-MCNC: 35 MG/DL (ref 6–20)
BUN/CREAT SERPL: 29 (ref 12–20)
CALCIUM SERPL-MCNC: 9.3 MG/DL (ref 8.5–10.1)
CHLORIDE SERPL-SCNC: 106 MMOL/L (ref 97–108)
CO2 SERPL-SCNC: 28 MMOL/L (ref 21–32)
CREAT SERPL-MCNC: 1.19 MG/DL (ref 0.7–1.3)
DIFFERENTIAL METHOD BLD: ABNORMAL
EOSINOPHIL # BLD: 0.6 K/UL (ref 0–0.4)
EOSINOPHIL NFR BLD: 5 % (ref 0–7)
ERYTHROCYTE [DISTWIDTH] IN BLOOD BY AUTOMATED COUNT: 12.8 % (ref 11.5–14.5)
GLOBULIN SER CALC-MCNC: 5.4 G/DL (ref 2–4)
GLUCOSE SERPL-MCNC: 92 MG/DL (ref 65–100)
HCT VFR BLD AUTO: 31 % (ref 36.6–50.3)
HGB BLD-MCNC: 10.2 G/DL (ref 12.1–17)
IMM GRANULOCYTES # BLD AUTO: 0.1 K/UL (ref 0–0.04)
IMM GRANULOCYTES NFR BLD AUTO: 1 % (ref 0–0.5)
LYMPHOCYTES # BLD: 1.6 K/UL (ref 0.8–3.5)
LYMPHOCYTES NFR BLD: 13 % (ref 12–49)
MCH RBC QN AUTO: 32.8 PG (ref 26–34)
MCHC RBC AUTO-ENTMCNC: 32.9 G/DL (ref 30–36.5)
MCV RBC AUTO: 99.7 FL (ref 80–99)
MONOCYTES # BLD: 1.1 K/UL (ref 0–1)
MONOCYTES NFR BLD: 10 % (ref 5–13)
NEUTS SEG # BLD: 8.3 K/UL (ref 1.8–8)
NEUTS SEG NFR BLD: 70 % (ref 32–75)
NRBC # BLD: 0 K/UL (ref 0–0.01)
NRBC BLD-RTO: 0 PER 100 WBC
PLATELET # BLD AUTO: 244 K/UL (ref 150–400)
PMV BLD AUTO: 10.8 FL (ref 8.9–12.9)
POTASSIUM SERPL-SCNC: 3.6 MMOL/L (ref 3.5–5.1)
PROT SERPL-MCNC: 7.9 G/DL (ref 6.4–8.2)
RBC # BLD AUTO: 3.11 M/UL (ref 4.1–5.7)
SODIUM SERPL-SCNC: 141 MMOL/L (ref 136–145)
WBC # BLD AUTO: 11.9 K/UL (ref 4.1–11.1)

## 2021-01-04 PROCEDURE — 92612 ENDOSCOPY SWALLOW (FEES) VID: CPT

## 2021-01-04 PROCEDURE — 36415 COLL VENOUS BLD VENIPUNCTURE: CPT

## 2021-01-04 PROCEDURE — 97116 GAIT TRAINING THERAPY: CPT

## 2021-01-04 PROCEDURE — 74011250636 HC RX REV CODE- 250/636: Performed by: ANESTHESIOLOGY

## 2021-01-04 PROCEDURE — 99232 SBSQ HOSP IP/OBS MODERATE 35: CPT | Performed by: NURSE PRACTITIONER

## 2021-01-04 PROCEDURE — 74011000250 HC RX REV CODE- 250: Performed by: NURSE PRACTITIONER

## 2021-01-04 PROCEDURE — 85025 COMPLETE CBC W/AUTO DIFF WBC: CPT

## 2021-01-04 PROCEDURE — 65660000001 HC RM ICU INTERMED STEPDOWN

## 2021-01-04 PROCEDURE — 74011250636 HC RX REV CODE- 250/636: Performed by: EMERGENCY MEDICINE

## 2021-01-04 PROCEDURE — 97530 THERAPEUTIC ACTIVITIES: CPT

## 2021-01-04 PROCEDURE — 74011000250 HC RX REV CODE- 250: Performed by: ANESTHESIOLOGY

## 2021-01-04 PROCEDURE — 80053 COMPREHEN METABOLIC PANEL: CPT

## 2021-01-04 PROCEDURE — 74011250637 HC RX REV CODE- 250/637: Performed by: ANESTHESIOLOGY

## 2021-01-04 RX ADMIN — METOPROLOL TARTRATE 5 MG: 1 INJECTION, SOLUTION INTRAVENOUS at 20:03

## 2021-01-04 RX ADMIN — ENOXAPARIN SODIUM 40 MG: 40 INJECTION SUBCUTANEOUS at 08:18

## 2021-01-04 RX ADMIN — Medication 10 ML: at 14:15

## 2021-01-04 RX ADMIN — Medication 10 ML: at 20:03

## 2021-01-04 RX ADMIN — METOPROLOL TARTRATE 5 MG: 1 INJECTION, SOLUTION INTRAVENOUS at 03:07

## 2021-01-04 RX ADMIN — METOPROLOL TARTRATE 5 MG: 1 INJECTION, SOLUTION INTRAVENOUS at 16:00

## 2021-01-04 RX ADMIN — METOPROLOL TARTRATE 5 MG: 1 INJECTION, SOLUTION INTRAVENOUS at 09:28

## 2021-01-04 RX ADMIN — OLANZAPINE 2.5 MG: 10 INJECTION, POWDER, FOR SOLUTION INTRAMUSCULAR at 23:06

## 2021-01-04 NOTE — PROGRESS NOTES
SLP Contact Note    Reconsult received. Discussed with RN Darrell Shaw, appreciate her assistance. Pt reportedly given applesauce yesterday and did not have overt s/s of aspiration, however, FEES performed on Wednesday showed silent aspiration/penetration with all consistencies including applesauce. Thus, would recommend holding any PO except for ice chips until SLP can reassess. However, will require repeat imaging prior to diet initiation when appropriate.        Thank you,  ALTON Del RosarioEd, [address] [facility]  Speech-Language Pathologist

## 2021-01-04 NOTE — PROGRESS NOTES
Transitions of Care Plan:   RUR: 20%   Disposition: Pending medical progress; recs for IPR   Baseline: Independent; supportive wife; resides at home    Failed swallow evaluation - possible PEG needed. Pleasantly confused. Subacute stroke and acute ICU delirium. Barriers to Discharge:  DHT - possible PEG needed  Sitter     CM will continue to follow.      López Henson, MPH
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
## 2021-01-04 NOTE — PROGRESS NOTES
SLP Contact Note    Will plan to complete repeat Fiberoptic Endoscopic Evaluation of Swallow (FEES) this afternoon.     Thank you,  ALTON RubioEd, [id number] [facility]  Speech-Language Pathologist
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
## 2021-01-04 NOTE — PROGRESS NOTES
[facility]    ICU TEAM Progress Note    Name: Hiro Burnett   : 1945   MRN: [id number]   Date: 2021      I  Subjective:   Progress Note: 2021      Reason for ICU Admission: Acute hypoxic respiratory failure leading to cardiac arrest    Interval history: From [facility]  Oskar is a 76 y. o. with a history of melanoma, hypertension and alcohol abuse. Chi Myles suffered a blunt head injury after a ground-level fall on the evening of .  He had tripped over a sidewalk resulting in him falling forward and landing on his right forehead.  Patient has felt disoriented and was having some memory issues.  For this reason, he was admitted on . Chi Myles was placed on Rocephin empirically, but this was discontinued because no source of infection was found.  He developed delirium tremens and had to be intubated and admitted to the ICU. Chi Myles was eventually extubated a few days later.  In the past day, he developed fever and elevated white count prompting consult.  The patient tells me that he does not have any cough, dyspnea, abdominal pain, dysuria, headache or sore throat.  He does not have any rash. Chi Myles does not have any back pain or joint pain.  Dr. Greg Faust documents that he is having diarrhea.  A C. difficile has been ordered.  Chest x-ray did not reveal any pneumonia.  Blood cultures have also been sent.  Urinalysis did not show any significant pyuria.  He was started on Zosyn and we are being asked to see him in consult. Overnight Events:   No acute event, remains pleasantly confused, off Precedex for 2 nights now, no nausea no vomiting no seizure new neurological deficit.   Active Problem List:     Problem List  Date Reviewed: 2020          Codes Class    Acute respiratory failure with hypoxia (HCC) ICD-10-CM: J96.01  ICD-9-CM: 518.81         [facility] ICD-10-CM: F10.20  ICD-9-CM: 303.90         Goals of care, counseling/discussion ICD-10-CM: Z71.89  ICD-9-CM: V65.49 Alcohol withdrawal (HCC) ICD-10-CM: F10.239  ICD-9-CM: 291.81         UTI (urinary tract infection) ICD-10-CM: N39.0  ICD-9-CM: 599.0         Hypokalemia ICD-10-CM: E87.6  ICD-9-CM: 276.8         Thrombocytopenia (HCC) ICD-10-CM: D69.6  ICD-9-CM: 287.5         Anemia ICD-10-CM: D64.9  ICD-9-CM: 285.9         * (Principal) AMS (altered mental status) ICD-10-CM: R41.82  ICD-9-CM: 780.97         Excessive drinking of alcohol ICD-10-CM: F10.10  ICD-9-CM: 305.00         Hyponatremia ICD-10-CM: E87.1  ICD-9-CM: 276.1         [facility]ATT[facility]) [facility] ICD-10-CM: N17.9  ICD-9-CM: 584.9         Essential hypertension ICD-10-CM: I10  ICD-9-CM: 401.9               Past Medical History:      has a past medical history of Cancer ([facility] [address] (~ ), Hypertension, and Other ill-defined conditions(799.89) (1960~). Past Surgical History:      has a past surgical history that includes hx other surgical (~ ) and colonoscopy (2011). Home Medications:     Prior to Admission medications    Medication Sig Start Date End Date Taking? Authorizing Provider   losartan (COZAAR) 100 mg tablet TAKE 1 TABLET BY MOUTH EVERY DAY 20  Yes Jessica Willett MD       Allergies/Social/Family History:      Allergies   Allergen Reactions    Ace Inhibitors Cough    Thiazides Other (comments)     hyponatremia      Social History     Tobacco Use    Smoking status: Never Smoker    Smokeless tobacco: Never Used   Substance Use Topics    Alcohol use: Yes     Comment: occasional beer      Family History   Problem Relation Age of Onset    Hypertension Father        Review of Systems:     Not able to obtain due to patient confusion    Objective:   Vital Signs:  Visit Vitals  BP (!) 148/90   Pulse 86   Temp 98.6 °F (37 °C)   Resp 18   Ht 5' 11\" (1.803 m)   Wt 76.3 kg (168 lb 3.4 oz)   SpO2 95%   BMI 23.46 kg/m²    O2 Flow Rate (L/min): 4 l/min O2 Device: Room air Temp (24hrs), Av.5 °F (36.9 °C), Min:98.3 °F (36.8 °C), Max:98.7 °F (37.1 °C)           Intake/Output:     Intake/Output Summary (Last 24 hours) at 1/4/2021 0806  Last data filed at 1/4/2021 0700  Gross per 24 hour   Intake 755 ml   Output 600 ml   Net 155 ml       Physical Exam:    General:  Knows name, knows he's in a health care facility, doesn't know year. Awake, follows simple commands, confused; look older than stated age   Eyes:  Sclera anicteric. Pupils equally round and reactive to light. Mouth/Throat: Mucous membranes normal, oral pharynx clear   Neck: Supple   Lungs:   Rhonchi bilaterally, good effort   CV:  Regular rate and rhythm,no murmur, click, rub or gallop   Abdomen:   Soft, non-tender. bowel sounds normal. non-distended   Extremities: No cyanosis or edema   Skin: Skin color, texture, turgor normal. no acute rash or lesions   Lymph nodes: Cervical and supraclavicular normal   Musculoskeletal: No swelling or deformity   Lines/Devices:  Intact, no erythema, drainage or tenderness   Psych: Pleasantly confused         LABS AND  DATA: Personally reviewed  Recent Labs     01/04/21  0349 01/03/21  0238   WBC 11.9* 11.5*   HGB 10.2* 10.3*   HCT 31.0* 31.2*    219     Recent Labs     01/04/21  0349 01/03/21  1239 01/02/21  1817 01/02/21  1817    138   < > 139   K 3.6 4.5   < > 4.0    106   < > 107   CO2 28 28   < > 29   BUN 35* 32*   < > 32*   CREA 1.19 1.22   < > 1.24   GLU 92 132*   < > 118*   CA 9.3 9.3   < > 9.0   MG  --   --   --  2.3    < > = values in this interval not displayed. Recent Labs     01/04/21  0349 01/03/21  1239   AP 95 100   TP 7.9 7.8   ALB 2.5* 2.5*   GLOB 5.4* 5.3*     No results for input(s): INR, PTP, APTT, INREXT in the last 72 hours. Recent Labs     01/03/21  1249   PHI 7.46*   PCO2I 36.2   PO2I 100     No results for input(s): CPK, CKMB, TROIQ, BNPP in the last 72 hours.     Hemodynamics:   PAP:   CO:     Wedge:   CI:     CVP:    SVR:       PVR:       Ventilator Settings:  Mode Rate Tidal Volume Pressure FiO2 PEEP   Assist control, Volume control   450 ml  5 cm H2O 27 % 5 cm H20     Peak airway pressure: 22 cm H2O    Minute ventilation: 10.3 l/min        MEDS: Reviewed    Chest X-Ray:  CXR Results  (Last 48 hours)    None            Assessment and Plan:   Encephalopathy: Multifactorial: Chronic alcohol abuse, cardiac arrest, subacute stroke and acute ICU delirium. Seems to be slowly improving. Appreciate neurology input. MRA did not show major large vessel disease. He has been off Precedex for a few days. I believe it is safer to have a sitter before transfer him out of the ICU. Continue with multivitamins, aspirin  Alcohol abuse: At this time he is not in acute DTs. Acute hypoxic respiratory failure: Resolved  Aspiration pneumonitis: Improved  Speech consult for swallow evaluation,  Palliative care consult to discuss goals of care and CODE STATUS    DISPOSITION  We will transfer out of the ICU today with a sitter    CRITICAL CARE CONSULTANT NOTE  I had a face to face encounter with the patient, reviewed and interpreted patient data including clinical events, labs, images, vital signs, I/O's, and examined patient. I have discussed the case and the plan and management of the patient's care with the consulting services, the bedside nurses and the respiratory therapist.      NOTE OF PERSONAL INVOLVEMENT IN CARE   This patient has a high probability of imminent, clinically significant deterioration, which requires the highest level of preparedness to intervene urgently. I participated in the decision-making and personally managed or directed the management of the following life and organ supporting interventions that required my frequent assessment to treat or prevent imminent deterioration. I personally spent 40 minutes of critical care time. This is time spent at this critically ill patient's bedside actively involved in patient care as well as the coordination of care and discussions with the patient's family.   This does not include any procedural time which has been billed separately. Yeison St M.D.   Staff Intensivist/Pulmonologist  [facility]  1/4/2021

## 2021-01-04 NOTE — PROGRESS NOTES
0730: Bedside shift change report given to Solitario Goldstein RN (oncoming nurse) by Romaine Schlatter, RN (offgoing nurse). Report included the following information SBAR, Kardex, ED Summary, OR Summary, Procedure Summary, Intake/Output, MAR, Recent Results, Cardiac Rhythm NSR and Dual Neuro Assessment. Alden Edmondson, at the bedside. 0815: PO AM medications held for the moment r/t NPO and no more DHT. Will call SLP to see how soon they can come do bedside eval .    0830: Sherrie SLP called and spoke with this RN. Pt requires repeat imaging prior to advancing pt's diet and reevaluating swallow. 0930: Neuro NP assessing pt @ the bedside. 1130: PT/OT @ the bedside to work with pt. Pt stood and transferred to chair with belt and 2 assist.     1420: SLP team @ the bedside to perform repeat FEES. 1630: Summer, pt's wife, @ the bedside. 1930: Bedside shift change report given to Leandro Mcardle, RN (oncoming nurse) by Solitario Goldstein RN (offgoing nurse). Report included the following information SBAR, Kardex, ED Summary, OR Summary, Procedure Summary, Intake/Output, MAR, Recent Results, Cardiac Rhythm NSR and Dual Neuro Assessment.

## 2021-01-04 NOTE — PROGRESS NOTES
1930 received bedside report from Mina Jennings [place] 79 pt alert, pleasantly confused, VSS    0000 no change on reassessment    0100 pt pullled out PIV, incontinence care performed    0400 labs sent per order    0730 Bedside shift change report given to Sofia [address] (oncoming nurse) by Melina Begum RN (offgoing nurse). Report included the following information SBAR, Kardex, Procedure Summary, Intake/Output, MAR, Recent Results and Cardiac Rhythm NSR.

## 2021-01-04 NOTE — PROGRESS NOTES
SLP Contact Note    FEES complete. Recommend pt remain NPO with consideration of longer term means of nutrition/hydration/medication. Still unclear as to etiology for dysphagia. Full note to follow.       Thank you,  ALTON BeebeEd, [id number] [facility]  Speech-Language Pathologist

## 2021-01-04 NOTE — PROGRESS NOTES
Attempted a follow up visit with pt who was sleeping and did not awake.    Chaplain Swain MDiv, MS, [id number]  [phone])
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
## 2021-01-05 ENCOUNTER — APPOINTMENT (OUTPATIENT)
Dept: GENERAL RADIOLOGY | Age: 76
DRG: 896 | End: 2021-01-05
Attending: INTERNAL MEDICINE
Payer: MEDICARE

## 2021-01-05 PROBLEM — E43 SEVERE PROTEIN-CALORIE MALNUTRITION (HCC): Status: ACTIVE | Noted: 2021-01-05

## 2021-01-05 LAB
ALBUMIN SERPL-MCNC: 2.5 G/DL (ref 3.5–5)
ALBUMIN/GLOB SERPL: 0.5 {RATIO} (ref 1.1–2.2)
ALP SERPL-CCNC: 96 U/L (ref 45–117)
ALT SERPL-CCNC: 19 U/L (ref 12–78)
ANION GAP SERPL CALC-SCNC: 8 MMOL/L (ref 5–15)
AST SERPL-CCNC: 21 U/L (ref 15–37)
BASOPHILS # BLD: 0.2 K/UL (ref 0–0.1)
BASOPHILS NFR BLD: 2 % (ref 0–1)
BILIRUB SERPL-MCNC: 0.4 MG/DL (ref 0.2–1)
BUN SERPL-MCNC: 37 MG/DL (ref 6–20)
BUN/CREAT SERPL: 30 (ref 12–20)
CALCIUM SERPL-MCNC: 9.3 MG/DL (ref 8.5–10.1)
CHLORIDE SERPL-SCNC: 108 MMOL/L (ref 97–108)
CO2 SERPL-SCNC: 25 MMOL/L (ref 21–32)
CREAT SERPL-MCNC: 1.22 MG/DL (ref 0.7–1.3)
DIFFERENTIAL METHOD BLD: ABNORMAL
EOSINOPHIL # BLD: 0.7 K/UL (ref 0–0.4)
EOSINOPHIL NFR BLD: 7 % (ref 0–7)
ERYTHROCYTE [DISTWIDTH] IN BLOOD BY AUTOMATED COUNT: 12.7 % (ref 11.5–14.5)
GLOBULIN SER CALC-MCNC: 5.2 G/DL (ref 2–4)
GLUCOSE SERPL-MCNC: 98 MG/DL (ref 65–100)
HCT VFR BLD AUTO: 31.4 % (ref 36.6–50.3)
HGB BLD-MCNC: 10.5 G/DL (ref 12.1–17)
IMM GRANULOCYTES # BLD AUTO: 0.1 K/UL (ref 0–0.04)
IMM GRANULOCYTES NFR BLD AUTO: 1 % (ref 0–0.5)
LYMPHOCYTES # BLD: 1.4 K/UL (ref 0.8–3.5)
LYMPHOCYTES NFR BLD: 14 % (ref 12–49)
MCH RBC QN AUTO: 32.5 PG (ref 26–34)
MCHC RBC AUTO-ENTMCNC: 33.4 G/DL (ref 30–36.5)
MCV RBC AUTO: 97.2 FL (ref 80–99)
MONOCYTES # BLD: 1.3 K/UL (ref 0–1)
MONOCYTES NFR BLD: 13 % (ref 5–13)
NEUTS SEG # BLD: 6.7 K/UL (ref 1.8–8)
NEUTS SEG NFR BLD: 63 % (ref 32–75)
NRBC # BLD: 0 K/UL (ref 0–0.01)
NRBC BLD-RTO: 0 PER 100 WBC
PLATELET # BLD AUTO: 244 K/UL (ref 150–400)
PMV BLD AUTO: 9.9 FL (ref 8.9–12.9)
POTASSIUM SERPL-SCNC: 3.6 MMOL/L (ref 3.5–5.1)
PROT SERPL-MCNC: 7.7 G/DL (ref 6.4–8.2)
RBC # BLD AUTO: 3.23 M/UL (ref 4.1–5.7)
SODIUM SERPL-SCNC: 141 MMOL/L (ref 136–145)
WBC # BLD AUTO: 10.3 K/UL (ref 4.1–11.1)

## 2021-01-05 PROCEDURE — 65660000001 HC RM ICU INTERMED STEPDOWN

## 2021-01-05 PROCEDURE — 99233 SBSQ HOSP IP/OBS HIGH 50: CPT | Performed by: NURSE PRACTITIONER

## 2021-01-05 PROCEDURE — 74018 RADEX ABDOMEN 1 VIEW: CPT

## 2021-01-05 PROCEDURE — 80053 COMPREHEN METABOLIC PANEL: CPT

## 2021-01-05 PROCEDURE — 97530 THERAPEUTIC ACTIVITIES: CPT

## 2021-01-05 PROCEDURE — 74011250636 HC RX REV CODE- 250/636: Performed by: EMERGENCY MEDICINE

## 2021-01-05 PROCEDURE — 74011000250 HC RX REV CODE- 250: Performed by: NURSE PRACTITIONER

## 2021-01-05 PROCEDURE — 36415 COLL VENOUS BLD VENIPUNCTURE: CPT

## 2021-01-05 PROCEDURE — 74011000250 HC RX REV CODE- 250: Performed by: ANESTHESIOLOGY

## 2021-01-05 PROCEDURE — 74011250636 HC RX REV CODE- 250/636: Performed by: ANESTHESIOLOGY

## 2021-01-05 PROCEDURE — 85025 COMPLETE CBC W/AUTO DIFF WBC: CPT

## 2021-01-05 PROCEDURE — 87635 SARS-COV-2 COVID-19 AMP PRB: CPT

## 2021-01-05 PROCEDURE — 0CJS8ZZ INSPECTION OF LARYNX, VIA NATURAL OR ARTIFICIAL OPENING ENDOSCOPIC: ICD-10-PCS | Performed by: OTOLARYNGOLOGY

## 2021-01-05 PROCEDURE — 97535 SELF CARE MNGMENT TRAINING: CPT

## 2021-01-05 PROCEDURE — 97116 GAIT TRAINING THERAPY: CPT

## 2021-01-05 RX ORDER — QUETIAPINE FUMARATE 25 MG/1
50 TABLET, FILM COATED ORAL
Status: DISCONTINUED | OUTPATIENT
Start: 2021-01-05 | End: 2021-01-07

## 2021-01-05 RX ADMIN — Medication 10 ML: at 14:00

## 2021-01-05 RX ADMIN — METOPROLOL TARTRATE 5 MG: 1 INJECTION, SOLUTION INTRAVENOUS at 21:24

## 2021-01-05 RX ADMIN — METOPROLOL TARTRATE 5 MG: 1 INJECTION, SOLUTION INTRAVENOUS at 02:32

## 2021-01-05 RX ADMIN — OLANZAPINE 5 MG: 10 INJECTION, POWDER, FOR SOLUTION INTRAMUSCULAR at 21:24

## 2021-01-05 RX ADMIN — METOPROLOL TARTRATE 5 MG: 1 INJECTION, SOLUTION INTRAVENOUS at 15:30

## 2021-01-05 RX ADMIN — CHLORHEXIDINE GLUCONATE 15 ML: 0.12 RINSE ORAL at 20:29

## 2021-01-05 RX ADMIN — ENOXAPARIN SODIUM 40 MG: 40 INJECTION SUBCUTANEOUS at 08:30

## 2021-01-05 RX ADMIN — METOPROLOL TARTRATE 5 MG: 1 INJECTION, SOLUTION INTRAVENOUS at 08:49

## 2021-01-05 RX ADMIN — Medication 10 ML: at 21:25

## 2021-01-05 NOTE — PROGRESS NOTES
ID Progress Note  2021       vanco   -    Vivianatatyn    -     Subjective:     Afebrile. On room air.     Objective:     Vitals:   Visit Vitals  BP (!) 179/97 (BP 1 Location: Left arm, BP Patient Position: At rest)   Pulse 93   Temp 97.8 °F (36.6 °C)   Resp 23   Ht 5' 11\" (1.803 m)   Wt 75.6 kg (166 lb 10.7 oz)   SpO2 100%   BMI 23.25 kg/m²        Tmax:  Temp (24hrs), Av °F (36.7 °C), Min:97.8 °F (36.6 °C), Max:98.2 °F (36.8 °C)      Exam:    Not in distress       Labs:   Lab Results   Component Value Date/Time    WBC 10.3 2021 02:55 AM    HGB (POC) 12.8 2017 10:27 AM    HGB 10.5 (L) 2021 02:55 AM    HCT (POC) 38.8 2017 10:27 AM    HCT 31.4 (L) 2021 02:55 AM    PLATELET 244 2021 02:55 AM    MCV 97.2 2021 02:55 AM     Lab Results   Component Value Date/Time    Sodium 141 2021 02:55 AM    Potassium 3.6 2021 02:55 AM    Chloride 108 2021 02:55 AM    CO2 25 2021 02:55 AM    Anion gap 8 2021 02:55 AM    Glucose 98 2021 02:55 AM    BUN 37 (H) 2021 02:55 AM    Creatinine 1.22 2021 02:55 AM    BUN/Creatinine ratio 30 (H) 2021 02:55 AM    GFR est AA >60 2021 02:55 AM    GFR est non-AA 58 (L) 2021 02:55 AM    Calcium 9.3 2021 02:55 AM    Bilirubin, total 0.4 2021 02:55 AM    Alk. phosphatase 96 2021 02:55 AM    Protein, total 7.7 2021 02:55 AM    Albumin 2.5 (L) 2021 02:55 AM    Globulin 5.2 (H) 2021 02:55 AM    A-G Ratio 0.5 (L) 2021 02:55 AM    ALT (SGPT) 19 2021 02:55 AM           Assessment:     #1 fever - resolved      #2 recent delirium tremens     #3 history of melanoma     #4 hypertension    #5 mild renal insufficiency     #6 s/p arrest, possible aspiration                Recommendations:       Observe off abx. He is now on room air. He will have egd/peg.     Anthony LÓPEZ MD

## 2021-01-05 NOTE — PROGRESS NOTES
[facility]    ICU TEAM Progress Note    Name: Yaneth Driver   : 1945   MRN: [id number]   Date: 2021      I  Subjective:   Progress Note: 2021      Reason for ICU Admission: Acute hypoxic respiratory failure leading to cardiac arrest, now in delirium    Interval history: From [facility]  Oskar is a 76 y. o. with a history of melanoma, hypertension and alcohol abuse. Hans Murrell suffered a blunt head injury after a ground-level fall on the evening of .  He had tripped over a sidewalk resulting in him falling forward and landing on his right forehead.  Patient has felt disoriented and was having some memory issues.  For this reason, he was admitted on . Hans Murrell was placed on Rocephin empirically, but this was discontinued because no source of infection was found.  He developed delirium tremens and had to be intubated and admitted to the ICU. Hans Murrell was eventually extubated a few days later.  In the past day, he developed fever and elevated white count prompting consult.  The patient tells me that he does not have any cough, dyspnea, abdominal pain, dysuria, headache or sore throat.  He does not have any rash. Hans Murrell does not have any back pain or joint pain.  Dr. Hermann Markham documents that he is having diarrhea.  A C. difficile has been ordered.  Chest x-ray did not reveal any pneumonia.  Blood cultures have also been sent.  Urinalysis did not show any significant pyuria.  He was started on Zosyn and we are being asked to see him in consult. Overnight Events:   Last night was confused, requiring Zyprexa IM, that helped somewhat and this morning he is more cooperative and calm, actually was cooperative with physical therapy and walked with a walker and assistance. Responding to questions appropriately at times however remains confused.     Active Problem List:     Problem List  Date Reviewed: 2020          Codes Class    Severe protein-calorie malnutrition ([facility] ICD-10-CM: I04  ICD-9-CM: 262         Acute respiratory failure with hypoxia (HCC) ICD-10-CM: J96.01  ICD-9-CM: 518.81         Alcoholism (HCC) ICD-10-CM: F10.20  ICD-9-CM: 303.90         Goals of care, counseling/discussion ICD-10-CM: Z71.89  ICD-9-CM: V65.49         Alcohol withdrawal (HCC) ICD-10-CM: K55.749  ICD-9-CM: 291.81         UTI (urinary tract infection) ICD-10-CM: N39.0  ICD-9-CM: 599.0         Hypokalemia ICD-10-CM: E87.6  ICD-9-CM: 276.8         Thrombocytopenia (HCC) ICD-10-CM: D69.6  ICD-9-CM: 287.5         Anemia ICD-10-CM: D64.9  ICD-9-CM: 285.9         * (Principal) AMS (altered mental status) ICD-10-CM: R41.82  ICD-9-CM: 780.97         Excessive drinking of alcohol ICD-10-CM: F10.10  ICD-9-CM: 305.00         Hyponatremia ICD-10-CM: E87.1  ICD-9-CM: 276.1         HATTIE (acute kidney injury) ([facility] [address] ICD-10-CM: N17.9  ICD-9-CM: 584.9         Essential hypertension ICD-10-CM: I10  ICD-9-CM: 401.9               Past Medical History:      has a past medical history of Cancer ([facility] [address] (~ 2008), Hypertension, and Other ill-defined conditions(799.89) (1960~). Past Surgical History:      has a past surgical history that includes hx other surgical (~ 2008) and colonoscopy (1/6/2011). Home Medications:     Prior to Admission medications    Medication Sig Start Date End Date Taking? Authorizing Provider   losartan (COZAAR) 100 mg tablet TAKE 1 TABLET BY MOUTH EVERY DAY 6/14/20  Yes Jinny Voss MD       Allergies/Social/Family History:      Allergies   Allergen Reactions    Ace Inhibitors Cough    Thiazides Other (comments)     hyponatremia      Social History     Tobacco Use    Smoking status: Never Smoker    Smokeless tobacco: Never Used   Substance Use Topics    Alcohol use: Yes     Comment: occasional beer      Family History   Problem Relation Age of Onset    Hypertension Father        Review of Systems:     Not able to obtain due to his medical condition    Objective:   Vital Signs:  Visit Vitals  BP (!) 175/106   Pulse 91   Temp 98.1 °F (36.7 °C)   Resp 24   Ht 5' 11\" (1.803 m)   Wt 75.6 kg (166 lb 10.7 oz)   SpO2 99%   BMI 23.25 kg/m²    O2 Flow Rate (L/min): 4 l/min O2 Device: Room air Temp (24hrs), Av.1 °F (36.7 °C), Min:98 °F (36.7 °C), Max:98.2 °F (36.8 °C)           Intake/Output:     Intake/Output Summary (Last 24 hours) at 2021 1432  Last data filed at 2021 1100  Gross per 24 hour   Intake    Output 485 ml   Net -485 ml       Physical Exam:    General:  Knows name, knows he's in a health care facility, doesn't know year. Randeen Batters, follows simple commands, confused; look older than stated age   Eyes:  Sclera anicteric. Pupils equally round and reactive to light. Mouth/Throat: Mucous membranes normal, oral pharynx clear   Neck: Supple   Lungs:   Rhonchi bilaterally, good effort   CV:  Regular rate and rhythm,no murmur, click, rub or gallop   Abdomen:   Soft, non-tender. bowel sounds normal. non-distended   Extremities: No cyanosis or edema   Skin: Skin color, texture, turgor normal. no acute rash or lesions   Lymph nodes: Cervical and supraclavicular normal   Musculoskeletal: No swelling or deformity   Lines/Devices:  Intact, no erythema, drainage or tenderness   Psych: Pleasantly confused         LABS AND  DATA: Personally reviewed  Recent Labs     21   WBC 10.3 11.9*   HGB 10.5* 10.2*   HCT 31.4* 31.0*    244     Recent Labs     21  02521    141   < > 139   K 3.6 3.6   < > 4.0    106   < > 107   CO2 25 28   < > 29   BUN 37* 35*   < > 32*   CREA 1.22 1.19   < > 1.24   GLU 98 92   < > 118*   CA 9.3 9.3   < > 9.0   MG  --   --   --  2.3    < > = values in this interval not displayed. Recent Labs     21  0255 21  0349   AP 96 95   TP 7.7 7.9   ALB 2.5* 2.5*   GLOB 5.2* 5.4*     No results for input(s): INR, PTP, APTT, INREXT in the last 72 hours.    Recent Labs     21  1249   PHI 7.46*   PCO2I 36.2 PO2I 100     No results for input(s): CPK, CKMB, TROIQ, BNPP in the last 72 hours. Hemodynamics:   PAP:   CO:     Wedge:   CI:     CVP:    SVR:       PVR:       Ventilator Settings:  Mode Rate Tidal Volume Pressure FiO2 PEEP   Assist control, Volume control   450 ml  5 cm H2O 27 % 5 cm H20     Peak airway pressure: 22 cm H2O    Minute ventilation: 10.3 l/min        MEDS: Reviewed    Chest X-Ray:  CXR Results  (Last 48 hours)    None          Assessment and Plan:   Encephalopathy: Multifactorial: Chronic alcohol abuse, cardiac arrest, subacute stroke and acute ICU delirium. Seems to be slowly improving. Appreciate neurology input. MRA did not show major large vessel disease. He has been off Precedex for a few days. Required Zyprexa IM last night, I will start him on oral Seroquel nightly. Alcohol abuse: At this time he is not in acute DTs. Acute hypoxic respiratory failure: Resolved  Aspiration pneumonitis: Patient shows evidence of silent aspiration on fiberoptic endoscopic evaluation of swallowing [please see speech language pathologist note on January 4], interventional radiology consulted for Dobbhoff tube insertion. GI consulted for PEG tube insertion. Palliative care consult to discuss goals of care and [facility]  Stay in ICU    CRITICAL CARE CONSULTANT NOTE  I had a face to face encounter with the patient, reviewed and interpreted patient data including clinical events, labs, images, vital signs, I/O's, and examined patient. I have discussed the case and the plan and management of the patient's care with the consulting services, the bedside nurses and the respiratory therapist.      NOTE OF PERSONAL INVOLVEMENT IN CARE   This patient has a high probability of imminent, clinically significant deterioration, which requires the highest level of preparedness to intervene urgently.  I participated in the decision-making and personally managed or directed the management of the following life and organ supporting interventions that required my frequent assessment to treat or prevent imminent deterioration. I personally spent 40 minutes of critical care time. This is time spent at this critically ill patient's bedside actively involved in patient care as well as the coordination of care and discussions with the patient's family. This does not include any procedural time which has been billed separately. Prasad Cid M.D.   Staff Intensivist/Pulmonologist  [facility]  1/5/2021
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
## 2021-01-05 NOTE — PROGRESS NOTES
Comprehensive Nutrition Assessment    Type and Reason for Visit: Reassess    Nutrition Recommendations/Plan:    1. Resume tube feeds with bolus regimen:    - 1st Feed After PEG: Jevity 1.5, 120ml bolus + 100ml flush pre/post bolus   - 2nd Feed After PEG: Jevity 1.5, 180ml bolus + 100ml flush pre/post bolus   -GOAL: Jevity 1.5, 264ml bolus 4x/day (~700, 1000, 1300, 1600, 1900) + 100ml flush per/post bolus + 2pkt prosource daily   -  Make sure to keep HOB >30 degrees while getting bolus feeds    2. Abdominal binder for PEG site (since pulled out DHT earlier this week and still with confusion)  3. Zero bedscale and reweigh as able - wt loss of 8kg since admit     **Of note: low vitamins this admit (1) iron (2) iron saturation (3) vitamin D (4) vitamin C   - review/recheck PRN and supplement per MD if indicated    Nutrition Assessment:    Pt admtited with AMS (recent fall). PMHx: Melanoma, HTN, Alcohol abuse. Intubated on admission for acute respiratory failure and DT's. PEA arrest 2/2 hypoxia on  requiring continuous BiPAP support. Confusion continues with subacute infarct noted. Palliative consult to discussion goals and code status noted. DHT placed by IR  with poor oral intake after extubation. Pt pulled out [address] 76 on 1/3, seen by SLP yesterday FEES completed with recommendation for NPO. PEG to be placed today. Pt tolerating tube feeds at goal rate of 55ml/hr prior to pulling out DHT 2 days ago. Providin ml, 2180 calories, 114 gm protein and 1020ml flush + 1003ml free fluid = 2023ml fluid. Meets 100% estimated protein and energy needs. Low vit C, D, and low iron noted. EN formula will provide 23mg iron, 396mg vit C, 528IU vit D - exceed DRI but may need additional for repletion. B12, thiamine, folate WNL. Pt visited but confusion and naked in bed with restraints in place. Severe muscle and fat wasting observed. Spoke with RN who reports pt calmer when laying down and when wife present.  Agrees that bolus may be helpful to allow for time off pump with previous pulling on NGT. See above recommendations which will provide same nutrition as current regimen. Sodium elevated last week but WNL since 12/30. Electrolytes WNL after repletion. Wt 83kg on admit. Trending around 83kg until 1/1 with 5kg wt loss over past 4 days - improvement in edema noted so likely d/t improvement in fluid status. Malnutrition Assessment:  Malnutrition Status: Moderate malnutrition    Context:  Acute illness     Findings of the 6 clinical characteristics of malnutrition:   Energy Intake:  7 - 50% or less of est energy requirements for 5 or more days  Weight Loss:  Unable to assess     Body Fat Loss:  1 - Mild body fat loss, Fat overlying ribs   Muscle Mass Loss:  1 - Mild muscle mass loss, Thigh (quadraceps), Calf  Fluid Accumulation:  1 - Mild, Extremities, Generalized   Strength:  Not performed     Nutritionally Significant Medications: vit C, vit D, liquid MVI, zyprexa, protonix, effer-K; PRN: miralax    Estimated Daily Nutrient Needs: follow for new zeroed wt with recalculation of needs pending value  Energy (kcal): 2372-6628 (MSJ x 1.2-1.3); Weight Used for Energy Requirements: Current(84 kg)  Protein (g): 101-109 (1.2-1.3g/kg); Weight Used for Protein Requirements: Current(84 kg)  Fluid (ml/day): ~2000 ml; Method Used for Fluid Requirements: 1 ml/kcal    Nutrition Related Findings:       BM: 1/4  Edema: trace  Wounds:  None       Current Nutrition Therapies:   Diet: NPO  Tube Feeding (prior to pulling DHT 1/3): Jevity 1.5 @ 55ml/hr+ 2 pkt prosource + 170ml flush q4hr    Anthropometric Measures:  · Height:  5' 11\" (180.3 cm)  · Current Body Wt:  83.5 kg (184 lb 1.4 oz)   · Admission Body Wt:  205 lb 0.4 oz       · Ideal Body Wt:   :  107 %     · BMI Categories:  Overweight (BMI 25.0-29. 9)     Wt Readings from Last 10 Encounters:   01/05/21 75.6 kg (166 lb 10.7 oz)   01/01/21 78.1 kg (172 lb 2.9 oz)   12/11/20 93 kg (205 lb 0.4 oz)   07/07/20 93 kg (205 lb)   09/04/19 93 kg (205 lb)   08/09/19 93 kg (205 lb)   08/03/19 90.8 kg (200 lb 3.2 oz)   08/01/19 93.4 kg (206 lb)   07/25/19 93.4 kg (206 lb)   06/12/19 93.4 kg (206 lb)     Nutrition Diagnosis:   · Inadequate oral intake related to cognitive or neurological impairment, swallowing difficulty as evidenced by NPO or clear liquid status due to medical condition, nutrition support-enteral nutrition(confusion)    · Severe malnutrition related to inadequate protein-energy intake(PTA) as evidenced by weight loss greater than or equal to 10% in 6 months, severe loss of subcutaneous fat, severe muscle loss    Nutrition Interventions:   Food and/or Nutrient Delivery: Modify tube feeding  Nutrition Education and Counseling: No recommendations at this time  Coordination of Nutrition Care: Continue to monitor while inpatient, Interdisciplinary rounds    Goals:  EN meeting at least 90% needs in 5-7 days       Nutrition Monitoring and Evaluation:   Behavioral-Environmental Outcomes: None identified  Food/Nutrient Intake Outcomes: Enteral nutrition intake/tolerance  Physical Signs/Symptoms Outcomes: Weight, Fluid status or edema, Chewing or swallowing    Discharge Planning:     Too soon to determine, Enteral nutrition     Jean Carlos Boyd, RD [address] [facility], Pager #[phone] or via PerfectServe

## 2021-01-05 NOTE — PROGRESS NOTES
0730: Bedside shift change report given to Marzena RN (oncoming nurse) by Karime Fregoso RN (offgoing nurse). Report included the following information SBAR, Kardex, ED Summary, OR Summary, Procedure Summary, Intake/Output, MAR, Recent Results, Cardiac Rhythm NSR and Dual Neuro Assessment. 0900: Summer, pt's wife, called and received pt update from RN. 1030: Sandy SUÁREZ, GI, assessing pt @ the bedside. 1120: Dietician, discussed plans with this RN. RD plans to transition pt to bolus TF if/when PEG is placed    1245: RN called main XRay to confirm availability for pt to transport for DHT placement. 1600: 3 RNs and PCT @ the bedside to attempt Parkring 76 placement. Pt extremely agitated, confused and uncooperative. RNs provided reassurance of pt's safety and well being. 1615: Dr Amee Armando pt @ the bedside and discussing with RN.     9158: X-ray @ the bedside for KUB to check Parkring 76 placement. DHT not in place and removed at this time. 1700: Summer @ the bedside and informed RN that she wishes to go forward with PEG placement ASAP. 1715: Dr Tri Galeana, infectious disease, assessing pt @ the bedside. MD placed order for rapid COVID test r/t EGD/PEG. 1720: RN paged on call GI MD r/t scheduling EGD/PEG. 1735: Psychiatry assessing pt @ the bedside. NP agrees with intensivist's order to start Seroquel via PEG tube once placed r/t pt's multifactorial delirium. 1750: ENT MD @ the bedside to assess pt. MD used endoscope to visualize pt's pharynx/anatomy. Copious amounts of dried bloody mucosa retrieved and throat cleared. MD suggests moving forward with PEG placement. 1830: Dr Jamar Amaya, GI, assessing pt @ the bedside and speaking with RN and Summer. MD explained procedure to [facility] and pt, consent received and plans put in place for EGD/PEG tomorrow. 1930: Bedside shift change report given to [facility] (oncoming nurse) by ESTRADA Ivan (offgoing nurse).  Report included the following information SBAR, Kardex, ED Summary, OR Summary, Procedure Summary, Intake/Output, MAR, Recent Results, Cardiac Rhythm NSR and Dual Neuro Assessment.

## 2021-01-05 NOTE — PROGRESS NOTES
SLP Contact Note    Noted pt plans to get a PEG tube placement today or tomorrow. SLP will hold for now as suspect longer term recovery of dysphagia.       Thank you,  ALTON VargasEd, [address] [facility]  Speech-Language Pathologist

## 2021-01-05 NOTE — PROGRESS NOTES
SLP Contact Note    Phone call made at the request of Lauro Lesch (NP with Palliative) to explain dysphagia and pt's prognosis with his dysphagia to pt's wife Summer. Discussed pt's dysphagia and educated pt's wife to these deficits. Answered questions to best of ability. Pt asking questions about having a long-term feeding tube vs keeping the NG tube. Discussed that pt unable to discharge with NG tube and thus it would be reasonable to pursue the PEG tube, allow pt to discharge and hopefully obtain some rehab (either at SNF vs IPR), and that he would not necessarily require it for a certain amount of time (except for the required time by GI). Pt had to leave because she was receiving a phone call, but offered to have another conversation with her at a different time or on another date. Summer expressed appreciation for conversation. SLP will continue to follow.     Thank you,  ALTON StevensonEd, [address] [facility]  Speech-Language Pathologist

## 2021-01-05 NOTE — INTERDISCIPLINARY ROUNDS
Multidisciplinary rounds were held 1/5/21. Today's plan/goal includes (but not limited to): peg tube placement

## 2021-01-05 NOTE — PROGRESS NOTES
Problem: Mobility Impaired (Adult and Pediatric)  Goal: *Acute Goals and Plan of Care (Insert Text)  Description: FUNCTIONAL STATUS PRIOR TO ADMISSION: Patient was independent and active without use of DME.    HOME SUPPORT PRIOR TO ADMISSION: The patient lived with his wife but did not require assist.    Physical Therapy Goals  Initiated 12/12/2020 and re-evaluated/downgraded 12/16/2020; goals reviewed on 12/26/20 and remain appropriate; Goals remain appropriate 1/4/2021    1. Patient will move from supine to sit and sit to supine , scoot up and down, and roll side to side in bed with minimal assistance/contact guard assist within 7 day(s). 2.  Patient will transfer from bed to chair and chair to bed with minimal assistance/contact guard assist using the least restrictive device within 7 day(s). 3.  Patient will perform sit to stand with minimal assistance/contact guard assist within 7 day(s). 4.  Patient will ambulate with minimal assistance/contact guard assist for 50 feet with the least restrictive device within 7 day(s). Outcome: Progressing Towards Goal   PHYSICAL THERAPY TREATMENT  Patient: Ivania Darnell (25 y.o. male)  Date: 1/5/2021  Diagnosis: AMS (altered mental status) [R41.82] AMS (altered mental status)       Precautions: Fall  Chart, physical therapy assessment, plan of care and goals were reviewed. ASSESSMENT  Patient continues with skilled PT services and is progressing towards goals. Patient received supine in bed, wife present just after session began. Patient more confused/delirious today than yesterday's session and required multiple attempts with multimodal cues to follow commands for mobility. Continues to be quite weak, but progressed with gait training within the room and SpO2 stable on room air. Patient unable to steer or manage RW without therapist assist and let go of device on one occasion.  Increased knee buckling and attempts to sit despite not being near a chair with fatigue. Despite 2 attempts at sidestepping to [facility], patient too fatigued to complete fully. Returned to supine (to go to IR for dobb geri placement) and immediately asleep after session. Discussed ICU delirium/confusion with wife and importance of reorientation. She remains very supportive. Current Level of Function Impacting Discharge (mobility/balance): mod-max Ax2    Other factors to consider for discharge: previously independent, prolonged hospital admission, delirium, swallowing issues         PLAN :  Patient continues to benefit from skilled intervention to address the above impairments. Continue treatment per established plan of care. to address goals. Recommendation for discharge: (in order for the patient to meet his/her long term goals)  Therapy 3 hours per day 5-7 days per week    This discharge recommendation:  Has been made in collaboration with the attending provider and/or case management    IF patient discharges home will need the following DME: to be determined (TBD)       SUBJECTIVE:   Patient stated I'm trying to find a football game to watch.  upon PT entry to room, patient in restraints and TV not on    OBJECTIVE DATA SUMMARY:   Critical Behavior:  Neurologic State: Confused  Orientation Level: Disoriented to person, Disoriented to place, Disoriented to situation, Oriented to time  Cognition: Decreased attention/concentration, Decreased command following, Impaired decision making, Impulsive, Memory loss, Poor safety awareness  Safety/Judgement: Decreased awareness of environment, Decreased awareness of need for assistance, Decreased awareness of need for safety  Functional Mobility Training:  Bed Mobility:  Supine to Sit: Moderate assistance;Assist x2  Sit to Supine: Minimum assistance; Moderate assistance;Assist x2  Scooting: Maximum assistance  Transfers:  Sit to Stand: Minimum assistance; Moderate assistance;Assist x2(min Ax2 at best from elevated bed)  Stand to Sit: Moderate assistance;Maximum assistance;Assist x2(due to impulsivity and decreased safety awareness)  Balance:  Sitting: Impaired; Without support  Sitting - Static: Good (unsupported)  Sitting - Dynamic: Fair (occasional)  Standing: Impaired; With support  Standing - Static: Fair;Poor  Standing - Dynamic : Poor  Ambulation/Gait Training:  Distance (ft): 10 Feet (ft)  Assistive Device: Gait belt;Walker, rolling  Ambulation - Level of Assistance: Minimal assistance; Moderate assistance;Assist x2(+ progressing to mod Ax2 and max A for RW management)  Gait Abnormalities: Ataxic;Decreased step clearance;Shuffling gait(knee buckling )  Base of Support: Narrowed  Speed/Yari: Shuffled  Step Length: Right shortened;Left shortened  Activity Tolerance:   Fair, SpO2 stable on RA, and requires frequent rest breaks    After treatment patient left in no apparent distress:   Supine in bed, Call bell within reach, Caregiver / family present, and Side rails x 3, RN aware and okay with restraints off as wife present and patient asleep    COMMUNICATION/COLLABORATION:   The patients plan of care was discussed with: Occupational therapist and Registered nurse.      Maura Alba PT, DPT   Time Calculation: 25 mins

## 2021-01-05 NOTE — PROGRESS NOTES
Problem: Self Care Deficits Care Plan (Adult)  Goal: *Acute Goals and Plan of Care (Insert Text)  Description:   FUNCTIONAL STATUS PRIOR TO ADMISSION: Patient independent. Family assist with shoes and socks PRN. HOME SUPPORT: The patient lived with wife and two teenage daughters. Occupational Therapy Goals  Initiated 12/21/2020, reviewed 12/28/2020; goals reviewed 1/5/2021, all remain appropriate. 1.  Patient will perform grooming sitting unsupported with minimal assistance within 7 day(s). 2.  Patient will perform anterior neck to thigh bathing sitting unsupported with minimal assistance within 7 day(s). 3.  Patient will perform lower body dressing with moderate assistance within 7 day(s). 4.  Patient will perform toilet transfers to/from [facility] with moderate assistance within 7 day(s). 5.  Patient will perform all aspects of toileting with moderate assistance  within 7 day(s). 6.  Patient will participate in upper extremity therapeutic exercise/activities with supervision/set-up for 5 minutes within 7 day(s). 7.  Patient will utilize energy conservation techniques during functional activities with verbal cues within 7 day(s). Outcome: Progressing Towards Goal     OCCUPATIONAL THERAPY TREATMENT/WEEKLY RE-ASSESSMENT  Patient: Shiloh Patton (89 y.o. male)  Date: 1/5/2021  Diagnosis: AMS (altered mental status) [R41.82] AMS (altered mental status)       Precautions: Fall  Chart, occupational therapy assessment, plan of care, and goals were reviewed. ASSESSMENT  Patient continues with skilled OT services and is slowly progressing towards goals. Pt continues to present with confusion, impaired cognition, decreased attention, poor safety awareness, decreased coordination, generalized weakness, and impaired balance impacting ADL performance and mobility. Pt is received in bed with restraints and is oriented to self only.  He is able to increase distance with mobility this session (see PT note) with A x 2. He benefits from simple direct cues and occasional visual stimulus to increase step length. Pt is highly distractible and requires frequent redirection to task throughout session. During oral hygiene with swab, pt limited by impaired coordination, distractibility, and fatigue, requiring up to max/total A to complete task. Supportive wife present at end of session. Pt continues to benefit from skilled OT services and all goals remain appropriate. Will continue to follow per POC at 5x/week to address deficits indicated above and maximize functional ADL performance. Current Level of Function Impacting Discharge (ADLs): up to max/total A for ADLs    Other factors to consider for discharge: plan for dobhoff placement this afternoon; supportive wife         PLAN :  Goals have been updated based on progression since last assessment. Patient continues to benefit from skilled intervention to address the above impairments. Continue to follow patient 5 times a week to address goals. Recommend with staff: bed in chair position 3x/day    Recommend next OT session: continue with ADLs at EOB; toilet transfer    Recommendation for discharge: (in order for the patient to meet his/her long term goals)  Therapy 3 hours per day 5-7 days per week    This discharge recommendation:  Has been made in collaboration with the attending provider and/or case management    IF patient discharges home will need the following DME: rolling walker, BSC, and TBD       SUBJECTIVE:   Patient stated I haven't had a popsicle in 10 years.  -during oral hygiene with blue swab    OBJECTIVE DATA SUMMARY:   Cognitive/Behavioral Status:  Neurologic State: Alert;Confused  Orientation Level: Oriented to person;Disoriented to place; Disoriented to situation;Disoriented to time  Cognition: Decreased attention/concentration;Decreased command following; Impulsive;Poor safety awareness  Perseveration: Perseverates during conversation  Safety/Judgement: Decreased awareness of environment;Decreased awareness of need for assistance;Decreased awareness of need for safety;Decreased insight into deficits    Functional Mobility and Transfers for ADLs:  Bed Mobility:  Supine to Sit: Moderate assistance;Assist x2  Sit to Supine: Minimum assistance; Moderate assistance;Assist x2  Scooting: Maximum assistance    Transfers:  Sit to Stand: Minimum assistance; Moderate assistance;Assist x2(min Ax2 at best from elevated bed)    Balance:  Sitting: Impaired; Without support  Sitting - Static: Good (unsupported)  Sitting - Dynamic: Fair (occasional)  Standing: Impaired; With support  Standing - Static: Fair;Poor  Standing - Dynamic : Poor    ADL Intervention:  Grooming  Grooming Assistance: Maximum assistance; Total assistance(dependent)  Position Performed: Other (comment)(semi-supine with HOB elevated >30*)  Brushing Teeth: Maximum assistance; Total assistance (dependent)(impaired coordination, decreased attention; blue swab use)  Cues: Physical assistance; Tactile cues provided;Verbal cues provided;Visual cues provided    Cognitive Retraining  Safety/Judgement: Decreased awareness of environment;Decreased awareness of need for assistance;Decreased awareness of need for safety;Decreased insight into deficits    Functional Outcome Measure:   Barthel Index:    Bathin  Bladder: 0  Bowels: 0  Groomin  Dressin  Feedin  Mobility: 0  Stairs: 0  Toilet Use: 0  Transfer (Bed to Chair and Back): 5  Total: 5/100        The Barthel ADL Index: Guidelines  1. The index should be used as a record of what a patient does, not as a record of what a patient could do. 2. The main aim is to establish degree of independence from any help, physical or verbal, however minor and for whatever reason. 3. The need for supervision renders the patient not independent. 4. A patient's performance should be established using the best available evidence.  Asking the patient, friends/relatives and nurses are the usual sources, but direct observation and common sense are also important. However direct testing is not needed. 5. Usually the patient's performance over the preceding 24-48 hours is important, but occasionally longer periods will be relevant. 6. Middle categories imply that the patient supplies over 50 per cent of the effort. 7. Use of aids to be independent is allowed. Andrea Plaza., Barthel, D.W. ([id number]). Functional evaluation: the Barthel Index. [address] [facility] (14)2. Jens Garcia anu SHANELLE Cuadra, Sanjana August., Jose Maldonado., [place], [address] (1999). Measuring the change indisability after inpatient rehabilitation; comparison of the responsiveness of the Barthel Index and Functional [place] Measure. Journal of Neurology, Neurosurgery, and Psychiatry, 66(4), 233-533. WALTER Ly, BRIGHT Choi, & Harini Smith M.A. (2004.) Assessment of post-stroke quality of life in cost-effectiveness studies: The usefulness of the Barthel Index and the EuroQoL-5D. Quality of Life Research, 13, 427-43     Pain:  Pt reporting minimal pain    Activity Tolerance:   Fair    After treatment patient left in no apparent distress:   Supine in bed, Call bell within reach, Bed / chair alarm activated, Caregiver / family present, Side rails x 3 and RN notified    COMMUNICATION/COLLABORATION:   The patients plan of care was discussed with: Physical therapist and Registered nurse.      Nieves Tompkins OT  Time Calculation: 30 mins

## 2021-01-06 ENCOUNTER — ANESTHESIA EVENT (OUTPATIENT)
Dept: ENDOSCOPY | Age: 76
DRG: 896 | End: 2021-01-06
Payer: MEDICARE

## 2021-01-06 ENCOUNTER — ANESTHESIA (OUTPATIENT)
Dept: ENDOSCOPY | Age: 76
DRG: 896 | End: 2021-01-06
Payer: MEDICARE

## 2021-01-06 LAB
ALBUMIN SERPL-MCNC: 2.6 G/DL (ref 3.5–5)
ALBUMIN/GLOB SERPL: 0.5 {RATIO} (ref 1.1–2.2)
ALP SERPL-CCNC: 109 U/L (ref 45–117)
ALT SERPL-CCNC: 21 U/L (ref 12–78)
ANION GAP SERPL CALC-SCNC: 7 MMOL/L (ref 5–15)
AST SERPL-CCNC: 20 U/L (ref 15–37)
BASOPHILS # BLD: 0.2 K/UL (ref 0–0.1)
BASOPHILS NFR BLD: 1 % (ref 0–1)
BILIRUB SERPL-MCNC: 0.4 MG/DL (ref 0.2–1)
BUN SERPL-MCNC: 39 MG/DL (ref 6–20)
BUN/CREAT SERPL: 30 (ref 12–20)
CALCIUM SERPL-MCNC: 9.2 MG/DL (ref 8.5–10.1)
CHLORIDE SERPL-SCNC: 111 MMOL/L (ref 97–108)
CO2 SERPL-SCNC: 23 MMOL/L (ref 21–32)
COVID-19 RAPID TEST, COVR: NOT DETECTED
CREAT SERPL-MCNC: 1.31 MG/DL (ref 0.7–1.3)
DIFFERENTIAL METHOD BLD: ABNORMAL
EOSINOPHIL # BLD: 0.7 K/UL (ref 0–0.4)
EOSINOPHIL NFR BLD: 5 % (ref 0–7)
ERYTHROCYTE [DISTWIDTH] IN BLOOD BY AUTOMATED COUNT: 12.6 % (ref 11.5–14.5)
GLOBULIN SER CALC-MCNC: 5.4 G/DL (ref 2–4)
GLUCOSE SERPL-MCNC: 101 MG/DL (ref 65–100)
HCT VFR BLD AUTO: 35.3 % (ref 36.6–50.3)
HEALTH STATUS, XMCV2T: NORMAL
HGB BLD-MCNC: 11.6 G/DL (ref 12.1–17)
IMM GRANULOCYTES # BLD AUTO: 0.1 K/UL (ref 0–0.04)
IMM GRANULOCYTES NFR BLD AUTO: 1 % (ref 0–0.5)
LYMPHOCYTES # BLD: 1.5 K/UL (ref 0.8–3.5)
LYMPHOCYTES NFR BLD: 10 % (ref 12–49)
MCH RBC QN AUTO: 32.2 PG (ref 26–34)
MCHC RBC AUTO-ENTMCNC: 32.9 G/DL (ref 30–36.5)
MCV RBC AUTO: 98.1 FL (ref 80–99)
MONOCYTES # BLD: 1.3 K/UL (ref 0–1)
MONOCYTES NFR BLD: 9 % (ref 5–13)
NEUTS SEG # BLD: 10.6 K/UL (ref 1.8–8)
NEUTS SEG NFR BLD: 74 % (ref 32–75)
NRBC # BLD: 0 K/UL (ref 0–0.01)
NRBC BLD-RTO: 0 PER 100 WBC
PLATELET # BLD AUTO: 295 K/UL (ref 150–400)
PMV BLD AUTO: 9.7 FL (ref 8.9–12.9)
POTASSIUM SERPL-SCNC: 4 MMOL/L (ref 3.5–5.1)
PROT SERPL-MCNC: 8 G/DL (ref 6.4–8.2)
RBC # BLD AUTO: 3.6 M/UL (ref 4.1–5.7)
SODIUM SERPL-SCNC: 141 MMOL/L (ref 136–145)
SOURCE, COVRS: NORMAL
SPECIMEN SOURCE, FCOV2M: NORMAL
SPECIMEN TYPE, XMCV1T: NORMAL
WBC # BLD AUTO: 14.4 K/UL (ref 4.1–11.1)

## 2021-01-06 PROCEDURE — 74011000250 HC RX REV CODE- 250: Performed by: INTERNAL MEDICINE

## 2021-01-06 PROCEDURE — 74011250637 HC RX REV CODE- 250/637: Performed by: ANESTHESIOLOGY

## 2021-01-06 PROCEDURE — 99232 SBSQ HOSP IP/OBS MODERATE 35: CPT | Performed by: NURSE PRACTITIONER

## 2021-01-06 PROCEDURE — 74011250636 HC RX REV CODE- 250/636: Performed by: INTERNAL MEDICINE

## 2021-01-06 PROCEDURE — 74011000250 HC RX REV CODE- 250: Performed by: ANESTHESIOLOGY

## 2021-01-06 PROCEDURE — 3E0G76Z INTRODUCTION OF NUTRITIONAL SUBSTANCE INTO UPPER GI, VIA NATURAL OR ARTIFICIAL OPENING: ICD-10-PCS | Performed by: INTERNAL MEDICINE

## 2021-01-06 PROCEDURE — 36415 COLL VENOUS BLD VENIPUNCTURE: CPT

## 2021-01-06 PROCEDURE — 80053 COMPREHEN METABOLIC PANEL: CPT

## 2021-01-06 PROCEDURE — 74011250637 HC RX REV CODE- 250/637: Performed by: INTERNAL MEDICINE

## 2021-01-06 PROCEDURE — 74011250636 HC RX REV CODE- 250/636: Performed by: NURSE ANESTHETIST, CERTIFIED REGISTERED

## 2021-01-06 PROCEDURE — 74011250637 HC RX REV CODE- 250/637: Performed by: NURSE PRACTITIONER

## 2021-01-06 PROCEDURE — 76040000019: Performed by: INTERNAL MEDICINE

## 2021-01-06 PROCEDURE — 65270000029 HC RM PRIVATE

## 2021-01-06 PROCEDURE — 74011000250 HC RX REV CODE- 250: Performed by: NURSE PRACTITIONER

## 2021-01-06 PROCEDURE — 77030005122 HC CATH GASTMY PEG BSC -B: Performed by: INTERNAL MEDICINE

## 2021-01-06 PROCEDURE — 0DH63UZ INSERTION OF FEEDING DEVICE INTO STOMACH, PERCUTANEOUS APPROACH: ICD-10-PCS | Performed by: INTERNAL MEDICINE

## 2021-01-06 PROCEDURE — 74011000250 HC RX REV CODE- 250: Performed by: NURSE ANESTHETIST, CERTIFIED REGISTERED

## 2021-01-06 PROCEDURE — 77030012058: Performed by: INTERNAL MEDICINE

## 2021-01-06 PROCEDURE — 74011250637 HC RX REV CODE- 250/637: Performed by: FAMILY MEDICINE

## 2021-01-06 PROCEDURE — 76060000031 HC ANESTHESIA FIRST 0.5 HR: Performed by: INTERNAL MEDICINE

## 2021-01-06 PROCEDURE — 2709999900 HC NON-CHARGEABLE SUPPLY: Performed by: INTERNAL MEDICINE

## 2021-01-06 PROCEDURE — 85025 COMPLETE CBC W/AUTO DIFF WBC: CPT

## 2021-01-06 RX ORDER — METOPROLOL TARTRATE 25 MG/1
6.25 TABLET, FILM COATED ORAL EVERY 12 HOURS
Status: DISCONTINUED | OUTPATIENT
Start: 2021-01-06 | End: 2021-01-07

## 2021-01-06 RX ORDER — SODIUM CHLORIDE 0.9 % (FLUSH) 0.9 %
5-40 SYRINGE (ML) INJECTION EVERY 8 HOURS
Status: DISCONTINUED | OUTPATIENT
Start: 2021-01-06 | End: 2021-01-13 | Stop reason: HOSPADM

## 2021-01-06 RX ORDER — DEXTROMETHORPHAN/PSEUDOEPHED 2.5-7.5/.8
1.2 DROPS ORAL
Status: DISCONTINUED | OUTPATIENT
Start: 2021-01-06 | End: 2021-01-06 | Stop reason: HOSPADM

## 2021-01-06 RX ORDER — SODIUM CHLORIDE 9 MG/ML
INJECTION, SOLUTION INTRAVENOUS
Status: DISCONTINUED | OUTPATIENT
Start: 2021-01-06 | End: 2021-01-06 | Stop reason: HOSPADM

## 2021-01-06 RX ORDER — PROPOFOL 10 MG/ML
INJECTION, EMULSION INTRAVENOUS AS NEEDED
Status: DISCONTINUED | OUTPATIENT
Start: 2021-01-06 | End: 2021-01-06 | Stop reason: HOSPADM

## 2021-01-06 RX ORDER — SODIUM CHLORIDE 0.9 % (FLUSH) 0.9 %
5-40 SYRINGE (ML) INJECTION AS NEEDED
Status: DISCONTINUED | OUTPATIENT
Start: 2021-01-06 | End: 2021-01-13 | Stop reason: HOSPADM

## 2021-01-06 RX ORDER — SODIUM CHLORIDE 9 MG/ML
75 INJECTION, SOLUTION INTRAVENOUS CONTINUOUS
Status: DISPENSED | OUTPATIENT
Start: 2021-01-06 | End: 2021-01-06

## 2021-01-06 RX ORDER — LIDOCAINE HYDROCHLORIDE 20 MG/ML
INJECTION, SOLUTION EPIDURAL; INFILTRATION; INTRACAUDAL; PERINEURAL AS NEEDED
Status: DISCONTINUED | OUTPATIENT
Start: 2021-01-06 | End: 2021-01-06 | Stop reason: HOSPADM

## 2021-01-06 RX ORDER — METOPROLOL TARTRATE 5 MG/5ML
5 INJECTION INTRAVENOUS ONCE
Status: DISCONTINUED | OUTPATIENT
Start: 2021-01-06 | End: 2021-01-06

## 2021-01-06 RX ORDER — EPINEPHRINE 0.1 MG/ML
1 INJECTION INTRACARDIAC; INTRAVENOUS
Status: DISCONTINUED | OUTPATIENT
Start: 2021-01-06 | End: 2021-01-06 | Stop reason: HOSPADM

## 2021-01-06 RX ORDER — ATROPINE SULFATE 0.1 MG/ML
0.5 INJECTION INTRAVENOUS
Status: DISCONTINUED | OUTPATIENT
Start: 2021-01-06 | End: 2021-01-06 | Stop reason: HOSPADM

## 2021-01-06 RX ORDER — CEFAZOLIN SODIUM 1 G/3ML
INJECTION, POWDER, FOR SOLUTION INTRAMUSCULAR; INTRAVENOUS
Status: COMPLETED
Start: 2021-01-06 | End: 2021-01-06

## 2021-01-06 RX ORDER — MIDAZOLAM HYDROCHLORIDE 1 MG/ML
.25-5 INJECTION, SOLUTION INTRAMUSCULAR; INTRAVENOUS
Status: DISCONTINUED | OUTPATIENT
Start: 2021-01-06 | End: 2021-01-06 | Stop reason: HOSPADM

## 2021-01-06 RX ORDER — FENTANYL CITRATE 50 UG/ML
12.5-2 INJECTION, SOLUTION INTRAMUSCULAR; INTRAVENOUS
Status: DISCONTINUED | OUTPATIENT
Start: 2021-01-06 | End: 2021-01-06 | Stop reason: HOSPADM

## 2021-01-06 RX ORDER — NALOXONE HYDROCHLORIDE 0.4 MG/ML
0.4 INJECTION, SOLUTION INTRAMUSCULAR; INTRAVENOUS; SUBCUTANEOUS
Status: DISCONTINUED | OUTPATIENT
Start: 2021-01-06 | End: 2021-01-06 | Stop reason: HOSPADM

## 2021-01-06 RX ORDER — CEFAZOLIN SODIUM 1 G/3ML
INJECTION, POWDER, FOR SOLUTION INTRAMUSCULAR; INTRAVENOUS AS NEEDED
Status: DISCONTINUED | OUTPATIENT
Start: 2021-01-06 | End: 2021-01-06 | Stop reason: HOSPADM

## 2021-01-06 RX ORDER — METOPROLOL TARTRATE 5 MG/5ML
5 INJECTION INTRAVENOUS ONCE
Status: COMPLETED | OUTPATIENT
Start: 2021-01-06 | End: 2021-01-06

## 2021-01-06 RX ORDER — FLUMAZENIL 0.1 MG/ML
0.2 INJECTION INTRAVENOUS
Status: DISCONTINUED | OUTPATIENT
Start: 2021-01-06 | End: 2021-01-06 | Stop reason: HOSPADM

## 2021-01-06 RX ADMIN — METOPROLOL TARTRATE 6.25 MG: 25 TABLET, FILM COATED ORAL at 22:14

## 2021-01-06 RX ADMIN — METOPROLOL TARTRATE 5 MG: 1 INJECTION, SOLUTION INTRAVENOUS at 03:04

## 2021-01-06 RX ADMIN — OXYCODONE HYDROCHLORIDE AND ACETAMINOPHEN 500 MG: 500 TABLET ORAL at 18:00

## 2021-01-06 RX ADMIN — SODIUM CHLORIDE: 900 INJECTION, SOLUTION INTRAVENOUS at 12:33

## 2021-01-06 RX ADMIN — Medication 1 SPRAY: at 22:15

## 2021-01-06 RX ADMIN — QUETIAPINE FUMARATE 50 MG: 25 TABLET ORAL at 22:14

## 2021-01-06 RX ADMIN — PROPOFOL 30 MG: 10 INJECTION, EMULSION INTRAVENOUS at 12:58

## 2021-01-06 RX ADMIN — PROPOFOL 30 MG: 10 INJECTION, EMULSION INTRAVENOUS at 12:51

## 2021-01-06 RX ADMIN — METOPROLOL TARTRATE 5 MG: 1 INJECTION, SOLUTION INTRAVENOUS at 09:28

## 2021-01-06 RX ADMIN — CEFAZOLIN 2 G: 330 INJECTION, POWDER, FOR SOLUTION INTRAMUSCULAR; INTRAVENOUS at 12:44

## 2021-01-06 RX ADMIN — Medication 1 SPRAY: at 18:33

## 2021-01-06 RX ADMIN — Medication 10 ML: at 22:15

## 2021-01-06 RX ADMIN — PROPOFOL 30 MG: 10 INJECTION, EMULSION INTRAVENOUS at 13:02

## 2021-01-06 RX ADMIN — Medication 10 ML: at 18:09

## 2021-01-06 RX ADMIN — POTASSIUM BICARBONATE 40 MEQ: 782 TABLET, EFFERVESCENT ORAL at 18:10

## 2021-01-06 RX ADMIN — LIDOCAINE HYDROCHLORIDE 100 MG: 20 INJECTION, SOLUTION EPIDURAL; INFILTRATION; INTRACAUDAL; PERINEURAL at 12:43

## 2021-01-06 RX ADMIN — PROPOFOL 40 MG: 10 INJECTION, EMULSION INTRAVENOUS at 12:44

## 2021-01-06 RX ADMIN — CEFAZOLIN SODIUM 2 G: 1 INJECTION, POWDER, FOR SOLUTION INTRAMUSCULAR; INTRAVENOUS at 18:08

## 2021-01-06 RX ADMIN — PROPOFOL 30 MG: 10 INJECTION, EMULSION INTRAVENOUS at 12:47

## 2021-01-06 RX ADMIN — Medication 10 ML: at 18:10

## 2021-01-06 RX ADMIN — METOROPROLOL TARTRATE 5 MG: 5 INJECTION, SOLUTION INTRAVENOUS at 06:29

## 2021-01-06 RX ADMIN — METOPROLOL TARTRATE 5 MG: 1 INJECTION, SOLUTION INTRAVENOUS at 18:13

## 2021-01-06 RX ADMIN — PROPOFOL 50 MG: 10 INJECTION, EMULSION INTRAVENOUS at 12:54

## 2021-01-06 RX ADMIN — CHLORHEXIDINE GLUCONATE 15 ML: 0.12 RINSE ORAL at 22:33

## 2021-01-06 RX ADMIN — PROPOFOL 30 MG: 10 INJECTION, EMULSION INTRAVENOUS at 12:45

## 2021-01-06 NOTE — ANESTHESIA PREPROCEDURE EVALUATION
Relevant Problems   NEUROLOGY   (+) Alcohol withdrawal (HCC)   (+) Alcoholism (HCC)      CARDIOVASCULAR   (+) Essential hypertension      RENAL FAILURE   (+) HATTIE (acute kidney injury) ([facility] [address]      HEMATOLOGY   (+) Anemia       Anesthetic History   No history of anesthetic complications            Review of Systems / Medical History  Patient summary reviewed, nursing notes reviewed and pertinent labs reviewed    Pulmonary  Within defined limits                 Neuro/Psych         Dementia     Cardiovascular    Hypertension              Exercise tolerance: <4 METS     GI/Hepatic/Renal               Comments: dysphagia Endo/Other  Within defined limits           Other Findings              Physical Exam    Airway  Mallampati: II  TM Distance: > 6 cm  Neck ROM: normal range of motion   Mouth opening: Normal     Cardiovascular  Regular rate and rhythm,  S1 and S2 normal,  no murmur, click, rub, or gallop             Dental  No notable dental hx       Pulmonary  Breath sounds clear to auscultation               Abdominal  GI exam deferred       Other Findings            Anesthetic Plan    ASA: 3  Anesthesia type: MAC          Induction: Intravenous  Anesthetic plan and risks discussed with: Patient

## 2021-01-06 NOTE — ROUTINE PROCESS
Occupational therapy [phone] -  
[phone] Chart reviewed in prep for OT, patient currently JOHN in ENDO. Will defer and f/u later in PM vs. tomorrow as able and appropriate. Thank you. Nav Pina MS, OTR/L

## 2021-01-06 NOTE — ROUTINE PROCESS
Marty Lion 1945 
[id number] Situation: 
Verbal report received from: [facility] Procedure: Procedure(s): PERCUTANEOUS ENDOSCOPIC GASTROSTOMY TUBE INSERTION :- 
ESOPHAGOGASTRODUODENOSCOPY (EGD) Background: 
 
Preoperative diagnosis: Dysphagia Postoperative diagnosis: 1. Dysphagia :  Dr. Kathrin Hudson Assistant(s): Endoscopy RN-1: Nicolas Friend Endoscopy RN-2: Keshia Hollingsworth RN Specimens:no H. Pylori no Assessment: 
Intra-procedure medications Anesthesia gave intra-procedure sedation and medications, see anesthesia flow sheet Intravenous fluids: NS@ [facility] Abdominal assessment: round and soft Recommendation: 
 
Return to floor room 621 Permission to share finding with family -yes

## 2021-01-06 NOTE — PROCEDURES
[address]  [address] [facility], [facility] [address] [facility]  [phone]           Endoscopic Gastroduodenoscopy with Percutaneous Endoscopic Gastrostomy Procedure Note                        Cezar Herron  1945  [id number]    Indication: Neurological dysphagia     :  Radha Wilson MD    Staff: Endoscopy RN-1: Iman Loo  Endoscopy RN-2: Sam Genao RN     Referring Provider: Mona Edwards MD    Sedation:  see anesthesia record    Prior to the procedure its objectives, risks, consequences and alternatives were discussed with the patient who then elected to proceed. The patient had the opportunity to ask questions and those questions were answered. A physical exam was performed. The heart, lungs, and mental status were examined prior to the procedure and found to be satisfactory for conscious sedation and for the procedure. Conscious sedation was initiated by the physician. Continuous pulse oximetry and blood pressure monitoring were used throughout the procedure. After appropriate pharyngeal anesthesia, the endoscope was passed into the esophagus without difficulty. The proximal esophagus is normal as is the distal esophagus. The fundus, body, antrum, pylorus, bulb and postbulbar area are unremarkable. On slow withdrawal of the scope, the stomach was transilluminated into the abdominal wall. Under sterile conditions and 1% Xylocaine anesthesia, a small incision was made in the abdominal wall. A needle was passed through the incision, and under direct vision into the stomach. A wire was passed through the needle, snared and brought out the mouth. The PEG tube was passed over the wire and brought out the abdominal wall without difficulty. The scope was then reinserted and the positioning of the PEG tube was excellent. The tube marking was at 3 cm when tube was pulled taught. The external bmper was left at 4 cm cameron.  He tolerated the procedure without complication and will return to his room in satisfactory condition. Specimen Removed:  * No specimens in log *    Therapies:  PEG placement         Complications:   None; patient tolerated the procedure well. EBL:  Minimal.           Impression:      -Normal upper endoscopic exam  -Successful PEG placement. External bumper at 3 cm and skin at 4 cm, 1 cm distance between skin and bumper. Recommendations:  -The PEG tube may be used for meds, water flushes and feeding after 4 hours if no complications arise  -The head of the bed should be kept elevated to 30 degrees during tube feeds, and the tube should be flushed with 30 mL of water every 8 hours and after giving medications through the tube. -Monitor the PEG site for bleeding and infection.  -No NSAIDS  -Okay to remove dressing after 24 hours. If need to replace dressing please DO NOT place dressing under external bumper (increased risk of buried bumper syndrome)  -Please consult nutrition for tube feeds  -In case of dislodged tube within next 4-6 weeks please do not attempt blind replacement.  Call on call GI or surgery for assistance.   -We will check on PEG in am and plan for 3 month follow up in office    Lois العراقي MD  1/6/2021  1:21 PM

## 2021-01-06 NOTE — PROGRESS NOTES
SLP Contact Note    Noted pt leaving the floor. Will hold SLP for now.     Thank you,  ALTON ShankarEd, [id number] [facility]  Speech-Language Pathologist

## 2021-01-06 NOTE — PROGRESS NOTES
0730: Bedside shift change report given to ESTRADA Ivan (oncoming nurse) by Ava Puri RN (offgoing nurse). Report included the following information SBAR, Kardex, ED Summary, Procedure Summary, Intake/Output, MAR, Recent Results, Cardiac Rhythm NSR and Dual Neuro Assessment. 65: Summer, pt's wife, called and received pt update from this RN.    0900: RN and PCT @ the bedside to provide incontinence care for large watery BM. CHG provided and pt moved from ICU bed to different bed. 4406: PCT @ the bedside to provide incontinence care for large watery BM. CHG provided. 0313: TRANSFER - OUT REPORT:    Verbal report given to [facility], RN(name) on Patric Yee  being transferred to [facility](unit) for routine progression of care       Report consisted of patients Situation, Background, Assessment and   Recommendations(SBAR). Information from the following report(s) SBAR, Kardex, ED Summary, OR Summary, Procedure Summary, Intake/Output, MAR, Recent Results, Cardiac Rhythm NSR/ST and Dual Neuro Assessment was reviewed with the receiving nurse.     Lines:   Peripheral IV 01/03/21 Distal;Left;Posterior Forearm (Active)   Site Assessment Clean, dry, & intact 01/06/21 0000   Phlebitis Assessment 0 01/06/21 0000   Infiltration Assessment 0 01/06/21 0000   Dressing Status Clean, dry, & intact 01/06/21 0000   Dressing Type Transparent;Tape 01/06/21 0000   Hub Color/Line Status Pink;Flushed;Capped 01/06/21 0000   Action Taken Open ports on tubing capped 01/06/21 0000   Alcohol Cap Used Yes 01/06/21 0000       Peripheral IV 01/06/21 Posterior;Right Forearm (Active)   Site Assessment Clean, dry, & intact 01/06/21 0000   Phlebitis Assessment 0 01/06/21 0000   Infiltration Assessment 0 01/06/21 0000   Dressing Status Clean, dry, & intact 01/06/21 0000   Dressing Type Transparent;Tape 01/06/21 0000   Hub Color/Line Status Pink;Flushed;Capped 01/06/21 0000   Action Taken Open ports on tubing capped 01/06/21 0000   Alcohol Cap Used Yes 01/06/21 0000        Opportunity for questions and clarification was provided.       Patient transported with:   Monitor  Registered Nurse  Tech

## 2021-01-06 NOTE — PROGRESS NOTES
ID Progress Note  2021       vanco   -    Preetn    -     Subjective:     Afebrile. On room air. Objective:     Vitals:   Visit Vitals  BP (!) 165/77   Pulse 100   Temp 97.6 °F (36.4 °C)   Resp 20   Ht 5' 11\" (1.803 m)   Wt 74.5 kg (164 lb 3.9 oz)   SpO2 93%   BMI 22.91 kg/m²        Tmax:  Temp (24hrs), Av.1 °F (36.7 °C), Min:97.6 °F (36.4 °C), Max:98.9 °F (37.2 °C)      Exam:    Not in distress  Lungs clear  RRR  Abdomen soft     Labs:   Lab Results   Component Value Date/Time    WBC 14.4 (H) 2021 03:24 AM    HGB (POC) 12.8 2017 10:27 AM    HGB 11.6 (L) 2021 03:24 AM    HCT (POC) 38.8 2017 10:27 AM    HCT 35.3 (L) 2021 03:24 AM    PLATELET 716  03:24 AM    MCV 98.1 2021 03:24 AM     Lab Results   Component Value Date/Time    Sodium 141 2021 03:24 AM    Potassium 4.0 2021 03:24 AM    Chloride 111 (H) 2021 03:24 AM    CO2 23 2021 03:24 AM    Anion gap 7 2021 03:24 AM    Glucose 101 (H) 2021 03:24 AM    BUN 39 (H) 2021 03:24 AM    Creatinine 1.31 (H) 2021 03:24 AM    BUN/Creatinine ratio 30 (H) 2021 03:24 AM    GFR est AA >60 2021 03:24 AM    GFR est non-AA 53 (L) 2021 03:24 AM    Calcium 9.2 2021 03:24 AM    Bilirubin, total 0.4 2021 03:24 AM    Alk. phosphatase 109 2021 03:24 AM    Protein, total 8.0 2021 03:24 AM    Albumin 2.6 (L) 2021 03:24 AM    Globulin 5.4 (H) 2021 03:24 AM    A-G Ratio 0.5 (L) 2021 03:24 AM    ALT (SGPT) 21 2021 03:24 AM           Assessment:     #1 fever - resolved      #2 recent delirium tremens     #3 history of melanoma     #4 hypertension    #5 mild renal insufficiency     #6 s/p arrest, possible aspiration                Recommendations:       Observe off abx. He is now on room air. He had egd/peg today. Repeat cbc tomorrow.      Zack Duque MD

## 2021-01-06 NOTE — PROGRESS NOTES
[facility]    ICU TEAM Progress Note    Name: Cari Garcia   : 1945   MRN: [id number]   Date: 2021      I  Subjective:   Progress Note: 2021      Reason for ICU Admission: Acute hypoxic respiratory failure leading to cardiac arrest, currently with multifactorial confusion and encephalopathy    Interval history: from [facility]  Oskar is a 76 y. o. with a history of melanoma, hypertension and alcohol abuse. [facility] suffered a blunt head injury after a ground-level fall on the evening of .  He had tripped over a sidewalk resulting in him falling forward and landing on his right forehead.  Patient has felt disoriented and was having some memory issues.  For this reason, he was admitted on . [facility] was placed on Rocephin empirically, but this was discontinued because no source of infection was found.  He developed delirium tremens and had to be intubated and admitted to the ICU. [facility] was eventually extubated a few days later.  In the past day, he developed fever and elevated white count prompting consult.  The patient tells me that he does not have any cough, dyspnea, abdominal pain, dysuria, headache or sore throat.  He does not have any rash. [facility] does not have any back pain or joint pain.  Dr. Jaqueline Alvarez documents that he is having diarrhea.  A C. difficile has been ordered.  Chest x-ray did not reveal any pneumonia.  Blood cultures have also been sent.  Urinalysis did not show any significant pyuria.  He was started on Zosyn and we are being asked to see him in consult. Overnight Events:   No acute event, remained confused but relatively better, required Zyprexa IM last night. Could not take his Seroquel as he does not have PEG tube and is not able to swallow.     Active Problem List:     Problem List  Date Reviewed: 2020          Codes Class    Severe protein[facility]calorie malnutrition [facility] ICD-10-CM: X16  ICD-9-CM: 262         Oropharyngeal dysphagia ICD-10-CM: R13.12  ICD-9-CM: 787.22         Acute respiratory failure with hypoxia (HCC) ICD-10-CM: J96.01  ICD-9-CM: 518.81         Alcoholism (HCC) ICD-10-CM: F10.20  ICD-9-CM: 303.90         Goals of care, counseling/discussion ICD-10-CM: Z71.89  ICD-9-CM: V65.49         Alcohol withdrawal (HCC) ICD-10-CM: H01.025  ICD-9-CM: 291.81         UTI (urinary tract infection) ICD-10-CM: N39.0  ICD-9-CM: 599.0         Hypokalemia ICD-10-CM: E87.6  ICD-9-CM: 276.8         Thrombocytopenia (HCC) ICD-10-CM: D69.6  ICD-9-CM: 287.5         Anemia ICD-10-CM: D64.9  ICD-9-CM: 285.9         * (Principal) AMS (altered mental status) ICD-10-CM: R41.82  ICD-9-CM: 780.97         Excessive drinking of alcohol ICD-10-CM: F10.10  ICD-9-CM: 305.00         Hyponatremia ICD-10-CM: E87.1  ICD-9-CM: 276.1         HATTIE (acute kidney injury) ([facility] ICD-10-CM: N17.9  ICD-9-CM: 584.9         Essential hypertension ICD-10-CM: I10  ICD-9-CM: 401.9               Past Medical History:      has a past medical history of Cancer ([facility] (~ 2008), Hypertension, and Other ill-defined conditions(799.89) (1960~). Past Surgical History:      has a past surgical history that includes hx other surgical (~ 2008) and colonoscopy (1/6/2011). Home Medications:     Prior to Admission medications    Medication Sig Start Date End Date Taking? Authorizing Provider   losartan (COZAAR) 100 mg tablet TAKE 1 TABLET BY MOUTH EVERY DAY 6/14/20  Yes Katy Cruz MD       Allergies/Social/Family History:      Allergies   Allergen Reactions    Ace Inhibitors Cough    Thiazides Other (comments)     hyponatremia      Social History     Tobacco Use    Smoking status: Never Smoker    Smokeless tobacco: Never Used   Substance Use Topics    Alcohol use: Yes     Comment: occasional beer      Family History   Problem Relation Age of Onset    Hypertension Father        Review of Systems:     Not able to obtain given the patient mental status    Objective:   Vital Signs:  Visit Vitals  BP (!) 151/76 Pulse 100   Temp 97.9 °F (36.6 °C)   Resp 16   Ht 5' 11\" (1.803 m)   Wt 74.5 kg (164 lb 3.9 oz)   SpO2 92%   BMI 22.91 kg/m²    O2 Flow Rate (L/min): 4 l/min O2 Device: Room air Temp (24hrs), Av °F (36.7 °C), Min:97.8 °F (36.6 °C), Max:98.2 °F (36.8 °C)           Intake/Output:     Intake/Output Summary (Last 24 hours) at 2021 0726  Last data filed at 2021 0700  Gross per 24 hour   Intake    Output 295 ml   Net -295 ml       Physical Exam:    General:  Knows name, knows he's in a health care facility, doesn't know year. Gwendalyn Bent, follows simple commands, confused; look older than stated age   Eyes:  Sclera anicteric. Pupils equally round and reactive to light. Mouth/Throat: Mucous membranes normal, oral pharynx clear   Neck: Supple   Lungs:   Rhonchi bilaterally, good effort   CV:  Regular rate and rhythm,no murmur, click, rub or gallop   Abdomen:   Soft, non-tender. bowel sounds normal. non-distended   Extremities: No cyanosis or edema   Skin: Skin color, texture, turgor normal. no acute rash or lesions   Lymph nodes: Cervical and supraclavicular normal   Musculoskeletal: No swelling or deformity   Lines/Devices:  Intact, no erythema, drainage or tenderness   Psych: Pleasantly confused         LABS AND  DATA: Personally reviewed  Recent Labs     21  025   WBC 14.4* 10.3   HGB 11.6* 10.5*   HCT 35.3* 31.4*    244     Recent Labs     21  025    141   K 4.0 3.6   * 108   CO2 23 25   BUN 39* 37*   CREA 1.31* 1.22   * 98   CA 9.2 9.3     Recent Labs     21  025    96   TP 8.0 7.7   ALB 2.6* 2.5*   GLOB 5.4* 5.2*     No results for input(s): INR, PTP, APTT, INREXT in the last 72 hours. Recent Labs     21  1249   PHI 7.46*   PCO2I 36.2   PO2I 100     No results for input(s): CPK, CKMB, TROIQ, BNPP in the last 72 hours.     Hemodynamics:   PAP:   CO:     Wedge:   CI:     CVP:    SVR:       PVR: Ventilator Settings:  Mode Rate Tidal Volume Pressure FiO2 PEEP   Assist control, Volume control   450 ml  5 cm H2O 27 % 5 cm H20     Peak airway pressure: 22 cm H2O    Minute ventilation: 10.3 l/min        MEDS: Reviewed    Chest X-Ray:  CXR Results  (Last 48 hours)    None            Assessment and Plan:   Encephalopathy: Multifactorial: Chronic alcohol abuse, cardiac arrest, subacute stroke and acute ICU delirium.  Seems to be slowly improving.  Appreciate neurology input.  MRA did not show major large vessel disease.  He has been off Precedex for a few days. Required Zyprexa IM last night, I will start him on oral Seroquel nightly to be started when PEG tube or Dobbhoff tube is inserted   Alcohol abuse: At this time he is not in acute DTs. Acute hypoxic respiratory failure: Resolved  Aspiration pneumonitis: Patient shows evidence of silent aspiration on fiberoptic endoscopic evaluation of swallowing [please see speech language pathologist note on January 4],  GI consulted for PEG tube insertion. DISPOSITION  Transfer to non-ICU bed with a sitter, will transfer care to his primary care physician Dr. Jaqueline Alvarez. CRITICAL CARE CONSULTANT NOTE  I had a face to face encounter with the patient, reviewed and interpreted patient data including clinical events, labs, images, vital signs, I/O's, and examined patient. I have discussed the case and the plan and management of the patient's care with the consulting services, the bedside nurses and the respiratory therapist.      NOTE OF PERSONAL INVOLVEMENT IN CARE   This patient has a high probability of imminent, clinically significant deterioration, which requires the highest level of preparedness to intervene urgently. I participated in the decision-making and personally managed or directed the management of the following life and organ supporting interventions that required my frequent assessment to treat or prevent imminent deterioration.       Mika Castillo Abbie Caballero M.D.   Staff Intensivist/Pulmonologist  [facility]  1/6/2021
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
## 2021-01-06 NOTE — ANESTHESIA POSTPROCEDURE EVALUATION
Post-Anesthesia Evaluation and Assessment    Patient: Ivania Darnell MRN: [id number]  SSN: [id number]    YOB: 1945  Age: 76 y.o. Sex: male      I have evaluated the patient and they are stable and ready for discharge from the PACU. Cardiovascular Function/Vital Signs  Visit Vitals  BP (!) 150/77   Pulse (!) 106   Temp 36.6 °C (97.8 °F)   Resp 25   Ht 5' 11\" (1.803 m)   Wt 74.5 kg (164 lb 3.9 oz)   SpO2 92%   BMI 22.91 kg/m²       Patient is status post MAC anesthesia for Procedure(s):  PERCUTANEOUS ENDOSCOPIC GASTROSTOMY TUBE INSERTION :-  ESOPHAGOGASTRODUODENOSCOPY (EGD). Nausea/Vomiting: None    Postoperative hydration reviewed and adequate. Pain:  Pain Scale 1: Visual (01/06/21 1330)  Pain Intensity 1: 0 (01/06/21 1330)   Managed    Neurological Status:   Neuro  Neurologic State: Confused (01/06/21 0800)  Orientation Level: Oriented to person;Disoriented to time;Disoriented to place; Disoriented to situation (01/06/21 0800)  Cognition: Decreased attention/concentration;Decreased command following; Impaired decision making; Impulsive;Memory loss;Poor safety awareness (01/06/21 0800)  Speech: Slurred (01/06/21 0800)  Assessment L Pupil: Brisk;Round (01/06/21 0800)  Size L Pupil (mm): 3 (01/06/21 0800)  Assessment R Pupil: Brisk;Round (01/06/21 0800)  Size R Pupil (mm): 3 (01/06/21 0800)  LUE Motor Response: Purposeful (01/06/21 0800)  LLE Motor Response: Purposeful (01/06/21 0800)  RUE Motor Response: Purposeful (01/06/21 0800)  RLE Motor Response: Purposeful (01/06/21 0800)   At baseline    Mental Status, Level of Consciousness: Alert and  oriented to person, place, and time    Pulmonary Status:   O2 Device: Nasal cannula (01/06/21 1330)   Adequate oxygenation and airway patent    Complications related to anesthesia: None    Post-anesthesia assessment completed.  No concerns    Signed By: Lima Alejandro MD     January 6, 2021              Procedure(s):  PERCUTANEOUS ENDOSCOPIC GASTROSTOMY TUBE INSERTION :-  ESOPHAGOGASTRODUODENOSCOPY (EGD).     MAC    <BSHSIANPOST>    INITIAL Post-op Vital signs:   Vitals Value Taken Time   /77 01/06/21 1330   Temp 36.6 °C (97.8 °F) 01/06/21 1315   Pulse 106 01/06/21 1330   Resp 25 01/06/21 1330   SpO2 92 % 01/06/21 1330
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
## 2021-01-06 NOTE — PROGRESS NOTES
1930: Bedside and Verbal shift change report given to Solange Sylvester RN (oncoming nurse) by Dexter Berry RN (offgoing nurse). Report included the following information SBAR, Kardex, ED Summary, Procedure Summary, Intake/Output, MAR, Recent Results, Med Rec Status, Cardiac Rhythm NSR, Alarm Parameters  and Dual Neuro Assessment. 2000: Resumed pt care, VSS, no pain. Incontinence care performed x1 BM. Full CHG provided. Orders from Dr. Simran Gan to give 5mg zyprexa IM. 2200: Incontinence care given for large loose BM. COVID swab sent for procedure tomorrow     0030: COVID negative    0330: AM labs drawn and sent     0630: 's, /92 with next scheduled dose of IV metoprolol @ 0900. Orders for x1 dose 5mg metoprolol IVP now     0720: Incontinence care provided for large watery BM. Full CHG provided. Orders for mitts. Transfer orders downgraded from [facility] to [facility]. 0730: Bedside and Verbal shift change report given to Dexter Berry RN (oncoming nurse) by Solange Sylvester RN (offgoing nurse). Report included the following information SBAR, Kardex, Procedure Summary, Intake/Output, MAR, Recent Results, Med Rec Status, Cardiac Rhythm NSR/ST, Alarm Parameters  and Dual Neuro Assessment.

## 2021-01-06 NOTE — PERIOP NOTES
TRANSFER - IN REPORT:    Verbal report received from [address] [place] [address] RN(name) on Lurdes Trammell  being received from [facility]) for ordered procedure      Report consisted of patients Situation, Background, Assessment and   Recommendations(SBAR). Information from the following report(s) SBAR was reviewed with the receiving nurse. Opportunity for questions and clarification was provided. Assessment completed upon patients arrival to unit and care assumed. TRANSFER - OUT REPORT:    Verbal report given to ESTRADA Saha(name) on Lurdes Trammell  being transferred to [facility](unit) for routine progression of care       Report consisted of patients Situation, Background, Assessment and   Recommendations(SBAR). Information from the following report(s) Procedure Summary was reviewed with the receiving nurse. Lines:   Peripheral IV 01/03/21 Distal;Left;Posterior Forearm (Active)   Site Assessment Clean, dry, & intact 01/06/21 0000   Phlebitis Assessment 0 01/06/21 0000   Infiltration Assessment 0 01/06/21 0000   Dressing Status Clean, dry, & intact 01/06/21 0000   Dressing Type Transparent;Tape 01/06/21 0000   Hub Color/Line Status Pink;Flushed;Capped 01/06/21 0000   Action Taken Open ports on tubing capped 01/06/21 0000   Alcohol Cap Used Yes 01/06/21 0000       Peripheral IV 01/06/21 Posterior;Right Forearm (Active)   Site Assessment Clean, dry, & intact 01/06/21 0000   Phlebitis Assessment 0 01/06/21 0000   Infiltration Assessment 0 01/06/21 0000   Dressing Status Clean, dry, & intact 01/06/21 0000   Dressing Type Transparent;Tape 01/06/21 0000   Hub Color/Line Status Pink;Flushed;Capped 01/06/21 0000   Action Taken Open ports on tubing capped 01/06/21 0000   Alcohol Cap Used Yes 01/06/21 0000        Opportunity for questions and clarification was provided. Initial RN admission and assessment performed and documented in Endoscopy navigator. Patient evaluated by anesthesia in pre-procedure holding. All procedural vital signs, airway assessment, and level of consciousness information monitored and recorded by anesthesia staff on the anesthesia record. Report received from CRNA post procedure. Patient transported to recovery area by RN. Endoscope was pre-cleaned at bedside immediately following procedure by Maylin Crystal RN.    [id number]: Upon arrival, patient with small BM, patient cleaned and diaper removed. Patient appears to have diaper rash, patient's bottom left open to air.
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
## 2021-01-06 NOTE — INTERDISCIPLINARY ROUNDS
Multidisciplinary rounds were held Wednesday, January 6, 2021. Today's plan/goal includes (but not limited to): PEG placement

## 2021-01-06 NOTE — PROGRESS NOTES
Physical Therapy    Reviewed chart attempted to treat pt x2. Initially pt in was in the process for transfer from CCU to 1. On follow up pt is off the floor in endo. Will defer at this time and continue to follow.

## 2021-01-07 LAB
ALBUMIN SERPL-MCNC: 2.6 G/DL (ref 3.5–5)
ALBUMIN/GLOB SERPL: 0.6 {RATIO} (ref 1.1–2.2)
ALP SERPL-CCNC: 111 U/L (ref 45–117)
ALT SERPL-CCNC: 17 U/L (ref 12–78)
ANION GAP SERPL CALC-SCNC: 8 MMOL/L (ref 5–15)
AST SERPL-CCNC: 19 U/L (ref 15–37)
BASOPHILS # BLD: 0.2 K/UL (ref 0–0.1)
BASOPHILS NFR BLD: 2 % (ref 0–1)
BILIRUB SERPL-MCNC: 0.2 MG/DL (ref 0.2–1)
BUN SERPL-MCNC: 44 MG/DL (ref 6–20)
BUN/CREAT SERPL: 28 (ref 12–20)
CALCIUM SERPL-MCNC: 8.9 MG/DL (ref 8.5–10.1)
CHLORIDE SERPL-SCNC: 115 MMOL/L (ref 97–108)
CO2 SERPL-SCNC: 23 MMOL/L (ref 21–32)
CREAT SERPL-MCNC: 1.58 MG/DL (ref 0.7–1.3)
DIFFERENTIAL METHOD BLD: ABNORMAL
EOSINOPHIL # BLD: 0.4 K/UL (ref 0–0.4)
EOSINOPHIL NFR BLD: 5 % (ref 0–7)
ERYTHROCYTE [DISTWIDTH] IN BLOOD BY AUTOMATED COUNT: 12.7 % (ref 11.5–14.5)
GLOBULIN SER CALC-MCNC: 4.5 G/DL (ref 2–4)
GLUCOSE SERPL-MCNC: 163 MG/DL (ref 65–100)
HCT VFR BLD AUTO: 32.3 % (ref 36.6–50.3)
HGB BLD-MCNC: 10.7 G/DL (ref 12.1–17)
IMM GRANULOCYTES # BLD AUTO: 0 K/UL (ref 0–0.04)
IMM GRANULOCYTES NFR BLD AUTO: 0 % (ref 0–0.5)
LYMPHOCYTES # BLD: 1.3 K/UL (ref 0.8–3.5)
LYMPHOCYTES NFR BLD: 16 % (ref 12–49)
MCH RBC QN AUTO: 32.1 PG (ref 26–34)
MCHC RBC AUTO-ENTMCNC: 33.1 G/DL (ref 30–36.5)
MCV RBC AUTO: 97 FL (ref 80–99)
MONOCYTES # BLD: 1.2 K/UL (ref 0–1)
MONOCYTES NFR BLD: 14 % (ref 5–13)
NEUTS SEG # BLD: 5.2 K/UL (ref 1.8–8)
NEUTS SEG NFR BLD: 63 % (ref 32–75)
NRBC # BLD: 0 K/UL (ref 0–0.01)
NRBC BLD-RTO: 0 PER 100 WBC
PLATELET # BLD AUTO: 305 K/UL (ref 150–400)
PMV BLD AUTO: 9.7 FL (ref 8.9–12.9)
POTASSIUM SERPL-SCNC: 3.5 MMOL/L (ref 3.5–5.1)
PROT SERPL-MCNC: 7.1 G/DL (ref 6.4–8.2)
RBC # BLD AUTO: 3.33 M/UL (ref 4.1–5.7)
SODIUM SERPL-SCNC: 146 MMOL/L (ref 136–145)
WBC # BLD AUTO: 8.3 K/UL (ref 4.1–11.1)

## 2021-01-07 PROCEDURE — 65270000029 HC RM PRIVATE

## 2021-01-07 PROCEDURE — 36415 COLL VENOUS BLD VENIPUNCTURE: CPT

## 2021-01-07 PROCEDURE — 80053 COMPREHEN METABOLIC PANEL: CPT

## 2021-01-07 PROCEDURE — 74011250637 HC RX REV CODE- 250/637: Performed by: ANESTHESIOLOGY

## 2021-01-07 PROCEDURE — 74011250636 HC RX REV CODE- 250/636: Performed by: EMERGENCY MEDICINE

## 2021-01-07 PROCEDURE — 85025 COMPLETE CBC W/AUTO DIFF WBC: CPT

## 2021-01-07 PROCEDURE — 74011250637 HC RX REV CODE- 250/637: Performed by: FAMILY MEDICINE

## 2021-01-07 PROCEDURE — 97530 THERAPEUTIC ACTIVITIES: CPT

## 2021-01-07 PROCEDURE — 77010033678 HC OXYGEN DAILY

## 2021-01-07 PROCEDURE — 97116 GAIT TRAINING THERAPY: CPT

## 2021-01-07 RX ORDER — GUAIFENESIN 100 MG/5ML
81 LIQUID (ML) ORAL DAILY
Status: DISCONTINUED | OUTPATIENT
Start: 2021-01-08 | End: 2021-01-13 | Stop reason: HOSPADM

## 2021-01-07 RX ORDER — METOPROLOL TARTRATE 25 MG/1
6.25 TABLET, FILM COATED ORAL EVERY 12 HOURS
Status: DISCONTINUED | OUTPATIENT
Start: 2021-01-07 | End: 2021-01-12

## 2021-01-07 RX ORDER — CHOLECALCIFEROL (VITAMIN D3) 10(400)/ML
30 DROPS ORAL DAILY
Status: DISCONTINUED | OUTPATIENT
Start: 2021-01-08 | End: 2021-01-13 | Stop reason: HOSPADM

## 2021-01-07 RX ORDER — QUETIAPINE FUMARATE 25 MG/1
50 TABLET, FILM COATED ORAL
Status: DISCONTINUED | OUTPATIENT
Start: 2021-01-07 | End: 2021-01-13 | Stop reason: HOSPADM

## 2021-01-07 RX ADMIN — METOPROLOL TARTRATE 6.25 MG: 25 TABLET, FILM COATED ORAL at 21:08

## 2021-01-07 RX ADMIN — Medication 10 ML: at 21:09

## 2021-01-07 RX ADMIN — METOPROLOL TARTRATE 6.25 MG: 25 TABLET, FILM COATED ORAL at 10:25

## 2021-01-07 RX ADMIN — PANTOPRAZOLE SODIUM 40 MG: 40 TABLET, DELAYED RELEASE ORAL at 07:36

## 2021-01-07 RX ADMIN — Medication 30 ML: at 10:26

## 2021-01-07 RX ADMIN — POTASSIUM BICARBONATE 40 MEQ: 782 TABLET, EFFERVESCENT ORAL at 19:24

## 2021-01-07 RX ADMIN — Medication 10 ML: at 12:58

## 2021-01-07 RX ADMIN — CHLORHEXIDINE GLUCONATE 15 ML: 0.12 RINSE ORAL at 21:08

## 2021-01-07 RX ADMIN — OXYCODONE HYDROCHLORIDE AND ACETAMINOPHEN 500 MG: 500 TABLET ORAL at 10:25

## 2021-01-07 RX ADMIN — ASPIRIN 81 MG: 81 TABLET, CHEWABLE ORAL at 10:25

## 2021-01-07 RX ADMIN — ENOXAPARIN SODIUM 40 MG: 40 INJECTION SUBCUTANEOUS at 07:36

## 2021-01-07 RX ADMIN — Medication 1 SPRAY: at 10:28

## 2021-01-07 RX ADMIN — Medication 10 ML: at 12:59

## 2021-01-07 RX ADMIN — POTASSIUM BICARBONATE 40 MEQ: 782 TABLET, EFFERVESCENT ORAL at 10:25

## 2021-01-07 RX ADMIN — CHLORHEXIDINE GLUCONATE 15 ML: 0.12 RINSE ORAL at 10:27

## 2021-01-07 RX ADMIN — Medication 30 MCG: at 12:45

## 2021-01-07 RX ADMIN — Medication 10 ML: at 07:36

## 2021-01-07 RX ADMIN — QUETIAPINE FUMARATE 50 MG: 25 TABLET ORAL at 21:04

## 2021-01-07 NOTE — PROGRESS NOTES
Nancy Armstrong, Caryn Oseguera, and Hilario Martins Date: 12/11/2020      Subjective:     Patient awake and alert today. Having lots of diarrhea. Tube feeds running.  ..       Current Facility-Administered Medications   Medication Dose Route Frequency    sodium chloride (NS) flush 5-40 mL  5-40 mL IntraVENous Q8H    sodium chloride (NS) flush 5-40 mL  5-40 mL IntraVENous PRN    artificial saliva (MOUTH KOTE) 1 Spray  1 Spray Oral PRN    metoprolol tartrate (LOPRESSOR) tablet 6.25 mg  6.25 mg Oral Q12H    QUEtiapine (SEROquel) tablet 50 mg  50 mg Oral QHS    aspirin chewable tablet 81 mg  81 mg Oral DAILY    pantoprazole (PROTONIX) tablet 40 mg  40 mg Oral ACB    multivit-folic acid-herbal 395 (WELLESSE PLUS) oral liquid 30 mL  30 mL Oral DAILY    ascorbic acid (vitamin C) (VITAMIN C) tablet 500 mg  500 mg Oral BID    potassium bicarb-citric acid (EFFER-K) tablet 40 mEq  40 mEq Oral BID    chlorhexidine (ORAL CARE KIT) 0.12 % mouthwash 15 mL  15 mL Oral Q12H    cholecalciferol (vitamin D3) 10 mcg/mL (400 unit/mL) oral liquid 30 mcg  30 mcg Oral DAILY    [Held by provider] amLODIPine (NORVASC) tablet 10 mg  10 mg Oral DAILY    enoxaparin (LOVENOX) injection 40 mg  40 mg SubCUTAneous Q24H    ELECTROLYTE REPLACEMENT PROTOCOL - Potassium and Magnesium  1 Each Other PRN    sodium chloride (NS) flush 5-40 mL  5-40 mL IntraVENous Q8H    sodium chloride (NS) flush 5-40 mL  5-40 mL IntraVENous PRN    potassium chloride 10 mEq in 100 ml IVPB  10 mEq IntraVENous PRN    acetaminophen (TYLENOL) tablet 650 mg  650 mg Oral Q6H PRN    Or    acetaminophen (TYLENOL) suppository 650 mg  650 mg Rectal Q6H PRN    polyethylene glycol (MIRALAX) packet 17 g  17 g Oral DAILY PRN    influenza vaccine 2020-21 (6 mos+)(PF) (FLUARIX/FLULAVAL/FLUZONE QUAD) injection 0.5 mL  0.5 mL IntraMUSCular PRIOR TO DISCHARGE          Objective:     Patient Vitals for the past 8 hrs:   BP Temp Pulse Resp SpO2   01/07/21 0343 (!) 144/83 97.5 °F (36.4 °C) (!) 106 18 97 %     No intake/output data recorded. 01/05 1901 - 01/07 0700  In: 100   Out: 56 [Urine:385]    Physical Exam: Lungs: clear to auscultation bilaterally  Heart: regular rate and rhythm, S1, S2 normal, no murmur, click, rub or gallop  Abdomen: soft, non-tender. Bowel sounds normal. No masses,  no organomegaly        Data Review   Recent Results (from the past 24 hour(s))   CBC WITH AUTOMATED DIFF    Collection Time: 01/07/21  3:51 AM   Result Value Ref Range    WBC 8.3 4.1 - 11.1 K/uL    RBC 3.33 (L) 4.10 - 5.70 M/uL    HGB 10.7 (L) 12.1 - 17.0 g/dL    HCT 32.3 (L) 36.6 - 50.3 %    MCV 97.0 80.0 - 99.0 FL    MCH 32.1 26.0 - 34.0 PG    MCHC 33.1 30.0 - 36.5 g/dL    RDW 12.7 11.5 - 14.5 %    PLATELET 572 736 - 954 K/uL    MPV 9.7 8.9 - 12.9 FL    NRBC 0.0 0  WBC    ABSOLUTE NRBC 0.00 0.00 - 0.01 K/uL    NEUTROPHILS 63 32 - 75 %    LYMPHOCYTES 16 12 - 49 %    MONOCYTES 14 (H) 5 - 13 %    EOSINOPHILS 5 0 - 7 %    BASOPHILS 2 (H) 0 - 1 %    IMMATURE GRANULOCYTES 0 0.0 - 0.5 %    ABS. NEUTROPHILS 5.2 1.8 - 8.0 K/UL    ABS. LYMPHOCYTES 1.3 0.8 - 3.5 K/UL    ABS. MONOCYTES 1.2 (H) 0.0 - 1.0 K/UL    ABS. EOSINOPHILS 0.4 0.0 - 0.4 K/UL    ABS. BASOPHILS 0.2 (H) 0.0 - 0.1 K/UL    ABS. IMM.  GRANS. 0.0 0.00 - 0.04 K/UL    DF AUTOMATED             Assessment:     Principal Problem:    AMS (altered mental status) (12/11/2020)    Active Problems:    Essential hypertension (11/21/2015)      Excessive drinking of alcohol (12/11/2020)      UTI (urinary tract infection) (12/12/2020)      Hypokalemia (12/12/2020)      Thrombocytopenia ([address] (12/12/2020)      Anemia (12/12/2020)      Alcohol withdrawal ([address] (12/21/2020)      Acute respiratory failure with hypoxia (HCC) ()      Alcoholism (HCC) ()      Goals of care, counseling/discussion ()      Severe protein-calorie malnutrition ([facility] (1/5/2021)      Oropharyngeal dysphagia ()      Dry mouth ()        Plan:     1) WBC normal today  2) Suspect osmotic diarrhea from tube feeds  Dietary to decrease osmolarity of TF to try to decrease diarrhea  3) PT

## 2021-01-07 NOTE — PROGRESS NOTES
ID Progress Note  2021       vanco   -    Alicia    -     Subjective:     Pt was sound asleep when I enter the room. Briefly opened his eyes during exam then fall back to sleep. Wife at bedside. Pt's spouse mentioned about pt having loose stools but it is not any worse than usual. No fever, chills, nausea, vomiting. Objective:     Vitals:   Visit Vitals  BP (!) 156/79 (BP 1 Location: Left arm, BP Patient Position: At rest)   Pulse 90   Temp 98.6 °F (37 °C)   Resp 18   Ht 5' 11\" (1.803 m)   Wt 74.5 kg (164 lb 3.9 oz)   SpO2 92%   BMI 22.91 kg/m²        Tmax:  Temp (24hrs), Av.8 °F (36.6 °C), Min:97.5 °F (36.4 °C), Max:98.6 °F (37 °C)      Exam:    Alert, briefly opened eyes during exam but fall back to sleep. RRR without RMG  Bilateral breath sounds clear, PEG tube in place, no depenedent pitting edema. Labs:   Lab Results   Component Value Date/Time    WBC 8.3 2021 03:51 AM    HGB (POC) 12.8 2017 10:27 AM    HGB 10.7 (L) 2021 03:51 AM    HCT (POC) 38.8 2017 10:27 AM    HCT 32.3 (L) 2021 03:51 AM    PLATELET 839  03:51 AM    MCV 97.0 2021 03:51 AM     Lab Results   Component Value Date/Time    Sodium 146 (H) 2021 03:51 AM    Potassium 3.5 2021 03:51 AM    Chloride 115 (H) 2021 03:51 AM    CO2 23 2021 03:51 AM    Anion gap 8 2021 03:51 AM    Glucose 163 (H) 2021 03:51 AM    BUN 44 (H) 2021 03:51 AM    Creatinine 1.58 (H) 2021 03:51 AM    BUN/Creatinine ratio 28 (H) 2021 03:51 AM    GFR est AA 52 (L) 2021 03:51 AM    GFR est non-AA 43 (L) 2021 03:51 AM    Calcium 8.9 2021 03:51 AM    Bilirubin, total 0.2 2021 03:51 AM    Alk.  phosphatase 111 2021 03:51 AM    Protein, total 7.1 2021 03:51 AM    Albumin 2.6 (L) 2021 03:51 AM    Globulin 4.5 (H) 2021 03:51 AM    A-G Ratio 0.6 (L) 2021 03:51 AM    ALT (SGPT) 17 2021 03:51 AM Assessment:     #1 fever - resolved      #2 recent delirium tremens     #3 history of melanoma     #4 hypertension    #5 mild renal insufficiency     #6 s/p arrest, possible aspiration                Recommendations:   WBC down to 8.3  Afebrile  No active infectious process. ID team signing off. Please contact us with any questions or concerns.        Elis Fleming, JOSE ARMANDO

## 2021-01-07 NOTE — PROGRESS NOTES
Problem: Mobility Impaired (Adult and Pediatric)  Goal: *Acute Goals and Plan of Care (Insert Text)  Description: FUNCTIONAL STATUS PRIOR TO ADMISSION: Patient was independent and active without use of DME.    HOME SUPPORT PRIOR TO ADMISSION: The patient lived with his wife but did not require assist.    Physical Therapy Goals  Initiated 12/12/2020 and re-evaluated/downgraded 12/16/2020; goals reviewed on 12/26/20 and remain appropriate; Goals remain appropriate 1/4/2021    1. Patient will move from supine to sit and sit to supine , scoot up and down, and roll side to side in bed with minimal assistance/contact guard assist within 7 day(s). 2.  Patient will transfer from bed to chair and chair to bed with minimal assistance/contact guard assist using the least restrictive device within 7 day(s). 3.  Patient will perform sit to stand with minimal assistance/contact guard assist within 7 day(s). 4.  Patient will ambulate with minimal assistance/contact guard assist for 50 feet with the least restrictive device within 7 day(s). Outcome: Progressing Towards Goal   PHYSICAL THERAPY TREATMENT  Patient: Monisha Conroy (27 y.o. male)  Date: 1/7/2021  Diagnosis: AMS (altered mental status) [R41.82] AMS (altered mental status)  Procedure(s) (LRB):  PERCUTANEOUS ENDOSCOPIC GASTROSTOMY TUBE INSERTION :- (Left)  ESOPHAGOGASTRODUODENOSCOPY (EGD) (Left) 1 Day Post-Op  Precautions: Fall  Chart, physical therapy assessment, plan of care and goals were reviewed. ASSESSMENT  Patient continues with skilled PT services and is progressing towards goals. Patient alert and interactive today. Does continue to require constant cues to stay on task. Wife present for session. Noted some improvement with gait but still reliable and will suddenly go from minimal assist to heavy mod assist and needs cues to focus and continue. Per wife, seems less agitated. Continue to feel he needs rehab prior to returning home. Current Level of Function Impacting Discharge (mobility/balance): min to moderate assist for bed, transfers and gait; impaired balance, cognitive status    Other factors to consider for discharge:         PLAN :  Patient continues to benefit from skilled intervention to address the above impairments. Continue treatment per established plan of care. to address goals. Recommendation for discharge: (in order for the patient to meet his/her long term goals)  Therapy 3 hours per day 5-7 days per week    This discharge recommendation:  Has been made in collaboration with the attending provider and/or case management    IF patient discharges home will need the following DME: bedside commode, hospital bed, rolling walker and wheelchair       SUBJECTIVE:   Patient stated I have an alcohol problem. I have to stay away from that now.     OBJECTIVE DATA SUMMARY:   Critical Behavior:  Neurologic State: Confused  Orientation Level: Oriented to person  Cognition: Poor safety awareness  Safety/Judgement: Decreased awareness of environment, Decreased awareness of need for assistance, Decreased awareness of need for safety, Decreased insight into deficits  Functional Mobility Training:  Bed Mobility:     Supine to Sit: Additional time; Moderate assistance;Minimum assistance     Scooting: Moderate assistance        Transfers:  Sit to Stand: Minimum assistance; Moderate assistance;Assist x2  Stand to Sit: Moderate assistance                             Balance:  Sitting: Impaired; Without support  Sitting - Static: Good (unsupported)  Sitting - Dynamic: Fair (occasional)  Standing: Impaired; With support  Standing - Static: Constant support; Fair  Standing - Dynamic : Constant support;Poor  Ambulation/Gait Training:  Distance (ft): 20 Feet (ft)  Assistive Device: Gait belt;Walker, rolling  Ambulation - Level of Assistance: Minimal assistance; Moderate assistance;Assist x2        Gait Abnormalities: Decreased step clearance;Shuffling gait        Base of Support: Narrowed; Center of gravity altered     Speed/Yari: Shuffled  Step Length: Right shortened;Left shortened           Activity Tolerance:   Fair and SpO2 stable on RA    After treatment patient left in no apparent distress:   Sitting in chair, Call bell within reach, Bed / chair alarm activated and Caregiver / family present    COMMUNICATION/COLLABORATION:   The patients plan of care was discussed with: Registered nurse.      Lenin Delgado, PT   Time Calculation: 25 mins

## 2021-01-07 NOTE — CONSULTS
Follow up for diabetes - not fasting  Td Solano LPN  8/92/9266  3:25 AM Nutrition Note    Nutrition Recommendations/Plan:   1. Adjust TF to Osmolite 1.5 - goal 264 mL bolus 5x/day with 100 mL H2O flush before/after each feeding + 1 packs Prosource BID  i. Start with 180 mL at first feeding  ii. Increase to goal 264 mL at each concurrent feeding    2. Consider addition of Probiotic  3. Consider addition of Benefiber if no improvement with above  4. Give liquid Vit D and MVI via PEG  5. Obtain new standing weight      Brief Assessment:  Consult received from MD for diarrhea management with tube feeds - MD suspect osmotic diarrhea from TF. Discussed with RN and reviewed chart. RN states pt was having diarrhea even prior to starting bolus tube feeds yesterday. Limited documentation, but MD notes \"pt with diarrhea last PM\" on 12/23 at which time pt was on regular diet. Per flow sheets, appears was having diarrhea even prior to starting tube feeds altogether. Had FMS from 12/11-12/19 with liquid stool. EN support initially started on 12/14-12/16 (Osmolite 1.5) and then briefly on diet as above, then TF resumed again 12/26 (Jevity 1.5). Pt previously on antibiotics this admission. Noted ordered to check for C. Diff on 12/23, but was cancelled. I visited pt in room, wife present. Pt up in chair. Both report baseline looser stools, wife suspects r/t his drinking. Pt/wife report UBW ~197 lb and aware he has lost some weight since admission. However, if current wt accurate, pt is down >30 lb since admission. Pt does exhibit some obvious muscle wasting/ loss of SQ fat. RN present, discussed obtaining standing weight when pt moved back to bed. Will adjust back to Osmolite 1.5 for now as unsure if insurance would cover specialty formula like Vital (lower osmolality) without trial of alternative standard formula first.  If persists with Osmolite, would then trial addition of Benefiber.       Currently TF goal order is for Jevity 1.5, 264 mL bolus 5x/day with 100 mL H2O before/after each bolus + 1 pack Prosource BID = 1320 mL, 2100 kcal, 114 gm pro, 1000+3127=2505 mL free H2O. Recommend changing to Osmolite 1.5 - same regimen as above for now which provides same as above except slightly less protein. Per RN, pt last received 180 mL bolus of Jevity 1.5, due to increase to 264 mL at next feeding. Discussed starting with 180 mL at first feeding with new formula and increasing from there. ?if would benefit from addition of probiotic. Also noted that pt has not been receiving his oral liquid MVI and Vit D. Recommend resuming this via PEG since unable to safely take PO meds at this time. Please refer to complete RD assessment from 1/5 for further details. Of additional note - pt's Na+ slightly elevated. May be d/t lack of free water when he pulled DHT on 1/3. Monitor for improvements with resumption of TF. RD will continue to follow along closely.      Recent Labs     01/07/21  0351 01/06/21  0324 01/05/21  0255   * 101* 98   BUN 44* 39* 37*   CREA 1.58* 1.31* 1.22   * 141 141   K 3.5 4.0 3.6   * 111* 108   CO2 23 23 25   CA 8.9 9.2 9.3     Estimated Daily Nutrient Needs:  Energy (kcal):  4229-2010 (MSJ x 1.2-1.3)  Protein (g):  101-109 (1.2-1.3g/kg)       Fluid (ml/day):  ~2000 ml      Mayco Michael RD  Available via eMoov  Or Pager# [phone]

## 2021-01-07 NOTE — PROGRESS NOTES
SLP Contact Note    Noted pt with significant diarrhea on this date and therefore will hold PO trials and will follow-up tomorrow.       Thank you,  ALTON JohnsonEd, [address] [facility]  Speech-Language Pathologist

## 2021-01-08 ENCOUNTER — APPOINTMENT (OUTPATIENT)
Dept: GENERAL RADIOLOGY | Age: 76
DRG: 896 | End: 2021-01-08
Attending: FAMILY MEDICINE
Payer: MEDICARE

## 2021-01-08 LAB
ALBUMIN SERPL-MCNC: 2.4 G/DL (ref 3.5–5)
ALBUMIN/GLOB SERPL: 0.5 {RATIO} (ref 1.1–2.2)
ALP SERPL-CCNC: 94 U/L (ref 45–117)
ALT SERPL-CCNC: 13 U/L (ref 12–78)
ANION GAP SERPL CALC-SCNC: 5 MMOL/L (ref 5–15)
ANION GAP SERPL CALC-SCNC: 6 MMOL/L (ref 5–15)
AST SERPL-CCNC: 17 U/L (ref 15–37)
BASOPHILS # BLD: 0.1 K/UL (ref 0–0.1)
BASOPHILS NFR BLD: 2 % (ref 0–1)
BILIRUB SERPL-MCNC: 0.2 MG/DL (ref 0.2–1)
BUN SERPL-MCNC: 46 MG/DL (ref 6–20)
BUN SERPL-MCNC: 47 MG/DL (ref 6–20)
BUN/CREAT SERPL: 29 (ref 12–20)
BUN/CREAT SERPL: 32 (ref 12–20)
CALCIUM SERPL-MCNC: 8.8 MG/DL (ref 8.5–10.1)
CALCIUM SERPL-MCNC: 9 MG/DL (ref 8.5–10.1)
CHLORIDE SERPL-SCNC: 115 MMOL/L (ref 97–108)
CHLORIDE SERPL-SCNC: 115 MMOL/L (ref 97–108)
CO2 SERPL-SCNC: 25 MMOL/L (ref 21–32)
CO2 SERPL-SCNC: 27 MMOL/L (ref 21–32)
CREAT SERPL-MCNC: 1.45 MG/DL (ref 0.7–1.3)
CREAT SERPL-MCNC: 1.6 MG/DL (ref 0.7–1.3)
DIFFERENTIAL METHOD BLD: ABNORMAL
EOSINOPHIL # BLD: 0.9 K/UL (ref 0–0.4)
EOSINOPHIL NFR BLD: 13 % (ref 0–7)
ERYTHROCYTE [DISTWIDTH] IN BLOOD BY AUTOMATED COUNT: 12.8 % (ref 11.5–14.5)
GLOBULIN SER CALC-MCNC: 4.8 G/DL (ref 2–4)
GLUCOSE BLD STRIP.AUTO-MCNC: 156 MG/DL (ref 65–100)
GLUCOSE SERPL-MCNC: 108 MG/DL (ref 65–100)
GLUCOSE SERPL-MCNC: 111 MG/DL (ref 65–100)
HCT VFR BLD AUTO: 29 % (ref 36.6–50.3)
HGB BLD-MCNC: 9.6 G/DL (ref 12.1–17)
IMM GRANULOCYTES # BLD AUTO: 0 K/UL (ref 0–0.04)
IMM GRANULOCYTES NFR BLD AUTO: 0 % (ref 0–0.5)
LYMPHOCYTES # BLD: 1.6 K/UL (ref 0.8–3.5)
LYMPHOCYTES NFR BLD: 22 % (ref 12–49)
MCH RBC QN AUTO: 32.5 PG (ref 26–34)
MCHC RBC AUTO-ENTMCNC: 33.1 G/DL (ref 30–36.5)
MCV RBC AUTO: 98.3 FL (ref 80–99)
MONOCYTES # BLD: 1.1 K/UL (ref 0–1)
MONOCYTES NFR BLD: 15 % (ref 5–13)
NEUTS SEG # BLD: 3.5 K/UL (ref 1.8–8)
NEUTS SEG NFR BLD: 48 % (ref 32–75)
NRBC # BLD: 0 K/UL (ref 0–0.01)
NRBC BLD-RTO: 0 PER 100 WBC
PLATELET # BLD AUTO: 291 K/UL (ref 150–400)
PMV BLD AUTO: 9.7 FL (ref 8.9–12.9)
POTASSIUM SERPL-SCNC: 3.6 MMOL/L (ref 3.5–5.1)
POTASSIUM SERPL-SCNC: 3.6 MMOL/L (ref 3.5–5.1)
PROT SERPL-MCNC: 7.2 G/DL (ref 6.4–8.2)
RBC # BLD AUTO: 2.95 M/UL (ref 4.1–5.7)
SERVICE CMNT-IMP: ABNORMAL
SODIUM SERPL-SCNC: 145 MMOL/L (ref 136–145)
SODIUM SERPL-SCNC: 148 MMOL/L (ref 136–145)
T3REVERSE SERPL-MCNC: 13.6 NG/DL (ref 9.2–24.1)
WBC # BLD AUTO: 7.2 K/UL (ref 4.1–11.1)

## 2021-01-08 PROCEDURE — 92526 ORAL FUNCTION THERAPY: CPT

## 2021-01-08 PROCEDURE — 82962 GLUCOSE BLOOD TEST: CPT

## 2021-01-08 PROCEDURE — 74230 X-RAY XM SWLNG FUNCJ C+: CPT

## 2021-01-08 PROCEDURE — 74011250636 HC RX REV CODE- 250/636: Performed by: EMERGENCY MEDICINE

## 2021-01-08 PROCEDURE — 74011250636 HC RX REV CODE- 250/636: Performed by: FAMILY MEDICINE

## 2021-01-08 PROCEDURE — 74011250637 HC RX REV CODE- 250/637: Performed by: FAMILY MEDICINE

## 2021-01-08 PROCEDURE — 85025 COMPLETE CBC W/AUTO DIFF WBC: CPT

## 2021-01-08 PROCEDURE — 65270000029 HC RM PRIVATE

## 2021-01-08 PROCEDURE — 80053 COMPREHEN METABOLIC PANEL: CPT

## 2021-01-08 PROCEDURE — 92611 MOTION FLUOROSCOPY/SWALLOW: CPT

## 2021-01-08 PROCEDURE — 97530 THERAPEUTIC ACTIVITIES: CPT | Performed by: PHYSICAL THERAPIST

## 2021-01-08 PROCEDURE — 36415 COLL VENOUS BLD VENIPUNCTURE: CPT

## 2021-01-08 PROCEDURE — 74011250637 HC RX REV CODE- 250/637: Performed by: ANESTHESIOLOGY

## 2021-01-08 RX ORDER — AMLODIPINE BESYLATE 5 MG/1
5 TABLET ORAL DAILY
Status: DISCONTINUED | OUTPATIENT
Start: 2021-01-08 | End: 2021-01-12

## 2021-01-08 RX ORDER — SODIUM CHLORIDE 9 MG/ML
200 INJECTION, SOLUTION INTRAVENOUS CONTINUOUS
Status: DISPENSED | OUTPATIENT
Start: 2021-01-08 | End: 2021-01-08

## 2021-01-08 RX ADMIN — METOPROLOL TARTRATE 6.25 MG: 25 TABLET, FILM COATED ORAL at 21:06

## 2021-01-08 RX ADMIN — SODIUM CHLORIDE 200 ML/HR: 9 INJECTION, SOLUTION INTRAVENOUS at 07:14

## 2021-01-08 RX ADMIN — Medication 10 ML: at 07:14

## 2021-01-08 RX ADMIN — ASPIRIN 81 MG: 81 TABLET, CHEWABLE ORAL at 09:50

## 2021-01-08 RX ADMIN — AMLODIPINE BESYLATE 5 MG: 5 TABLET ORAL at 09:50

## 2021-01-08 RX ADMIN — Medication 10 ML: at 14:00

## 2021-01-08 RX ADMIN — QUETIAPINE FUMARATE 50 MG: 25 TABLET ORAL at 21:06

## 2021-01-08 RX ADMIN — Medication 30 ML: at 09:00

## 2021-01-08 RX ADMIN — Medication 10 ML: at 21:07

## 2021-01-08 RX ADMIN — CHLORHEXIDINE GLUCONATE 15 ML: 0.12 RINSE ORAL at 21:00

## 2021-01-08 RX ADMIN — POTASSIUM BICARBONATE 40 MEQ: 782 TABLET, EFFERVESCENT ORAL at 09:50

## 2021-01-08 RX ADMIN — CHLORHEXIDINE GLUCONATE 15 ML: 0.12 RINSE ORAL at 09:51

## 2021-01-08 RX ADMIN — Medication 1 CAPSULE: at 09:50

## 2021-01-08 RX ADMIN — Medication 30 MCG: at 09:00

## 2021-01-08 RX ADMIN — ENOXAPARIN SODIUM 40 MG: 40 INJECTION SUBCUTANEOUS at 07:14

## 2021-01-08 RX ADMIN — METOPROLOL TARTRATE 6.25 MG: 25 TABLET, FILM COATED ORAL at 09:50

## 2021-01-08 NOTE — PROGRESS NOTES
NICOLASA:  Referral pending to [facility] and accepted at [facility]. CM received call from Dr. Mir Butler who wanted CM to check status of [facility]. CM reached out to [facility], pt's wife, phone [phone] and reviewed discharge planning. Their preference is [facility] and then [facility] as back up, pt already accepted to [place]. CM spoke with [facility] [phone] and they will follow pt over weekend and connect with CM on Monday. Will update attending.     BRANNON Martin

## 2021-01-08 NOTE — PROGRESS NOTES
SLP Contact Note    MBS complete. No significant changes, however, pt able to utilize throat clear to clear penetration. Therefore, recommend cautious initiation of pureed diet/nectar-thick liquids with ice chips between meals, with supervision to cue pt to clear throat intermittently. Full note to follow.       Thank you,  ALESSANDRO Easton.Ed, [id number] [facility]  Speech-Language Pathologist

## 2021-01-08 NOTE — PROGRESS NOTES
Problem: Falls - Risk of  Goal: *Absence of Falls  Description: Document Nola Santa Fall Risk and appropriate interventions in the flowsheet. Outcome: Progressing Towards Goal  Note: Fall Risk Interventions:  Mobility Interventions: Communicate number of staff needed for ambulation/transfer    Mentation Interventions: Adequate sleep, hydration, pain control    Medication Interventions: Evaluate medications/consider consulting pharmacy    Elimination Interventions: Bed/chair exit alarm    History of Falls Interventions: Bed/chair exit alarm         Problem: Patient Education: Go to Patient Education Activity  Goal: Patient/Family Education  Outcome: Progressing Towards Goal     Problem: Pressure Injury - Risk of  Goal: *Prevention of pressure injury  Description: Document Naresh Scale and appropriate interventions in the flowsheet.   Outcome: Progressing Towards Goal  Note: Pressure Injury Interventions:  Sensory Interventions: Assess changes in LOC    Moisture Interventions: Absorbent underpads, Apply protective barrier, creams and emollients    Activity Interventions: Increase time out of bed, Pressure redistribution bed/mattress(bed type)    Mobility Interventions: Pressure redistribution bed/mattress (bed type)    Nutrition Interventions: Offer support with meals,snacks and hydration    Friction and Shear Interventions: Apply protective barrier, creams and emollients                Problem: Patient Education: Go to Patient Education Activity  Goal: Patient/Family Education  Outcome: Progressing Towards Goal     Problem: Infection - Risk of, Urinary Catheter-Associated Urinary Tract Infection  Goal: *Absence of infection signs and symptoms  Outcome: Progressing Towards Goal     Problem: Patient Education: Go to Patient Education Activity  Goal: Patient/Family Education  Outcome: Progressing Towards Goal

## 2021-01-08 NOTE — PROGRESS NOTES
SLP Contact Note    SLP treatment complete. Pt looks significantly better at bedside. Therefore, will plan to complete a Modified Barium Swallow Study today to objectively assess safety of swallow physiology.     Thank you,  Carlyle Duverney, M.Ed, [id number] [facility]  Speech-Language Pathologist

## 2021-01-08 NOTE — PROGRESS NOTES
Problem: Falls - Risk of  Goal: *Absence of Falls  Description: Document Joe Tyrone Fall Risk and appropriate interventions in the flowsheet. Outcome: Progressing Towards Goal  Note: Fall Risk Interventions:  Mobility Interventions: Communicate number of staff needed for ambulation/transfer    Mentation Interventions: Adequate sleep, hydration, pain control    Medication Interventions: Evaluate medications/consider consulting pharmacy    Elimination Interventions: Bed/chair exit alarm    History of Falls Interventions: Bed/chair exit alarm         Problem: Patient Education: Go to Patient Education Activity  Goal: Patient/Family Education  Outcome: Progressing Towards Goal     Problem: Pressure Injury - Risk of  Goal: *Prevention of pressure injury  Description: Document Naresh Scale and appropriate interventions in the flowsheet.   Outcome: Progressing Towards Goal  Note: Pressure Injury Interventions:  Sensory Interventions: Assess changes in LOC    Moisture Interventions: Absorbent underpads, Apply protective barrier, creams and emollients    Activity Interventions: Increase time out of bed, Pressure redistribution bed/mattress(bed type)    Mobility Interventions: Pressure redistribution bed/mattress (bed type)    Nutrition Interventions: Offer support with meals,snacks and hydration    Friction and Shear Interventions: Apply protective barrier, creams and emollients                Problem: Patient Education: Go to Patient Education Activity  Goal: Patient/Family Education  Outcome: Progressing Towards Goal

## 2021-01-08 NOTE — PROGRESS NOTES
Problem: Dysphagia (Adult)  Goal: *Acute Goals and Plan of Care (Insert Text)  Description: Speech Therapy Goals  Initiated 12/29/2020    1. Patient will participate in objective imaging of the swallow within 3 days. Continue for MBS 1/8/2021  Outcome: Progressing Towards Goal     SPEECH LANGUAGE PATHOLOGY DYSPHAGIA TREATMENT  Patient: Benitez De La O (89 y.o. male)  Date: 1/8/2021  Diagnosis: AMS (altered mental status) [R41.82] AMS (altered mental status)  Procedure(s) (LRB):  PERCUTANEOUS ENDOSCOPIC GASTROSTOMY TUBE INSERTION :- (Left)  ESOPHAGOGASTRODUODENOSCOPY (EGD) (Left) 2 Days Post-Op  Precautions:  Fall    ASSESSMENT:  Pt with significant improvement in cognition on this date and with only intermittent throat clear with ice chip trials. Therefore, will plan to complete a Modified Barium Swallow Study to objectively assess safety of swallow physiology. Further prognosis of swallow function will be dependent on results of MBS. PLAN:  Recommendations and Planned Interventions:  --MBS today  Patient continues to benefit from skilled intervention to address the above impairments. Continue treatment per established plan of care. Discharge Recommendations: To Be Determined     SUBJECTIVE:   Patient stated, \"I mean, I have so much to do today :laughs:. Pt with improved cognition using sarcasm appropriately.     OBJECTIVE:   Cognitive and Communication Status:  Neurologic State: Alert, Confused  Orientation Level: Oriented to person  Cognition: Poor safety awareness, Impulsive, Impaired decision making  Perception: Appears intact  Perseveration: Perseverates during conversation  Safety/Judgement: Decreased awareness of environment, Decreased awareness of need for assistance, Decreased awareness of need for safety, Decreased insight into deficits  Dysphagia Treatment:     Exercises:  Laryngeal Exercises:                    Effortful Swallow: Yes  Sets : 2  Reps : 10     Pain:  Pain Scale 1: Numeric (0 - 10)  Pain Intensity 1: 0       After treatment:   Call bell within reach and Nursing notified    COMMUNICATION/EDUCATION:     The patient's plan of care including recommendations, planned interventions, and recommended diet changes were discussed with: Registered nurse.      ZENY Duong  Time Calculation: 20 mins
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
## 2021-01-08 NOTE — PROGRESS NOTES
Nutrition Note    Nutrition Recommendations/Plan:   1. Continue TF of Osmolite 1.5 - 264 mL bolus 5x/day  2. Increase flushes to 150 mL H2O pre/post bolus  3. PO diet per SLP for COMFORT, primary nutrition remains via PEG    New TF regimen: Osmolite 1.5, 264 mL 5x/day + 150 mL h2O pre/post bolus + 2 packet Prosource/day to meet 100% est needs. Brief Assessment:  Discussed in rounds today and with SLP. MBS today - no significant changes, but pt able to utilize throat clear to clear penetration. Recommends cautious initiation of purees/ nectar thick liquids. Suspected dehydration, MD requests increased free water via tube. Unlikely pt will be able to improve fluid intake PO with thickened liquids. Currently receiving 100 mL H2O before/after each bolus 5x/day = 1000 mL, recommend increasing to 150 mL pre/post bolus for total 1500 mL additional free water/day. Flushes + formula provides total 2500 mL free H2O. Noted improvements in diarrhea per nursing. Tolerating tube feeds at goal.  See additional RD notes from this past week for more details.     Recent Labs     01/08/21  0345 01/07/21  0351 01/06/21  0324   *  111* 163* 101*   BUN 47*  46* 44* 39*   CREA 1.60*  1.45* 1.58* 1.31*     148* 146* 141   K 3.6  3.6 3.5 4.0   *  115* 115* 111*   CO2 25  27 23 23   CA 8.8  9.0 8.9 9.2     Estimated Daily Nutrient Needs:  Energy (kcal):  8872-8453 (MSJ x 1.2-1.3)  Protein (g):  101-109 (1.2-1.3g/kg)       Fluid (ml/day):  ~2000 ml      Valerie Toro RD  Available via [employer]  Or Pager# [phone]

## 2021-01-08 NOTE — PROGRESS NOTES
Problem: Mobility Impaired (Adult and Pediatric)  Goal: *Acute Goals and Plan of Care (Insert Text)  Description: FUNCTIONAL STATUS PRIOR TO ADMISSION: Patient was independent and active without use of DME.    HOME SUPPORT PRIOR TO ADMISSION: The patient lived with his wife but did not require assist.    Physical Therapy Goals  Initiated 12/12/2020 and re-evaluated/downgraded 12/16/2020; goals reviewed on 12/26/20 and remain appropriate; Goals remain appropriate 1/4/2021    1. Patient will move from supine to sit and sit to supine , scoot up and down, and roll side to side in bed with minimal assistance/contact guard assist within 7 day(s). 2.  Patient will transfer from bed to chair and chair to bed with minimal assistance/contact guard assist using the least restrictive device within 7 day(s). 3.  Patient will perform sit to stand with minimal assistance/contact guard assist within 7 day(s). 4.  Patient will ambulate with minimal assistance/contact guard assist for 50 feet with the least restrictive device within 7 day(s). Outcome: Progressing Towards Goal   PHYSICAL THERAPY TREATMENT  Patient: Boris Michael (7 y.o. male)  Date: 1/8/2021  Diagnosis: AMS (altered mental status) [R41.82] AMS (altered mental status)  Procedure(s) (LRB):  PERCUTANEOUS ENDOSCOPIC GASTROSTOMY TUBE INSERTION :- (Left)  ESOPHAGOGASTRODUODENOSCOPY (EGD) (Left) 2 Days Post-Op  Precautions: Fall  Chart, physical therapy assessment, plan of care and goals were reviewed. ASSESSMENT  Patient continues with skilled PT services and is progressing towards goals. Patient overall limited by confusion, gait instability, poor balance, and decreased activity tolerance. Overall needing Carmencita to come to EOB with HOB elevated. Sit to stand with Carmencita but immediate posterior LOB needing modA to recover. Able to take a few steps to bedside chair with modA x 2 and continued posterior LOB. Patient will likely need rehab prior to DC home. Will continue to follow. Other factors to consider for discharge: at risk for falls, confused, poor balance and below functional baseline         PLAN :  Patient continues to benefit from skilled intervention to address the above impairments. Continue treatment per established plan of care. to address goals. Recommendation for discharge: (in order for the patient to meet his/her long term goals)  Therapy 3 hours per day 5-7 days per week        IF patient discharges home will need the following DME: to be determined (TBD)       SUBJECTIVE:   Patient stated I really like to listen to music.     OBJECTIVE DATA SUMMARY:   Critical Behavior:  Neurologic State: Alert, Confused  Orientation Level: Oriented to person  Cognition: Poor safety awareness, Impulsive, Impaired decision making  Safety/Judgement: Decreased awareness of environment, Decreased awareness of need for assistance, Decreased awareness of need for safety, Decreased insight into deficits  Functional Mobility Training:  Bed Mobility:  Rolling: Minimum assistance;Assist x1;Bed Modified  Supine to Sit: Minimum assistance; Additional time;Assist x1;Bed Modified     Scooting: Minimum assistance; Additional time;Assist x1        Transfers:  Sit to Stand: Minimum assistance;Assist x2(posterior LOB)  Stand to Sit: Minimum assistance;Assist x2                             Balance:  Sitting: Impaired  Sitting - Static: Good (unsupported)  Sitting - Dynamic: Fair (occasional)  Standing: Impaired  Standing - Static: Constant support; Fair  Standing - Dynamic : Constant support;Poor  Ambulation/Gait Training:  Distance (ft): 5 Feet (ft)(bed to chair)  Assistive Device: Gait belt;Walker, rolling  Ambulation - Level of Assistance: Moderate assistance;Assist x2(major posterior lean)        Gait Abnormalities: Decreased step clearance;Shuffling gait        Base of Support: Widened     Speed/Yari: Pace decreased (<100 feet/min); Shuffled; Slow  Step Length: Left shortened;Right shortened       Pain Rating:  No c/o pain    Activity Tolerance:   Fair and requires rest breaks    After treatment patient left in no apparent distress:   Sitting in chair, Call bell within reach, and Caregiver / family present    COMMUNICATION/COLLABORATION:   The patients plan of care was discussed with: Physical therapist, Occupational therapist, and Registered nurse.      Uday Mars, PT, DPT   Time Calculation: 13 mins

## 2021-01-08 NOTE — PROGRESS NOTES
Nancy Patton, Diaz Ngo, Caryn, and Tico Groves Date: 12/11/2020      Subjective:     Mental status much better yesterday. Worked better with PT. Slept well last night. His Cr still up- likely mildly dehydrated  .        Current Facility-Administered Medications   Medication Dose Route Frequency    amLODIPine (NORVASC) tablet 5 mg  5 mg Per G Tube DAILY    0.9% sodium chloride infusion  200 mL/hr IntraVENous CONTINUOUS    lactobac ac& pc-s.therm-b.anim (JUSTO Q/RISAQUAD)  1 Cap PEG Tube DAILY    multivit-folic acid-herbal 411 (WELLESSE PLUS) oral liquid 30 mL  30 mL Per G Tube DAILY    cholecalciferol (vitamin D3) 10 mcg/mL (400 unit/mL) oral liquid 30 mcg  30 mcg Per G Tube DAILY    metoprolol tartrate (LOPRESSOR) tablet 6.25 mg  6.25 mg Per G Tube Q12H    QUEtiapine (SEROquel) tablet 50 mg  50 mg Per G Tube QHS    aspirin chewable tablet 81 mg  81 mg Per G Tube DAILY    sodium chloride (NS) flush 5-40 mL  5-40 mL IntraVENous Q8H    sodium chloride (NS) flush 5-40 mL  5-40 mL IntraVENous PRN    artificial saliva (MOUTH KOTE) 1 Spray  1 Spray Oral PRN    potassium bicarb-citric acid (EFFER-K) tablet 40 mEq  40 mEq Oral BID    chlorhexidine (ORAL CARE KIT) 0.12 % mouthwash 15 mL  15 mL Oral Q12H    [Held by provider] amLODIPine (NORVASC) tablet 10 mg  10 mg Oral DAILY    enoxaparin (LOVENOX) injection 40 mg  40 mg SubCUTAneous Q24H    ELECTROLYTE REPLACEMENT PROTOCOL - Potassium and Magnesium  1 Each Other PRN    sodium chloride (NS) flush 5-40 mL  5-40 mL IntraVENous Q8H    sodium chloride (NS) flush 5-40 mL  5-40 mL IntraVENous PRN    potassium chloride 10 mEq in 100 ml IVPB  10 mEq IntraVENous PRN    acetaminophen (TYLENOL) tablet 650 mg  650 mg Oral Q6H PRN    Or    acetaminophen (TYLENOL) suppository 650 mg  650 mg Rectal Q6H PRN    polyethylene glycol (MIRALAX) packet 17 g  17 g Oral DAILY PRN    influenza vaccine 2020-21 (6 mos+)(PF) (FLUARIX/FLULAVAL/FLUZONE QUAD) injection 0.5 mL  0.5 mL IntraMUSCular PRIOR TO DISCHARGE          Objective:     Patient Vitals for the past 8 hrs:   BP Temp Pulse Resp SpO2   01/08/21 0413 (!) 161/91 97.9 °F (36.6 °C) 91 17 96 %     No intake/output data recorded. 01/06 1901 - 01/08 0700  In: 860   Out: 150 [Urine:150]    Physical Exam: Lungs: clear to auscultation bilaterally  Heart: regular rate and rhythm, S1, S2 normal, no murmur, click, rub or gallop  Abdomen: soft, non-tender. Bowel sounds normal. No masses,  no organomegaly        Data Review   Recent Results (from the past 24 hour(s))   CBC WITH AUTOMATED DIFF    Collection Time: 01/08/21  3:45 AM   Result Value Ref Range    WBC 7.2 4.1 - 11.1 K/uL    RBC 2.95 (L) 4.10 - 5.70 M/uL    HGB 9.6 (L) 12.1 - 17.0 g/dL    HCT 29.0 (L) 36.6 - 50.3 %    MCV 98.3 80.0 - 99.0 FL    MCH 32.5 26.0 - 34.0 PG    MCHC 33.1 30.0 - 36.5 g/dL    RDW 12.8 11.5 - 14.5 %    PLATELET 034 875 - 313 K/uL    MPV 9.7 8.9 - 12.9 FL    NRBC 0.0 0  WBC    ABSOLUTE NRBC 0.00 0.00 - 0.01 K/uL    NEUTROPHILS 48 32 - 75 %    LYMPHOCYTES 22 12 - 49 %    MONOCYTES 15 (H) 5 - 13 %    EOSINOPHILS 13 (H) 0 - 7 %    BASOPHILS 2 (H) 0 - 1 %    IMMATURE GRANULOCYTES 0 0.0 - 0.5 %    ABS. NEUTROPHILS 3.5 1.8 - 8.0 K/UL    ABS. LYMPHOCYTES 1.6 0.8 - 3.5 K/UL    ABS. MONOCYTES 1.1 (H) 0.0 - 1.0 K/UL    ABS. EOSINOPHILS 0.9 (H) 0.0 - 0.4 K/UL    ABS. BASOPHILS 0.1 0.0 - 0.1 K/UL    ABS. IMM.  GRANS. 0.0 0.00 - 0.04 K/UL    DF AUTOMATED     METABOLIC PANEL, COMPREHENSIVE    Collection Time: 01/08/21  3:45 AM   Result Value Ref Range    Sodium 148 (H) 136 - 145 mmol/L    Potassium 3.6 3.5 - 5.1 mmol/L    Chloride 115 (H) 97 - 108 mmol/L    CO2 27 21 - 32 mmol/L    Anion gap 6 5 - 15 mmol/L    Glucose 111 (H) 65 - 100 mg/dL    BUN 46 (H) 6 - 20 MG/DL    Creatinine 1.45 (H) 0.70 - 1.30 MG/DL    BUN/Creatinine ratio 32 (H) 12 - 20      GFR est AA 58 (L) >60 ml/min/1.73m2    GFR est non-AA 47 (L) >60 ml/min/1.73m2    Calcium 9.0 8.5 - 10.1 MG/DL    Bilirubin, total 0.2 0.2 - 1.0 MG/DL    ALT (SGPT) 13 12 - 78 U/L    AST (SGOT) 17 15 - 37 U/L    Alk.  phosphatase 94 45 - 117 U/L    Protein, total 7.2 6.4 - 8.2 g/dL    Albumin 2.4 (L) 3.5 - 5.0 g/dL    Globulin 4.8 (H) 2.0 - 4.0 g/dL    A-G Ratio 0.5 (L) 1.1 - 2.2     METABOLIC PANEL, BASIC    Collection Time: 01/08/21  3:45 AM   Result Value Ref Range    Sodium 145 136 - 145 mmol/L    Potassium 3.6 3.5 - 5.1 mmol/L    Chloride 115 (H) 97 - 108 mmol/L    CO2 25 21 - 32 mmol/L    Anion gap 5 5 - 15 mmol/L    Glucose 108 (H) 65 - 100 mg/dL    BUN 47 (H) 6 - 20 MG/DL    Creatinine 1.60 (H) 0.70 - 1.30 MG/DL    BUN/Creatinine ratio 29 (H) 12 - 20      GFR est AA 51 (L) >60 ml/min/1.73m2    GFR est non-AA 42 (L) >60 ml/min/1.73m2    Calcium 8.8 8.5 - 10.1 MG/DL           Assessment:     Principal Problem:    AMS (altered mental status) (12/11/2020)    Active Problems:    Essential hypertension (11/21/2015)      Excessive drinking of alcohol (12/11/2020)      UTI (urinary tract infection) (12/12/2020)      Hypokalemia (12/12/2020)      Thrombocytopenia (HCC) (12/12/2020)      Anemia (12/12/2020)      Alcohol withdrawal (HCC) (12/21/2020)      Acute respiratory failure with hypoxia (HCC) ()      Alcoholism (HCC) ()      Goals of care, counseling/discussion ()      Severe protein-calorie malnutrition (HCC) (1/5/2021)      Oropharyngeal dysphagia ()      Dry mouth ()        Plan:     1) Will give 1 liter NS today to try to catch up on his fluid status  2) Increase his water per PEG feedings  3) Cont PT  4) If does well mental status wise today- consider d/cing maldonado  5) rehab

## 2021-01-09 LAB
ANION GAP SERPL CALC-SCNC: 2 MMOL/L (ref 5–15)
BASOPHILS # BLD: 0.1 K/UL (ref 0–0.1)
BASOPHILS NFR BLD: 2 % (ref 0–1)
BUN SERPL-MCNC: 35 MG/DL (ref 6–20)
BUN/CREAT SERPL: 28 (ref 12–20)
CALCIUM SERPL-MCNC: 8.5 MG/DL (ref 8.5–10.1)
CHLORIDE SERPL-SCNC: 109 MMOL/L (ref 97–108)
CO2 SERPL-SCNC: 29 MMOL/L (ref 21–32)
CREAT SERPL-MCNC: 1.24 MG/DL (ref 0.7–1.3)
DIFFERENTIAL METHOD BLD: ABNORMAL
EOSINOPHIL # BLD: 1.2 K/UL (ref 0–0.4)
EOSINOPHIL NFR BLD: 16 % (ref 0–7)
ERYTHROCYTE [DISTWIDTH] IN BLOOD BY AUTOMATED COUNT: 13 % (ref 11.5–14.5)
GLUCOSE BLD STRIP.AUTO-MCNC: 105 MG/DL (ref 65–100)
GLUCOSE BLD STRIP.AUTO-MCNC: 97 MG/DL (ref 65–100)
GLUCOSE SERPL-MCNC: 143 MG/DL (ref 65–100)
HCT VFR BLD AUTO: 26.6 % (ref 36.6–50.3)
HGB BLD-MCNC: 8.8 G/DL (ref 12.1–17)
IMM GRANULOCYTES # BLD AUTO: 0 K/UL (ref 0–0.04)
IMM GRANULOCYTES NFR BLD AUTO: 0 % (ref 0–0.5)
LYMPHOCYTES # BLD: 1.9 K/UL (ref 0.8–3.5)
LYMPHOCYTES NFR BLD: 25 % (ref 12–49)
MCH RBC QN AUTO: 32.5 PG (ref 26–34)
MCHC RBC AUTO-ENTMCNC: 33.1 G/DL (ref 30–36.5)
MCV RBC AUTO: 98.2 FL (ref 80–99)
MONOCYTES # BLD: 0.7 K/UL (ref 0–1)
MONOCYTES NFR BLD: 9 % (ref 5–13)
NEUTS SEG # BLD: 3.5 K/UL (ref 1.8–8)
NEUTS SEG NFR BLD: 48 % (ref 32–75)
NRBC # BLD: 0 K/UL (ref 0–0.01)
NRBC BLD-RTO: 0 PER 100 WBC
PLATELET # BLD AUTO: 264 K/UL (ref 150–400)
PMV BLD AUTO: 9.4 FL (ref 8.9–12.9)
POTASSIUM SERPL-SCNC: 3.6 MMOL/L (ref 3.5–5.1)
RBC # BLD AUTO: 2.71 M/UL (ref 4.1–5.7)
RBC MORPH BLD: ABNORMAL
SERVICE CMNT-IMP: ABNORMAL
SERVICE CMNT-IMP: NORMAL
SODIUM SERPL-SCNC: 140 MMOL/L (ref 136–145)
WBC # BLD AUTO: 7.4 K/UL (ref 4.1–11.1)

## 2021-01-09 PROCEDURE — 80048 BASIC METABOLIC PNL TOTAL CA: CPT

## 2021-01-09 PROCEDURE — 82962 GLUCOSE BLOOD TEST: CPT

## 2021-01-09 PROCEDURE — 36415 COLL VENOUS BLD VENIPUNCTURE: CPT

## 2021-01-09 PROCEDURE — 74011250637 HC RX REV CODE- 250/637: Performed by: FAMILY MEDICINE

## 2021-01-09 PROCEDURE — 74011250636 HC RX REV CODE- 250/636: Performed by: EMERGENCY MEDICINE

## 2021-01-09 PROCEDURE — 65270000029 HC RM PRIVATE

## 2021-01-09 PROCEDURE — 85025 COMPLETE CBC W/AUTO DIFF WBC: CPT

## 2021-01-09 PROCEDURE — 74011250637 HC RX REV CODE- 250/637: Performed by: ANESTHESIOLOGY

## 2021-01-09 RX ADMIN — Medication 5 ML: at 14:00

## 2021-01-09 RX ADMIN — Medication 10 ML: at 21:45

## 2021-01-09 RX ADMIN — AMLODIPINE BESYLATE 5 MG: 5 TABLET ORAL at 10:00

## 2021-01-09 RX ADMIN — QUETIAPINE FUMARATE 50 MG: 25 TABLET ORAL at 21:44

## 2021-01-09 RX ADMIN — ENOXAPARIN SODIUM 40 MG: 40 INJECTION SUBCUTANEOUS at 09:22

## 2021-01-09 RX ADMIN — POTASSIUM BICARBONATE 40 MEQ: 782 TABLET, EFFERVESCENT ORAL at 19:16

## 2021-01-09 RX ADMIN — METOPROLOL TARTRATE 6.25 MG: 25 TABLET, FILM COATED ORAL at 10:00

## 2021-01-09 RX ADMIN — ASPIRIN 81 MG: 81 TABLET, CHEWABLE ORAL at 10:00

## 2021-01-09 RX ADMIN — Medication 10 ML: at 05:52

## 2021-01-09 RX ADMIN — CHLORHEXIDINE GLUCONATE 15 ML: 0.12 RINSE ORAL at 10:10

## 2021-01-09 RX ADMIN — Medication 1 SPRAY: at 10:07

## 2021-01-09 RX ADMIN — METOPROLOL TARTRATE 6.25 MG: 25 TABLET, FILM COATED ORAL at 21:44

## 2021-01-09 RX ADMIN — POTASSIUM BICARBONATE 40 MEQ: 782 TABLET, EFFERVESCENT ORAL at 10:10

## 2021-01-09 RX ADMIN — CHLORHEXIDINE GLUCONATE 15 ML: 0.12 RINSE ORAL at 21:00

## 2021-01-09 RX ADMIN — Medication 1 CAPSULE: at 10:00

## 2021-01-09 NOTE — PROGRESS NOTES
[facility] Incoming Shift Nursing Note        INCOMING SHIFT REPORT:    Verbal and/or Written report received from ESTRADA Raya by Nolan Fermin RN on Hiro Burnett a 76 y.o. male who was admitted on 12/11/2020 12:24 PM and currently admitted to unit. Code Status: Full Code     Admit Diagnosis: AMS (altered mental status) [R41.82]     Surgery: Procedure(s):  PERCUTANEOUS ENDOSCOPIC GASTROSTOMY TUBE INSERTION :-  ESOPHAGOGASTRODUODENOSCOPY (EGD)     Infections: No current active infections     Allergies: Ace inhibitors and Thiazides     Current diet: DIET DYSPHAGIA PUREED (NDD1)  2 [place]/2 Mildly Thick  DIET TUBE FEEDING Increase flushes to 150 mL pre/post bolus. Osmolite 1.5-  264ml bolus 5x/day (~700, 1000, 1300, 1600, 1900) + 150 mL flush pre/post bolus + 2 pkts prosource daily Make sure to keep HOB >30 degrees while getting bolus feeds     Lines:   Peripheral IV 01/03/21 Distal;Left;Posterior Forearm (Active)   Site Assessment Clean, dry, & intact 01/08/21 0109   Phlebitis Assessment 0 01/08/21 0109   Infiltration Assessment 0 01/08/21 0109   Dressing Status Clean, dry, & intact 01/08/21 0109   Dressing Type Transparent 01/08/21 0109   Hub Color/Line Status Blue;Flushed;Patent 01/08/21 0109   Action Taken Open ports on tubing capped 01/08/21 0109   Alcohol Cap Used Yes 01/08/21 0109          Report consisted of the following information SBAR, Kardex and MAR and the information was reviewed with the reporting nurse. Opportunity for questions and clarifications were provided. Patient's bed locked and in low position, side rails up x 2, door open PRN, call bell within patient's reach and patient is not in distress. A complete nursing assessment will be/has been  completed by the receiving nurse.       Nolan Fermin RN, BSN, [facility]    1/8/2021 at 7:35 PM

## 2021-01-09 NOTE — PROGRESS NOTES
Problem: Falls - Risk of  Goal: *Absence of Falls  Description: Document [place] Fall Risk and appropriate interventions in the flowsheet.   Outcome: Progressing Towards Goal  Note: Fall Risk Interventions:  Mobility Interventions: Communicate number of staff needed for ambulation/transfer    Mentation Interventions: Adequate sleep, hydration, pain control    Medication Interventions: Evaluate medications/consider consulting pharmacy    Elimination Interventions: Bed/chair exit alarm    History of Falls Interventions: Bed/chair exit alarm

## 2021-01-09 NOTE — PROGRESS NOTES
Problem: Falls - Risk of  Goal: *Absence of Falls  Description: Document Lexy Minicheri Fall Risk and appropriate interventions in the flowsheet.   Outcome: Progressing Towards Goal  Note: Fall Risk Interventions:  Mobility Interventions: Communicate number of staff needed for ambulation/transfer    Mentation Interventions: Adequate sleep, hydration, pain control    Medication Interventions: Evaluate medications/consider consulting pharmacy    Elimination Interventions: Bed/chair exit alarm    History of Falls Interventions: Bed/chair exit alarm         Problem: Alcohol Withdrawal  Goal: *STG: Participates in treatment plan  Outcome: Progressing Towards Goal

## 2021-01-09 NOTE — PROGRESS NOTES
Nery Trimble, & Marcello    Admit Date: 12/11/2020    Subjective:     Pt without c/o's. Current Facility-Administered Medications   Medication Dose Route Frequency    amLODIPine (NORVASC) tablet 5 mg  5 mg Per G Tube DAILY    lactobac ac& pc-s.therm-b.anim (JUSTO Q/RISAQUAD)  1 Cap PEG Tube DAILY    multivit-folic acid-herbal 412 (WELLESSE PLUS) oral liquid 30 mL  30 mL Per G Tube DAILY    cholecalciferol (vitamin D3) 10 mcg/mL (400 unit/mL) oral liquid 30 mcg  30 mcg Per G Tube DAILY    metoprolol tartrate (LOPRESSOR) tablet 6.25 mg  6.25 mg Per G Tube Q12H    QUEtiapine (SEROquel) tablet 50 mg  50 mg Per G Tube QHS    aspirin chewable tablet 81 mg  81 mg Per G Tube DAILY    sodium chloride (NS) flush 5-40 mL  5-40 mL IntraVENous Q8H    sodium chloride (NS) flush 5-40 mL  5-40 mL IntraVENous PRN    artificial saliva (MOUTH KOTE) 1 Spray  1 Spray Oral PRN    potassium bicarb-citric acid (EFFER-K) tablet 40 mEq  40 mEq Oral BID    chlorhexidine (ORAL CARE KIT) 0.12 % mouthwash 15 mL  15 mL Oral Q12H    enoxaparin (LOVENOX) injection 40 mg  40 mg SubCUTAneous Q24H    ELECTROLYTE REPLACEMENT PROTOCOL - Potassium and Magnesium  1 Each Other PRN    sodium chloride (NS) flush 5-40 mL  5-40 mL IntraVENous Q8H    sodium chloride (NS) flush 5-40 mL  5-40 mL IntraVENous PRN    potassium chloride 10 mEq in 100 ml IVPB  10 mEq IntraVENous PRN    acetaminophen (TYLENOL) tablet 650 mg  650 mg Oral Q6H PRN    Or    acetaminophen (TYLENOL) suppository 650 mg  650 mg Rectal Q6H PRN    polyethylene glycol (MIRALAX) packet 17 g  17 g Oral DAILY PRN    influenza vaccine 2020-21 (6 mos+)(PF) (FLUARIX/FLULAVAL/FLUZONE QUAD) injection 0.5 mL  0.5 mL IntraMUSCular PRIOR TO DISCHARGE          Objective:     Patient Vitals for the past 8 hrs:   BP Temp Pulse Resp SpO2   01/09/21 0832 (P) 131/62 (P) 97.7 °F (36.5 °C) (P) 82 (P) 18 (P) 94 %     No intake/output data recorded.   01/07 1901 - 01/09 0700  In: 1584 [P.O.:170; I.V.:1000]  Out: 475 [Urine:475]    Physical Exam: NAD. Alert. Neck -- Supple. No JVD. Heart -- RRR. Lungs -- Grossly CTA. Abd -- Benign. Ext -- No LE edema, b/l. Data Review   Recent Results (from the past 24 hour(s))   GLUCOSE, POC    Collection Time: 01/08/21 10:13 PM   Result Value Ref Range    Glucose (POC) 156 (H) 65 - 100 mg/dL    Performed by [employer]bler[employer] Pillow Float    CBC WITH AUTOMATED DIFF    Collection Time: 01/09/21 12:47 AM   Result Value Ref Range    WBC 7.4 4.1 - 11.1 K/uL    RBC 2.71 (L) 4.10 - 5.70 M/uL    HGB 8.8 (L) 12.1 - 17.0 g/dL    HCT 26.6 (L) 36.6 - 50.3 %    MCV 98.2 80.0 - 99.0 FL    MCH 32.5 26.0 - 34.0 PG    MCHC 33.1 30.0 - 36.5 g/dL    RDW 13.0 11.5 - 14.5 %    PLATELET 857 522 - 610 K/uL    MPV 9.4 8.9 - 12.9 FL    NRBC 0.0 0  WBC    ABSOLUTE NRBC 0.00 0.00 - 0.01 K/uL    NEUTROPHILS 48 32 - 75 %    LYMPHOCYTES 25 12 - 49 %    MONOCYTES 9 5 - 13 %    EOSINOPHILS 16 (H) 0 - 7 %    BASOPHILS 2 (H) 0 - 1 %    IMMATURE GRANULOCYTES 0 0.0 - 0.5 %    ABS. NEUTROPHILS 3.5 1.8 - 8.0 K/UL    ABS. LYMPHOCYTES 1.9 0.8 - 3.5 K/UL    ABS. MONOCYTES 0.7 0.0 - 1.0 K/UL    ABS. EOSINOPHILS 1.2 (H) 0.0 - 0.4 K/UL    ABS. BASOPHILS 0.1 0.0 - 0.1 K/UL    ABS. IMM. GRANS. 0.0 0.00 - 0.04 K/UL    DF SMEAR SCANNED      RBC COMMENTS MACROCYTOSIS  1+       GLUCOSE, POC    Collection Time: 01/09/21  6:51 AM   Result Value Ref Range    Glucose (POC) 105 (H) 65 - 100 mg/dL    Performed by Stef Antoine            Assessment:     Principal Problem:    AMS (altered mental status) -- Largely due to ETOH abuse & w/d. Active Problems:    Essential hypertension (11/21/2015)      Alcohol withdrawal ([facility] [address] (12/21/2020)      Acute respiratory failure with hypoxia (HCC) ()      Oropharyngeal dysphagia -- PEG in now.         Plan: 1. He continues to recover. Cont PT, etc.  2. Getting TF's. 3. Voiding trial.  4. Dispo -- Pending.           Igor Almodovar MD

## 2021-01-10 LAB
ANION GAP SERPL CALC-SCNC: 5 MMOL/L (ref 5–15)
BASOPHILS # BLD: 0.1 K/UL (ref 0–0.1)
BASOPHILS NFR BLD: 1 % (ref 0–1)
BUN SERPL-MCNC: 31 MG/DL (ref 6–20)
BUN/CREAT SERPL: 27 (ref 12–20)
CALCIUM SERPL-MCNC: 8.5 MG/DL (ref 8.5–10.1)
CHLORIDE SERPL-SCNC: 108 MMOL/L (ref 97–108)
CO2 SERPL-SCNC: 27 MMOL/L (ref 21–32)
CREAT SERPL-MCNC: 1.15 MG/DL (ref 0.7–1.3)
DIFFERENTIAL METHOD BLD: ABNORMAL
EOSINOPHIL # BLD: 1.2 K/UL (ref 0–0.4)
EOSINOPHIL NFR BLD: 12 % (ref 0–7)
ERYTHROCYTE [DISTWIDTH] IN BLOOD BY AUTOMATED COUNT: 12.8 % (ref 11.5–14.5)
GLUCOSE BLD STRIP.AUTO-MCNC: 99 MG/DL (ref 65–100)
GLUCOSE SERPL-MCNC: 94 MG/DL (ref 65–100)
HCT VFR BLD AUTO: 27.8 % (ref 36.6–50.3)
HGB BLD-MCNC: 9.2 G/DL (ref 12.1–17)
IMM GRANULOCYTES # BLD AUTO: 0.1 K/UL (ref 0–0.04)
IMM GRANULOCYTES NFR BLD AUTO: 1 % (ref 0–0.5)
LYMPHOCYTES # BLD: 1.3 K/UL (ref 0.8–3.5)
LYMPHOCYTES NFR BLD: 13 % (ref 12–49)
MCH RBC QN AUTO: 32.6 PG (ref 26–34)
MCHC RBC AUTO-ENTMCNC: 33.1 G/DL (ref 30–36.5)
MCV RBC AUTO: 98.6 FL (ref 80–99)
MONOCYTES # BLD: 0.8 K/UL (ref 0–1)
MONOCYTES NFR BLD: 8 % (ref 5–13)
NEUTS SEG # BLD: 6.7 K/UL (ref 1.8–8)
NEUTS SEG NFR BLD: 65 % (ref 32–75)
NRBC # BLD: 0 K/UL (ref 0–0.01)
NRBC BLD-RTO: 0 PER 100 WBC
PLATELET # BLD AUTO: 294 K/UL (ref 150–400)
PMV BLD AUTO: 9.5 FL (ref 8.9–12.9)
POTASSIUM SERPL-SCNC: 4.1 MMOL/L (ref 3.5–5.1)
RBC # BLD AUTO: 2.82 M/UL (ref 4.1–5.7)
RBC MORPH BLD: ABNORMAL
SERVICE CMNT-IMP: NORMAL
SODIUM SERPL-SCNC: 140 MMOL/L (ref 136–145)
WBC # BLD AUTO: 10.2 K/UL (ref 4.1–11.1)

## 2021-01-10 PROCEDURE — 80048 BASIC METABOLIC PNL TOTAL CA: CPT

## 2021-01-10 PROCEDURE — 36415 COLL VENOUS BLD VENIPUNCTURE: CPT

## 2021-01-10 PROCEDURE — 74011250637 HC RX REV CODE- 250/637: Performed by: FAMILY MEDICINE

## 2021-01-10 PROCEDURE — 65270000029 HC RM PRIVATE

## 2021-01-10 PROCEDURE — 85025 COMPLETE CBC W/AUTO DIFF WBC: CPT

## 2021-01-10 PROCEDURE — 82962 GLUCOSE BLOOD TEST: CPT

## 2021-01-10 PROCEDURE — 74011250636 HC RX REV CODE- 250/636: Performed by: EMERGENCY MEDICINE

## 2021-01-10 PROCEDURE — 74011250636 HC RX REV CODE- 250/636: Performed by: INTERNAL MEDICINE

## 2021-01-10 PROCEDURE — 74011250637 HC RX REV CODE- 250/637: Performed by: ANESTHESIOLOGY

## 2021-01-10 RX ORDER — HALOPERIDOL 5 MG/ML
1-2 INJECTION INTRAMUSCULAR
Status: DISCONTINUED | OUTPATIENT
Start: 2021-01-10 | End: 2021-01-13 | Stop reason: HOSPADM

## 2021-01-10 RX ADMIN — POTASSIUM BICARBONATE 40 MEQ: 782 TABLET, EFFERVESCENT ORAL at 18:57

## 2021-01-10 RX ADMIN — Medication 10 ML: at 22:05

## 2021-01-10 RX ADMIN — Medication 10 ML: at 05:59

## 2021-01-10 RX ADMIN — POTASSIUM BICARBONATE 40 MEQ: 782 TABLET, EFFERVESCENT ORAL at 09:25

## 2021-01-10 RX ADMIN — Medication 30 MCG: at 09:28

## 2021-01-10 RX ADMIN — QUETIAPINE FUMARATE 50 MG: 25 TABLET ORAL at 22:04

## 2021-01-10 RX ADMIN — ASPIRIN 81 MG: 81 TABLET, CHEWABLE ORAL at 09:24

## 2021-01-10 RX ADMIN — Medication 1 CAPSULE: at 09:25

## 2021-01-10 RX ADMIN — HALOPERIDOL LACTATE 2 MG: 5 INJECTION, SOLUTION INTRAMUSCULAR at 16:06

## 2021-01-10 RX ADMIN — CHLORHEXIDINE GLUCONATE 15 ML: 0.12 RINSE ORAL at 22:04

## 2021-01-10 RX ADMIN — ENOXAPARIN SODIUM 40 MG: 40 INJECTION SUBCUTANEOUS at 07:03

## 2021-01-10 RX ADMIN — METOPROLOL TARTRATE 6.25 MG: 25 TABLET, FILM COATED ORAL at 22:03

## 2021-01-10 RX ADMIN — AMLODIPINE BESYLATE 5 MG: 5 TABLET ORAL at 09:24

## 2021-01-10 RX ADMIN — Medication 30 ML: at 09:28

## 2021-01-10 RX ADMIN — CHLORHEXIDINE GLUCONATE 15 ML: 0.12 RINSE ORAL at 09:25

## 2021-01-10 RX ADMIN — Medication 10 ML: at 16:06

## 2021-01-10 RX ADMIN — METOPROLOL TARTRATE 6.25 MG: 25 TABLET, FILM COATED ORAL at 09:24

## 2021-01-10 RX ADMIN — Medication 1 SPRAY: at 22:04

## 2021-01-10 NOTE — PROGRESS NOTES
Problem: Falls - Risk of  Goal: *Absence of Falls  Description: Document [place] Fall Risk and appropriate interventions in the flowsheet.   Outcome: Progressing Towards Goal  Note: Fall Risk Interventions:  Mobility Interventions: Communicate number of staff needed for ambulation/transfer    Mentation Interventions: Adequate sleep, hydration, pain control    Medication Interventions: Evaluate medications/consider consulting pharmacy    Elimination Interventions: Bed/chair exit alarm    History of Falls Interventions: Bed/chair exit alarm

## 2021-01-10 NOTE — PROGRESS NOTES
Nery Savage, & Marcello    Admit Date: 12/11/2020    Subjective:     Pt agitated this afternoon -- Just given dose of IV Haldol. August out, and voiding trial went OK yesterday.         Current Facility-Administered Medications   Medication Dose Route Frequency    haloperidol lactate (HALDOL) injection 1-2 mg  1-2 mg IntraMUSCular QID PRN    amLODIPine (NORVASC) tablet 5 mg  5 mg Per G Tube DAILY    lactobac ac& pc-s.therm-b.anim (JUSTO Q/RISAQUAD)  1 Cap PEG Tube DAILY    multivit-folic acid-herbal 848 (WELLESSE PLUS) oral liquid 30 mL  30 mL Per G Tube DAILY    cholecalciferol (vitamin D3) 10 mcg/mL (400 unit/mL) oral liquid 30 mcg  30 mcg Per G Tube DAILY    metoprolol tartrate (LOPRESSOR) tablet 6.25 mg  6.25 mg Per G Tube Q12H    QUEtiapine (SEROquel) tablet 50 mg  50 mg Per G Tube QHS    aspirin chewable tablet 81 mg  81 mg Per G Tube DAILY    sodium chloride (NS) flush 5-40 mL  5-40 mL IntraVENous Q8H    sodium chloride (NS) flush 5-40 mL  5-40 mL IntraVENous PRN    artificial saliva (MOUTH KOTE) 1 Spray  1 Spray Oral PRN    potassium bicarb-citric acid (EFFER-K) tablet 40 mEq  40 mEq Oral BID    chlorhexidine (ORAL CARE KIT) 0.12 % mouthwash 15 mL  15 mL Oral Q12H    enoxaparin (LOVENOX) injection 40 mg  40 mg SubCUTAneous Q24H    ELECTROLYTE REPLACEMENT PROTOCOL - Potassium and Magnesium  1 Each Other PRN    sodium chloride (NS) flush 5-40 mL  5-40 mL IntraVENous Q8H    sodium chloride (NS) flush 5-40 mL  5-40 mL IntraVENous PRN    potassium chloride 10 mEq in 100 ml IVPB  10 mEq IntraVENous PRN    acetaminophen (TYLENOL) tablet 650 mg  650 mg Oral Q6H PRN    Or    acetaminophen (TYLENOL) suppository 650 mg  650 mg Rectal Q6H PRN    polyethylene glycol (MIRALAX) packet 17 g  17 g Oral DAILY PRN    influenza vaccine 2020-21 (6 mos+)(PF) (FLUARIX/FLULAVAL/FLUZONE QUAD) injection 0.5 mL  0.5 mL IntraMUSCular PRIOR TO DISCHARGE          Objective:     Patient Vitals for the past 8 hrs:   BP Temp Pulse Resp SpO2   01/10/21 1449 (!) 147/74 98.7 °F (37.1 °C) 100 18 98 %     01/10 0701 - 01/10 1900  In: 400   Out: 0   01/08 1901 - 01/10 0700  In: 2008 [P.O.:220; I.V.:1000]  Out: 925 [Urine:925]    Physical Exam: NAD. Alert. Neck -- Supple. No JVD. Heart -- RRR. Lungs -- Grossly CTA. Abd -- Benign. Ext -- No LE edema, b/l. Data Review   Recent Results (from the past 24 hour(s))   GLUCOSE, POC    Collection Time: 01/09/21  9:06 PM   Result Value Ref Range    Glucose (POC) 97 65 - 100 mg/dL    Performed by Karissa Wei    CBC WITH AUTOMATED DIFF    Collection Time: 01/10/21  1:14 AM   Result Value Ref Range    WBC 10.2 4.1 - 11.1 K/uL    RBC 2.82 (L) 4.10 - 5.70 M/uL    HGB 9.2 (L) 12.1 - 17.0 g/dL    HCT 27.8 (L) 36.6 - 50.3 %    MCV 98.6 80.0 - 99.0 FL    MCH 32.6 26.0 - 34.0 PG    MCHC 33.1 30.0 - 36.5 g/dL    RDW 12.8 11.5 - 14.5 %    PLATELET 986 940 - 387 K/uL    MPV 9.5 8.9 - 12.9 FL    NRBC 0.0 0  WBC    ABSOLUTE NRBC 0.00 0.00 - 0.01 K/uL    NEUTROPHILS 65 32 - 75 %    LYMPHOCYTES 13 12 - 49 %    MONOCYTES 8 5 - 13 %    EOSINOPHILS 12 (H) 0 - 7 %    BASOPHILS 1 0 - 1 %    IMMATURE GRANULOCYTES 1 (H) 0.0 - 0.5 %    ABS. NEUTROPHILS 6.7 1.8 - 8.0 K/UL    ABS. LYMPHOCYTES 1.3 0.8 - 3.5 K/UL    ABS. MONOCYTES 0.8 0.0 - 1.0 K/UL    ABS. EOSINOPHILS 1.2 (H) 0.0 - 0.4 K/UL    ABS. BASOPHILS 0.1 0.0 - 0.1 K/UL    ABS. IMM.  GRANS. 0.1 (H) 0.00 - 0.04 K/UL    DF SMEAR SCANNED      RBC COMMENTS MACROCYTOSIS  1+       METABOLIC PANEL, BASIC    Collection Time: 01/10/21  1:14 AM   Result Value Ref Range    Sodium 140 136 - 145 mmol/L    Potassium 4.1 3.5 - 5.1 mmol/L    Chloride 108 97 - 108 mmol/L    CO2 27 21 - 32 mmol/L    Anion gap 5 5 - 15 mmol/L    Glucose 94 65 - 100 mg/dL    BUN 31 (H) 6 - 20 MG/DL    Creatinine 1. 15 0.70 - 1.30 MG/DL    BUN/Creatinine ratio 27 (H) 12 - 20      GFR est AA >60 >60 ml/min/1.73m2    GFR est non-AA >60 >60 ml/min/1.73m2    Calcium 8.5 8.5 - 10.1 MG/DL   GLUCOSE, POC    Collection Time: 01/10/21  6:37 AM   Result Value Ref Range    Glucose (POC) 99 65 - 100 mg/dL    Performed by Silvano Jones  PCT            Assessment:     Principal Problem:    AMS (altered mental status) -- Largely due to ETOH abuse & w/d. He is not currently actively withdrawing as it is too far out. ... Active Problems:    Essential hypertension (11/21/2015)      Alcohol withdrawal ([facility] [address] (12/21/2020)      Acute respiratory failure with hypoxia (HCC) ()      Oropharyngeal dysphagia -- PEG in now. Plan:     1. He continues to recover. Cont PT, etc.  2. Getting TF's. 3. PRN Haldol. 4. Check ammonia & thiamine level tomorrow. 5. Dispo -- Pending. I levt  yesterday & today for wife, Summer.         Mia Agosto MD

## 2021-01-11 LAB
ALBUMIN SERPL-MCNC: 2.5 G/DL (ref 3.5–5)
ALBUMIN/GLOB SERPL: 0.5 {RATIO} (ref 1.1–2.2)
ALP SERPL-CCNC: 118 U/L (ref 45–117)
ALT SERPL-CCNC: 19 U/L (ref 12–78)
AMMONIA PLAS-SCNC: 30 UMOL/L
ANION GAP SERPL CALC-SCNC: 7 MMOL/L (ref 5–15)
AST SERPL-CCNC: 33 U/L (ref 15–37)
BILIRUB SERPL-MCNC: 0.3 MG/DL (ref 0.2–1)
BUN SERPL-MCNC: 23 MG/DL (ref 6–20)
BUN/CREAT SERPL: 22 (ref 12–20)
CALCIUM SERPL-MCNC: 8.6 MG/DL (ref 8.5–10.1)
CHLORIDE SERPL-SCNC: 107 MMOL/L (ref 97–108)
CO2 SERPL-SCNC: 24 MMOL/L (ref 21–32)
CREAT SERPL-MCNC: 1.06 MG/DL (ref 0.7–1.3)
GLOBULIN SER CALC-MCNC: 4.8 G/DL (ref 2–4)
GLUCOSE SERPL-MCNC: 97 MG/DL (ref 65–100)
POTASSIUM SERPL-SCNC: 4.2 MMOL/L (ref 3.5–5.1)
PROT SERPL-MCNC: 7.3 G/DL (ref 6.4–8.2)
SODIUM SERPL-SCNC: 138 MMOL/L (ref 136–145)

## 2021-01-11 PROCEDURE — 36415 COLL VENOUS BLD VENIPUNCTURE: CPT

## 2021-01-11 PROCEDURE — 80053 COMPREHEN METABOLIC PANEL: CPT

## 2021-01-11 PROCEDURE — 87635 SARS-COV-2 COVID-19 AMP PRB: CPT

## 2021-01-11 PROCEDURE — 65270000029 HC RM PRIVATE

## 2021-01-11 PROCEDURE — 97530 THERAPEUTIC ACTIVITIES: CPT | Performed by: OCCUPATIONAL THERAPIST

## 2021-01-11 PROCEDURE — 74011250637 HC RX REV CODE- 250/637: Performed by: FAMILY MEDICINE

## 2021-01-11 PROCEDURE — 74011250637 HC RX REV CODE- 250/637: Performed by: ANESTHESIOLOGY

## 2021-01-11 PROCEDURE — 82140 ASSAY OF AMMONIA: CPT

## 2021-01-11 PROCEDURE — 97535 SELF CARE MNGMENT TRAINING: CPT | Performed by: OCCUPATIONAL THERAPIST

## 2021-01-11 PROCEDURE — 92526 ORAL FUNCTION THERAPY: CPT

## 2021-01-11 PROCEDURE — 84425 ASSAY OF VITAMIN B-1: CPT

## 2021-01-11 PROCEDURE — 74011250636 HC RX REV CODE- 250/636: Performed by: EMERGENCY MEDICINE

## 2021-01-11 PROCEDURE — 97116 GAIT TRAINING THERAPY: CPT

## 2021-01-11 RX ADMIN — Medication 30 MCG: at 09:16

## 2021-01-11 RX ADMIN — ENOXAPARIN SODIUM 40 MG: 40 INJECTION SUBCUTANEOUS at 08:42

## 2021-01-11 RX ADMIN — CHLORHEXIDINE GLUCONATE 15 ML: 0.12 RINSE ORAL at 08:48

## 2021-01-11 RX ADMIN — POTASSIUM BICARBONATE 40 MEQ: 782 TABLET, EFFERVESCENT ORAL at 08:42

## 2021-01-11 RX ADMIN — Medication 10 ML: at 14:00

## 2021-01-11 RX ADMIN — QUETIAPINE FUMARATE 50 MG: 25 TABLET ORAL at 22:11

## 2021-01-11 RX ADMIN — POTASSIUM BICARBONATE 40 MEQ: 782 TABLET, EFFERVESCENT ORAL at 19:07

## 2021-01-11 RX ADMIN — METOPROLOL TARTRATE 6.25 MG: 25 TABLET, FILM COATED ORAL at 22:11

## 2021-01-11 RX ADMIN — Medication 10 ML: at 06:55

## 2021-01-11 RX ADMIN — Medication 1 CAPSULE: at 08:43

## 2021-01-11 RX ADMIN — Medication 30 ML: at 09:16

## 2021-01-11 RX ADMIN — ASPIRIN 81 MG: 81 TABLET, CHEWABLE ORAL at 08:43

## 2021-01-11 NOTE — PROGRESS NOTES
Problem: Mobility Impaired (Adult and Pediatric)  Goal: *Acute Goals and Plan of Care (Insert Text)  Description: FUNCTIONAL STATUS PRIOR TO ADMISSION: Patient was independent and active without use of DME.    HOME SUPPORT PRIOR TO ADMISSION: The patient lived with his wife but did not require assist.    Physical Therapy Goals  Initiated 12/12/2020 and re-evaluated/downgraded 12/16/2020; goals reviewed on 12/26/20 and remain appropriate; Goals remain appropriate 1/4/2021    1. Patient will move from supine to sit and sit to supine , scoot up and down, and roll side to side in bed with minimal assistance/contact guard assist within 7 day(s). 2.  Patient will transfer from bed to chair and chair to bed with minimal assistance/contact guard assist using the least restrictive device within 7 day(s). 3.  Patient will perform sit to stand with minimal assistance/contact guard assist within 7 day(s). 4.  Patient will ambulate with minimal assistance/contact guard assist for 50 feet with the least restrictive device within 7 day(s). Outcome: Progressing Towards Goal   PHYSICAL THERAPY TREATMENT  Patient: Hiro Burnett (70 y.o. male)  Date: 1/11/2021  Diagnosis: AMS (altered mental status) [R41.82] AMS (altered mental status)  Procedure(s) (LRB):  PERCUTANEOUS ENDOSCOPIC GASTROSTOMY TUBE INSERTION :- (Left)  ESOPHAGOGASTRODUODENOSCOPY (EGD) (Left) 5 Days Post-Op  Precautions: Fall  Chart, physical therapy assessment, plan of care and goals were reviewed. ASSESSMENT  Patient continues with skilled PT services and is slowly progressing towards goals. Pt received sleeping soundly but with verbal stimulation pt awakened and very agreeable to participate with therapy. Pt did well with bed mobility and came to sit EOB with good balance with only light assistance. He stood and ambulated with a rolling walker with 2 person assistance for increased safety.   Pt has mild posterior trunk sway however no episodes of major loss of balance this session. Pt tends to runs the walker into obstacles on his left side and requires assistance for walker management to avoid. Pt remained up in chair at session end. Current Level of Function Impacting Discharge (mobility/balance): bed mobility minimal assist x 1, sit to stand minimal assist x 2, ambulation with rolling walker x 25 feet with minimal assist x 2    Other factors to consider for discharge: fall risk, below his baseline level          PLAN :  Patient continues to benefit from skilled intervention to address the above impairments. Continue treatment per established plan of care. to address goals. Recommendation for discharge: (in order for the patient to meet his/her long term goals)  Therapy 3 hours per day 5-7 days per week, if denied then recommend SNF    This discharge recommendation:  Has not yet been discussed the attending provider and/or case management    IF patient discharges home will need the following DME: to be determined (TBD)       SUBJECTIVE:   Patient very pleasant and agreeable to participate with therapy. OBJECTIVE DATA SUMMARY:   Critical Behavior:  Neurologic State: Alert, Lethargic initially but alert after sitting EOB  Orientation Level: Oriented to person, Oriented to place, Disoriented to situation  Cognition: Decreased command following, Appropriate for age attention/concentration, Poor safety awareness  Safety/Judgement: Decreased insight into deficits  Functional Mobility Training:  Bed Mobility:     Supine to Sit: Minimum assistance;Assist x1     Scooting: Contact guard assistance;Assist x1        Transfers:  Sit to Stand: Minimum assistance;Assist x2; Additional time; Adaptive equipment  Stand to Sit: Minimum assistance;Assist x1;Additional time; Adaptive equipment                             Balance:  Sitting: Intact  Standing: Impaired  Standing - Static: Constant support; Fair  Standing - Dynamic : Constant support; Fair  Ambulation/Gait Training:  Distance (ft): 25 Feet (ft)  Assistive Device: Walker, rolling;Gait belt  Ambulation - Level of Assistance: Minimal assistance;Assist x2        Gait Abnormalities: Trunk sway increased;Decreased step clearance(tends to run walker into obstacles on left side, ? neglect/visual deficit)              Speed/Yari: Pace decreased (<100 feet/min)  Step Length: Left shortened;Right shortened                Pain Rating:  none    Activity Tolerance:   Fair    After treatment patient left in no apparent distress:   Sitting in chair, Call bell within reach, Bed / chair alarm activated, and Caregiver / family present    COMMUNICATION/COLLABORATION:   The patients plan of care was discussed with: Registered nurse.      Gabriela Nunez   Time Calculation: 18 mins

## 2021-01-11 NOTE — PROGRESS NOTES
[facility] vs SNF, pending acceptance   RUR-26% Moderate    [place] is following patient for possible admission/[facility] [phone]. Patient has been accepted to [facility] [phone]. Last COVID test rapid result on 1/5/21. CM to monitor.      Shira Mora MS

## 2021-01-11 NOTE — PROGRESS NOTES
Nancy Raygoza, Nichol Villaseñor, Caryn, and Caitlin Owen Date: 12/11/2020      Subjective:     Patient writhing around in bed this morning but quickly and easily directable. Much better hydrated now.         Current Facility-Administered Medications   Medication Dose Route Frequency    haloperidol lactate (HALDOL) injection 1-2 mg  1-2 mg IntraMUSCular QID PRN    amLODIPine (NORVASC) tablet 5 mg  5 mg Per G Tube DAILY    lactobac ac& pc-s.therm-b.anim (JUSTO Q/RISAQUAD)  1 Cap PEG Tube DAILY    multivit-folic acid-herbal 031 (WELLESSE PLUS) oral liquid 30 mL  30 mL Per G Tube DAILY    cholecalciferol (vitamin D3) 10 mcg/mL (400 unit/mL) oral liquid 30 mcg  30 mcg Per G Tube DAILY    metoprolol tartrate (LOPRESSOR) tablet 6.25 mg  6.25 mg Per G Tube Q12H    QUEtiapine (SEROquel) tablet 50 mg  50 mg Per G Tube QHS    aspirin chewable tablet 81 mg  81 mg Per G Tube DAILY    sodium chloride (NS) flush 5-40 mL  5-40 mL IntraVENous Q8H    sodium chloride (NS) flush 5-40 mL  5-40 mL IntraVENous PRN    artificial saliva (MOUTH KOTE) 1 Spray  1 Spray Oral PRN    potassium bicarb-citric acid (EFFER-K) tablet 40 mEq  40 mEq Oral BID    chlorhexidine (ORAL CARE KIT) 0.12 % mouthwash 15 mL  15 mL Oral Q12H    enoxaparin (LOVENOX) injection 40 mg  40 mg SubCUTAneous Q24H    ELECTROLYTE REPLACEMENT PROTOCOL - Potassium and Magnesium  1 Each Other PRN    sodium chloride (NS) flush 5-40 mL  5-40 mL IntraVENous Q8H    sodium chloride (NS) flush 5-40 mL  5-40 mL IntraVENous PRN    potassium chloride 10 mEq in 100 ml IVPB  10 mEq IntraVENous PRN    acetaminophen (TYLENOL) tablet 650 mg  650 mg Oral Q6H PRN    Or    acetaminophen (TYLENOL) suppository 650 mg  650 mg Rectal Q6H PRN    polyethylene glycol (MIRALAX) packet 17 g  17 g Oral DAILY PRN    influenza vaccine 2020-21 (6 mos+)(PF) (FLUARIX/FLULAVAL/FLUZONE QUAD) injection 0.5 mL  0.5 mL IntraMUSCular PRIOR TO DISCHARGE          Objective:     Patient Vitals for the past 8 hrs:   BP Temp Pulse Resp SpO2   01/11/21 0202 115/63 98.5 °F (36.9 °C) 85 18 95 %     No intake/output data recorded. 01/09 1901 - 01/11 0700  In: 600 [P.O.:50]  Out: 400 [Urine:400]    Physical Exam: Lungs: clear to auscultation bilaterally  Heart: regular rate and rhythm, S1, S2 normal, no murmur, click, rub or gallop  Abdomen: soft, non-tender. Bowel sounds normal. No masses,  no organomegaly        Data Review   Recent Results (from the past 24 hour(s))   METABOLIC PANEL, COMPREHENSIVE    Collection Time: 01/11/21  2:10 AM   Result Value Ref Range    Sodium 138 136 - 145 mmol/L    Potassium 4.2 3.5 - 5.1 mmol/L    Chloride 107 97 - 108 mmol/L    CO2 24 21 - 32 mmol/L    Anion gap 7 5 - 15 mmol/L    Glucose 97 65 - 100 mg/dL    BUN 23 (H) 6 - 20 MG/DL    Creatinine 1.06 0.70 - 1.30 MG/DL    BUN/Creatinine ratio 22 (H) 12 - 20      GFR est AA >60 >60 ml/min/1.73m2    GFR est non-AA >60 >60 ml/min/1.73m2    Calcium 8.6 8.5 - 10.1 MG/DL    Bilirubin, total 0.3 0.2 - 1.0 MG/DL    ALT (SGPT) 19 12 - 78 U/L    AST (SGOT) 33 15 - 37 U/L    Alk.  phosphatase 118 (H) 45 - 117 U/L    Protein, total 7.3 6.4 - 8.2 g/dL    Albumin 2.5 (L) 3.5 - 5.0 g/dL    Globulin 4.8 (H) 2.0 - 4.0 g/dL    A-G Ratio 0.5 (L) 1.1 - 2.2     AMMONIA    Collection Time: 01/11/21  2:10 AM   Result Value Ref Range    Ammonia 30 <32 UMOL/L           Assessment:     Principal Problem:    AMS (altered mental status) (12/11/2020)    Active Problems:    Essential hypertension (11/21/2015)      Excessive drinking of alcohol (12/11/2020)      UTI (urinary tract infection) (12/12/2020)      Hypokalemia (12/12/2020)      Thrombocytopenia ([address] (12/12/2020)      Anemia (12/12/2020)      Alcohol withdrawal ([address] (12/21/2020)      Acute respiratory failure with hypoxia (HCC) ()      Alcoholism ([facility] ()      Goals of care, counseling/discussion ()      Severe protein-calorie malnutrition ([facility] [facility] (1/5/2021)      Oropharyngeal dysphagia ()        Plan:     1) Work with PT/OT  2) Placement for rehab  3) Cont current tube feeds

## 2021-01-11 NOTE — PROGRESS NOTES
Problem: Dysphagia (Adult)  Goal: *Acute Goals and Plan of Care (Insert Text)  Description: Speech Therapy Goals  Initiated 12/29/2020    1. Patient will participate in objective imaging of the swallow within 3 days. Continue for MBS 1/8/2021  Goal one met. Additional goals 1/11/2021:  1. Pt will tolerate puree and nectar consistencies with no adverse effects within 7 days. Outcome: Progressing Towards Goal   SPEECH LANGUAGE PATHOLOGY DYSPHAGIA TREATMENT  Patient: Maude Castillo (18 y.o. male)  Date: 1/11/2021  Diagnosis: AMS (altered mental status) [R41.82] AMS (altered mental status)  Procedure(s) (LRB):  PERCUTANEOUS ENDOSCOPIC GASTROSTOMY TUBE INSERTION :- (Left)  ESOPHAGOGASTRODUODENOSCOPY (EGD) (Left) 5 Days Post-Op  Precautions: Swallow, Fall    ASSESSMENT:  The pt continues to require assistance with positioning prior to meals, as well as verbal and at times tactile cues for increased safety and use of swallow recommendations. The pt's wife was present during a portion of the therapy session where recommendations were reviewed, understood, and she was able to start verbally cuing the pt at times. After the pt was moved up in bed, due to reduced cognition for safety, the pt slid his body back down the bed. With frequent verbal cuing the first half of the session the pt was then able to independently recall to consume small bites. Pt however continued to consume large drinks, requiring tactile cue from clinician to remove cup from pt lips due to not responding to verbal cues to discontinue or consume small drinks. The pt initially showed no signs of penetration or aspiration of puree with alteration of bites and sips, however near the end of the session the pt began to display coughing and occasional wet vocal quality. Pt fairly frequently showed signs of penetration or aspiration with nectar liquids.  He was not following verbal cues for throat clear or cough then swallow during the first half of the session, but began to follow the direction during the last third of the session. When pt was showing increased coughing and wet vocal quality with puree and nectar, session was discontinued, despite pt showing increased alertness during the session. Aspiration risk remains high. Recommend strict aspiration precautions as outlined below. PLAN:  Recommendations and Planned Interventions:  -Continue puree and nectar diet in small meal. Hold PO IF pt displays signs and symptoms of aspiration during meal. PEG for primary nutrition/hydration.  -Monitor for change in respiratory status  -Small bites and sips  -upright position  -Throat clear and swallow after initial swallow of bolus  -Supervision with PO diet  -SLP will continue to follow  Patient continues to benefit from skilled intervention to address the above impairments. Continue treatment per established plan of care. Discharge Recommendations: To Be Determined     SUBJECTIVE:   Therapy woke pt for meal. Pt increased alertness as session progressed. Pt remains confused. OBJECTIVE:   Cognitive and Communication Status:  Neurologic State: Alert, Lethargic  Orientation Level: Oriented to person, Oriented to place, Disoriented to situation, Oriented to time  Cognition: Decreased command following, Appropriate for age attention/concentration, Poor safety awareness  Perception: Appears intact  Perseveration: No perseveration noted  Safety/Judgement: Decreased insight into deficits  Dysphagia Treatment:  Oral Assessment:  Oral Assessment  Labial: No impairment  P.O. Trials:  Patient Position: upright in bed  Vocal quality prior to P.O.: No impairment  Consistency Presented: Puree;Nectar thick liquid  How Presented: Self-fed/presented;SLP-fed/presented;Cup/gulp; Spoon     Bolus Acceptance: No impairment              Initiation of Swallow: Delayed (# of seconds)  Laryngeal Elevation: Weak  Aspiration Signs/Symptoms: Delayed cough/throat clear  Pharyngeal Phase Characteristics: Suspected pharyngeal residue; Altered vocal quality  Effective Modifications: Small sips and bites; Double swallow  Cues for Modifications: Moderate             Exercises:  Laryngeal Exercises:                                                                                                                                   Pain:  Pain Scale 1: Numeric (0 - 10)  Pain Intensity 1: 0       After treatment:   Patient left in no apparent distress in bed, Call bell within reach, Nursing notified, and Caregiver / family present    COMMUNICATION/EDUCATION:   Patient was educated regarding his deficit(s) of dysphagia and aspiration as this relates to his diagnosis. He demonstrated Fair understanding as evidenced by some ability to follow verbal directions. The patient's plan of care including recommendations, planned interventions, and recommended diet changes were discussed with: Registered nurse.      Maira Easley, SLP & Benigno Diehl SLP  Time Calculation: 30 mins

## 2021-01-12 LAB
BASOPHILS # BLD: 0.1 K/UL (ref 0–0.1)
BASOPHILS NFR BLD: 1 % (ref 0–1)
DIFFERENTIAL METHOD BLD: ABNORMAL
EOSINOPHIL # BLD: 1.6 K/UL (ref 0–0.4)
EOSINOPHIL NFR BLD: 20 % (ref 0–7)
ERYTHROCYTE [DISTWIDTH] IN BLOOD BY AUTOMATED COUNT: 13.1 % (ref 11.5–14.5)
HCT VFR BLD AUTO: 25.5 % (ref 36.6–50.3)
HEALTH STATUS, XMCV2T: NORMAL
HGB BLD-MCNC: 8.4 G/DL (ref 12.1–17)
IMM GRANULOCYTES # BLD AUTO: 0.1 K/UL (ref 0–0.04)
IMM GRANULOCYTES NFR BLD AUTO: 1 % (ref 0–0.5)
LYMPHOCYTES # BLD: 1.7 K/UL (ref 0.8–3.5)
LYMPHOCYTES NFR BLD: 22 % (ref 12–49)
MCH RBC QN AUTO: 32.6 PG (ref 26–34)
MCHC RBC AUTO-ENTMCNC: 32.9 G/DL (ref 30–36.5)
MCV RBC AUTO: 98.8 FL (ref 80–99)
MONOCYTES # BLD: 0.7 K/UL (ref 0–1)
MONOCYTES NFR BLD: 9 % (ref 5–13)
NEUTS SEG # BLD: 3.6 K/UL (ref 1.8–8)
NEUTS SEG NFR BLD: 47 % (ref 32–75)
NRBC # BLD: 0 K/UL (ref 0–0.01)
NRBC BLD-RTO: 0 PER 100 WBC
PLATELET # BLD AUTO: 263 K/UL (ref 150–400)
PMV BLD AUTO: 9.7 FL (ref 8.9–12.9)
RBC # BLD AUTO: 2.58 M/UL (ref 4.1–5.7)
RBC MORPH BLD: ABNORMAL
SARS-COV-2, COV2: NOT DETECTED
SOURCE, COVRS: NORMAL
SPECIMEN SOURCE, FCOV2M: NORMAL
SPECIMEN TYPE, XMCV1T: NORMAL
WBC # BLD AUTO: 7.8 K/UL (ref 4.1–11.1)

## 2021-01-12 PROCEDURE — 65270000029 HC RM PRIVATE

## 2021-01-12 PROCEDURE — 74011250636 HC RX REV CODE- 250/636: Performed by: EMERGENCY MEDICINE

## 2021-01-12 PROCEDURE — 97535 SELF CARE MNGMENT TRAINING: CPT

## 2021-01-12 PROCEDURE — 74011250637 HC RX REV CODE- 250/637: Performed by: FAMILY MEDICINE

## 2021-01-12 PROCEDURE — 74011250637 HC RX REV CODE- 250/637: Performed by: ANESTHESIOLOGY

## 2021-01-12 PROCEDURE — 92526 ORAL FUNCTION THERAPY: CPT

## 2021-01-12 PROCEDURE — 85025 COMPLETE CBC W/AUTO DIFF WBC: CPT

## 2021-01-12 PROCEDURE — 36415 COLL VENOUS BLD VENIPUNCTURE: CPT

## 2021-01-12 RX ADMIN — QUETIAPINE FUMARATE 50 MG: 25 TABLET ORAL at 23:06

## 2021-01-12 RX ADMIN — Medication 1 CAPSULE: at 09:52

## 2021-01-12 RX ADMIN — POTASSIUM BICARBONATE 40 MEQ: 782 TABLET, EFFERVESCENT ORAL at 18:50

## 2021-01-12 RX ADMIN — Medication 30 MCG: at 10:04

## 2021-01-12 RX ADMIN — Medication 10 ML: at 14:00

## 2021-01-12 RX ADMIN — CHLORHEXIDINE GLUCONATE 15 ML: 0.12 RINSE ORAL at 00:07

## 2021-01-12 RX ADMIN — CHLORHEXIDINE GLUCONATE 15 ML: 0.12 RINSE ORAL at 23:06

## 2021-01-12 RX ADMIN — CHLORHEXIDINE GLUCONATE 15 ML: 0.12 RINSE ORAL at 09:00

## 2021-01-12 RX ADMIN — Medication 10 ML: at 00:04

## 2021-01-12 RX ADMIN — ENOXAPARIN SODIUM 40 MG: 40 INJECTION SUBCUTANEOUS at 07:37

## 2021-01-12 RX ADMIN — Medication 10 ML: at 00:03

## 2021-01-12 RX ADMIN — POTASSIUM BICARBONATE 40 MEQ: 782 TABLET, EFFERVESCENT ORAL at 09:51

## 2021-01-12 RX ADMIN — ASPIRIN 81 MG: 81 TABLET, CHEWABLE ORAL at 09:52

## 2021-01-12 RX ADMIN — Medication 10 ML: at 07:38

## 2021-01-12 RX ADMIN — Medication 10 ML: at 06:00

## 2021-01-12 RX ADMIN — Medication 30 ML: at 10:04

## 2021-01-12 RX ADMIN — Medication 10 ML: at 23:11

## 2021-01-12 NOTE — PROGRESS NOTES
Problem: Dysphagia (Adult)  Goal: *Acute Goals and Plan of Care (Insert Text)  Description: Speech Therapy Goals  Initiated 12/29/2020    1. Patient will participate in objective imaging of the swallow within 3 days. Continue for MBS 1/8/2021  Goal one met. Additional goals 1/11/2021:  1. Pt will tolerate puree and nectar consistencies with no adverse effects within 7 days. Outcome: Progressing Towards Goal     SPEECH LANGUAGE PATHOLOGY DYSPHAGIA TREATMENT  Patient: Delon Rose (89 y.o. male)  Date: 1/12/2021  Diagnosis: AMS (altered mental status) [R41.82] AMS (altered mental status)  Procedure(s) (LRB):  PERCUTANEOUS ENDOSCOPIC GASTROSTOMY TUBE INSERTION :- (Left)  ESOPHAGOGASTRODUODENOSCOPY (EGD) (Left) 6 Days Post-Op  Precautions: Fall, Bed Alarm, Seizure, Aspiration(PEG in place; ice chips allowed)    ASSESSMENT:  Spent session educating pt to strategies for safe swallowing, rehabilitation potential, and discussed diet consistencies. Noted that pt had just finished eating and was increasingly congested, which is consistent with presentation noted yesterday. Decision made together to downsize to pureed snacks only with continuing intermittent throat clear and dry swallow. Pt will be an excellent candidate for inpatient rehabilitation for intensive swallowing therapy. Will continue to follow. PLAN:  Recommendations and Planned Interventions:  --Pureed snacks only with PEG as primary source of nutrition/hydration/medication  --Throat clear and subsequent dry swallow    Patient continues to benefit from skilled intervention to address the above impairments. Continue treatment per established plan of care. Discharge Recommendations:  Inpatient Rehab     SUBJECTIVE:   Patient stated, \"It's a bit of a Thomas Maik situation when stated that is 29years older than his wife.     OBJECTIVE:   Cognitive and Communication Status:  Neurologic State: Alert  Orientation Level: Oriented to person, Oriented to place  Cognition: Follows commands, Impulsive  Perception: Appears intact  Perseveration: No perseveration noted  Safety/Judgement: Fall prevention, Lack of insight into deficits    Pain:  Pain Scale 1: Numeric (0 - 10)  Pain Intensity 1: 0       After treatment:   Call bell within reach and Nursing notified    COMMUNICATION/EDUCATION:     The patient's plan of care including recommendations, planned interventions, and recommended diet changes were discussed with: Registered nurse.      Elver Rosales, SLP  Time Calculation: 10 mins

## 2021-01-12 NOTE — PROGRESS NOTES
SLP Contact Note    Noted pt with increased congestion appreciated. Given this, would recommend downgrading to pureed snacks only and continuing PEG as primary source of nutrition/hydration/medication. Of note, pt will be an excellent candidate for inpatient rehab from SLP standpoint. Full note to follow.     Thank you,  ALESSANDRO Steiner.Ed, [address] [facility]  Speech-Language Pathologist

## 2021-01-12 NOTE — PROGRESS NOTES
Nutrition Note    Recommendations:   1. Obtain new measured weight     Brief check on tolerance of TF & PO, DW SLP. Pt with increased congestion, downgraded number of items allowed on trays after discussion with patient & SLP. Adjusted PO trays for pleasure. TF & flushes meet 100% of initial estimated needs and are well tolerated. No new weight. Will need a new measured weight to determine adequate nutrition support. Diet Orders & Tube Feeding:   DIET TUBE FEEDING Increase flushes to 150 mL pre/post bolus.  Osmolite 1.5-  264ml bolus 5x/day (~700, 1000, 1300, 1600, 1900) + 150 mL flush pre/post bolus + 2 pkts prosource daily Make sure to keep HOB >30 degrees while getting bolus feeds  DIET NUTRITIONAL SUPPLEMENTS Dinner, Breakfast; Magic Cups  DIET DYSPHAGIA PUREED (NDD1)  No Fluids on Tray    Last 3 Recorded Weights in this Encounter    01/04/21 0600 01/05/21 0400 01/06/21 0400   Weight: 76.3 kg (168 lb 3.4 oz) 75.6 kg (166 lb 10.7 oz) 74.5 kg (164 lb 3.9 oz)       Electronically signed by Brett Trevino RD, MS on 1/12/2021 at 4:00 PM  Contact: perfect serve

## 2021-01-12 NOTE — PROGRESS NOTES
Problem: Self Care Deficits Care Plan (Adult)  Goal: *Acute Goals and Plan of Care (Insert Text)  Description:   FUNCTIONAL STATUS PRIOR TO ADMISSION: Patient independent. Family assist with shoes and socks PRN. HOME SUPPORT: The patient lived with wife and two teenage daughters. Occupational Therapy Goals  Initiated 12/21/2020, reviewed 12/28/2020; goals reviewed 1/5/2021, all remain appropriate. Goals reviewed and updated 1-12  1. Patient will perform grooming sitting unsupported with minimal assistance within 7 day(s). Met; upgrade to S in 7 days  2. Patient will perform anterior neck to thigh bathing sitting unsupported with minimal assistance within 7 day(s). Met upgrade to S in 7 days  3. Patient will perform lower body dressing with moderate assistance within 7 day(s). Met; upgrade to min A consistently in 7 days  4. Patient will perform toilet transfers to/from [facility] with moderate assistance within 7 day(s). Met; upgrade to min A consistently in 7 days  5. Patient will perform all aspects of toileting with moderate assistance  within 7 day(s). Met; upgrade to min A consistently in 7 days  6. Patient will participate in upper extremity therapeutic exercise/activities with supervision/set-up for 5 minutes within 7 day(s). Met; upgrade to 10 minutes in 7 days  7. Patient will utilize energy conservation techniques during functional activities with verbal cues within 7 day(s).  continue  Outcome: Progressing Towards Goal   OCCUPATIONAL THERAPY TREATMENT/WEEKLY RE-ASSESSMENT  Patient: Brittany Rodriguez (53 y.o. male)  Date: 1/12/2021  Diagnosis: AMS (altered mental status) [R41.82] AMS (altered mental status)  Procedure(s) (LRB):  PERCUTANEOUS ENDOSCOPIC GASTROSTOMY TUBE INSERTION :- (Left)  ESOPHAGOGASTRODUODENOSCOPY (EGD) (Left) 6 Days Post-Op  Precautions: Fall, Bed Alarm, Seizure, Aspiration(PEG in place; ice chips allowed)  Chart, occupational therapy assessment, plan of care, and goals were reviewed. ASSESSMENT  Patient continues with skilled OT services and is progressing towards goals. Progress towards all goals; 6/7 met and upgraded. Barthel index increased from 5/100 to 30/100. Remains limited by decreased dynamic sitting and standing balance, impulsive movements and high fall risk. Good participation with supportive family present     Current Level of Function Impacting Discharge (ADLs): S-min A self care and functional transfers, limited by fall risks and aspiration precautions    Other factors to consider for discharge: supportive family will be able to assist upon discharge to home once rehab is completed         PLAN :  Goals have been updated based on progression since last assessment. Patient continues to benefit from skilled intervention to address the above impairments. Continue to follow patient 4 times a week to address goals. Recommend with staff: head of bed at least 30 degrees    Recommend next OT session: Toileting practice    Recommendation for discharge: (in order for the patient to meet his/her long term goals)  Therapy 3 hours per day 5-7 days per week    This discharge recommendation:  Has been made in collaboration with the attending provider and/or case management    IF patient discharges home will need the following DME: TBA in rehab       SUBJECTIVE:   Patient stated I can do these exercises to get stonger.     OBJECTIVE DATA SUMMARY:   Cognitive/Behavioral Status:  Neurologic State: Alert; Appropriate for age  Orientation Level: Oriented to person;Oriented to place  Cognition: Follows commands; Impulsive(recommend formal screening)  Perception: Appears intact  Perseveration: No perseveration noted  Safety/Judgement: Fall prevention;Decreased insight into deficits; Awareness of environment    Functional Mobility and Transfers for ADLs:  Bed Mobility:  Rolling: Supervision  Supine to Sit: Supervision;Contact guard assistance  Sit to Supine: Supervision;Contact guard assistance  Scooting: Minimum assistance; Additional time    Transfers:  Sit to Stand: Minimum assistance  Functional Transfers  Toilet Transfer : Contact guard assistance;Minimum assistance  Bed to Chair: Minimum assistance    Balance:  Sitting: Intact  Sitting - Static: Good (unsupported)  Sitting - Dynamic: Fair (occasional); Good (unsupported)  Standing: Impaired; Without support  Standing - Static: Constant support; Fair  Standing - Dynamic : Fair;Constant support    ADL Intervention:  Feeding  Feeding Assistance: Total assistance (dependent); Supervision(Tube feeds; able to feed self ice chips)    Grooming  Grooming Assistance: Contact guard assistance    Upper Body Bathing  Bathing Assistance: Contact guard assistance    Lower Body Bathing  Bathing Assistance: Minimum assistance    Upper Body Dressing Assistance  Dressing Assistance: Supervision    Lower Body Dressing Assistance  Dressing Assistance: Minimum assistance;Contact guard assistance  Position Performed: Seated edge of bed    Toileting  Toileting Assistance: Contact guard assistance    Cognitive Retraining  Maintains Attention For (Time): Greater than 10 minutes  Following Commands: Follows one step commands/directions; Follows two step commands/directions  Safety/Judgement: Fall prevention;Decreased insight into deficits; Awareness of environment    Barthel Index:    Bathin  Bladder: 5  Bowels: 5  Groomin  Dressin  Feedin  Mobility: 0  Stairs: 0  Toilet Use: 5  Transfer (Bed to Chair and Back): 10  Total: 30/100        The Barthel ADL Index: Guidelines  1. The index should be used as a record of what a patient does, not as a record of what a patient could do. 2. The main aim is to establish degree of independence from any help, physical or verbal, however minor and for whatever reason. 3. The need for supervision renders the patient not independent. 4. A patient's performance should be established using the best available evidence. Asking the patient, friends/relatives and nurses are the usual sources, but direct observation and common sense are also important. However direct testing is not needed. 5. Usually the patient's performance over the preceding 24-48 hours is important, but occasionally longer periods will be relevant. 6. Middle categories imply that the patient supplies over 50 per cent of the effort. 7. Use of aids to be independent is allowed. Maximilian Jon., Barthel, D.W. ([id number]). Functional evaluation: the Barthel Index. [facility] ([id number])2. Brianda Altamirano anu SHANELLE Cuadra, Samson Collins., Michel Brown., [place], [address] (1999). Measuring the change indisability after inpatient rehabilitation; comparison of the responsiveness of the Barthel Index and Functional [place] Measure. Journal of Neurology, Neurosurgery, and Psychiatry, 66(4), 047-430. Brandon Trevino NROC, BRIGHT Choi, & Gerardo Rogel M.A. (2004.) Assessment of post-stroke quality of life in cost-effectiveness studies: The usefulness of the Barthel Index and the EuroQoL-5D. Quality of Life Research, 13, 427-43        Pain:  No pain reported    Activity Tolerance:   Fair, SpO2 stable on RA, and requires rest breaks    After treatment patient left in no apparent distress:   Call bell within reach, Bed / chair alarm activated, Caregiver / family present, Side rails x 3, and HOB elevated 45 degrees    COMMUNICATION/COLLABORATION:   The patients plan of care was discussed with: Registered nurse.      Evette Gillette OTR/L  Time Calculation: 40 mins

## 2021-01-12 NOTE — PROGRESS NOTES
[facility], pending negative COVID result  RUR-26% Moderate    Patient has acceptance to [facility]. COVID pending. CM contacted wife to inform. Will plan for admission tomorrow. Attending updated.      Luis Alberto Simpson MS

## 2021-01-12 NOTE — PROGRESS NOTES
Nancy Durbin, Alayna Borrego, and Neo Delgado Date: 12/11/2020      Subjective:     Patient had an OK night. No new issues.  .       Current Facility-Administered Medications   Medication Dose Route Frequency    haloperidol lactate (HALDOL) injection 1-2 mg  1-2 mg IntraMUSCular QID PRN    amLODIPine (NORVASC) tablet 5 mg  5 mg Per G Tube DAILY    lactobac ac& pc-s.therm-b.anim (JUSTO Q/RISAQUAD)  1 Cap PEG Tube DAILY    multivit-folic acid-herbal 657 (WELLESSE PLUS) oral liquid 30 mL  30 mL Per G Tube DAILY    cholecalciferol (vitamin D3) 10 mcg/mL (400 unit/mL) oral liquid 30 mcg  30 mcg Per G Tube DAILY    metoprolol tartrate (LOPRESSOR) tablet 6.25 mg  6.25 mg Per G Tube Q12H    QUEtiapine (SEROquel) tablet 50 mg  50 mg Per G Tube QHS    aspirin chewable tablet 81 mg  81 mg Per G Tube DAILY    sodium chloride (NS) flush 5-40 mL  5-40 mL IntraVENous Q8H    sodium chloride (NS) flush 5-40 mL  5-40 mL IntraVENous PRN    artificial saliva (MOUTH KOTE) 1 Spray  1 Spray Oral PRN    potassium bicarb-citric acid (EFFER-K) tablet 40 mEq  40 mEq Oral BID    chlorhexidine (ORAL CARE KIT) 0.12 % mouthwash 15 mL  15 mL Oral Q12H    enoxaparin (LOVENOX) injection 40 mg  40 mg SubCUTAneous Q24H    ELECTROLYTE REPLACEMENT PROTOCOL - Potassium and Magnesium  1 Each Other PRN    sodium chloride (NS) flush 5-40 mL  5-40 mL IntraVENous Q8H    sodium chloride (NS) flush 5-40 mL  5-40 mL IntraVENous PRN    potassium chloride 10 mEq in 100 ml IVPB  10 mEq IntraVENous PRN    acetaminophen (TYLENOL) tablet 650 mg  650 mg Oral Q6H PRN    Or    acetaminophen (TYLENOL) suppository 650 mg  650 mg Rectal Q6H PRN    polyethylene glycol (MIRALAX) packet 17 g  17 g Oral DAILY PRN    influenza vaccine 2020-21 (6 mos+)(PF) (FLUARIX/FLULAVAL/FLUZONE QUAD) injection 0.5 mL  0.5 mL IntraMUSCular PRIOR TO DISCHARGE          Objective:     Patient Vitals for the past 8 hrs:   BP Temp Pulse Resp SpO2   01/12/21 0817 126/65 97.9 °F (36.6 °C) 84 17 93 %   01/12/21 0232 138/60 97.6 °F (36.4 °C) 80 16 93 %     No intake/output data recorded. 01/10 1901 - 01/12 0700  In: 150   Out: 150 [Urine:150]    Physical Exam: Lungs: clear to auscultation bilaterally  Heart: regular rate and rhythm, S1, S2 normal, no murmur, click, rub or gallop  Abdomen: soft, non-tender. Bowel sounds normal. No masses,  no organomegaly        Data Review   Recent Results (from the past 24 hour(s))   SARS-COV-2    Collection Time: 01/11/21  8:08 PM   Result Value Ref Range    Specimen source Nasopharyngeal      SARS-CoV-2 PENDING     SARS-CoV-2 PENDING     Specimen source NP SWAB     COVID-19 rapid test PENDING     Specimen type NP Swab      Health status PENDING     COVID-19 PENDING    CBC WITH AUTOMATED DIFF    Collection Time: 01/12/21  2:34 AM   Result Value Ref Range    WBC 7.8 4.1 - 11.1 K/uL    RBC 2.58 (L) 4.10 - 5.70 M/uL    HGB 8.4 (L) 12.1 - 17.0 g/dL    HCT 25.5 (L) 36.6 - 50.3 %    MCV 98.8 80.0 - 99.0 FL    MCH 32.6 26.0 - 34.0 PG    MCHC 32.9 30.0 - 36.5 g/dL    RDW 13.1 11.5 - 14.5 %    PLATELET 311 136 - 626 K/uL    MPV 9.7 8.9 - 12.9 FL    NRBC 0.0 0  WBC    ABSOLUTE NRBC 0.00 0.00 - 0.01 K/uL    NEUTROPHILS 47 32 - 75 %    LYMPHOCYTES 22 12 - 49 %    MONOCYTES 9 5 - 13 %    EOSINOPHILS 20 (H) 0 - 7 %    BASOPHILS 1 0 - 1 %    IMMATURE GRANULOCYTES 1 (H) 0.0 - 0.5 %    ABS. NEUTROPHILS 3.6 1.8 - 8.0 K/UL    ABS. LYMPHOCYTES 1.7 0.8 - 3.5 K/UL    ABS. MONOCYTES 0.7 0.0 - 1.0 K/UL    ABS. EOSINOPHILS 1.6 (H) 0.0 - 0.4 K/UL    ABS. BASOPHILS 0.1 0.0 - 0.1 K/UL    ABS. IMM.  GRANS. 0.1 (H) 0.00 - 0.04 K/UL    DF SMEAR SCANNED      RBC COMMENTS NORMOCYTIC, NORMOCHROMIC             Assessment:     Principal Problem:    AMS (altered mental status) (12/11/2020)    Active Problems:    Essential hypertension (11/21/2015)      Excessive drinking of alcohol (12/11/2020)      UTI (urinary tract infection) (12/12/2020)      Hypokalemia (12/12/2020)      Thrombocytopenia ([facility] (12/12/2020)      Anemia (12/12/2020)      Alcohol withdrawal ([facility] (12/21/2020)      Acute respiratory failure with hypoxia (HCC) ()      Alcoholism (HCC) ()      Goals of care, counseling/discussion ()      Severe protein-calorie malnutrition ([facility] (1/5/2021)      Oropharyngeal dysphagia ()        Plan:     1) [facility] If not will discharge to [facility].  Hope for answer today  2) Cont tube feeds  3) Speech, OT,PT

## 2021-01-13 VITALS
HEIGHT: 71 IN | WEIGHT: 164.24 LBS | RESPIRATION RATE: 18 BRPM | TEMPERATURE: 98.7 F | OXYGEN SATURATION: 94 % | HEART RATE: 90 BPM | BODY MASS INDEX: 22.99 KG/M2 | DIASTOLIC BLOOD PRESSURE: 77 MMHG | SYSTOLIC BLOOD PRESSURE: 155 MMHG

## 2021-01-13 PROCEDURE — 74011250636 HC RX REV CODE- 250/636: Performed by: EMERGENCY MEDICINE

## 2021-01-13 PROCEDURE — 74011250637 HC RX REV CODE- 250/637: Performed by: FAMILY MEDICINE

## 2021-01-13 PROCEDURE — 74011250637 HC RX REV CODE- 250/637: Performed by: ANESTHESIOLOGY

## 2021-01-13 PROCEDURE — 99231 SBSQ HOSP IP/OBS SF/LOW 25: CPT | Performed by: NURSE PRACTITIONER

## 2021-01-13 RX ORDER — QUETIAPINE FUMARATE 50 MG/1
50 TABLET, FILM COATED ORAL
Qty: 30 TAB | Refills: 2 | Status: SHIPPED | OUTPATIENT
Start: 2021-01-13 | End: 2022-03-25 | Stop reason: ALTCHOICE

## 2021-01-13 RX ORDER — ENOXAPARIN SODIUM 100 MG/ML
40 INJECTION SUBCUTANEOUS EVERY 24 HOURS
Qty: 5 SYRINGE | Refills: 1 | Status: SHIPPED | OUTPATIENT
Start: 2021-01-13 | End: 2022-03-25 | Stop reason: ALTCHOICE

## 2021-01-13 RX ORDER — GUAIFENESIN 100 MG/5ML
81 LIQUID (ML) ORAL DAILY
Qty: 100 TAB | Refills: 1 | Status: SHIPPED | OUTPATIENT
Start: 2021-01-13

## 2021-01-13 RX ORDER — HALOPERIDOL 5 MG/ML
1-2 INJECTION INTRAMUSCULAR
Qty: 1 VIAL | Refills: 0 | Status: SHIPPED | OUTPATIENT
Start: 2021-01-13 | End: 2022-03-25 | Stop reason: ALTCHOICE

## 2021-01-13 RX ORDER — CHOLECALCIFEROL (VITAMIN D3) 10(400)/ML
30 DROPS ORAL DAILY
Qty: 50 ML | Refills: 1 | Status: SHIPPED | OUTPATIENT
Start: 2021-01-13 | End: 2022-03-25 | Stop reason: ALTCHOICE

## 2021-01-13 RX ADMIN — ASPIRIN 81 MG: 81 TABLET, CHEWABLE ORAL at 10:06

## 2021-01-13 RX ADMIN — ENOXAPARIN SODIUM 40 MG: 40 INJECTION SUBCUTANEOUS at 09:03

## 2021-01-13 RX ADMIN — POTASSIUM BICARBONATE 40 MEQ: 782 TABLET, EFFERVESCENT ORAL at 10:06

## 2021-01-13 RX ADMIN — Medication 1 CAPSULE: at 10:06

## 2021-01-13 RX ADMIN — Medication 30 ML: at 10:19

## 2021-01-13 RX ADMIN — CHLORHEXIDINE GLUCONATE 15 ML: 0.12 RINSE ORAL at 09:00

## 2021-01-13 RX ADMIN — Medication 30 MCG: at 10:19

## 2021-01-13 NOTE — PROGRESS NOTES
Nancy Durbin, Alayna Borrego, and Carmencita    Admit Date: 12/11/2020      Subjective:     Patient did OK overnight. Accepted at [facility]. ..       Current Facility-Administered Medications   Medication Dose Route Frequency    haloperidol lactate (HALDOL) injection 1-2 mg  1-2 mg IntraMUSCular QID PRN    lactobac ac& pc-s.therm-b.anim (JUSTO Q/RISAQUAD)  1 Cap PEG Tube DAILY    multivit-folic acid-herbal 147 (WELLESSE PLUS) oral liquid 30 mL  30 mL Per G Tube DAILY    cholecalciferol (vitamin D3) 10 mcg/mL (400 unit/mL) oral liquid 30 mcg  30 mcg Per G Tube DAILY    QUEtiapine (SEROquel) tablet 50 mg  50 mg Per G Tube QHS    aspirin chewable tablet 81 mg  81 mg Per G Tube DAILY    sodium chloride (NS) flush 5-40 mL  5-40 mL IntraVENous Q8H    sodium chloride (NS) flush 5-40 mL  5-40 mL IntraVENous PRN    artificial saliva (MOUTH KOTE) 1 Spray  1 Spray Oral PRN    potassium bicarb-citric acid (EFFER-K) tablet 40 mEq  40 mEq Oral BID    chlorhexidine (ORAL CARE KIT) 0.12 % mouthwash 15 mL  15 mL Oral Q12H    enoxaparin (LOVENOX) injection 40 mg  40 mg SubCUTAneous Q24H    ELECTROLYTE REPLACEMENT PROTOCOL - Potassium and Magnesium  1 Each Other PRN    sodium chloride (NS) flush 5-40 mL  5-40 mL IntraVENous Q8H    sodium chloride (NS) flush 5-40 mL  5-40 mL IntraVENous PRN    potassium chloride 10 mEq in 100 ml IVPB  10 mEq IntraVENous PRN    acetaminophen (TYLENOL) tablet 650 mg  650 mg Oral Q6H PRN    Or    acetaminophen (TYLENOL) suppository 650 mg  650 mg Rectal Q6H PRN    polyethylene glycol (MIRALAX) packet 17 g  17 g Oral DAILY PRN    influenza vaccine 2020-21 (6 mos+)(PF) (FLUARIX/FLULAVAL/FLUZONE QUAD) injection 0.5 mL  0.5 mL IntraMUSCular PRIOR TO DISCHARGE          Objective:     Patient Vitals for the past 8 hrs:   BP Temp Pulse Resp SpO2   01/13/21 0909 (!) 155/77 98.7 °F (37.1 °C) 90 18 94 %   01/13/21 0257 136/75 98 °F (36.7 °C) 84 14 92 %     No intake/output data recorded.   01/11 1901 - 01/13 0700  In: 664   Out: 150 [Urine:150]    Physical Exam:   Visit Vitals  BP (!) 155/77   Pulse 90   Temp 98.7 °F (37.1 °C)   Resp 18   Ht 5' 11\" (1.803 m)   Wt 164 lb 3.9 oz (74.5 kg)   SpO2 94%   BMI 22.91 kg/m²     Neck: supple, symmetrical, trachea midline, no adenopathy, thyroid: not enlarged, symmetric, no tenderness/mass/nodules, no carotid bruit and no JVD  Lungs: clear to auscultation bilaterally  Heart: regular rate and rhythm, S1, S2 normal, no murmur, click, rub or gallop  Abdomen: soft, non-tender. Bowel sounds normal. No masses,  no organomegaly  Neurologic: Grossly normal        Data Review No results found for this or any previous visit (from the past 24 hour(s)).         Assessment:     Principal Problem:    AMS (altered mental status) (12/11/2020)    Active Problems:    Essential hypertension (11/21/2015)      Excessive drinking of alcohol (12/11/2020)      UTI (urinary tract infection) (12/12/2020)      Hypokalemia (12/12/2020)      Thrombocytopenia ([address] (12/12/2020)      Anemia (12/12/2020)      Alcohol withdrawal ([address] (12/21/2020)      Acute respiratory failure with hypoxia (HCC) ()      Alcoholism (HCC) ()      Goals of care, counseling/discussion ()      Severe protein-calorie malnutrition ([address] (1/5/2021)      Oropharyngeal dysphagia ()        Plan:     1) Discharge to [facility]

## 2021-01-13 NOTE — DISCHARGE INSTRUCTIONS
Patient Discharge Instructions    Balwinder Radford / [id number] : 1945    Admitted 2020 Discharged: 2021     Take Home Medications            · It is important that you take the medication exactly as they are prescribed. · Keep your medication in the bottles provided by the pharmacist and keep a list of the medication names, dosages, and times to be taken in your wallet. · Do not take other medications without consulting your doctor. What to do at Home    Recommended diet: ,   DIET TUBE FEEDING Increase flushes to 150 mL pre/post bolus. Osmolite 1.5-  264ml bolus 5x/day (~700, 1000, 1300, 1600, 1900) + 150 mL flush pre/post bolus + 2 pkts prosource daily Make sure to keep HOB >30 degrees while getting bolus feeds  DIET NUTRITIONAL SUPPLEMENTS Dinner, Breakfast; Magic Cups  DIET DYSPHAGIA PUREED (NDD1)  No Fluids on Tray  Recommended activity: Activity as tolerated,     If you experience any of the following symptoms fever, SOB, please follow up with Dr Eusebio Hernadez. Follow-up Appointments   Procedures    FOLLOW UP VISIT Appointment in: Ten Days After discharge from [facility]     After discharge from [facility]     Standing Status:   Standing     Number of Occurrences:   1     Order Specific Question:   Appointment in     Answer:   Ten Days            Information obtained by :  I understand that if any problems occur once I am at home I am to contact my physician. I understand and acknowledge receipt of the instructions indicated above.                                                                                                                                            Physician's or R.N.'s Signature                                                                  Date/Time                                                                                                                                              Patient or Representative Signature Date/Time

## 2021-01-13 NOTE — PROGRESS NOTES
[facility]  RUR-28% High    CM confirmed admission to [facility] today with liaison [facility] [phone]. [facility] transport confirmed for [phone]. Nurse will need to call report and complete EMTALA, CM will complete CM portion of EMTALA and place discharge envelope on chart when completed. Wife informed. Medicare pt has received, reviewed, and signed 2nd IM letter informing them of their right to appeal the discharge. Signed copy has been placed on pt bedside chart. Fain Libman, MS    12:00 Patient has been assigned to room [facility] [id number]  at [facility]. Nurse to call report to [phone]. Accepting physician is Dr. Fabian Rosales. CM completed CM portion of EMTALA and nurse to complete nursing portion of EMTALA.     Fain Libman, MS
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
## 2021-01-15 LAB — VIT B1 BLD-SCNC: 176.8 NMOL/L (ref 66.5–200)

## 2021-01-18 NOTE — PROGRESS NOTES
Physician Progress Note      PATIENT:               Erlin Kline  CSN #:                  [id number]  :                       1945  ADMIT DATE:       2020 12:24 PM  [address] DATE:        2021 3:29 PM  RESPONDING  PROVIDER #:        Guillermina Meade MD          QUERY TEXT:    Dear Attending Provider,    Patient admitted with AMS. If possible, please document in progress notes and discharge summary if you are evaluating and /or treating any of the following: The medical record reflects the following:  Risk Factors: 76yo with AMS and alcohol abuse  Clinical Indicators:  registered dietician:  - Moderate malnutrition and severe malnutrition are both documented on note  - Energy Intake:  7 - 50% or less of est energy requirements for 5 or more days  - Body Fat Loss:  1 - Mild body fat loss, Fat overlying ribs  - Muscle Mass Loss:  1 - Mild muscle mass loss, Thigh (quadraceps), Calf  - Fluid Accumulation:  1 - Mild, Extremities, Generalized  - BMI: 22.91  - Inadequate oral intake related to cognitive or neurological impairment, swallowing difficulty as evidenced by NPO or clear liquid status due to medical condition, nutrition support-enteral nutrition(confusion)  - Severe malnutrition related to inadequate protein-energy intake(PTA) as evidenced by weight loss greater than or equal to 10% in 6 months, severe loss of subcutaneous fat, severe muscle loss  Treatment: seen by registered dietitian, PEG tube placed and started feedings    Thank you,  Nakul Law  [phone]  Options provided:  -- Protein calorie malnutrition moderate  -- Protein calorie malnutrition severe  -- Other - I will add my own diagnosis  -- Disagree - Not applicable / Not valid  -- Disagree - Clinically unable to determine / Unknown  -- Refer to Clinical Documentation Reviewer    PROVIDER RESPONSE TEXT:    This patient has moderate protein calorie malnutrition.     Query created by: Kiara Avalos on 2021 12:14 PM      Electronically signed by:  Celia Gómez MD 1/18/2021 9:03 AM

## 2021-01-28 PROBLEM — E43 SEVERE PROTEIN-CALORIE MALNUTRITION (HCC): Status: RESOLVED | Noted: 2021-01-05 | Resolved: 2021-01-28

## 2021-01-28 PROBLEM — D69.6 THROMBOCYTOPENIA (HCC): Status: RESOLVED | Noted: 2020-12-12 | Resolved: 2021-01-28

## 2021-02-15 NOTE — DISCHARGE SUMMARY
Sid Beauchamp 2906 SUMMARY    Name:  Gigi Briceno  MR#:  [id number]  :  1945  ACCOUNT #:  [de-identified]  ADMIT DATE:  2020  DISCHARGE DATE:  2021      DIAGNOSES:  1. Alcohol withdrawal.  2.  Acute respiratory failure with hypoxia. 3.  Altered mental status. 4.  Essential hypertension. 5.  Urinary tract infection. 6.  Anemia. 7.  Alcoholism. 8.  Oropharyngeal dysphagia. 9.  Debility. SUMMARY:  This 43-year-old male with a history of melanoma, hypertension, and chronic daily alcohol abuse, presented to the ER for evaluation of altered mental status and blunt head injury status post mechanical ground-level fall that occurred in the evening of Tuesday, . According to the patient, he had a lot to drink, tripped over a sidewalk, which resulted in him landing forward onto his left wrist and right forehead. He denies any prodromal symptoms prior to the fall. He denies any loss of consciousness. He presented today since the fall, he has had abnormal fatigue and feeling disoriented with memory issues. Wife states he has been having some bizarre behavior and he has had problems with the short-term memory evidenced by easy forgetfulness and asking the same questions over and over. The patient denied any sensory or motor disturbances, visual disturbances, headache, chest pain, palpitations, shortness of breath, fever, chills, urinary complaints, or back pain. His last drink was either Wednesday night or the night prior to admission. MEDICATIONS ON ADMISSION:  Include losartan 100 mg a day. PHYSICAL EXAMINATION:  VITAL SIGNS:  Blood pressure 172/87; pulse 100; temperature, afebrile; respirations 16; O2 saturation is 94% on room air. GENERAL:  Alert and oriented. NECK:  Supple without adenopathy. CHEST:  Clear. COR:  Regular rate and rhythm without murmurs. ABDOMEN:  Soft, nontender. Positive bowel sounds.     RADIOLOGICAL RESULTS:  CT of the head showed no fractures or bleeding. HOSPITAL COURSE:  The patient was admitted to the hospital with altered mental status, hypernatremia, and high risk for withdrawal.  He was placed on CIWA protocol with vitamin replacement and on monitoring. His encephalopathy was concerning for closed head injury. MRI of the brain was ordered as well as a Neurology consult, PT and OT. Also started on IV fluids. He was put on empiric IV antibiotics for an urinalysis suggestive of UTI. He was placed on SCDs for DVT prophylaxis. Neurology saw him who felt that he had a mild concussion related to his fall, felt that he was experiencing a post-concussion syndrome. MRI of the brain was without acute findings. He was continued on the IV fluids with IV, Rocephin and the CIWA protocol. Later on the same day, the patient appeared to be withdrawing and was significantly worse. He was soon in respiratory distress and transferred into the intensive care unit for closer observation. He was soon intubated to protect his airway and continued on alcohol withdrawal protocol. He was maintained on the ICU for a few days and was extubated. He was subsequently transferred out to the telemetry bed. He was awake and recognizes me and I had a pleasant conversation with him. He, however, was still fairly agitated and requiring benzodiazepines for behavior control. Also, we were using amlodipine p.o. for blood pressure control. We were still repleting electrolytes. He was on regular-dose Librium, which I began to wean as I was concerned about his periods of somnolence. We began looking for rehabilitation bed for him. He started having diarrhea on 12/23 and became quite sedated. His fever bounced up to 100.5. I started him on empiric Zosyn and increased his IV fluids. Infectious Disease consult was obtained. C. difficile for his stool was ordered. On 04/24/2020, a Cardiology consult was obtained.   On the same day, he had a respiratory arrest and was successfully resuscitated and transferred into the intensive care unit. He was treated in the ICU for a number of days for acute hypoxic respiratory failure, aspiration pneumonitis, acute metabolic encephalopathy. He was on BiPAP as well as broad-spectrum antibiotics. His mental status was waxing and waning. PRN Precedex was used for his agitation, but tried to avoid sedation if possible. Placed feeding tube for tube feeds for his nutritional support. He was seen on 12/29 by Palliative Medicine. He continued in the intensive care unit for many days, slowly decreasing his IV antibiotics, maintaining his electrolyte status. He was transferred out of the intensive care unit to the floor on telemetry on 01/07/2021. He was awake and alert and having a fair amount of diarrhea. He was having tube feeds, which I suspect were due to an osmotic diarrhea. His white count was normal.  He continued working with Physical Therapy. The patient was still writhing around in the bed at night, but was directable. PEG was placed on 01/06/2021. The patient tolerated this quite well. His tube feeds through the rest of his admission were done without incidents. His agitation and restlessness at night were gradually improving. I was having daily discussions with his wife about disposition. I wanted to try to get him in the [facility] as I thought he would do better in the long term. He was accepted into [facility] and was discharged there on 01/13/2021. He was on tube feeds, off antibiotics. He was discharged in much improved condition and I will follow him up outpatient 1 week after his discharge from [address] [place] [address].         Andrzej Bocanegra MD [email or web address]  D:  02/14/2021 18:34  T:  02/15/2021 8:35  JOB #:  [id number]

## 2022-03-18 PROBLEM — E87.1 HYPONATREMIA: Status: ACTIVE | Noted: 2019-08-01

## 2022-03-19 PROBLEM — N17.9 AKI (ACUTE KIDNEY INJURY) (HCC): Status: ACTIVE | Noted: 2019-08-01

## 2022-03-19 PROBLEM — E87.6 HYPOKALEMIA: Status: ACTIVE | Noted: 2020-12-12

## 2022-03-19 PROBLEM — F10.939 ALCOHOL WITHDRAWAL (HCC): Status: ACTIVE | Noted: 2020-12-21

## 2022-03-19 PROBLEM — F10.10 EXCESSIVE DRINKING OF ALCOHOL: Status: ACTIVE | Noted: 2020-12-11

## 2022-03-19 PROBLEM — D64.9 ANEMIA: Status: ACTIVE | Noted: 2020-12-12

## 2022-03-19 PROBLEM — R41.82 AMS (ALTERED MENTAL STATUS): Status: ACTIVE | Noted: 2020-12-11

## 2022-03-19 PROBLEM — N39.0 UTI (URINARY TRACT INFECTION): Status: ACTIVE | Noted: 2020-12-12

## 2023-06-05 NOTE — PROGRESS NOTES
Rx Refill Note  Requested Prescriptions      No prescriptions requested or ordered in this encounter        Last office visit with prescribing clinician: 4/18/2023      Next office visit with prescribing clinician: 10/23/2023       Randi Raya (Jodi)  06/05/23, 10:49 EDT    TRANSFER - IN REPORT:    [facility]  being received from [address] [place] [address] ED (unit) for routine progression of care      Report consisted of patients Situation, Background, Assessment and   Recommendations(SBAR). Information from the following report(s) SBAR, Kardex, Intake/Output, MAR, Recent Results, Cardiac Rhythm NSR/ST and Dual Neuro Assessment was reviewed with the receiving nurse. Opportunity for questions and clarification was provided. Assessment completed upon patients arrival to unit and care assumed.

## 2023-08-15 PROBLEM — I47.20 VENTRICULAR TACHYCARDIA (HCC): Status: RESOLVED | Noted: 2023-08-15 | Resolved: 2023-08-15

## 2023-08-15 PROBLEM — D69.2 OTHER NONTHROMBOCYTOPENIC PURPURA (HCC): Status: ACTIVE | Noted: 2023-08-15

## 2023-08-15 PROBLEM — F10.21 HISTORY OF ALCOHOLISM (HCC): Status: ACTIVE | Noted: 2023-08-15

## 2023-08-15 PROBLEM — I47.20 VENTRICULAR TACHYCARDIA (HCC): Status: ACTIVE | Noted: 2023-08-15

## 2023-12-12 ENCOUNTER — HOSPITAL ENCOUNTER (INPATIENT)
Facility: HOSPITAL | Age: 78
LOS: 13 days | Discharge: NURSING, REHAB OR LONG-TERM CARE | DRG: 640 | End: 2023-12-26
Attending: EMERGENCY MEDICINE | Admitting: FAMILY MEDICINE
Payer: MEDICARE

## 2023-12-12 ENCOUNTER — APPOINTMENT (OUTPATIENT)
Facility: HOSPITAL | Age: 78
DRG: 640 | End: 2023-12-12
Payer: MEDICARE

## 2023-12-12 DIAGNOSIS — E87.1 HYPONATREMIA: Primary | ICD-10-CM

## 2023-12-12 LAB
ALBUMIN SERPL-MCNC: 3.2 G/DL (ref 3.5–5)
ALBUMIN/GLOB SERPL: 0.8 (ref 1.1–2.2)
ALP SERPL-CCNC: 88 U/L (ref 45–117)
ALT SERPL-CCNC: 31 U/L (ref 12–78)
ANION GAP SERPL CALC-SCNC: 11 MMOL/L (ref 5–15)
ANION GAP SERPL CALC-SCNC: 12 MMOL/L (ref 5–15)
APPEARANCE UR: CLEAR
AST SERPL-CCNC: 44 U/L (ref 15–37)
BACTERIA URNS QL MICRO: NEGATIVE /HPF
BASOPHILS # BLD: 0 K/UL (ref 0–0.1)
BASOPHILS NFR BLD: 0 % (ref 0–1)
BILIRUB SERPL-MCNC: 1.7 MG/DL (ref 0.2–1)
BILIRUB UR QL: NEGATIVE
BUN SERPL-MCNC: 7 MG/DL (ref 6–20)
BUN SERPL-MCNC: 8 MG/DL (ref 6–20)
BUN/CREAT SERPL: 11 (ref 12–20)
BUN/CREAT SERPL: 12 (ref 12–20)
CALCIUM SERPL-MCNC: 7.1 MG/DL (ref 8.5–10.1)
CALCIUM SERPL-MCNC: 8.2 MG/DL (ref 8.5–10.1)
CHLORIDE SERPL-SCNC: 82 MMOL/L (ref 97–108)
CHLORIDE SERPL-SCNC: 88 MMOL/L (ref 97–108)
CO2 SERPL-SCNC: 21 MMOL/L (ref 21–32)
CO2 SERPL-SCNC: 22 MMOL/L (ref 21–32)
COLOR UR: ABNORMAL
COMMENT:: NORMAL
CREAT SERPL-MCNC: 0.63 MG/DL (ref 0.7–1.3)
CREAT SERPL-MCNC: 0.69 MG/DL (ref 0.7–1.3)
CREAT UR-MCNC: 252 MG/DL
DIFFERENTIAL METHOD BLD: ABNORMAL
EKG ATRIAL RATE: 101 BPM
EKG DIAGNOSIS: NORMAL
EKG P AXIS: 44 DEGREES
EKG P-R INTERVAL: 168 MS
EKG Q-T INTERVAL: 408 MS
EKG QRS DURATION: 104 MS
EKG QTC CALCULATION (BAZETT): 529 MS
EKG R AXIS: -37 DEGREES
EKG T AXIS: 47 DEGREES
EKG VENTRICULAR RATE: 101 BPM
EOSINOPHIL # BLD: 0 K/UL (ref 0–0.4)
EOSINOPHIL NFR BLD: 0 % (ref 0–7)
EPITH CASTS URNS QL MICRO: ABNORMAL /LPF
ERYTHROCYTE [DISTWIDTH] IN BLOOD BY AUTOMATED COUNT: 12.9 % (ref 11.5–14.5)
GLOBULIN SER CALC-MCNC: 4.1 G/DL (ref 2–4)
GLUCOSE SERPL-MCNC: 90 MG/DL (ref 65–100)
GLUCOSE SERPL-MCNC: 94 MG/DL (ref 65–100)
GLUCOSE UR STRIP.AUTO-MCNC: NEGATIVE MG/DL
HCT VFR BLD AUTO: 35.5 % (ref 36.6–50.3)
HGB BLD-MCNC: 12.9 G/DL (ref 12.1–17)
HGB UR QL STRIP: NEGATIVE
IMM GRANULOCYTES # BLD AUTO: 0.1 K/UL (ref 0–0.04)
IMM GRANULOCYTES NFR BLD AUTO: 1 % (ref 0–0.5)
KETONES UR QL STRIP.AUTO: 40 MG/DL
LEUKOCYTE ESTERASE UR QL STRIP.AUTO: NEGATIVE
LYMPHOCYTES # BLD: 0.6 K/UL (ref 0.8–3.5)
LYMPHOCYTES NFR BLD: 6 % (ref 12–49)
MAGNESIUM SERPL-MCNC: 1.9 MG/DL (ref 1.6–2.4)
MCH RBC QN AUTO: 32.4 PG (ref 26–34)
MCHC RBC AUTO-ENTMCNC: 36.3 G/DL (ref 30–36.5)
MCV RBC AUTO: 89.2 FL (ref 80–99)
MONOCYTES # BLD: 1.2 K/UL (ref 0–1)
MONOCYTES NFR BLD: 12 % (ref 5–13)
NEUTS SEG # BLD: 7.8 K/UL (ref 1.8–8)
NEUTS SEG NFR BLD: 81 % (ref 32–75)
NITRITE UR QL STRIP.AUTO: NEGATIVE
NRBC # BLD: 0 K/UL (ref 0–0.01)
NRBC BLD-RTO: 0 PER 100 WBC
OSMOLALITY SERPL: 244 MOSM/KG H2O
OSMOLALITY UR: 506 MOSM/KG H2O
PH UR STRIP: 6 (ref 5–8)
PHOSPHATE SERPL-MCNC: 3.2 MG/DL (ref 2.6–4.7)
PLATELET # BLD AUTO: 85 K/UL (ref 150–400)
PMV BLD AUTO: 10.3 FL (ref 8.9–12.9)
POTASSIUM SERPL-SCNC: 3.5 MMOL/L (ref 3.5–5.1)
POTASSIUM SERPL-SCNC: 3.6 MMOL/L (ref 3.5–5.1)
POTASSIUM UR-SCNC: 52 MMOL/L
PROT SERPL-MCNC: 7.3 G/DL (ref 6.4–8.2)
PROT UR STRIP-MCNC: 30 MG/DL
RBC # BLD AUTO: 3.98 M/UL (ref 4.1–5.7)
RBC #/AREA URNS HPF: ABNORMAL /HPF (ref 0–5)
RBC MORPH BLD: ABNORMAL
SODIUM SERPL-SCNC: 116 MMOL/L (ref 136–145)
SODIUM SERPL-SCNC: 120 MMOL/L (ref 136–145)
SODIUM UR-SCNC: 15 MMOL/L
SP GR UR REFRACTOMETRY: 1.02 (ref 1–1.03)
SPECIMEN HOLD: NORMAL
SPECIMEN HOLD: NORMAL
UROBILINOGEN UR QL STRIP.AUTO: 1 EU/DL (ref 0.2–1)
WBC # BLD AUTO: 9.7 K/UL (ref 4.1–11.1)
WBC URNS QL MICRO: ABNORMAL /HPF (ref 0–4)

## 2023-12-12 PROCEDURE — 84100 ASSAY OF PHOSPHORUS: CPT

## 2023-12-12 PROCEDURE — G0378 HOSPITAL OBSERVATION PER HR: HCPCS

## 2023-12-12 PROCEDURE — 99285 EMERGENCY DEPT VISIT HI MDM: CPT

## 2023-12-12 PROCEDURE — 6360000004 HC RX CONTRAST MEDICATION: Performed by: RADIOLOGY

## 2023-12-12 PROCEDURE — 80053 COMPREHEN METABOLIC PANEL: CPT

## 2023-12-12 PROCEDURE — 74177 CT ABD & PELVIS W/CONTRAST: CPT

## 2023-12-12 PROCEDURE — 84300 ASSAY OF URINE SODIUM: CPT

## 2023-12-12 PROCEDURE — 83935 ASSAY OF URINE OSMOLALITY: CPT

## 2023-12-12 PROCEDURE — 81001 URINALYSIS AUTO W/SCOPE: CPT

## 2023-12-12 PROCEDURE — 36415 COLL VENOUS BLD VENIPUNCTURE: CPT

## 2023-12-12 PROCEDURE — 83735 ASSAY OF MAGNESIUM: CPT

## 2023-12-12 PROCEDURE — 93005 ELECTROCARDIOGRAM TRACING: CPT | Performed by: EMERGENCY MEDICINE

## 2023-12-12 PROCEDURE — 82570 ASSAY OF URINE CREATININE: CPT

## 2023-12-12 PROCEDURE — 80048 BASIC METABOLIC PNL TOTAL CA: CPT

## 2023-12-12 PROCEDURE — 83930 ASSAY OF BLOOD OSMOLALITY: CPT

## 2023-12-12 PROCEDURE — 96361 HYDRATE IV INFUSION ADD-ON: CPT

## 2023-12-12 PROCEDURE — 2580000003 HC RX 258: Performed by: NURSE PRACTITIONER

## 2023-12-12 PROCEDURE — 93005 ELECTROCARDIOGRAM TRACING: CPT | Performed by: FAMILY MEDICINE

## 2023-12-12 PROCEDURE — 71045 X-RAY EXAM CHEST 1 VIEW: CPT

## 2023-12-12 PROCEDURE — 84133 ASSAY OF URINE POTASSIUM: CPT

## 2023-12-12 PROCEDURE — 73521 X-RAY EXAM HIPS BI 2 VIEWS: CPT

## 2023-12-12 PROCEDURE — 6370000000 HC RX 637 (ALT 250 FOR IP): Performed by: NURSE PRACTITIONER

## 2023-12-12 PROCEDURE — 85025 COMPLETE CBC W/AUTO DIFF WBC: CPT

## 2023-12-12 RX ORDER — ONDANSETRON 4 MG/1
4 TABLET, ORALLY DISINTEGRATING ORAL EVERY 8 HOURS PRN
Status: DISCONTINUED | OUTPATIENT
Start: 2023-12-12 | End: 2023-12-26 | Stop reason: HOSPADM

## 2023-12-12 RX ORDER — SODIUM CHLORIDE 0.9 % (FLUSH) 0.9 %
5-40 SYRINGE (ML) INJECTION PRN
Status: DISCONTINUED | OUTPATIENT
Start: 2023-12-12 | End: 2023-12-26 | Stop reason: HOSPADM

## 2023-12-12 RX ORDER — LORAZEPAM 2 MG/ML
1 INJECTION INTRAMUSCULAR
Status: DISCONTINUED | OUTPATIENT
Start: 2023-12-12 | End: 2023-12-21 | Stop reason: ALTCHOICE

## 2023-12-12 RX ORDER — ENOXAPARIN SODIUM 100 MG/ML
40 INJECTION SUBCUTANEOUS DAILY
Status: DISCONTINUED | OUTPATIENT
Start: 2023-12-12 | End: 2023-12-12

## 2023-12-12 RX ORDER — SODIUM CHLORIDE 9 MG/ML
INJECTION, SOLUTION INTRAVENOUS CONTINUOUS
Status: DISCONTINUED | OUTPATIENT
Start: 2023-12-12 | End: 2023-12-13

## 2023-12-12 RX ORDER — POTASSIUM CHLORIDE 750 MG/1
40 TABLET, FILM COATED, EXTENDED RELEASE ORAL PRN
Status: DISCONTINUED | OUTPATIENT
Start: 2023-12-12 | End: 2023-12-26 | Stop reason: HOSPADM

## 2023-12-12 RX ORDER — POLYETHYLENE GLYCOL 3350 17 G/17G
17 POWDER, FOR SOLUTION ORAL DAILY PRN
Status: DISCONTINUED | OUTPATIENT
Start: 2023-12-12 | End: 2023-12-26 | Stop reason: HOSPADM

## 2023-12-12 RX ORDER — LORAZEPAM 2 MG/1
2 TABLET ORAL
Status: DISCONTINUED | OUTPATIENT
Start: 2023-12-12 | End: 2023-12-26 | Stop reason: HOSPADM

## 2023-12-12 RX ORDER — LANOLIN ALCOHOL/MO/W.PET/CERES
100 CREAM (GRAM) TOPICAL DAILY
Status: DISCONTINUED | OUTPATIENT
Start: 2023-12-12 | End: 2023-12-15 | Stop reason: ALTCHOICE

## 2023-12-12 RX ORDER — SODIUM CHLORIDE 0.9 % (FLUSH) 0.9 %
5-40 SYRINGE (ML) INJECTION EVERY 12 HOURS SCHEDULED
Status: DISCONTINUED | OUTPATIENT
Start: 2023-12-12 | End: 2023-12-26 | Stop reason: HOSPADM

## 2023-12-12 RX ORDER — LORAZEPAM 2 MG/ML
2 INJECTION INTRAMUSCULAR
Status: DISCONTINUED | OUTPATIENT
Start: 2023-12-12 | End: 2023-12-21 | Stop reason: ALTCHOICE

## 2023-12-12 RX ORDER — LORAZEPAM 2 MG/ML
3 INJECTION INTRAMUSCULAR
Status: DISCONTINUED | OUTPATIENT
Start: 2023-12-12 | End: 2023-12-21 | Stop reason: ALTCHOICE

## 2023-12-12 RX ORDER — LORAZEPAM 1 MG/1
1 TABLET ORAL
Status: DISCONTINUED | OUTPATIENT
Start: 2023-12-12 | End: 2023-12-26 | Stop reason: HOSPADM

## 2023-12-12 RX ORDER — SODIUM CHLORIDE 9 MG/ML
INJECTION, SOLUTION INTRAVENOUS PRN
Status: DISCONTINUED | OUTPATIENT
Start: 2023-12-12 | End: 2023-12-26 | Stop reason: HOSPADM

## 2023-12-12 RX ORDER — ONDANSETRON 2 MG/ML
4 INJECTION INTRAMUSCULAR; INTRAVENOUS EVERY 6 HOURS PRN
Status: DISCONTINUED | OUTPATIENT
Start: 2023-12-12 | End: 2023-12-26 | Stop reason: HOSPADM

## 2023-12-12 RX ORDER — 0.9 % SODIUM CHLORIDE 0.9 %
1000 INTRAVENOUS SOLUTION INTRAVENOUS ONCE
Status: COMPLETED | OUTPATIENT
Start: 2023-12-12 | End: 2023-12-12

## 2023-12-12 RX ORDER — ACETAMINOPHEN 325 MG/1
650 TABLET ORAL EVERY 6 HOURS PRN
Status: DISCONTINUED | OUTPATIENT
Start: 2023-12-12 | End: 2023-12-26 | Stop reason: HOSPADM

## 2023-12-12 RX ORDER — LORAZEPAM 2 MG/ML
4 INJECTION INTRAMUSCULAR
Status: DISCONTINUED | OUTPATIENT
Start: 2023-12-12 | End: 2023-12-21 | Stop reason: ALTCHOICE

## 2023-12-12 RX ORDER — POTASSIUM CHLORIDE 7.45 MG/ML
10 INJECTION INTRAVENOUS PRN
Status: DISCONTINUED | OUTPATIENT
Start: 2023-12-12 | End: 2023-12-26 | Stop reason: HOSPADM

## 2023-12-12 RX ORDER — MULTIVITAMIN WITH IRON
1 TABLET ORAL DAILY
Status: DISCONTINUED | OUTPATIENT
Start: 2023-12-12 | End: 2023-12-26 | Stop reason: HOSPADM

## 2023-12-12 RX ORDER — 0.9 % SODIUM CHLORIDE 0.9 %
500 INTRAVENOUS SOLUTION INTRAVENOUS ONCE
Status: DISCONTINUED | OUTPATIENT
Start: 2023-12-12 | End: 2023-12-12

## 2023-12-12 RX ORDER — LORAZEPAM 2 MG/1
4 TABLET ORAL
Status: DISCONTINUED | OUTPATIENT
Start: 2023-12-12 | End: 2023-12-26 | Stop reason: HOSPADM

## 2023-12-12 RX ORDER — ACETAMINOPHEN 650 MG/1
650 SUPPOSITORY RECTAL EVERY 6 HOURS PRN
Status: DISCONTINUED | OUTPATIENT
Start: 2023-12-12 | End: 2023-12-26 | Stop reason: HOSPADM

## 2023-12-12 RX ORDER — MAGNESIUM SULFATE IN WATER 40 MG/ML
2000 INJECTION, SOLUTION INTRAVENOUS PRN
Status: DISCONTINUED | OUTPATIENT
Start: 2023-12-12 | End: 2023-12-26 | Stop reason: HOSPADM

## 2023-12-12 RX ADMIN — SODIUM CHLORIDE: 9 INJECTION, SOLUTION INTRAVENOUS at 15:58

## 2023-12-12 RX ADMIN — Medication 100 MG: at 18:13

## 2023-12-12 RX ADMIN — Medication 30 ML: at 23:07

## 2023-12-12 RX ADMIN — IOPAMIDOL 100 ML: 755 INJECTION, SOLUTION INTRAVENOUS at 16:53

## 2023-12-12 RX ADMIN — METOPROLOL TARTRATE 12.5 MG: 25 TABLET, FILM COATED ORAL at 22:59

## 2023-12-12 RX ADMIN — THERA TABS 1 TABLET: TAB at 18:13

## 2023-12-12 RX ADMIN — SODIUM CHLORIDE 1000 ML: 9 INJECTION, SOLUTION INTRAVENOUS at 18:13

## 2023-12-12 ASSESSMENT — PAIN SCALES - GENERAL: PAINLEVEL_OUTOF10: 0

## 2023-12-12 NOTE — H&P
History and Physical    Date of Service:  12/12/2023  Primary Care Provider: Torito Hunter MD  Source of information: patient, spouse    Chief Complaint: Fall and Extremity Weakness      History of Presenting Illness:   Isaiah Mistry is a 66 y.o. male with pmhx of htn and etoh abuse who presents with bilateral lower extremity weakness. Patient states he fell two nights ago at 3am and has had trouble bearing weight on his legs. He states he has no pain except in his lower back and does attests to some numbness in BLE but still has sensation. Workup in ED showed wbc 9.7k, Hg 12.9, plt 85, , K 3.5, BUN 8, Cr. 0.69, Gene 8.2, Albumin 3.2, AST 44, Tbili 1.7, glucose 90.  UA, ct abd/pelvis, pelvis xray pending. Will admit for hyponatremia. Attests to drinking 6-8 PBR daily, lower back pain (nonreproducible) with radiation into the front lower abdomen, and some diarrhea. Denies fever chest pain shortness of breath, n/v     REVIEW OF SYSTEMS:  Pertinent items are noted in the History of Present Illness. Past Medical History:   Diagnosis Date    Cancer ([address])      2008    Hypertension     Other ill-defined conditions(839.96) 9210      Past Surgical History:   Procedure Laterality Date    COLONOSCOPY  1/6/2011         OTHER SURGICAL HISTORY       2008     Prior to Admission medications    Medication Sig Start Date End Date Taking? Authorizing Provider   aspirin 81 MG chewable tablet 81 mg by Enteral route daily 1/13/21   Automatic Reconciliation, Ar   metoprolol tartrate (LOPRESSOR) 25 MG tablet TAKE 1/2 TABLET BY MOUTH TWICE A DAY 3/10/23   Automatic Reconciliation, Ar     Allergies   Allergen Reactions    Ace Inhibitors Cough    Thiazide-Type Diuretics Other (See Comments)     hyponatremia      Family History   Problem Relation Age of Onset    Hypertension Father       Social History:  reports that he has never smoked. He has never used smokeless tobacco. He reports current alcohol use.  He decompensation if discharged from the emergency department. Complex decision making was performed, which includes reviewing the patient's available past medical records, laboratory results, and imaging studies.    Principal Problem:    Hyponatremia  Resolved Problems:    * No resolved hospital problems. *      Plan:     HTN  Continue home metoprolol and hold asa due to thrombocytopenia    Hyponatremia  -1L NS bolus  -trend bmp, ca, mg, phos q12  -UA, urine studies pending    Etoh abuse  -CIWA protocol with ativan  -daily folate,thiamine and MVI    Hyperbilirubinemia  -trend cmp  -ct abd/pelvis pending    Thrombocytopenia  -plt 85 on admission, historically has had low plt  -hold asa and lovenox        DIET: ADULT DIET; Regular   ISOLATION PRECAUTIONS: No active isolations  CODE STATUS: [unfilled]    DVT PROPHYLAXIS: SCD's or Sequential Compression Device  FUNCTIONAL STATUS PRIOR TO HOSPITALIZATION: Maximum assistance  Ambulatory status/function: Severely impaired  EARLY MOBILITY ASSESSMENT: 2-max assistance  ANTICIPATED DISCHARGE: 2-3 days  ANTICIPATED DISPOSITION: Rehab  EMERGENCY CONTACT/SURROGATE DECISION MAKER: nia joya, spouse [phone]    CRITICAL CARE WAS PERFORMED FOR THIS ENCOUNTER: 30 to 74 minutes      Signed By: MARTINE Franco - CNP     December 12, 2023         Please note that this dictation may have been completed with Dragon, the computer voice recognition software.  Quite often unanticipated grammatical, syntax, homophones, and other interpretive errors are inadvertently transcribed by the computer software.  Please disregard these errors.  Please excuse any errors that have escaped final proofreading.

## 2023-12-12 NOTE — ED NOTES
Patient confused and ripped out Katarina Corley. MD paged for new orders.        Bernardino Campos RN  12/12/23 2240

## 2023-12-12 NOTE — ED TRIAGE NOTES
Pt arrives via EMS w/ c/o increased bilateral leg weakness and multiple falls over the last 2 days. Pt denies hitting head or LOC. Pt states he slid off bed landing on bottom. Denies chest pain, SOB, lower extremity swelling.

## 2023-12-12 NOTE — ED PROVIDER NOTES
[facility] EMERGENCY DEP  EMERGENCY DEPARTMENT ENCOUNTER      Pt Name: Terri Ortez  MRN: [id number]  [address] [facility] [place] 1945  Date of evaluation: 12/12/2023  Provider: Darwin French MD    CHIEF COMPLAINT       Chief Complaint   Patient presents with    Fall    Extremity Weakness         HISTORY OF PRESENT ILLNESS    HPI  This is a 70-year-old male with a history of essential hypertension and some alcohol abuse. He states over the last few days he has been having a lot of diarrhea. He has not changed any medications and there is been no nausea or vomiting. Has had no fever or chill, cough, congestion and no shortness of breath or abdominal pain. Patient voices no other acute complaints other than having several falls in the last few days. He states his legs get weak and just will not hold him up and he is fallen several times but has not injured himself. He voices no other acute complaints. Review of External Medical Records:     Nursing Notes were reviewed. REVIEW OF SYSTEMS       Review of Systems   Constitutional:  Negative for activity change, chills, fatigue and fever. HENT:  Negative for congestion, ear pain, rhinorrhea, sinus pressure, sinus pain, sore throat and trouble swallowing. Eyes: Negative. Respiratory:  Negative for cough, chest tightness, shortness of breath, wheezing and stridor. Cardiovascular:  Negative for chest pain, palpitations and leg swelling. Gastrointestinal:  Positive for diarrhea. Negative for abdominal pain, blood in stool, nausea and vomiting. Endocrine: Negative. Genitourinary:  Negative for decreased urine volume, difficulty urinating, flank pain, frequency and hematuria. Musculoskeletal:  Negative for back pain, joint swelling and neck pain. Skin:  Negative for color change, pallor and rash. Neurological:  Positive for weakness (Legs giving way). Negative for dizziness, syncope, speech difficulty, numbness and headaches.    Psychiatric/Behavioral:

## 2023-12-13 LAB
ANION GAP SERPL CALC-SCNC: 12 MMOL/L (ref 5–15)
ANION GAP SERPL CALC-SCNC: 9 MMOL/L (ref 5–15)
ANION GAP SERPL CALC-SCNC: 9 MMOL/L (ref 5–15)
BASOPHILS # BLD: 0 K/UL (ref 0–0.1)
BASOPHILS NFR BLD: 0 % (ref 0–1)
BUN SERPL-MCNC: 7 MG/DL (ref 6–20)
BUN SERPL-MCNC: 7 MG/DL (ref 6–20)
BUN SERPL-MCNC: 8 MG/DL (ref 6–20)
BUN/CREAT SERPL: 11 (ref 12–20)
BUN/CREAT SERPL: 13 (ref 12–20)
BUN/CREAT SERPL: 14 (ref 12–20)
CALCIUM SERPL-MCNC: 7.1 MG/DL (ref 8.5–10.1)
CALCIUM SERPL-MCNC: 7.3 MG/DL (ref 8.5–10.1)
CALCIUM SERPL-MCNC: 7.4 MG/DL (ref 8.5–10.1)
CHLORIDE SERPL-SCNC: 88 MMOL/L (ref 97–108)
CHLORIDE SERPL-SCNC: 90 MMOL/L (ref 97–108)
CHLORIDE SERPL-SCNC: 93 MMOL/L (ref 97–108)
CO2 SERPL-SCNC: 18 MMOL/L (ref 21–32)
CO2 SERPL-SCNC: 21 MMOL/L (ref 21–32)
CO2 SERPL-SCNC: 23 MMOL/L (ref 21–32)
CREAT SERPL-MCNC: 0.52 MG/DL (ref 0.7–1.3)
CREAT SERPL-MCNC: 0.59 MG/DL (ref 0.7–1.3)
CREAT SERPL-MCNC: 0.61 MG/DL (ref 0.7–1.3)
DIFFERENTIAL METHOD BLD: ABNORMAL
EKG DIAGNOSIS: NORMAL
EKG Q-T INTERVAL: 314 MS
EKG QRS DURATION: 86 MS
EKG QTC CALCULATION (BAZETT): 424 MS
EKG R AXIS: -31 DEGREES
EKG T AXIS: 46 DEGREES
EKG VENTRICULAR RATE: 110 BPM
EOSINOPHIL # BLD: 0.1 K/UL (ref 0–0.4)
EOSINOPHIL NFR BLD: 2 % (ref 0–7)
ERYTHROCYTE [DISTWIDTH] IN BLOOD BY AUTOMATED COUNT: 12.9 % (ref 11.5–14.5)
GLUCOSE BLD STRIP.AUTO-MCNC: 99 MG/DL (ref 65–117)
GLUCOSE SERPL-MCNC: 87 MG/DL (ref 65–100)
GLUCOSE SERPL-MCNC: 87 MG/DL (ref 65–100)
GLUCOSE SERPL-MCNC: 91 MG/DL (ref 65–100)
HCT VFR BLD AUTO: 30.2 % (ref 36.6–50.3)
HGB BLD-MCNC: 10.9 G/DL (ref 12.1–17)
IMM GRANULOCYTES # BLD AUTO: 0.1 K/UL (ref 0–0.04)
IMM GRANULOCYTES NFR BLD AUTO: 1 % (ref 0–0.5)
LYMPHOCYTES # BLD: 1 K/UL (ref 0.8–3.5)
LYMPHOCYTES NFR BLD: 14 % (ref 12–49)
MAGNESIUM SERPL-MCNC: 1.8 MG/DL (ref 1.6–2.4)
MCH RBC QN AUTO: 32.7 PG (ref 26–34)
MCHC RBC AUTO-ENTMCNC: 36.1 G/DL (ref 30–36.5)
MCV RBC AUTO: 90.7 FL (ref 80–99)
MONOCYTES # BLD: 1.2 K/UL (ref 0–1)
MONOCYTES NFR BLD: 16 % (ref 5–13)
NEUTS SEG # BLD: 4.9 K/UL (ref 1.8–8)
NEUTS SEG NFR BLD: 67 % (ref 32–75)
NRBC # BLD: 0 K/UL (ref 0–0.01)
NRBC BLD-RTO: 0 PER 100 WBC
PHOSPHATE SERPL-MCNC: 2.6 MG/DL (ref 2.6–4.7)
PLATELET # BLD AUTO: 76 K/UL (ref 150–400)
PMV BLD AUTO: 9.8 FL (ref 8.9–12.9)
POTASSIUM SERPL-SCNC: 3.1 MMOL/L (ref 3.5–5.1)
POTASSIUM SERPL-SCNC: 3.6 MMOL/L (ref 3.5–5.1)
POTASSIUM SERPL-SCNC: 4.3 MMOL/L (ref 3.5–5.1)
RBC # BLD AUTO: 3.33 M/UL (ref 4.1–5.7)
RBC MORPH BLD: ABNORMAL
SERVICE CMNT-IMP: NORMAL
SODIUM SERPL-SCNC: 120 MMOL/L (ref 136–145)
SODIUM SERPL-SCNC: 120 MMOL/L (ref 136–145)
SODIUM SERPL-SCNC: 123 MMOL/L (ref 136–145)
WBC # BLD AUTO: 7.3 K/UL (ref 4.1–11.1)

## 2023-12-13 PROCEDURE — 2580000003 HC RX 258: Performed by: INTERNAL MEDICINE

## 2023-12-13 PROCEDURE — 1100000000 HC RM PRIVATE

## 2023-12-13 PROCEDURE — 85025 COMPLETE CBC W/AUTO DIFF WBC: CPT

## 2023-12-13 PROCEDURE — 80048 BASIC METABOLIC PNL TOTAL CA: CPT

## 2023-12-13 PROCEDURE — 6360000002 HC RX W HCPCS: Performed by: NURSE PRACTITIONER

## 2023-12-13 PROCEDURE — 6370000000 HC RX 637 (ALT 250 FOR IP): Performed by: NURSE PRACTITIONER

## 2023-12-13 PROCEDURE — 83735 ASSAY OF MAGNESIUM: CPT

## 2023-12-13 PROCEDURE — 82962 GLUCOSE BLOOD TEST: CPT

## 2023-12-13 PROCEDURE — 84100 ASSAY OF PHOSPHORUS: CPT

## 2023-12-13 PROCEDURE — 2580000003 HC RX 258: Performed by: NURSE PRACTITIONER

## 2023-12-13 PROCEDURE — 96374 THER/PROPH/DIAG INJ IV PUSH: CPT

## 2023-12-13 RX ORDER — SODIUM CHLORIDE, SODIUM LACTATE, POTASSIUM CHLORIDE, CALCIUM CHLORIDE 600; 310; 30; 20 MG/100ML; MG/100ML; MG/100ML; MG/100ML
INJECTION, SOLUTION INTRAVENOUS CONTINUOUS
Status: DISCONTINUED | OUTPATIENT
Start: 2023-12-13 | End: 2023-12-14

## 2023-12-13 RX ADMIN — LORAZEPAM 3 MG: 2 INJECTION INTRAMUSCULAR; INTRAVENOUS at 13:12

## 2023-12-13 RX ADMIN — LORAZEPAM 2 MG: 2 INJECTION INTRAMUSCULAR; INTRAVENOUS at 21:03

## 2023-12-13 RX ADMIN — LORAZEPAM 2 MG: 2 INJECTION INTRAMUSCULAR; INTRAVENOUS at 22:34

## 2023-12-13 RX ADMIN — LORAZEPAM 1 MG: 2 INJECTION INTRAMUSCULAR at 19:02

## 2023-12-13 RX ADMIN — THERA TABS 1 TABLET: TAB at 08:27

## 2023-12-13 RX ADMIN — Medication 30 ML: at 07:21

## 2023-12-13 RX ADMIN — METOPROLOL TARTRATE 12.5 MG: 25 TABLET, FILM COATED ORAL at 08:26

## 2023-12-13 RX ADMIN — SODIUM CHLORIDE, POTASSIUM CHLORIDE, SODIUM LACTATE AND CALCIUM CHLORIDE: 600; 310; 30; 20 INJECTION, SOLUTION INTRAVENOUS at 08:26

## 2023-12-13 RX ADMIN — Medication 100 MG: at 08:27

## 2023-12-13 ASSESSMENT — PAIN DESCRIPTION - ORIENTATION: ORIENTATION: LOWER

## 2023-12-13 ASSESSMENT — PAIN SCALES - GENERAL
PAINLEVEL_OUTOF10: 0
PAINLEVEL_OUTOF10: 0
PAINLEVEL_OUTOF10: 5

## 2023-12-13 ASSESSMENT — PAIN DESCRIPTION - DESCRIPTORS: DESCRIPTORS: ACHING

## 2023-12-13 ASSESSMENT — PAIN - FUNCTIONAL ASSESSMENT: PAIN_FUNCTIONAL_ASSESSMENT: PREVENTS OR INTERFERES SOME ACTIVE ACTIVITIES AND ADLS

## 2023-12-13 ASSESSMENT — PAIN DESCRIPTION - LOCATION: LOCATION: BACK

## 2023-12-13 NOTE — ED NOTES
The PCT that was sitting with the pt came and told me that the pt took out his IV. This nurse and the PCT changed the pt's bedsheet, blankets and gown and wiped the blood off of the pt. Pt alert and oriented x4 and said that he \"did it in his sleep\".  New IV in place     Magdalena Anthony  12/13/23 0206

## 2023-12-13 NOTE — ED NOTES
Verbal shift change report given to Michelle RN (oncoming nurse) by Fabrizio Díaz RN (offgoing nurse). Report included the following information Nurse Handoff Report, ED Encounter Summary, ED SBAR, Adult Overview, Intake/Output, MAR, and Recent Results.         Miguel Burton RN  12/13/23 0669

## 2023-12-13 NOTE — CONSULTS
[address] [facility] [place], MD                [facility] NOTE     Patient: Hermila Brooks MRN: [id number]  PCP: Mayo Mcneil MD   :     1945  Age:   66 y.o. Sex:  male      Referring physician: Rohith Nolan MD  Reason for consultation: 66 y.o. male with Hyponatremia [T13.2] complicated by MOHAN   Admission Date: 2023  3:22 PM  LOS: 0 days     DISCUSSION / PLAN :      Assessment:  Hyponatremia due to binge drinking and volume depletion in setting of diarrhea and poor po intake  -improving with IVF. Off IVF currently    Hypokalemia  ETOH abuse  HTN-controlled  Thrombocytopenia      Plan:  Resume IVF, change to LR at 75 ml/hr  Regular diet, protein supplements  F/u BMP BID  Replete phos, Mg and K prn           Active Problems / Assessment AAActive  :   Principal Problem:    Hyponatremia  Resolved Problems:    * No resolved hospital problems. *       Subjective:   HPI: Hermila Brooks is a 66 y.o. male who has been admitted to the hospital for weakness, fall and diarrhea. Labs showed Na 116. Was given NS, Na 123 last night. IVF stopped. Na 120 this am. Renal function nl, Urine with protein and ketone, urine osm 506, urine Na 15. CO2 was low but has improved  He drinks 6 beers a day. Has had diarrhea for several days, no N/V. Low appetite. Has fallen no headache. Was confused in ER and has required sitter. He is oriented to place. Past Medical Hx:   Past Medical History:   Diagnosis Date    Cancer ([address])          Hypertension     Other ill-defined conditions(989.89) 1960        Past Surgical Hx:     Past Surgical History:   Procedure Laterality Date    COLONOSCOPY  2011         OTHER SURGICAL HISTORY              Medications:  Prior to Admission medications    Medication Sig Start Date End Date Taking?  Authorizing Provider   aspirin 81 MG chewable tablet 81 mg by Enteral route daily 21   Automatic Reconciliation, Ar

## 2023-12-13 NOTE — CARE COORDINATION
12/13/23 9798   Service Assessment   Patient Orientation Alert and Oriented   Cognition Alert   History Provided By Patient;Significant Other   Primary Caregiver Self   Accompanied By/Relationship spouse [address] Spouse/Significant Other   Patient's Healthcare Decision Maker is: Legal Next of Kin   PCP Verified by CM Yes   Last Visit to PCP Within last 3 months   Prior Functional Level Independent in ADLs/IADLs   Can patient return to prior living arrangement   (TBD)   Ability to make needs known: Good   Family able to assist with home care needs: Yes   Would you like for me to discuss the discharge plan with any other family members/significant others, and if so, who? Yes  (spouse Nguyen Archer [phone])   Financial Resources Medicare   Social/Functional History   Lives With Spouse   Type of [address] [place] [address] Dr One level   [address] to enter with rails   Entrance Stairs - Number of Steps 3   Entrance Stairs - Rails Both   ADL Assistance Independent   Ambulation Assistance Independent   Active  Yes   Discharge Planning   History of falls? 1       EMR reviewed noted history of hypotension, ETOH, and falls. Consult received for possible rehab. Met w/ patient introduced to role of CM. Patient is A/O and provided history - during visit spouse arrived. Mr. Mireille Valencia acknowledged treated at rehab in [facility] for 30 days. Spouse clarified was for ETOH and was this year. Previous rehab was [facility] 3 years ago patient had history of falls. Mr. Mireille Valencia states currently having problem w/ his back related to falls. Discussed consult patient states will wait for recommendations is receptive to [facility]. CM Department will follow for needs. Medicare Outpatient Observation Notice (MOON)/ [place] Outpatient Observation Notice (Kathleen Masker) provided to patient with verbal explanation of the notice. Time allotted for questions regarding the notice.   Patient /representative provided a

## 2023-12-13 NOTE — PROGRESS NOTES
Consulted for hyponatremia, patient with Na 116, presented with LE weakness. H/o alcohol use. Was given NS bolus and currently on nS at 75 ml/hr. Will repeat BMP stat.

## 2023-12-13 NOTE — PROGRESS NOTES
Hospitalist Progress Note  MARTINE Cortez NP  Answering service: [phone] OR [phone] from in house phone        Date of Service:  2023  NAME:  Mateo Burk  :  1945  MRN:  [id number]      Admission Summary:   Per H&P, Mateo Burk is a 66 y.o. male with pmhx of htn and etoh abuse who presents with bilateral lower extremity weakness. Patient states he fell two nights ago at 3am and has had trouble bearing weight on his legs. He states he has no pain except in his lower back and does attests to some numbness in BLE but still has sensation. Workup in ED showed wbc 9.7k, Hg 12.9, plt 85, , K 3.5, BUN 8, Cr. 0.69, Gene 8.2, Albumin 3.2, AST 44, Tbili 1.7, glucose 90.  UA, ct abd/pelvis, pelvis xray pending. Will admit for hyponatremia. Attests to drinking 6-8 PBR daily, lower back pain (nonreproducible) with radiation into the front lower abdomen, and some diarrhea. Denies fever chest pain shortness of breath, n/v       Interval history / Subjective:   Saw the patient this morning on rounds. Noted hyponatremia natremia. This morning patient telling me feeling better, tells me he is down to about 2 beers per day.   Noted CIWA scores unremarkable     Assessment & Plan:         HTN  Blood pressure currently controlled  Continuing metoprolol     Hyponatremia  Sodium was 116 on presentation, was up to 123 yesterday evening however back down to 120 this morning  I did discuss the case with nephrology, continuing IV hydration  Nephrology following, appreciate recommendations     AUD  No symptoms of withdrawal currently  CIWA protocols  As needed benzodiazepines     Hyperbilirubinemia  Will continue to monitor     Thrombocytopenia  Platelets 85 at presentation  76 this morning  Will continue to monitor          Code status: Full  Prophylaxis: SCD  Care Plan discussed with: Patient, nurse,

## 2023-12-13 NOTE — ED NOTES
RN to call patient wife when patient gets a room upstairs. Phone number is patient's emergency contact.       Ifrah Decker RN  12/12/23 2024

## 2023-12-14 LAB
ALBUMIN SERPL-MCNC: 2.4 G/DL (ref 3.5–5)
ALBUMIN/GLOB SERPL: 0.6 (ref 1.1–2.2)
ALP SERPL-CCNC: 67 U/L (ref 45–117)
ALT SERPL-CCNC: 25 U/L (ref 12–78)
ANION GAP SERPL CALC-SCNC: 10 MMOL/L (ref 5–15)
ANION GAP SERPL CALC-SCNC: 10 MMOL/L (ref 5–15)
ARTERIAL PATENCY WRIST A: YES
AST SERPL-CCNC: 36 U/L (ref 15–37)
BASE DEFICIT BLDA-SCNC: 4 MMOL/L
BASOPHILS # BLD: 0.1 K/UL (ref 0–0.1)
BASOPHILS NFR BLD: 1 % (ref 0–1)
BDY SITE: ABNORMAL
BILIRUB SERPL-MCNC: 1 MG/DL (ref 0.2–1)
BUN SERPL-MCNC: 7 MG/DL (ref 6–20)
BUN SERPL-MCNC: 7 MG/DL (ref 6–20)
BUN/CREAT SERPL: 13 (ref 12–20)
BUN/CREAT SERPL: 14 (ref 12–20)
CALCIUM SERPL-MCNC: 7.5 MG/DL (ref 8.5–10.1)
CALCIUM SERPL-MCNC: 7.5 MG/DL (ref 8.5–10.1)
CHLORIDE SERPL-SCNC: 91 MMOL/L (ref 97–108)
CHLORIDE SERPL-SCNC: 92 MMOL/L (ref 97–108)
CO2 SERPL-SCNC: 20 MMOL/L (ref 21–32)
CO2 SERPL-SCNC: 21 MMOL/L (ref 21–32)
CREAT SERPL-MCNC: 0.51 MG/DL (ref 0.7–1.3)
CREAT SERPL-MCNC: 0.53 MG/DL (ref 0.7–1.3)
DIFFERENTIAL METHOD BLD: ABNORMAL
EKG ATRIAL RATE: 75 BPM
EKG DIAGNOSIS: NORMAL
EKG Q-T INTERVAL: 358 MS
EKG QRS DURATION: 104 MS
EKG QTC CALCULATION (BAZETT): 501 MS
EKG R AXIS: -29 DEGREES
EKG T AXIS: 26 DEGREES
EKG VENTRICULAR RATE: 118 BPM
EOSINOPHIL # BLD: 0.3 K/UL (ref 0–0.4)
EOSINOPHIL NFR BLD: 4 % (ref 0–7)
ERYTHROCYTE [DISTWIDTH] IN BLOOD BY AUTOMATED COUNT: 13.1 % (ref 11.5–14.5)
GAS FLOW.O2 O2 DELIVERY SYS: 2 L/MIN
GLOBULIN SER CALC-MCNC: 3.8 G/DL (ref 2–4)
GLUCOSE BLD STRIP.AUTO-MCNC: 104 MG/DL (ref 65–117)
GLUCOSE SERPL-MCNC: 92 MG/DL (ref 65–100)
GLUCOSE SERPL-MCNC: 93 MG/DL (ref 65–100)
HCO3 BLDA-SCNC: 21 MMOL/L (ref 22–26)
HCT VFR BLD AUTO: 31 % (ref 36.6–50.3)
HGB BLD-MCNC: 11.1 G/DL (ref 12.1–17)
IMM GRANULOCYTES # BLD AUTO: 0.1 K/UL (ref 0–0.04)
IMM GRANULOCYTES NFR BLD AUTO: 1 % (ref 0–0.5)
LYMPHOCYTES # BLD: 1.3 K/UL (ref 0.8–3.5)
LYMPHOCYTES NFR BLD: 17 % (ref 12–49)
MAGNESIUM SERPL-MCNC: 1.8 MG/DL (ref 1.6–2.4)
MCH RBC QN AUTO: 32.3 PG (ref 26–34)
MCHC RBC AUTO-ENTMCNC: 35.8 G/DL (ref 30–36.5)
MCV RBC AUTO: 90.1 FL (ref 80–99)
MONOCYTES # BLD: 1.4 K/UL (ref 0–1)
MONOCYTES NFR BLD: 18 % (ref 5–13)
NEUTS SEG # BLD: 4.7 K/UL (ref 1.8–8)
NEUTS SEG NFR BLD: 59 % (ref 32–75)
NRBC # BLD: 0 K/UL (ref 0–0.01)
NRBC BLD-RTO: 0 PER 100 WBC
PCO2 BLDA: 38 MMHG (ref 35–45)
PH BLDA: 7.36 (ref 7.35–7.45)
PLATELET # BLD AUTO: 93 K/UL (ref 150–400)
PMV BLD AUTO: 9.3 FL (ref 8.9–12.9)
PO2 BLDA: 107 MMHG (ref 80–100)
POTASSIUM SERPL-SCNC: 3.2 MMOL/L (ref 3.5–5.1)
POTASSIUM SERPL-SCNC: 3.6 MMOL/L (ref 3.5–5.1)
PROT SERPL-MCNC: 6.2 G/DL (ref 6.4–8.2)
RBC # BLD AUTO: 3.44 M/UL (ref 4.1–5.7)
RBC MORPH BLD: ABNORMAL
SAO2 % BLD: 98 % (ref 92–97)
SAO2% DEVICE SAO2% SENSOR NAME: ABNORMAL
SERVICE CMNT-IMP: NORMAL
SODIUM SERPL-SCNC: 121 MMOL/L (ref 136–145)
SODIUM SERPL-SCNC: 123 MMOL/L (ref 136–145)
SPECIMEN SITE: ABNORMAL
T4 FREE SERPL-MCNC: 1.2 NG/DL (ref 0.8–1.5)
TSH SERPL DL<=0.05 MIU/L-ACNC: 1.43 UIU/ML (ref 0.36–3.74)
WBC # BLD AUTO: 7.9 K/UL (ref 4.1–11.1)

## 2023-12-14 PROCEDURE — 2580000003 HC RX 258

## 2023-12-14 PROCEDURE — 36415 COLL VENOUS BLD VENIPUNCTURE: CPT

## 2023-12-14 PROCEDURE — 2060000000 HC ICU INTERMEDIATE R&B

## 2023-12-14 PROCEDURE — 2500000003 HC RX 250 WO HCPCS

## 2023-12-14 PROCEDURE — 93005 ELECTROCARDIOGRAM TRACING: CPT

## 2023-12-14 PROCEDURE — 83735 ASSAY OF MAGNESIUM: CPT

## 2023-12-14 PROCEDURE — 2580000003 HC RX 258: Performed by: NURSE PRACTITIONER

## 2023-12-14 PROCEDURE — 82803 BLOOD GASES ANY COMBINATION: CPT

## 2023-12-14 PROCEDURE — 6360000002 HC RX W HCPCS: Performed by: PHYSICIAN ASSISTANT

## 2023-12-14 PROCEDURE — 6360000002 HC RX W HCPCS: Performed by: NURSE PRACTITIONER

## 2023-12-14 PROCEDURE — 84443 ASSAY THYROID STIM HORMONE: CPT

## 2023-12-14 PROCEDURE — 6360000002 HC RX W HCPCS: Performed by: INTERNAL MEDICINE

## 2023-12-14 PROCEDURE — 80053 COMPREHEN METABOLIC PANEL: CPT

## 2023-12-14 PROCEDURE — 82962 GLUCOSE BLOOD TEST: CPT

## 2023-12-14 PROCEDURE — 85025 COMPLETE CBC W/AUTO DIFF WBC: CPT

## 2023-12-14 PROCEDURE — 84439 ASSAY OF FREE THYROXINE: CPT

## 2023-12-14 PROCEDURE — 36600 WITHDRAWAL OF ARTERIAL BLOOD: CPT

## 2023-12-14 RX ORDER — MAGNESIUM SULFATE 1 G/100ML
1000 INJECTION INTRAVENOUS ONCE
Status: COMPLETED | OUTPATIENT
Start: 2023-12-14 | End: 2023-12-14

## 2023-12-14 RX ORDER — ENOXAPARIN SODIUM 100 MG/ML
1 INJECTION SUBCUTANEOUS 2 TIMES DAILY
Status: DISCONTINUED | OUTPATIENT
Start: 2023-12-14 | End: 2023-12-15

## 2023-12-14 RX ORDER — POTASSIUM CHLORIDE 7.45 MG/ML
10 INJECTION INTRAVENOUS
Status: DISCONTINUED | OUTPATIENT
Start: 2023-12-14 | End: 2023-12-14

## 2023-12-14 RX ORDER — SODIUM CHLORIDE AND POTASSIUM CHLORIDE 150; 900 MG/100ML; MG/100ML
INJECTION, SOLUTION INTRAVENOUS CONTINUOUS
Status: DISCONTINUED | OUTPATIENT
Start: 2023-12-14 | End: 2023-12-15

## 2023-12-14 RX ORDER — DILTIAZEM HYDROCHLORIDE 5 MG/ML
5 INJECTION INTRAVENOUS ONCE
Status: COMPLETED | OUTPATIENT
Start: 2023-12-14 | End: 2023-12-14

## 2023-12-14 RX ADMIN — MAGNESIUM SULFATE HEPTAHYDRATE 1000 MG: 1 INJECTION, SOLUTION INTRAVENOUS at 12:07

## 2023-12-14 RX ADMIN — POTASSIUM CHLORIDE AND SODIUM CHLORIDE: 900; 150 INJECTION, SOLUTION INTRAVENOUS at 12:06

## 2023-12-14 RX ADMIN — ENOXAPARIN SODIUM 80 MG: 100 INJECTION SUBCUTANEOUS at 21:16

## 2023-12-14 RX ADMIN — Medication 10 ML: at 21:20

## 2023-12-14 RX ADMIN — Medication 10 ML: at 12:11

## 2023-12-14 RX ADMIN — Medication 10 ML: at 12:12

## 2023-12-14 RX ADMIN — DILTIAZEM HYDROCHLORIDE 2.5 MG/HR: 5 INJECTION, SOLUTION INTRAVENOUS at 05:27

## 2023-12-14 RX ADMIN — LORAZEPAM 1 MG: 2 INJECTION INTRAMUSCULAR at 15:48

## 2023-12-14 RX ADMIN — DILTIAZEM HYDROCHLORIDE 10 MG/HR: 5 INJECTION, SOLUTION INTRAVENOUS at 20:31

## 2023-12-14 RX ADMIN — ENOXAPARIN SODIUM 80 MG: 100 INJECTION SUBCUTANEOUS at 12:15

## 2023-12-14 RX ADMIN — DILTIAZEM HYDROCHLORIDE 5 MG: 5 INJECTION, SOLUTION INTRAVENOUS at 03:15

## 2023-12-14 RX ADMIN — Medication 10 ML: at 00:05

## 2023-12-14 ASSESSMENT — PAIN SCALES - WONG BAKER: WONGBAKER_NUMERICALRESPONSE: 0

## 2023-12-14 ASSESSMENT — PAIN SCALES - GENERAL
PAINLEVEL_OUTOF10: 0
PAINLEVEL_OUTOF10: 0

## 2023-12-14 NOTE — ED NOTES
Respiratory called for ABG and Bi-PAP start. Stated that she'd come. Pt snoring, placed on 4L NC, O2 sat ranging from 91-98% on room air. 0145 - RT at bedside for ABG.

## 2023-12-14 NOTE — ED NOTES
Rn tries to provided metoprolol but pt is unable to follow commands. Md notified.       Sylvester Brush RN  12/13/23 2011

## 2023-12-14 NOTE — PROGRESS NOTES
0015: CMU notified nurse that pt HR was in 150s. Notified NP Perez. 0050: Pt sustaining 120s-150s Afib RvR. Notified NP.    0115: Pt still sustaining, NP at bedside. EKG done. NP ordered transfer to [facility] and ABG    0135. Report called to Carmen.  RT took ABG    0155: Pt transferred to Room 414 IMCU

## 2023-12-14 NOTE — PLAN OF CARE
Problem: Safety - Adult  Goal: Free from fall injury  Outcome: Progressing     Problem: Discharge Planning  Goal: Discharge to home or other facility with appropriate resources  Outcome: Progressing     Problem: Confusion  Goal: Confusion, delirium, dementia, or psychosis is improved or at baseline  Description: INTERVENTIONS:  1. Assess for possible contributors to thought disturbance, including medications, impaired vision or hearing, underlying metabolic abnormalities, dehydration, psychiatric diagnoses, and notify attending LIP  2. [place] high risk fall precautions, as indicated  3. Provide frequent short contacts to provide reality reorientation, refocusing and direction  4. Decrease environmental stimuli, including noise as appropriate  5. Monitor and intervene to maintain adequate nutrition, hydration, elimination, sleep and activity  6. If unable to ensure safety without constant attention obtain sitter and review sitter guidelines with assigned personnel  7. Initiate Psychosocial CNS and Spiritual Care consult, as indicated  Outcome: Progressing     Problem: Skin/Tissue Integrity  Goal: Absence of new skin breakdown  Description: 1. Monitor for areas of redness and/or skin breakdown  2. Assess vascular access sites hourly  3. Every 4-6 hours minimum:  Change oxygen saturation probe site  4. Every 4-6 hours:  If on nasal continuous positive airway pressure, respiratory therapy assess nares and determine need for appliance change or resting period.   Outcome: Progressing

## 2023-12-14 NOTE — CONSULTS
Cardiology Consult Note    CC: weakness  Reason for consult:  Afib  Requesting MD:  Dr. Jain     Subjective:      Date of  Admission: 12/12/2023  3:22 PM     Admission type:Emergency    Hal Casas is a 78 y.o. male admitted for Hyponatremia [E87.1].Patient complains of progressive weakness, especially in his legs. His first EKG on admission was sinus tachycardia but quickly changed to Afib with RVR on subsequent one. He is not aware of it and denies any CP or SOB. He was found to have severe hyponatremia. No prior history of Afib or CAD/stent. No failure sx.    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    History of alcoholism (HCC) 08/15/2023    Debility     Dry mouth     Oropharyngeal dysphagia     Acute respiratory failure with hypoxia (HCC)     Goals of care, counseling/discussion     Alcoholism (HCC)     Alcohol withdrawal (HCC) 12/21/2020    Anemia 12/12/2020    Hypokalemia 12/12/2020    UTI (urinary tract infection) 12/12/2020    AMS (altered mental status) 12/11/2020    Excessive drinking of alcohol 12/11/2020    Hyponatremia 08/01/2019    MOHAN (acute kidney injury) (HCC) 08/01/2019    Essential hypertension 11/21/2015      Yossi Jain MD  Past Medical History:   Diagnosis Date    Cancer (HCC)      2008    Hypertension     Other ill-defined conditions(799.89) 1960      Past Surgical History:   Procedure Laterality Date    COLONOSCOPY  1/6/2011         OTHER SURGICAL HISTORY       2008     Allergies   Allergen Reactions    Ace Inhibitors Cough    Thiazide-Type Diuretics Other (See Comments)     hyponatremia      Family History   Problem Relation Age of Onset    Hypertension Father       Current Facility-Administered Medications   Medication Dose Route Frequency    dilTIAZem 125 mg in sodium chloride 0.9 % 125 mL infusion  2.5-15 mg/hr IntraVENous Continuous    0.9% NaCl with KCl 20 mEq infusion   IntraVENous Continuous    magnesium  bruits. Intact pedal pulses. No peripheral edema. GI: No abd mass noted, soft; no organomegaly noted. Bowel sounds present. Muscular:  No significant kyphosis. Strength WNL for age. Ext: No cyanosis, clubbing, or stigmata of peripheral embolization. Derm: No ulcers or stasis dermatitis of lower extremities. Neuro: Alert and oriented x 3;  Grossly non-focal. Normal mood and affect.            Cardiographics    Telemetry: AFIB  ECG: atrial fibrillation, rate 120  Echocardiogram: not done    Labs:   Recent Results (from the past 24 hour(s))   Basic Metabolic Panel    Collection Time: 12/13/23  5:31 PM   Result Value Ref Range    Sodium 120 (L) 136 - 145 mmol/L    Potassium 4.3 3.5 - 5.1 mmol/L    Chloride 90 (L) 97 - 108 mmol/L    CO2 21 21 - 32 mmol/L    Anion Gap 9 5 - 15 mmol/L    Glucose 87 65 - 100 mg/dL    BUN 8 6 - 20 MG/DL    Creatinine 0.59 (L) 0.70 - 1.30 MG/DL    Bun/Cre Ratio 14 12 - 20      Est, Glom Filt Rate >60 >60 ml/min/1.73m2    Calcium 7.4 (L) 8.5 - 10.1 MG/DL   POCT Glucose    Collection Time: 12/13/23  8:56 PM   Result Value Ref Range    POC Glucose 99 65 - 117 mg/dL    Performed by: Jerman Vicente    POCT Glucose    Collection Time: 12/14/23  1:02 AM   Result Value Ref Range    POC Glucose 104 65 - 117 mg/dL    Performed by: Espinoza Gaitan    EKG 12 Lead    Collection Time: 12/14/23  1:18 AM   Result Value Ref Range    Ventricular Rate 118 BPM    Atrial Rate 75 BPM    QRS Duration 104 ms    Q-T Interval 358 ms    QTc Calculation (Bazett) 501 ms    R Axis -29 degrees    T Axis 26 degrees    Diagnosis       Atrial fibrillation with rapid ventricular response with premature   ventricular or aberrantly conducted complexes  Incomplete right bundle branch block  Inferior infarct (cited on or before 27-DEC-2020)  When compared with ECG of 12-DEC-2023 23:24,  No significant change was found  Confirmed by Ashlee Anaya ([id number]) on 12/14/2023 7:30:06 AM     Blood Gas, Arterial    Collection Time:

## 2023-12-14 NOTE — PROGRESS NOTES
[address] [facility]  Hospitalist Group                                                                                          Hospitalist Progress Note  Maggie Bucio  Answering service: [phone] OR [phone] from in house phone        Date of Service:  2023  NAME:  Tavo Nguyen  :  1945  MRN:  [id number]      Admission Summary:   Per H&P, Tavo Nguyen is a 66 y.o. male with pmhx of htn and etoh abuse who presents with bilateral lower extremity weakness. Patient states he fell two nights ago at 3am and has had trouble bearing weight on his legs. He states he has no pain except in his lower back and does attests to some numbness in BLE but still has sensation. Workup in ED showed wbc 9.7k, Hg 12.9, plt 85, , K 3.5, BUN 8, Cr. 0.69, Gene 8.2, Albumin 3.2, AST 44, Tbili 1.7, glucose 90.  UA, ct abd/pelvis, pelvis xray pending. Will admit for hyponatremia. Attests to drinking 6-8 PBR daily, lower back pain (nonreproducible) with radiation into the front lower abdomen, and some diarrhea. Denies fever chest pain shortness of breath, n/v     Interval history / Subjective:   RRT called overnight for A-fib w/RVR, patient now on diltiazem gtt. Cardiology consulted    This morning the patient is resting in bed with snoring respirations, but rouses to answer questions. He does answer questions appropriately but becomes somnolent immediately after being engaged. Per MATI Christian this appears to be a change from yesterday. Assessment & Plan:     Atrial fibrillation with RVR  - RRT called last night (-) for A-fib w/RVR   - S/P Diltiazem push, gtt started. - Consult placed to cardiology  - [id number] score = 3, patient is moderate-high risk for cardioembolic event.   - Obtain patient weight and begin lovenox 1mg/kg BID as he cannot currently tolerate PO intake    HTN  - Continuing metoprolol, increase to 25mg BID     Hyponatremia  Hypokalemia   - Likely 2/2 EtOH tests in the medicine section of [facility]      Labs:     Recent Labs     12/13/23  0412 12/14/23  0325   WBC 7.3 7.9   HGB 10.9* 11.1*   HCT 30.2* 31.0*   PLT 76* 93*     Recent Labs     12/12/23  1336 12/12/23  1338 12/12/23 2012 12/13/23  0412 12/13/23  1731 12/14/23  0325   NA  --  116*   < > 120* 120* 121*   K  --  3.5   < > 3.6 4.3 3.2*   CL  --  82*   < > 88* 90* 91*   CO2  --  22   < > 23 21 20*   BUN  --  8   < > 7 8 7   MG  --  1.9  --  1.8  --  1.8   PHOS 3.2  --   --  2.6  --   --     < > = values in this interval not displayed.     Recent Labs     12/12/23 1338 12/14/23  0325   ALT 31 25   GLOB 4.1* 3.8     No results for input(s): \"INR\", \"APTT\" in the last 72 hours.    Invalid input(s): \"PTP\"   No results for input(s): \"TIBC\", \"FERR\" in the last 72 hours.    Invalid input(s): \"FE\", \"PSAT\"   No results found for: \"FOL\", \"RBCF\"   No results for input(s): \"PH\", \"PCO2\", \"PO2\" in the last 72 hours.  No results for input(s): \"CPK\" in the last 72 hours.    Invalid input(s): \"CPKMB\", \"CKNDX\", \"TROIQ\"  Lab Results   Component Value Date/Time    CHOL 216 11/08/2022 11:42 AM    HDL 58 11/08/2022 11:42 AM     No results found for: \"GLUCPOC\"  [unfilled]      Medications Reviewed:     Current Facility-Administered Medications   Medication Dose Route Frequency    dilTIAZem 125 mg in sodium chloride 0.9 % 125 mL infusion  2.5-15 mg/hr IntraVENous Continuous    potassium chloride 10 mEq/100 mL IVPB (Peripheral Line)  10 mEq IntraVENous Q1H    0.9% NaCl with KCl 20 mEq infusion   IntraVENous Continuous    magnesium sulfate 1000 mg in dextrose 5% 100 mL IVPB  1,000 mg IntraVENous Once    sodium chloride flush 0.9 % injection 5-40 mL  5-40 mL IntraVENous 2 times per day    sodium chloride flush 0.9 % injection 5-40 mL  5-40 mL IntraVENous PRN    0.9 % sodium chloride infusion   IntraVENous PRN    potassium chloride (KLOR-CON) extended release tablet 40 mEq  40 mEq Oral PRN    Or    potassium bicarb-citric acid (EFFER-K)

## 2023-12-14 NOTE — PROGRESS NOTES
[facility] Hospitalist cross coverage (7p-7a) progress note:    Notified by RN heart rate up to 140. Evaluated at bedside, pressure 132/90 heart rate .   Patient has snoring respirations with witnessed apneic events.    -Obtain EKG  -Rectal temp 99.3  -CPAP and ABG  -Transfer to intermediate care alcohol withdrawal and CPAP  -Advised RN to avoid Ativan at this time, patient now stuporous, CIWA 0  -Continue LR  -dilt push and gtt    Padmini Height AGACNP

## 2023-12-14 NOTE — PROGRESS NOTES
Referral source:   Michelle Lara at [facility] in [facility] [place] [facility] 2.  attended rounds in the CCU as part of the Interdisciplinary team where the patient's ongoing care was discussed. I reviewed the medical record as part of this encounter. Outcome: Interdisciplinary team are aware of  availability and were encouraged to request support as needed. Advised nurse to contact [address] [place] [address] for any further referrals. The  on-call can be reached at [phone]). Rev.  Sammi Carvalho MDiv, [facility]  Staff

## 2023-12-14 NOTE — PROGRESS NOTES
[facility]  Jeet Ye MD  [phone]            Renal Progress Note    NAME:  Hal Casas   :   1945   MRN:   [id number]     Date/Time:  2023 9:56 AM            DISCUSSION / PLAN :      Assessment:  Hyponatremia due to binge drinking and volume depletion in setting of diarrhea and poor po intake  -Na still 121, K low  -CO2 low,      Hypokalemia  ETOH abuse  Possible Alcohol withdrawal  Encephalopathy, metabolic  HTN-controlled  Thrombocytopenia  New Afib /rvr on cardizem        Plan:  Change IVF to NS+Kckl 20 meq/lit at 75 ml/hr  Check ABG/VBG  Replete K, has been given KCL 40 meq IV  Replete Mg, ordered  Bladder scan prn for urinary retention-spoke to nurse  Regular diet, protein supplements-if safe to have po  F/u BMP BID  Replete phos, Mg and K prn           ___________________________________________________  Subjective:         Confused, states he can't void, Na 121 this am    Medications reviewed:  Current Facility-Administered Medications   Medication Dose Route Frequency    dilTIAZem 125 mg in sodium chloride 0.9 % 125 mL infusion  2.5-15 mg/hr IntraVENous Continuous    0.9% NaCl with KCl 20 mEq infusion   IntraVENous Continuous    magnesium sulfate 1000 mg in dextrose 5% 100 mL IVPB  1,000 mg IntraVENous Once    sodium chloride flush 0.9 % injection 5-40 mL  5-40 mL IntraVENous 2 times per day    sodium chloride flush 0.9 % injection 5-40 mL  5-40 mL IntraVENous PRN    0.9 % sodium chloride infusion   IntraVENous PRN    potassium chloride (KLOR-CON) extended release tablet 40 mEq  40 mEq Oral PRN    Or    potassium bicarb-citric acid (EFFER-K) effervescent tablet 40 mEq  40 mEq Oral PRN    Or    potassium chloride 10 mEq/100 mL IVPB (Peripheral Line)  10 mEq IntraVENous PRN    magnesium sulfate 2000 mg in 50 mL IVPB premix  2,000 mg IntraVENous PRN    ondansetron (ZOFRAN-ODT) disintegrating tablet 4 mg  4 mg Oral Q8H PRN    Or    ondansetron (ZOFRAN) injection 4 mg  4 mg  Reviewed     [] NSR [] PAC/PVCs   [] Afib  [] Paced   [] NSVT   [] Benson [] NGT  [] Intubated on vent    Lab Data Reviewed:    Recent Results (from the past 24 hour(s))   Basic Metabolic Panel    Collection Time: 12/13/23  5:31 PM   Result Value Ref Range    Sodium 120 (L) 136 - 145 mmol/L    Potassium 4.3 3.5 - 5.1 mmol/L    Chloride 90 (L) 97 - 108 mmol/L    CO2 21 21 - 32 mmol/L    Anion Gap 9 5 - 15 mmol/L    Glucose 87 65 - 100 mg/dL    BUN 8 6 - 20 MG/DL    Creatinine 0.59 (L) 0.70 - 1.30 MG/DL    Bun/Cre Ratio 14 12 - 20      Est, Glom Filt Rate >60 >60 ml/min/1.73m2    Calcium 7.4 (L) 8.5 - 10.1 MG/DL   POCT Glucose    Collection Time: 12/13/23  8:56 PM   Result Value Ref Range    POC Glucose 99 65 - 117 mg/dL    Performed by: Alma Griggs    POCT Glucose    Collection Time: 12/14/23  1:02 AM   Result Value Ref Range    POC Glucose 104 65 - 117 mg/dL    Performed by: Amarilis Bean    EKG 12 Lead    Collection Time: 12/14/23  1:18 AM   Result Value Ref Range    Ventricular Rate 118 BPM    Atrial Rate 75 BPM    QRS Duration 104 ms    Q-T Interval 358 ms    QTc Calculation (Bazett) 501 ms    R Axis -29 degrees    T Axis 26 degrees    Diagnosis       Atrial fibrillation with rapid ventricular response with premature   ventricular or aberrantly conducted complexes  Incomplete right bundle branch block  Inferior infarct (cited on or before 27-DEC-2020)  When compared with ECG of 12-DEC-2023 23:24,  No significant change was found  Confirmed by Bonita Cartagena ([id number]) on 12/14/2023 7:30:06 AM     Blood Gas, Arterial    Collection Time: 12/14/23  1:56 AM   Result Value Ref Range    pH, Arterial 7.36 7.35 - 7.45      pCO2, Arterial 38 35 - 45 mmHg    pO2, Arterial 107 (H) 80 - 100 mmHg    POC O2 SAT 98 (H) 92 - 97 %    HCO3, Arterial 21 (L) 22 - 26 mmol/L    Base deficit, arterial blood 4.0 mmol/L    O2 Method NASAL CANNULA      O2 FLOW RATE, POC 2.00 L/min    Source ARTERIAL      Site RIGHT RADIAL      Ciro Test YES

## 2023-12-14 NOTE — ED NOTES
TRANSFER - OUT REPORT:    Verbal report given to Mary Ellen Urias  on Isa Barksdale  being transferred to [facility] for routine progression of patient care       Report consisted of patient's Situation, Background, Assessment and   Recommendations(SBAR). Information from the following report(s) Nurse Handoff Report, Index, ED Encounter Summary, Adult Overview, Intake/Output, MAR, Recent Results, Cardiac Rhythm A-fib , and Neuro Assessment was reviewed with the receiving nurse. [place] Fall Assessment:    Presents to emergency department  because of falls (Syncope, seizure, or loss of consciousness): Yes  Age > 79: No  Altered Mental Status, Intoxication with alcohol or substance confusion (Disorientation, impaired judgment, poor safety awaremess, or inability to follow instructions): Yes  Impaired Mobility: Ambulates or transfers with assistive devices or assistance; Unable to ambulate or transer.: No  Nursing Judgement: Yes          Lines:   Peripheral IV 12/13/23 Distal;Right Cephalic (Active)   Site Assessment Clean, dry & intact 12/13/23 0204   Line Status Blood return noted 12/13/23 0204   Phlebitis Assessment No symptoms 12/13/23 0204   Infiltration Assessment 0 12/13/23 0204        Opportunity for questions and clarification was provided.       Patient transported with:  Monitor and Registered Nurse           Yamile Esparza RN  12/13/23 1641

## 2023-12-14 NOTE — PROGRESS NOTES
SLP Contact Note    Pt very well-known to this SLP and department from previous admissions. In 2021, pt had a PEG tube placed at that time due to swallowing difficulty. Had a FEES and MBS at this time. Both times pureed diet and mildly-thick liquids were recommended. However, pt then began with increased difficulty with only recommendations for pureed snacks with PEG tube. Since then, it appears that pt has had PEG tube removed and pt was eating/drinking. No signs of intolerance on chest imaging nor WBC. However, pt not appropriate on this date to participate due to lethargy per discussion with PA. Therefore, will hold SLP for now.       PAPA Schulte.Ed, [facility]  Speech-Language Pathologist

## 2023-12-14 NOTE — ED NOTES
Rn contacted Md regarding pt increase confusion and hallucinations. CIWA assessment increase to 13 and pt had increase HR and BP. Rn also asked if any additional orders were need for the pt sodium of 120. Md states to continue providing ativan and see if pt's Sxs improve. Md also clarified to continue providing LR at 75ml/hr for pt's low sodium.       Vasiliy Sheppard RN  12/13/23 [id number]

## 2023-12-15 ENCOUNTER — APPOINTMENT (OUTPATIENT)
Facility: HOSPITAL | Age: 78
DRG: 640 | End: 2023-12-15
Payer: MEDICARE

## 2023-12-15 LAB
ALBUMIN SERPL-MCNC: 2.7 G/DL (ref 3.5–5)
ALBUMIN/GLOB SERPL: 0.8 (ref 1.1–2.2)
ALP SERPL-CCNC: 76 U/L (ref 45–117)
ALT SERPL-CCNC: 28 U/L (ref 12–78)
ANION GAP SERPL CALC-SCNC: 11 MMOL/L (ref 5–15)
AST SERPL-CCNC: 34 U/L (ref 15–37)
BASOPHILS # BLD: 0.1 K/UL (ref 0–0.1)
BASOPHILS NFR BLD: 1 % (ref 0–1)
BILIRUB SERPL-MCNC: 1.3 MG/DL (ref 0.2–1)
BUN SERPL-MCNC: 6 MG/DL (ref 6–20)
BUN/CREAT SERPL: 11 (ref 12–20)
CALCIUM SERPL-MCNC: 8.2 MG/DL (ref 8.5–10.1)
CHLORIDE SERPL-SCNC: 95 MMOL/L (ref 97–108)
CO2 SERPL-SCNC: 20 MMOL/L (ref 21–32)
CREAT SERPL-MCNC: 0.54 MG/DL (ref 0.7–1.3)
DIFFERENTIAL METHOD BLD: ABNORMAL
EOSINOPHIL # BLD: 0.3 K/UL (ref 0–0.4)
EOSINOPHIL NFR BLD: 4 % (ref 0–7)
ERYTHROCYTE [DISTWIDTH] IN BLOOD BY AUTOMATED COUNT: 13.1 % (ref 11.5–14.5)
GLOBULIN SER CALC-MCNC: 3.6 G/DL (ref 2–4)
GLUCOSE SERPL-MCNC: 76 MG/DL (ref 65–100)
HCT VFR BLD AUTO: 32.4 % (ref 36.6–50.3)
HGB BLD-MCNC: 11.5 G/DL (ref 12.1–17)
IMM GRANULOCYTES # BLD AUTO: 0 K/UL (ref 0–0.04)
IMM GRANULOCYTES NFR BLD AUTO: 1 % (ref 0–0.5)
LACTATE SERPL-SCNC: 0.7 MMOL/L (ref 0.4–2)
LYMPHOCYTES # BLD: 1.1 K/UL (ref 0.8–3.5)
LYMPHOCYTES NFR BLD: 15 % (ref 12–49)
MCH RBC QN AUTO: 32.7 PG (ref 26–34)
MCHC RBC AUTO-ENTMCNC: 35.5 G/DL (ref 30–36.5)
MCV RBC AUTO: 92 FL (ref 80–99)
MONOCYTES # BLD: 1.3 K/UL (ref 0–1)
MONOCYTES NFR BLD: 17 % (ref 5–13)
NEUTS SEG # BLD: 4.5 K/UL (ref 1.8–8)
NEUTS SEG NFR BLD: 62 % (ref 32–75)
NRBC # BLD: 0 K/UL (ref 0–0.01)
NRBC BLD-RTO: 0 PER 100 WBC
PLATELET # BLD AUTO: 109 K/UL (ref 150–400)
PMV BLD AUTO: 8.9 FL (ref 8.9–12.9)
POTASSIUM SERPL-SCNC: 3.8 MMOL/L (ref 3.5–5.1)
PROT SERPL-MCNC: 6.3 G/DL (ref 6.4–8.2)
RBC # BLD AUTO: 3.52 M/UL (ref 4.1–5.7)
SODIUM SERPL-SCNC: 126 MMOL/L (ref 136–145)
WBC # BLD AUTO: 7.3 K/UL (ref 4.1–11.1)

## 2023-12-15 PROCEDURE — 97162 PT EVAL MOD COMPLEX 30 MIN: CPT

## 2023-12-15 PROCEDURE — 36415 COLL VENOUS BLD VENIPUNCTURE: CPT

## 2023-12-15 PROCEDURE — 2580000003 HC RX 258

## 2023-12-15 PROCEDURE — 2500000003 HC RX 250 WO HCPCS

## 2023-12-15 PROCEDURE — 97161 PT EVAL LOW COMPLEX 20 MIN: CPT

## 2023-12-15 PROCEDURE — 92526 ORAL FUNCTION THERAPY: CPT

## 2023-12-15 PROCEDURE — 92610 EVALUATE SWALLOWING FUNCTION: CPT

## 2023-12-15 PROCEDURE — 92611 MOTION FLUOROSCOPY/SWALLOW: CPT

## 2023-12-15 PROCEDURE — 6360000002 HC RX W HCPCS: Performed by: HOSPITALIST

## 2023-12-15 PROCEDURE — 80053 COMPREHEN METABOLIC PANEL: CPT

## 2023-12-15 PROCEDURE — 97530 THERAPEUTIC ACTIVITIES: CPT

## 2023-12-15 PROCEDURE — 97165 OT EVAL LOW COMPLEX 30 MIN: CPT

## 2023-12-15 PROCEDURE — 2060000000 HC ICU INTERMEDIATE R&B

## 2023-12-15 PROCEDURE — 83605 ASSAY OF LACTIC ACID: CPT

## 2023-12-15 PROCEDURE — 2580000003 HC RX 258: Performed by: HOSPITALIST

## 2023-12-15 PROCEDURE — 85025 COMPLETE CBC W/AUTO DIFF WBC: CPT

## 2023-12-15 PROCEDURE — 74230 X-RAY XM SWLNG FUNCJ C+: CPT

## 2023-12-15 PROCEDURE — 2500000003 HC RX 250 WO HCPCS: Performed by: INTERNAL MEDICINE

## 2023-12-15 PROCEDURE — 2580000003 HC RX 258: Performed by: NURSE PRACTITIONER

## 2023-12-15 PROCEDURE — 97110 THERAPEUTIC EXERCISES: CPT

## 2023-12-15 PROCEDURE — 6360000002 HC RX W HCPCS: Performed by: INTERNAL MEDICINE

## 2023-12-15 RX ORDER — METOPROLOL TARTRATE 50 MG/1
50 TABLET, FILM COATED ORAL 2 TIMES DAILY
Status: DISCONTINUED | OUTPATIENT
Start: 2023-12-15 | End: 2023-12-21

## 2023-12-15 RX ORDER — DEXTROSE MONOHYDRATE, SODIUM CHLORIDE, AND POTASSIUM CHLORIDE 50; 1.49; 9 G/1000ML; G/1000ML; G/1000ML
INJECTION, SOLUTION INTRAVENOUS CONTINUOUS
Status: DISCONTINUED | OUTPATIENT
Start: 2023-12-15 | End: 2023-12-18

## 2023-12-15 RX ADMIN — DILTIAZEM HYDROCHLORIDE 10 MG/HR: 5 INJECTION, SOLUTION INTRAVENOUS at 19:32

## 2023-12-15 RX ADMIN — POTASSIUM CHLORIDE, DEXTROSE MONOHYDRATE AND SODIUM CHLORIDE: 150; 5; 900 INJECTION, SOLUTION INTRAVENOUS at 15:47

## 2023-12-15 RX ADMIN — POTASSIUM CHLORIDE AND SODIUM CHLORIDE: 900; 150 INJECTION, SOLUTION INTRAVENOUS at 00:50

## 2023-12-15 RX ADMIN — THIAMINE HYDROCHLORIDE 100 MG: 100 INJECTION, SOLUTION INTRAMUSCULAR; INTRAVENOUS at 15:48

## 2023-12-15 RX ADMIN — Medication 10 ML: at 21:47

## 2023-12-15 RX ADMIN — DILTIAZEM HYDROCHLORIDE 10 MG/HR: 5 INJECTION, SOLUTION INTRAVENOUS at 05:28

## 2023-12-15 ASSESSMENT — PAIN SCALES - WONG BAKER
WONGBAKER_NUMERICALRESPONSE: 0

## 2023-12-15 ASSESSMENT — PAIN SCALES - GENERAL
PAINLEVEL_OUTOF10: 0

## 2023-12-15 NOTE — PROGRESS NOTES
[facility]  Jeet Ye MD  [phone]            Renal Progress Note    NAME:  Hal Casas   :   1945   MRN:   [id number]     Date/Time:  12/15/2023 11:12 AM            DISCUSSION / PLAN :      Assessment:  Hyponatremia due to binge drinking and volume depletion in setting of diarrhea and poor po intake  -improving-Na 126    Probably alcoholic ketoacidosis     Hypokalemia-resolved  ETOH abuse  Possible Alcohol withdrawal  Encephalopathy, metabolic  HTN-controlled  Thrombocytopenia  New Afib /rvr on cardizem        Plan:  Change IVF to D5NS+Kckl 20 meq/lit at 75 ml/hr  Check Betahydroxy-butyrate   Bladder scan prn for urinary retention-spoke to nurse  Regular diet, protein supplements-if safe to have po  F/u BMP BID  Replete phos, Mg and K prn           ___________________________________________________  Subjective:         -Confused, states he can't void, Na 121 this am  12/15-less confused, knows the place and date. No complaints. Na 126 improving    Medications reviewed:  Current Facility-Administered Medications   Medication Dose Route Frequency    metoprolol tartrate (LOPRESSOR) tablet 50 mg  50 mg Oral BID    apixaban (ELIQUIS) tablet 5 mg  5 mg Oral BID    dilTIAZem 125 mg in sodium chloride 0.9 % 125 mL infusion  2.5-15 mg/hr IntraVENous Continuous    0.9% NaCl with KCl 20 mEq infusion   IntraVENous Continuous    sodium chloride flush 0.9 % injection 5-40 mL  5-40 mL IntraVENous 2 times per day    sodium chloride flush 0.9 % injection 5-40 mL  5-40 mL IntraVENous PRN    0.9 % sodium chloride infusion   IntraVENous PRN    potassium chloride (KLOR-CON) extended release tablet 40 mEq  40 mEq Oral PRN    Or    potassium bicarb-citric acid (EFFER-K) effervescent tablet 40 mEq  40 mEq Oral PRN    Or    potassium chloride 10 mEq/100 mL IVPB (Peripheral Line)  10 mEq IntraVENous PRN    magnesium sulfate 2000 mg in 50 mL IVPB premix  2,000 mg IntraVENous PRN    ondansetron (ZOFRAN-ODT)  Afib  [] Paced   [] NSVT   [] Benson [] NGT  [] Intubated on vent    Lab Data Reviewed:    Recent Results (from the past 24 hour(s))   TSH + Free T4 Panel    Collection Time: 12/14/23  3:53 PM   Result Value Ref Range    TSH, 3RD GENERATION 1.43 0.36 - 3.74 uIU/mL    T4 Free 1.2 0.8 - 1.5 NG/DL   Basic Metabolic Panel    Collection Time: 12/14/23  4:00 PM   Result Value Ref Range    Sodium 123 (L) 136 - 145 mmol/L    Potassium 3.6 3.5 - 5.1 mmol/L    Chloride 92 (L) 97 - 108 mmol/L    CO2 21 21 - 32 mmol/L    Anion Gap 10 5 - 15 mmol/L    Glucose 93 65 - 100 mg/dL    BUN 7 6 - 20 MG/DL    Creatinine 0.53 (L) 0.70 - 1.30 MG/DL    Bun/Cre Ratio 13 12 - 20      Est, Glom Filt Rate >60 >60 ml/min/1.73m2    Calcium 7.5 (L) 8.5 - 10.1 MG/DL   CBC with Auto Differential    Collection Time: 12/15/23  3:10 AM   Result Value Ref Range    WBC 7.3 4.1 - 11.1 K/uL    RBC 3.52 (L) 4.10 - 5.70 M/uL    Hemoglobin 11.5 (L) 12.1 - 17.0 g/dL    Hematocrit 32.4 (L) 36.6 - 50.3 %    MCV 92.0 80.0 - 99.0 FL    MCH 32.7 26.0 - 34.0 PG    MCHC 35.5 30.0 - 36.5 g/dL    RDW 13.1 11.5 - 14.5 %    Platelets 675 (L) 957 - 400 K/uL    MPV 8.9 8.9 - 12.9 FL    Nucleated RBCs 0.0 0  WBC    nRBC 0.00 0.00 - 0.01 K/uL    Neutrophils % 62 32 - 75 %    Lymphocytes % 15 12 - 49 %    Monocytes % 17 (H) 5 - 13 %    Eosinophils % 4 0 - 7 %    Basophils % 1 0 - 1 %    Immature Granulocytes 1 (H) 0.0 - 0.5 %    Neutrophils Absolute 4.5 1.8 - 8.0 K/UL    Lymphocytes Absolute 1.1 0.8 - 3.5 K/UL    Monocytes Absolute 1.3 (H) 0.0 - 1.0 K/UL    Eosinophils Absolute 0.3 0.0 - 0.4 K/UL    Basophils Absolute 0.1 0.0 - 0.1 K/UL    Absolute Immature Granulocyte 0.0 0.00 - 0.04 K/UL    Differential Type AUTOMATED     Comprehensive Metabolic Panel    Collection Time: 12/15/23  3:11 AM   Result Value Ref Range    Sodium 126 (L) 136 - 145 mmol/L    Potassium 3.8 3.5 - 5.1 mmol/L    Chloride 95 (L) 97 - 108 mmol/L    CO2 20 (L) 21 - 32 mmol/L    Anion Gap 11 5 - 15

## 2023-12-15 NOTE — PROGRESS NOTES
SPEECH PATHOLOGY MODIFIED BARIUM SWALLOW STUDY  Patient: Hal Casas (78 y.o. male)  Date: 12/15/2023  Primary Diagnosis: Hyponatremia [E87.1]       Precautions: aspiration, NPO                     ASSESSMENT :    Patient participated with thins via tsp, cup and straw trials with fairly adequate bolus retrieval, delayed transit and trigger of swallow resulting in premature spillage to the vallecula and, at times, subsequent spillage to the pyriform sinuses before trigger of swallow occurred, incomplete epiglottic inversion with epiglottis noted to approximate with posterior pharyngeal wall and with noted significantly reduced hyolaryngeal elevation and excursion resulting in silent aspiration during the swallow, and subsequent silent aspiration after the swallow pooled material. BOT retraction appeared reduced, UES opening also appeared reduced. Cued cough and throat clear did not result in clearance of material in airway. Patient with persistent vallecular and pyriform sinus pooling with limited improved clearance with cued dry swallows. Cues for effortful swallow with mildly improved clearance of residue however not consistently. PO trials pureed solids and mildly thickened liquids with intermittent epiglottic inversion however significantly reduced with persistent poor pharyngeal clearance following swallow resulting in deep laryngeal penetration to vocal folds and/or silent tracheal aspiration pooled material with additional swallows. Chin tuck trial only mildly improved airway protection. Esophageal reflux in the UES also observed during trials.     Based on the objective data described below, the patient presents with moderate oral phase, moderate-severe pharyngeal phase dysphagia and remains at extremely high risk for aspiration and aspiration-related illness. Patient does not appear safe for PO at this time other than conservative ice chips/sip unthickened water following rigorous oral care.    Patient  recommended diet changes were discussed with: Registered nurse, Physician, and GI PA    Patient/family have participated as able in goal setting and plan of care    Thank you,  Jamison Fabry, M.Ed, [address] [facility]  Speech Language Pathologist    Minutes: 48

## 2023-12-15 NOTE — PLAN OF CARE
Problem: Occupational Therapy - Adult  Goal: By Discharge: Performs self-care activities at highest level of function for planned discharge setting. See evaluation for individualized goals. Description: FUNCTIONAL STATUS PRIOR TO ADMISSION:  Pt reports he lives in one-level home with wife and was IND with ADLs and IADLs prior to admission. HOME SUPPORT: Patient lived with wife but didn't require assistance. Occupational Therapy Goals:  Initiated 12/15/2023  1. Patient will perform grooming seated with Supervision within 7 day(s). 2.  Patient will perform upper body dressing seated with Minimal Assist within 7 day(s). 3.  Patient will perform LB bathing seated with Maximal Assist within 7 day(s). 4.  Patient will perform toilet transfers with Moderate Assist  within 7 day(s). 5.  Patient will perform all aspects of toileting with Maximal Assist within 7 day(s). 6.  Patient will participate in upper extremity therapeutic exercise/activities with Stand by Assist for 5 minutes within 7 day(s). 7.  Patient will utilize energy conservation techniques during functional activities with verbal and visual cues within 7 day(s). Outcome: Progressing     OCCUPATIONAL THERAPY EVALUATION    Patient: Marcelle Keith (83 y.o. male)  Date: 12/15/2023  Primary Diagnosis: Hyponatremia [E87.1]         Precautions: Fall Risk                  ASSESSMENT :  The patient is limited by decreased functional mobility, independence in ADLs, high-level IADLs, body mechanics, activity tolerance, endurance, safety awareness, cognition, attention/concentration, coordination, balance, increased pain levels. Pt received to OT services seated in chair, amendable to session. VSS throughout session, on RA. Pt required overall MAX A for all functional mobility with HHA for dynamic standing balance. Pt transferred back to semi-reclined in bed, completed face washing with SBA.  Provided verbal education on safety/insight during

## 2023-12-15 NOTE — PLAN OF CARE
Independent  Ambulation Assistance: Independent  Active : Yes        Cognitive and Communication Status:  Neurologic State: Alert and Confused  Orientation Level: Oriented to person, Oriented to place, and Oriented to time  Cognition: Follows commands, Decreased attention/concentration, and Poor safety awareness    Dysphagia:  Oral Assessment:  Oral Motor   Labial: No impairment  Dentition: Full  Oral Hygiene: Dried secretions  Lingual: Decreased strength; Incoordinated  Mandible: No impairment  P.O. Trials:  PO Trials  Assessment Method(s): Observation  Vocal Quality: Wet  Consistency Presented: Easy to chew;Pureed; Thin  How Presented: Cup/sip;Straw;SLP-fed/Presented  Bolus Acceptance: Impaired (intermittent decreased labial seal)  Bolus Formation/Control: Impaired  Type of Impairment: Oral holding; Suspected premature spilling;Lip closure;Mastication  Propulsion: Delayed (# of seconds); Discoordination  Oral Residue: Lingual;10-50% of bolus  Initiation of Swallow: Delayed (# of seconds)  Aspiration Signs/Symptoms: Change of vocal quality; Throat clear;Watery eyes;Strong cough  Pharyngeal Phase Characteristics: Altered vocal quality; Suspected pharyngeal residue;Multiple swallows     Pharyngeal Phase   Pharyngeal Phase: Exceptions        Respiratory Status/Airway:  Room air               Functional Oral Intake Scale (FOIS): 1--Nothing by mouth (NPO)    After treatment:   Patient left in no apparent distress in bed, RN notified    COMMUNICATION/EDUCATION:   Discussed concern for dysphagia symptoms; discussed recs for MBS this date to further evaluate. Patient agreeable to evaluation. He demonstrated Good understanding as evidenced by Verbalizing understanding.     The patient's plan of care including recommendations, planned interventions, and recommended diet changes were discussed with: Registered nurse and Physician    Patient/family have participated as able in goal setting and plan of care    Thank youCorine Xiomy Denson, CCC-SLP  Speech Language Pathologist   Minutes: 20   Problem: SLP Adult - Impaired Swallowing  Goal: By Discharge: Advance to least restrictive diet without signs or symptoms of aspiration for planned discharge setting.  See evaluation for individualized goals.  Description: 1. Patient will participate with MBSS to further assess swallow function - initiated 12/15  Outcome: Progressing

## 2023-12-15 NOTE — CONSULTS
[facility] [address] [address]        GASTROENTEROLOGY CONSULTATION NOTE  Will ARIS Scanlon  [phone] office      NAME:  Hal Casas   :   1945   MRN:   [id number]       Referring Physician: Dr. Antonieta Connelly    Consult Date: 12/15/2023 12:36 PM    History of Present Illness:  Patient is a 78 y.o. who is seen in consultation at the request of Dr. Connelly for PEG tube.  Patient has a past medical history significant for hypertension and alcohol abuse.  He presented to the ED for evaluation of bilateral lower extremity weakness and fall.  Patient was admitted to the hospital on 23 for hyponatremia, hyperbilirubinemia, thrombocytopenia, alcohol abuse, and hypertension.    EGD with PEG placement in 2021 by Dr. Alcala.  PEG tube was removed in 2021.  Patient reportedly did not use the PEG tube at that time.  Since then, he reports eating and drinking without any significant issues.  He reports an occasional cough with eating.  Otherwise, denies dysphagia or odynophagia.  No nausea, reflux, vomiting, or abdominal pain.  No fevers, chills, or unexplained weight loss.  SLP has been following the patient and he underwent a modified barium swallow today.     No NSAID use.  Patient was on aspirin 81 mg daily prior to admission.  Per chart review, the plan is start Eliquis.  + Alcohol abuse (reports 3-4 beers/day).  No tobacco use.         I have reviewed the emergency room note, hospital admission note, notes by all other clinicians who have seen the patient during this hospitalization to date. I have reviewed the problem list and the reason for this hospitalization. I have reviewed the allergies and the medications the patient was taking at home prior to this hospitalization.    PMH:  Past Medical History:   Diagnosis Date    Cancer (HCC)      2008    Hypertension     Other ill-defined conditions(700.35) 2031       PSH:  Past Surgical History:   Procedure  Pleasant male lying in bed, no acute distress   NEURO:  Alert and oriented x 3   HEENT: EOMI, no scleral icterus   LUNGS: No acute respiratory distress   CARD:  S1 S2   ABD:  Soft, non distended, no tenderness, no rebound, no guarding. + Bowel sounds. EXT:  Warm   PSYCH: Not anxious or agitated     Data Review     Recent Labs     12/14/23  0325 12/15/23  0310   WBC 7.9 7.3   HGB 11.1* 11.5*   HCT 31.0* 32.4*   PLT 93* 109*     Recent Labs     12/12/23  1336 12/12/23  1338 12/13/23  0412 12/13/23  1731 12/14/23  1600 12/15/23  0311   NA  --    < > 120*   < > 123* 126*   K  --    < > 3.6   < > 3.6 3.8   CL  --    < > 88*   < > 92* 95*   CO2  --    < > 23   < > 21 20*   BUN  --    < > 7   < > 7 6   PHOS 3.2  --  2.6  --   --   --     < > = values in this interval not displayed. Recent Labs     12/14/23  0325 12/15/23  0311   GLOB 3.8 3.6     No results for input(s): \"INR\", \"APTT\" in the last 72 hours. Invalid input(s): \"PTP\"    EGD with PEG placement in 1/2021 by Dr. Jailene Desir. PEG tube was removed in 2/2021. Assessment:     Dysphagia: modified barium swallow (12/15/23): episodes of silent aspiration; SLP note pending. Hgb 11.5, platelets 992, INR 0.9. History PEG in 2021 as above  New atrial fibrillation with RVR: cardiology following. On diltiazem gtt, plan to start Eliquis. Thrombocytopenia  Hyponatremia: Na 126. Nephrology following.    Hypokalemia, improved: K+ 3.8  Hypertension  Alcohol abuse     Patient Active Problem List   Diagnosis    Hyponatremia    Oropharyngeal dysphagia    AMS (altered mental status)    Excessive drinking of alcohol    MOHAN (acute kidney injury) ([address])    Alcohol withdrawal ([address])    Acute respiratory failure with hypoxia (HCC)    Goals of care, counseling/discussion    Dry mouth    Anemia    Essential hypertension    Hypokalemia    UTI (urinary tract infection)    Alcoholism ([address])    Debility    Other nonthrombocytopenic purpura ([address])    History of alcoholism ([address])

## 2023-12-15 NOTE — PLAN OF CARE
Problem: Physical Therapy - Adult  Goal: By Discharge: Performs mobility at highest level of function for planned discharge setting. See evaluation for individualized goals. Description: FUNCTIONAL STATUS PRIOR TO ADMISSION: Patient reports being independent with the use of a rolling walker vs cane. He states he had a fall recently which is why he came to the ED.    HOME SUPPORT PRIOR TO ADMISSION: The patient lived with his wife, whom he states \"can help me with whatever I ask\" but reports not requiring assistance from her prior to admission. Physical Therapy Goals  Initiated 12/15/2023  1. Patient will move from supine to sit and sit to supine, scoot up and down, and roll side to side in bed with minimal assistance within 7 day(s). 2.  Patient will perform sit to stand with minimal assistance within 7 day(s). 3.  Patient will transfer from bed to chair and chair to bed with minimal assistance using the least restrictive device within 7 day(s). 4.  Patient will ambulate with minimal assistance for 50 feet with the least restrictive device within 7 day(s). 5.  Patient will ascend/descend 3 stairs with handrail(s) with minimal assistance within 7 day(s). Outcome: Progressing     PHYSICAL THERAPY EVALUATION    Patient: Jean-Paul Avery (6 y.o. male)  Date: 12/15/2023  Primary Diagnosis: Hyponatremia [E87.1]       Precautions: Fall Risk                    ASSESSMENT :   DEFICITS/IMPAIRMENTS:   The patient is limited by decreased functional mobility, independence in ADLs, ROM, strength, sensation, body mechanics, activity tolerance, safety awareness, cognition, command following, coordination, balance, posture, fine-motor control     Based on the impairments listed above patient is below his functional baseline. Patient is not a fully accurate historian with some inconsistent answers given during history, however patient does report being independent with mobility using a rolling walker at home.  He

## 2023-12-15 NOTE — PLAN OF CARE
Problem: Confusion  Goal: Confusion, delirium, dementia, or psychosis is improved or at baseline  Description: INTERVENTIONS:  1. Assess for possible contributors to thought disturbance, including medications, impaired vision or hearing, underlying metabolic abnormalities, dehydration, psychiatric diagnoses, and notify attending LIP  2. [place] high risk fall precautions, as indicated  3. Provide frequent short contacts to provide reality reorientation, refocusing and direction  4. Decrease environmental stimuli, including noise as appropriate  5. Monitor and intervene to maintain adequate nutrition, hydration, elimination, sleep and activity  6. If unable to ensure safety without constant attention obtain sitter and review sitter guidelines with assigned personnel  7. Initiate Psychosocial CNS and Spiritual Care consult, as indicated  Outcome: Not Progressing       Problem: Safety - Adult  Goal: Free from fall injury  Outcome: Progressing     Problem: Discharge Planning  Goal: Discharge to home or other facility with appropriate resources  Outcome: Progressing     Problem: Skin/Tissue Integrity  Goal: Absence of new skin breakdown  Description: 1. Monitor for areas of redness and/or skin breakdown  2. Assess vascular access sites hourly  3. Every 4-6 hours minimum:  Change oxygen saturation probe site  4. Every 4-6 hours:  If on nasal continuous positive airway pressure, respiratory therapy assess nares and determine need for appliance change or resting period.   Outcome: Progressing     Problem: Pain  Goal: Verbalizes/displays adequate comfort level or baseline comfort level  Outcome: Progressing

## 2023-12-15 NOTE — PROGRESS NOTES
monitoring  - Will use caution with Ativan given poor mentation  - Consider Phenobarbital taper when patient can tolerate PO  - RN to attempt bedside swallow      Hyperbilirubinemia: resolved     Thrombocytopenia: improving  - PLT improved to 93 this morning  - Follow CBC    PT/OT    Code status: Full  Prophylaxis: Eliquis  Care Plan discussed with: pt/rn/ cm  Anticipated Disposition: 48 hrs           Review of Systems:   Pertinent items are noted in HPI.       Vital Signs:    Last 24hrs VS reviewed since prior progress note. Most recent are:  Vitals:    12/15/23 1000   BP:    Pulse: 98   Resp:    Temp:    SpO2:        No intake or output data in the 24 hours ending 12/15/23 1153       Physical Examination:     I had a face to face encounter with this patient and independently examined them on 12/15/2023 as outlined below:          Constitutional:  No acute distress, stuporous   ENT:  Oral mucosa moist, oropharynx benign.    Resp:  CTA bilaterally. No wheezing/rhonchi/rales. No accessory muscle use.    CV:  Regular rhythm, normal rate, no murmurs, gallops, rubs    GI:  Soft, non distended, non tender. normoactive bowel sounds, no hepatosplenomegaly     Musculoskeletal:  No edema, warm, 2+ pulses throughout    Neurologic:  Moves all extremities.  AAOx3 but is somewhat somnolent, CN II-XII grossly intact            Data Review:    Review and/or order of clinical lab test  Review and/or order of tests in the radiology section of CPT  Review and/or order of tests in the medicine section of CPT      Labs:     Recent Labs     12/14/23  0325 12/15/23  0310   WBC 7.9 7.3   HGB 11.1* 11.5*   HCT 31.0* 32.4*   PLT 93* 109*       Recent Labs     12/12/23  1336 12/12/23  1338 12/12/23  2012 12/13/23  0412 12/13/23  1731 12/14/23  0325 12/14/23  1600 12/15/23  0311   NA  --  116*   < > 120*   < > 121* 123* 126*   K  --  3.5   < > 3.6   < > 3.2* 3.6 3.8   CL  --  82*   < > 88*   < > 91* 92* 95*   CO2  --  22   < > 23   < > 20* 21  20*   BUN  --  8   < > 7   < > 7 7 6   MG  --  1.9  --  1.8  --  1.8  --   --    PHOS 3.2  --   --  2.6  --   --   --   --     < > = values in this interval not displayed. Recent Labs     12/12/23  1338 12/14/23  0325 12/15/23  0311   ALT 31 25 28   GLOB 4.1* 3.8 3.6       No results for input(s): \"INR\", \"APTT\" in the last 72 hours. Invalid input(s): \"PTP\"   No results for input(s): \"TIBC\", \"FERR\" in the last 72 hours. Invalid input(s): \"FE\", \"PSAT\"   No results found for: \"FOL\", \"RBCF\"   No results for input(s): \"PH\", \"PCO2\", \"PO2\" in the last 72 hours. No results for input(s): \"CPK\" in the last 72 hours.     Invalid input(s): \"CPKMB\", \"CKNDX\", \"TROIQ\"  Lab Results   Component Value Date/Time    CHOL 216 11/08/2022 11:42 AM    HDL 58 11/08/2022 11:42 AM     No results found for: \"GLUCPOC\"  [unfilled]      Medications Reviewed:     Current Facility-Administered Medications   Medication Dose Route Frequency    metoprolol tartrate (LOPRESSOR) tablet 50 mg  50 mg Oral BID    apixaban (ELIQUIS) tablet 5 mg  5 mg Oral BID    dextrose 5 % and 0.9 % NaCl with KCl 20 mEq infusion   IntraVENous Continuous    dilTIAZem 125 mg in sodium chloride 0.9 % 125 mL infusion  2.5-15 mg/hr IntraVENous Continuous    sodium chloride flush 0.9 % injection 5-40 mL  5-40 mL IntraVENous 2 times per day    sodium chloride flush 0.9 % injection 5-40 mL  5-40 mL IntraVENous PRN    0.9 % sodium chloride infusion   IntraVENous PRN    potassium chloride (KLOR-CON) extended release tablet 40 mEq  40 mEq Oral PRN    Or    potassium bicarb-citric acid (EFFER-K) effervescent tablet 40 mEq  40 mEq Oral PRN    Or    potassium chloride 10 mEq/100 mL IVPB (Peripheral Line)  10 mEq IntraVENous PRN    magnesium sulfate 2000 mg in 50 mL IVPB premix  2,000 mg IntraVENous PRN    ondansetron (ZOFRAN-ODT) disintegrating tablet 4 mg  4 mg Oral Q8H PRN    Or    ondansetron (ZOFRAN) injection 4 mg  4 mg IntraVENous Q6H PRN    polyethylene glycol (GLYCOLAX)

## 2023-12-16 LAB
ALBUMIN SERPL-MCNC: 2.8 G/DL (ref 3.5–5)
ALBUMIN/GLOB SERPL: 0.6 (ref 1.1–2.2)
ALP SERPL-CCNC: 80 U/L (ref 45–117)
ALT SERPL-CCNC: 27 U/L (ref 12–78)
ANION GAP SERPL CALC-SCNC: 7 MMOL/L (ref 5–15)
AST SERPL-CCNC: 35 U/L (ref 15–37)
BASOPHILS # BLD: 0.1 K/UL (ref 0–0.1)
BASOPHILS NFR BLD: 1 % (ref 0–1)
BILIRUB SERPL-MCNC: 1.4 MG/DL (ref 0.2–1)
BUN SERPL-MCNC: 5 MG/DL (ref 6–20)
BUN/CREAT SERPL: 8 (ref 12–20)
CALCIUM SERPL-MCNC: 8.1 MG/DL (ref 8.5–10.1)
CHLORIDE SERPL-SCNC: 95 MMOL/L (ref 97–108)
CO2 SERPL-SCNC: 22 MMOL/L (ref 21–32)
CREAT SERPL-MCNC: 0.61 MG/DL (ref 0.7–1.3)
DIFFERENTIAL METHOD BLD: ABNORMAL
EOSINOPHIL # BLD: 0.4 K/UL (ref 0–0.4)
EOSINOPHIL NFR BLD: 5 % (ref 0–7)
ERYTHROCYTE [DISTWIDTH] IN BLOOD BY AUTOMATED COUNT: 12.8 % (ref 11.5–14.5)
GLOBULIN SER CALC-MCNC: 4.9 G/DL (ref 2–4)
GLUCOSE SERPL-MCNC: 101 MG/DL (ref 65–100)
HCT VFR BLD AUTO: 40.6 % (ref 36.6–50.3)
HGB BLD-MCNC: 13.8 G/DL (ref 12.1–17)
IMM GRANULOCYTES # BLD AUTO: 0.1 K/UL (ref 0–0.04)
IMM GRANULOCYTES NFR BLD AUTO: 1 % (ref 0–0.5)
LYMPHOCYTES # BLD: 1.6 K/UL (ref 0.8–3.5)
LYMPHOCYTES NFR BLD: 18 % (ref 12–49)
MCH RBC QN AUTO: 32.5 PG (ref 26–34)
MCHC RBC AUTO-ENTMCNC: 34 G/DL (ref 30–36.5)
MCV RBC AUTO: 95.8 FL (ref 80–99)
MONOCYTES # BLD: 1.6 K/UL (ref 0–1)
MONOCYTES NFR BLD: 18 % (ref 5–13)
NEUTS SEG # BLD: 5.2 K/UL (ref 1.8–8)
NEUTS SEG NFR BLD: 57 % (ref 32–75)
NRBC # BLD: 0 K/UL (ref 0–0.01)
NRBC BLD-RTO: 0 PER 100 WBC
PLATELET # BLD AUTO: 126 K/UL (ref 150–400)
PMV BLD AUTO: 8.9 FL (ref 8.9–12.9)
POTASSIUM SERPL-SCNC: 3.9 MMOL/L (ref 3.5–5.1)
PROT SERPL-MCNC: 7.7 G/DL (ref 6.4–8.2)
RBC # BLD AUTO: 4.24 M/UL (ref 4.1–5.7)
SODIUM SERPL-SCNC: 124 MMOL/L (ref 136–145)
WBC # BLD AUTO: 8.9 K/UL (ref 4.1–11.1)

## 2023-12-16 PROCEDURE — 6360000002 HC RX W HCPCS: Performed by: HOSPITALIST

## 2023-12-16 PROCEDURE — 2060000000 HC ICU INTERMEDIATE R&B

## 2023-12-16 PROCEDURE — 2500000003 HC RX 250 WO HCPCS

## 2023-12-16 PROCEDURE — 2500000003 HC RX 250 WO HCPCS: Performed by: INTERNAL MEDICINE

## 2023-12-16 PROCEDURE — 6370000000 HC RX 637 (ALT 250 FOR IP): Performed by: HOSPITALIST

## 2023-12-16 PROCEDURE — 85025 COMPLETE CBC W/AUTO DIFF WBC: CPT

## 2023-12-16 PROCEDURE — 36415 COLL VENOUS BLD VENIPUNCTURE: CPT

## 2023-12-16 PROCEDURE — 2580000003 HC RX 258

## 2023-12-16 PROCEDURE — 2580000003 HC RX 258: Performed by: HOSPITALIST

## 2023-12-16 PROCEDURE — 2500000003 HC RX 250 WO HCPCS: Performed by: HOSPITALIST

## 2023-12-16 PROCEDURE — 80053 COMPREHEN METABOLIC PANEL: CPT

## 2023-12-16 RX ORDER — BENZOCAINE/MENTHOL 6 MG-10 MG
LOZENGE MUCOUS MEMBRANE 2 TIMES DAILY
Status: DISCONTINUED | OUTPATIENT
Start: 2023-12-16 | End: 2023-12-26 | Stop reason: HOSPADM

## 2023-12-16 RX ORDER — METOPROLOL TARTRATE 1 MG/ML
2.5 INJECTION, SOLUTION INTRAVENOUS EVERY 6 HOURS
Status: DISCONTINUED | OUTPATIENT
Start: 2023-12-16 | End: 2023-12-17

## 2023-12-16 RX ORDER — ENOXAPARIN SODIUM 100 MG/ML
1 INJECTION SUBCUTANEOUS 2 TIMES DAILY
Status: DISCONTINUED | OUTPATIENT
Start: 2023-12-16 | End: 2023-12-18

## 2023-12-16 RX ORDER — HYDROCORTISONE CREAM 1% 10 MG/G
1 CREAM TOPICAL ONCE
Status: DISCONTINUED | OUTPATIENT
Start: 2023-12-16 | End: 2023-12-16 | Stop reason: SDUPTHER

## 2023-12-16 RX ADMIN — POTASSIUM CHLORIDE, DEXTROSE MONOHYDRATE AND SODIUM CHLORIDE: 150; 5; 900 INJECTION, SOLUTION INTRAVENOUS at 03:51

## 2023-12-16 RX ADMIN — THIAMINE HYDROCHLORIDE 100 MG: 100 INJECTION, SOLUTION INTRAMUSCULAR; INTRAVENOUS at 11:59

## 2023-12-16 RX ADMIN — POTASSIUM CHLORIDE, DEXTROSE MONOHYDRATE AND SODIUM CHLORIDE: 150; 5; 900 INJECTION, SOLUTION INTRAVENOUS at 19:31

## 2023-12-16 RX ADMIN — METOPROLOL TARTRATE 2.5 MG: 5 INJECTION INTRAVENOUS at 23:00

## 2023-12-16 RX ADMIN — DILTIAZEM HYDROCHLORIDE 10 MG/HR: 5 INJECTION, SOLUTION INTRAVENOUS at 20:57

## 2023-12-16 RX ADMIN — ENOXAPARIN SODIUM 90 MG: 100 INJECTION SUBCUTANEOUS at 11:59

## 2023-12-16 RX ADMIN — ANTI-PRURITIC: 1 CREAM TOPICAL at 14:17

## 2023-12-16 RX ADMIN — METOPROLOL TARTRATE 2.5 MG: 5 INJECTION INTRAVENOUS at 12:00

## 2023-12-16 RX ADMIN — METOPROLOL TARTRATE 2.5 MG: 5 INJECTION INTRAVENOUS at 17:49

## 2023-12-16 RX ADMIN — ANTI-PRURITIC: 1 CREAM TOPICAL at 20:57

## 2023-12-16 RX ADMIN — ENOXAPARIN SODIUM 90 MG: 100 INJECTION SUBCUTANEOUS at 20:57

## 2023-12-16 ASSESSMENT — PAIN SCALES - WONG BAKER
WONGBAKER_NUMERICALRESPONSE: 0
WONGBAKER_NUMERICALRESPONSE: 0

## 2023-12-16 ASSESSMENT — PAIN SCALES - GENERAL
PAINLEVEL_OUTOF10: 0
PAINLEVEL_OUTOF10: 0

## 2023-12-16 NOTE — PROGRESS NOTES
[facility]                                                                                          Hospitalist Progress Note  Antonieta Connelly MD  Answering service: [phone] OR [phone] from in house phone        Date of Service:  2023  NAME:  Hal Casas  :  1945  MRN:  [id number]      Admission Summary:   Per H&P, Hal Casas is a 78 y.o. male with pmhx of htn and etoh abuse who presents with bilateral lower extremity weakness.  Patient states he fell two nights ago at 3am and has had trouble bearing weight on his legs.  He states he has no pain except in his lower back and does attests to some numbness in BLE but still has sensation.  Workup in ED showed wbc 9.7k, Hg 12.9, plt 85, , K 3.5, BUN 8, Cr. 0.69, Gene 8.2, Albumin 3.2, AST 44, Tbili 1.7, glucose 90.  UA, ct abd/pelvis, pelvis xray pending.  Will admit for hyponatremia.     Attests to drinking 6-8 PBR daily, lower back pain (nonreproducible) with radiation into the front lower abdomen, and some diarrhea. Denies fever chest pain shortness of breath, n/v     Interval history / Subjective:     Patient seen and examined follow-up for atrial fibrillation alcohol abuse  Patient failed swallowing evaluation all medications are IV now will reexamine on Monday try to avoid feeding tube discussed with patient in length he is more alert and     Assessment & Plan:     Atrial fibrillation with RVR  - RRT called (-) for A-fib w/RVR   - S/P Diltiazem push, gtt started.   - Consult placed to cardiology  - [id number] score = 3, patient is moderate-high risk for cardioembolic event.  - start eliquis BB ( IV BB and add lovenox can't swallow)    HTN  - Continuing metoprolol, increase to 25mg BID changed to IV     Hyponatremia  Hypokalemia   - Likely 2/2 EtOH intake, diarrhea  - Na was 116 on presentation, 124 today  - Nephrology following, appreciate expertise  - Change IVF to D5NS+Kckl 20  Labs     12/14/23  0325 12/14/23  1600 12/15/23  0311 12/16/23  0201   * 123* 126* 124*   K 3.2* 3.6 3.8 3.9   CL 91* 92* 95* 95*   CO2 20* 21 20* 22   BUN 7 7 6 5*   MG 1.8  --   --   --        Recent Labs     12/14/23  0325 12/15/23  0311 12/16/23  0201   ALT 25 28 27   GLOB 3.8 3.6 4.9*       No results for input(s): \"INR\", \"APTT\" in the last 72 hours. Invalid input(s): \"PTP\"   No results for input(s): \"TIBC\", \"FERR\" in the last 72 hours. Invalid input(s): \"FE\", \"PSAT\"   No results found for: \"FOL\", \"RBCF\"   No results for input(s): \"PH\", \"PCO2\", \"PO2\" in the last 72 hours. No results for input(s): \"CPK\" in the last 72 hours.     Invalid input(s): \"CPKMB\", \"CKNDX\", \"TROIQ\"  Lab Results   Component Value Date/Time    CHOL 216 11/08/2022 11:42 AM    HDL 58 11/08/2022 11:42 AM     No results found for: \"GLUCPOC\"  [unfilled]      Medications Reviewed:     Current Facility-Administered Medications   Medication Dose Route Frequency    enoxaparin (LOVENOX) injection 90 mg  1 mg/kg SubCUTAneous BID    metoprolol (LOPRESSOR) injection 2.5 mg  2.5 mg IntraVENous Q6H    [Held by provider] metoprolol tartrate (LOPRESSOR) tablet 50 mg  50 mg Oral BID    [Held by provider] apixaban (ELIQUIS) tablet 5 mg  5 mg Oral BID    dextrose 5 % and 0.9 % NaCl with KCl 20 mEq infusion   IntraVENous Continuous    thiamine (B-1) 100 mg in sodium chloride 0.9 % 100 mL IVPB  100 mg IntraVENous Q24H    dilTIAZem 125 mg in sodium chloride 0.9 % 125 mL infusion  2.5-15 mg/hr IntraVENous Continuous    sodium chloride flush 0.9 % injection 5-40 mL  5-40 mL IntraVENous 2 times per day    sodium chloride flush 0.9 % injection 5-40 mL  5-40 mL IntraVENous PRN    0.9 % sodium chloride infusion   IntraVENous PRN    potassium chloride (KLOR-CON) extended release tablet 40 mEq  40 mEq Oral PRN    Or    potassium bicarb-citric acid (EFFER-K) effervescent tablet 40 mEq  40 mEq Oral PRN    Or    potassium chloride 10 mEq/100 mL IVPB (Peripheral

## 2023-12-16 NOTE — PLAN OF CARE
Problem: Skin/Tissue Integrity  Goal: Absence of new skin breakdown  Description: 1. Monitor for areas of redness and/or skin breakdown  2. Assess vascular access sites hourly  3. Every 4-6 hours minimum:  Change oxygen saturation probe site  4. Every 4-6 hours:  If on nasal continuous positive airway pressure, respiratory therapy assess nares and determine need for appliance change or resting period. Outcome: Progressing     Problem: Pain  Goal: Verbalizes/displays adequate comfort level or baseline comfort level  Outcome: Progressing  Flowsheets (Taken 12/15/2023 2000)  Verbalizes/displays adequate comfort level or baseline comfort level: Assess pain using appropriate pain scale     Problem: SLP Adult - Impaired Swallowing  Goal: By Discharge: Advance to least restrictive diet without signs or symptoms of aspiration for planned discharge setting. See evaluation for individualized goals. Description: 1. Patient will participate with MBSS to further assess swallow function - initiated 12/15 - Met  2. Patient will participate with therapeutic trials ice chips and/or small amounts pureed solids with SLP with use/demo swallowing strategies with min cues and no overt s/sx aspiration or penetration - initiated 12/15  3. Patient will participate with repeat imaging as appropriate - initiated 12/15  12/15/2023 1436 by Helio Stokes, SLP  Outcome: Progressing  12/15/2023 1435 by Helio Stokes, SLP  Outcome: Progressing  12/15/2023 1359 by Helio Stokes, SLP  Outcome: Progressing  12/15/2023 1338 by Helio Stokes, SLP  Outcome: Progressing     Problem: Physical Therapy - Adult  Goal: By Discharge: Performs mobility at highest level of function for planned discharge setting. See evaluation for individualized goals. Description: FUNCTIONAL STATUS PRIOR TO ADMISSION: Patient reports being independent with the use of a rolling walker vs cane.  He states he had a fall recently which is why he came to the ED.    HOME SUPPORT PRIOR TO ADMISSION: The patient lived with his wife, whom he states \"can help me with whatever I ask\" but reports not requiring assistance from her prior to admission.    Physical Therapy Goals  Initiated 12/15/2023  1.  Patient will move from supine to sit and sit to supine, scoot up and down, and roll side to side in bed with minimal assistance within 7 day(s).    2.  Patient will perform sit to stand with minimal assistance within 7 day(s).  3.  Patient will transfer from bed to chair and chair to bed with minimal assistance using the least restrictive device within 7 day(s).  4.  Patient will ambulate with minimal assistance for 50 feet with the least restrictive device within 7 day(s).   5.  Patient will ascend/descend 3 stairs with handrail(s) with minimal assistance within 7 day(s).    12/15/2023 1451 by Tarsha Stewart, PT  Outcome: Progressing     Problem: Occupational Therapy - Adult  Goal: By Discharge: Performs self-care activities at highest level of function for planned discharge setting.  See evaluation for individualized goals.  Description: FUNCTIONAL STATUS PRIOR TO ADMISSION:  Pt reports he lives in one-level home with wife and was IND with ADLs and IADLs prior to admission.    HOME SUPPORT: Patient lived with wife but didn't require assistance.    Occupational Therapy Goals:  Initiated 12/15/2023  1.  Patient will perform grooming seated with Supervision within 7 day(s).  2.  Patient will perform upper body dressing seated with Minimal Assist within 7 day(s).  3.  Patient will perform LB bathing seated with Maximal Assist within 7 day(s).  4.  Patient will perform toilet transfers with Moderate Assist  within 7 day(s).  5.  Patient will perform all aspects of toileting with Maximal Assist within 7 day(s).  6.  Patient will participate in upper extremity therapeutic exercise/activities with Stand by Assist for 5 minutes within 7 day(s).    7.  Patient will utilize energy conservation

## 2023-12-16 NOTE — PROGRESS NOTES
Summary (Last 24 hours) at 12/16/2023 1839  Last data filed at 12/15/2023 2300  Gross per 24 hour   Intake 901.46 ml   Output --   Net 901.46 ml        Physical Exam:  General: confused  HEENT: Eyes Conjunctiva without pallor ,erythema. The sclerae without icterus.  .   Neck:Supple,no JVD  Lungs : Clears to auscultation Bilaterally, Normal respiratory effort  CVS: RRR, S1 S2 normal, No rub  Abdomen: Soft, Non tender, Not distended, bowel sounds present  : external catheter  Extremities:  No Edema  Skin: No rash , not jaundiced         [] Telemetry Reviewed     [] NSR [] PAC/PVCs   [] Afib  [] Paced   [] NSVT   [] Benson [] NGT  [] Intubated on vent    Lab Data Reviewed:    Recent Results (from the past 24 hour(s))   CBC with Auto Differential    Collection Time: 12/16/23  2:01 AM   Result Value Ref Range    WBC 8.9 4.1 - 11.1 K/uL    RBC 4.24 4.10 - 5.70 M/uL    Hemoglobin 13.8 12.1 - 17.0 g/dL    Hematocrit 40.6 36.6 - 50.3 %    MCV 95.8 80.0 - 99.0 FL    MCH 32.5 26.0 - 34.0 PG    MCHC 34.0 30.0 - 36.5 g/dL    RDW 12.8 11.5 - 14.5 %    Platelets 314 (L) 480 - 400 K/uL    MPV 8.9 8.9 - 12.9 FL    Nucleated RBCs 0.0 0  WBC    nRBC 0.00 0.00 - 0.01 K/uL    Neutrophils % 57 32 - 75 %    Lymphocytes % 18 12 - 49 %    Monocytes % 18 (H) 5 - 13 %    Eosinophils % 5 0 - 7 %    Basophils % 1 0 - 1 %    Immature Granulocytes 1 (H) 0.0 - 0.5 %    Neutrophils Absolute 5.2 1.8 - 8.0 K/UL    Lymphocytes Absolute 1.6 0.8 - 3.5 K/UL    Monocytes Absolute 1.6 (H) 0.0 - 1.0 K/UL    Eosinophils Absolute 0.4 0.0 - 0.4 K/UL    Basophils Absolute 0.1 0.0 - 0.1 K/UL    Absolute Immature Granulocyte 0.1 (H) 0.00 - 0.04 K/UL    Differential Type AUTOMATED     Comprehensive Metabolic Panel    Collection Time: 12/16/23  2:01 AM   Result Value Ref Range    Sodium 124 (L) 136 - 145 mmol/L    Potassium 3.9 3.5 - 5.1 mmol/L    Chloride 95 (L) 97 - 108 mmol/L    CO2 22 21 - 32 mmol/L    Anion Gap 7 5 - 15 mmol/L    Glucose 101 (H) 65 -

## 2023-12-17 LAB
ANION GAP SERPL CALC-SCNC: 4 MMOL/L (ref 5–15)
BASOPHILS # BLD: 0.1 K/UL (ref 0–0.1)
BASOPHILS NFR BLD: 1 % (ref 0–1)
BUN SERPL-MCNC: 5 MG/DL (ref 6–20)
BUN/CREAT SERPL: 9 (ref 12–20)
CALCIUM SERPL-MCNC: 7.8 MG/DL (ref 8.5–10.1)
CHLORIDE SERPL-SCNC: 101 MMOL/L (ref 97–108)
CO2 SERPL-SCNC: 18 MMOL/L (ref 21–32)
CREAT SERPL-MCNC: 0.58 MG/DL (ref 0.7–1.3)
DIFFERENTIAL METHOD BLD: ABNORMAL
EOSINOPHIL # BLD: 0.3 K/UL (ref 0–0.4)
EOSINOPHIL NFR BLD: 6 % (ref 0–7)
ERYTHROCYTE [DISTWIDTH] IN BLOOD BY AUTOMATED COUNT: 12.6 % (ref 11.5–14.5)
GLUCOSE SERPL-MCNC: 105 MG/DL (ref 65–100)
HCT VFR BLD AUTO: 38.1 % (ref 36.6–50.3)
HGB BLD-MCNC: 13.1 G/DL (ref 12.1–17)
IMM GRANULOCYTES # BLD AUTO: 0.1 K/UL (ref 0–0.04)
IMM GRANULOCYTES NFR BLD AUTO: 1 % (ref 0–0.5)
LYMPHOCYTES # BLD: 1 K/UL (ref 0.8–3.5)
LYMPHOCYTES NFR BLD: 22 % (ref 12–49)
MAGNESIUM SERPL-MCNC: 1.6 MG/DL (ref 1.6–2.4)
MCH RBC QN AUTO: 32.9 PG (ref 26–34)
MCHC RBC AUTO-ENTMCNC: 34.4 G/DL (ref 30–36.5)
MCV RBC AUTO: 95.7 FL (ref 80–99)
MONOCYTES # BLD: 0.8 K/UL (ref 0–1)
MONOCYTES NFR BLD: 16 % (ref 5–13)
NEUTS SEG # BLD: 2.6 K/UL (ref 1.8–8)
NEUTS SEG NFR BLD: 55 % (ref 32–75)
NRBC # BLD: 0 K/UL (ref 0–0.01)
NRBC BLD-RTO: 0 PER 100 WBC
PHOSPHATE SERPL-MCNC: 2.5 MG/DL (ref 2.6–4.7)
PLATELET # BLD AUTO: 141 K/UL (ref 150–400)
PMV BLD AUTO: 9.2 FL (ref 8.9–12.9)
POTASSIUM SERPL-SCNC: 4.2 MMOL/L (ref 3.5–5.1)
RBC # BLD AUTO: 3.98 M/UL (ref 4.1–5.7)
SODIUM SERPL-SCNC: 123 MMOL/L (ref 136–145)
WBC # BLD AUTO: 4.8 K/UL (ref 4.1–11.1)

## 2023-12-17 PROCEDURE — 83735 ASSAY OF MAGNESIUM: CPT

## 2023-12-17 PROCEDURE — 84100 ASSAY OF PHOSPHORUS: CPT

## 2023-12-17 PROCEDURE — 2500000003 HC RX 250 WO HCPCS: Performed by: FAMILY MEDICINE

## 2023-12-17 PROCEDURE — 80048 BASIC METABOLIC PNL TOTAL CA: CPT

## 2023-12-17 PROCEDURE — 2500000003 HC RX 250 WO HCPCS: Performed by: HOSPITALIST

## 2023-12-17 PROCEDURE — 2500000003 HC RX 250 WO HCPCS: Performed by: INTERNAL MEDICINE

## 2023-12-17 PROCEDURE — 2580000003 HC RX 258: Performed by: HOSPITALIST

## 2023-12-17 PROCEDURE — 36415 COLL VENOUS BLD VENIPUNCTURE: CPT

## 2023-12-17 PROCEDURE — 6360000002 HC RX W HCPCS: Performed by: NURSE PRACTITIONER

## 2023-12-17 PROCEDURE — 2060000000 HC ICU INTERMEDIATE R&B

## 2023-12-17 PROCEDURE — 85025 COMPLETE CBC W/AUTO DIFF WBC: CPT

## 2023-12-17 PROCEDURE — 6360000002 HC RX W HCPCS: Performed by: HOSPITALIST

## 2023-12-17 PROCEDURE — 2580000003 HC RX 258: Performed by: NURSE PRACTITIONER

## 2023-12-17 RX ORDER — HYDRALAZINE HYDROCHLORIDE 20 MG/ML
5 INJECTION INTRAMUSCULAR; INTRAVENOUS EVERY 8 HOURS PRN
Status: DISCONTINUED | OUTPATIENT
Start: 2023-12-17 | End: 2023-12-26 | Stop reason: HOSPADM

## 2023-12-17 RX ORDER — METOPROLOL TARTRATE 1 MG/ML
2.5 INJECTION, SOLUTION INTRAVENOUS EVERY 6 HOURS
Status: DISCONTINUED | OUTPATIENT
Start: 2023-12-17 | End: 2023-12-18

## 2023-12-17 RX ADMIN — ENOXAPARIN SODIUM 90 MG: 100 INJECTION SUBCUTANEOUS at 20:33

## 2023-12-17 RX ADMIN — METOPROLOL TARTRATE 2.5 MG: 5 INJECTION INTRAVENOUS at 18:12

## 2023-12-17 RX ADMIN — MAGNESIUM SULFATE HEPTAHYDRATE 2000 MG: 40 INJECTION, SOLUTION INTRAVENOUS at 13:08

## 2023-12-17 RX ADMIN — ENOXAPARIN SODIUM 90 MG: 100 INJECTION SUBCUTANEOUS at 09:38

## 2023-12-17 RX ADMIN — THIAMINE HYDROCHLORIDE 100 MG: 100 INJECTION, SOLUTION INTRAMUSCULAR; INTRAVENOUS at 11:53

## 2023-12-17 RX ADMIN — Medication 10 ML: at 20:32

## 2023-12-17 RX ADMIN — ANTI-PRURITIC: 1 CREAM TOPICAL at 09:56

## 2023-12-17 RX ADMIN — METOPROLOL TARTRATE 2.5 MG: 5 INJECTION INTRAVENOUS at 09:38

## 2023-12-17 RX ADMIN — METOPROLOL TARTRATE 2.5 MG: 5 INJECTION INTRAVENOUS at 04:38

## 2023-12-17 RX ADMIN — POTASSIUM CHLORIDE, DEXTROSE MONOHYDRATE AND SODIUM CHLORIDE: 150; 5; 900 INJECTION, SOLUTION INTRAVENOUS at 09:37

## 2023-12-17 ASSESSMENT — PAIN SCALES - WONG BAKER
WONGBAKER_NUMERICALRESPONSE: 0
WONGBAKER_NUMERICALRESPONSE: 0

## 2023-12-17 ASSESSMENT — PAIN SCALES - GENERAL: PAINLEVEL_OUTOF10: 0

## 2023-12-17 NOTE — PROGRESS NOTES
[facility]  Stephanie Ferreira MD  [phone]            Renal Progress Note    NAME:  Genia Duane   :   1945   MRN:   [id number]     Date/Time:  2023 1:03 PM            DISCUSSION / PLAN :      Assessment:  Hyponatremia initially due to binge drinking and volume depletion in setting of diarrhea and poor po intake-On admission,  Urine NA <10, with high U creat. At this point he is probably volume replete  -Na at 123, K low  -CO2 low,      Hypokalemia- improved  ETOH abuse  Possible Alcohol withdrawal  Encephalopathy, metabolic  HTN-controlled  Thrombocytopenia  New Afib /rvr on Cardizem  Swallowing dysfxn, high risk for aspiration        Plan:  Change IVF to NS+Kckl 20 meq/lit at 75 ml/hr- watch for worsening hyponatremia  Recheck Mg and K in AM - replete as needed  Regular diet, protein supplements-once safe to have po, may require PEG  F/u BMP daily  Replete phos, Mg and K prn  Recheck Urune Na         ___________________________________________________  Subjective:         Confused, states he can't void, Na 121 this am    12-16 seems lucid, not allowed to eat or drink due to swallowing issues. At some point he will need protein intake to facilitate water excretion. Can ry UreNa and/ or salt tablets when allowed to swallow    12-17 remains NPO. Alert, comfortable.  No distress    Medications reviewed:  Current Facility-Administered Medications   Medication Dose Route Frequency    metoprolol (LOPRESSOR) injection 2.5 mg  2.5 mg IntraVENous Q6H    enoxaparin (LOVENOX) injection 90 mg  1 mg/kg SubCUTAneous BID    hydrocortisone 1 % cream   Topical BID    [Held by provider] metoprolol tartrate (LOPRESSOR) tablet 50 mg  50 mg Oral BID    [Held by provider] apixaban (ELIQUIS) tablet 5 mg  5 mg Oral BID    dextrose 5 % and 0.9 % NaCl with KCl 20 mEq infusion   IntraVENous Continuous    thiamine (B-1) 100 mg in sodium chloride 0.9 % 100 mL IVPB  100 mg IntraVENous Q24H    sodium chloride flush 0.9

## 2023-12-17 NOTE — PROGRESS NOTES
thiamine (B-1) 100 mg in sodium chloride 0.9 % 100 mL IVPB  100 mg IntraVENous Q24H    dilTIAZem 125 mg in sodium chloride 0.9 % 125 mL infusion  2.5-15 mg/hr IntraVENous Continuous    sodium chloride flush 0.9 % injection 5-40 mL  5-40 mL IntraVENous 2 times per day    sodium chloride flush 0.9 % injection 5-40 mL  5-40 mL IntraVENous PRN    0.9 % sodium chloride infusion   IntraVENous PRN    potassium chloride (KLOR-CON) extended release tablet 40 mEq  40 mEq Oral PRN    Or    potassium bicarb-citric acid (EFFER-K) effervescent tablet 40 mEq  40 mEq Oral PRN    Or    potassium chloride 10 mEq/100 mL IVPB (Peripheral Line)  10 mEq IntraVENous PRN    magnesium sulfate 2000 mg in 50 mL IVPB premix  2,000 mg IntraVENous PRN    ondansetron (ZOFRAN-ODT) disintegrating tablet 4 mg  4 mg Oral Q8H PRN    Or    ondansetron (ZOFRAN) injection 4 mg  4 mg IntraVENous Q6H PRN    polyethylene glycol (GLYCOLAX) packet 17 g  17 g Oral Daily PRN    acetaminophen (TYLENOL) tablet 650 mg  650 mg Oral Q6H PRN    Or    acetaminophen (TYLENOL) suppository 650 mg  650 mg Rectal Q6H PRN    sodium chloride flush 0.9 % injection 5-40 mL  5-40 mL IntraVENous 2 times per day    sodium chloride flush 0.9 % injection 5-40 mL  5-40 mL IntraVENous PRN    0.9 % sodium chloride infusion   IntraVENous PRN    multivitamin 1 tablet  1 tablet Oral Daily    LORazepam (ATIVAN) tablet 1 mg  1 mg Oral Q1H PRN    Or    LORazepam (ATIVAN) injection 1 mg  1 mg IntraVENous Q1H PRN    Or    LORazepam (ATIVAN) tablet 2 mg  2 mg Oral Q1H PRN    Or    LORazepam (ATIVAN) injection 2 mg  2 mg IntraVENous Q1H PRN    Or    LORazepam (ATIVAN) tablet 3 mg  3 mg Oral Q1H PRN    Or    LORazepam (ATIVAN) injection 3 mg  3 mg IntraVENous Q1H PRN    Or    LORazepam (ATIVAN) tablet 4 mg  4 mg Oral Q1H PRN    Or    LORazepam (ATIVAN) injection 4 mg  4 mg IntraVENous Q1H PRN     ______________________________________________________________________  EXPECTED LENGTH OF STAY:

## 2023-12-17 NOTE — PROGRESS NOTES
1930: .Bedside shift change report given to Gris Briones  (oncoming nurse) by Mariella Celestin (offgoing nurse). Report included the following information Nurse Handoff Report, Index, Intake/Output, and MAR        0730: Bedside shift change report given to Edson (oncoming nurse) by Gris Briones  (offgoing nurse). Report included the following information Nurse Handoff Report, Index, Intake/Output, and MAR.

## 2023-12-17 NOTE — PROGRESS NOTES
SLP CONTACT NOTE:    Re-consult received, discussed with Dr Belem Toney at length via [employer] re: MBS results on Friday, recs for consideration of alt means nutrition/hydration, and that BSE is not appropriate secondary to silent aspiration unable to be detected at bedside. Will f/u as indicated.      Genaro Neff M.Ed, CCC-SLP  Speech Language Pathologist

## 2023-12-18 LAB
ANION GAP SERPL CALC-SCNC: 8 MMOL/L (ref 5–15)
BUN SERPL-MCNC: 3 MG/DL (ref 6–20)
BUN/CREAT SERPL: 5 (ref 12–20)
CALCIUM SERPL-MCNC: 7.5 MG/DL (ref 8.5–10.1)
CHLORIDE SERPL-SCNC: 101 MMOL/L (ref 97–108)
CO2 SERPL-SCNC: 21 MMOL/L (ref 21–32)
COMMENT:: NORMAL
CREAT SERPL-MCNC: 0.56 MG/DL (ref 0.7–1.3)
GLUCOSE SERPL-MCNC: 102 MG/DL (ref 65–100)
POTASSIUM SERPL-SCNC: 3.7 MMOL/L (ref 3.5–5.1)
SODIUM SERPL-SCNC: 130 MMOL/L (ref 136–145)
SPECIMEN HOLD: NORMAL

## 2023-12-18 PROCEDURE — 6370000000 HC RX 637 (ALT 250 FOR IP): Performed by: INTERNAL MEDICINE

## 2023-12-18 PROCEDURE — 97110 THERAPEUTIC EXERCISES: CPT

## 2023-12-18 PROCEDURE — 97530 THERAPEUTIC ACTIVITIES: CPT

## 2023-12-18 PROCEDURE — 6360000002 HC RX W HCPCS

## 2023-12-18 PROCEDURE — 97535 SELF CARE MNGMENT TRAINING: CPT

## 2023-12-18 PROCEDURE — 6370000000 HC RX 637 (ALT 250 FOR IP): Performed by: HOSPITALIST

## 2023-12-18 PROCEDURE — 6370000000 HC RX 637 (ALT 250 FOR IP)

## 2023-12-18 PROCEDURE — 2580000003 HC RX 258: Performed by: NURSE PRACTITIONER

## 2023-12-18 PROCEDURE — 2500000003 HC RX 250 WO HCPCS: Performed by: FAMILY MEDICINE

## 2023-12-18 PROCEDURE — 80048 BASIC METABOLIC PNL TOTAL CA: CPT

## 2023-12-18 PROCEDURE — 2500000003 HC RX 250 WO HCPCS: Performed by: INTERNAL MEDICINE

## 2023-12-18 PROCEDURE — 2060000000 HC ICU INTERMEDIATE R&B

## 2023-12-18 PROCEDURE — 36415 COLL VENOUS BLD VENIPUNCTURE: CPT

## 2023-12-18 RX ORDER — LANOLIN ALCOHOL/MO/W.PET/CERES
CREAM (GRAM) TOPICAL
Status: COMPLETED
Start: 2023-12-18 | End: 2023-12-18

## 2023-12-18 RX ORDER — LANOLIN ALCOHOL/MO/W.PET/CERES
100 CREAM (GRAM) TOPICAL DAILY
Status: DISCONTINUED | OUTPATIENT
Start: 2023-12-19 | End: 2023-12-26 | Stop reason: HOSPADM

## 2023-12-18 RX ORDER — LOSARTAN POTASSIUM 50 MG/1
50 TABLET ORAL DAILY
Status: DISCONTINUED | OUTPATIENT
Start: 2023-12-18 | End: 2023-12-26 | Stop reason: HOSPADM

## 2023-12-18 RX ADMIN — APIXABAN 5 MG: 5 TABLET, FILM COATED ORAL at 21:08

## 2023-12-18 RX ADMIN — Medication 10 ML: at 21:08

## 2023-12-18 RX ADMIN — METOPROLOL TARTRATE 50 MG: 50 TABLET, FILM COATED ORAL at 21:09

## 2023-12-18 RX ADMIN — METOPROLOL TARTRATE 2.5 MG: 5 INJECTION INTRAVENOUS at 06:30

## 2023-12-18 RX ADMIN — LOSARTAN POTASSIUM 50 MG: 50 TABLET, FILM COATED ORAL at 14:48

## 2023-12-18 RX ADMIN — HYDRALAZINE HYDROCHLORIDE 5 MG: 20 INJECTION, SOLUTION INTRAMUSCULAR; INTRAVENOUS at 04:33

## 2023-12-18 RX ADMIN — METOPROLOL TARTRATE 2.5 MG: 5 INJECTION INTRAVENOUS at 00:28

## 2023-12-18 RX ADMIN — Medication 100 MG: at 14:49

## 2023-12-18 RX ADMIN — POTASSIUM CHLORIDE, DEXTROSE MONOHYDRATE AND SODIUM CHLORIDE: 150; 5; 900 INJECTION, SOLUTION INTRAVENOUS at 02:15

## 2023-12-18 ASSESSMENT — PAIN SCALES - GENERAL
PAINLEVEL_OUTOF10: 0

## 2023-12-18 NOTE — PROGRESS NOTES
Spiritual Care Assessment/Progress Note  [facility]    Name: Michelle Lara MRN: [id number]    Age: 66 y.o. Sex: male   Language: English     Date: 12/18/2023            Total Time Calculated: 29 min              Spiritual Assessment begun in [facility] IMCU 2  Service Provided For[de-identified] Patient and family together  Referral/Consult From[de-identified] Palliative Care  Encounter Overview/Reason : Palliative Care    Spiritual beliefs:      [] Involved in a levar tradition/spiritual practice:      [] Supported by a levar community:      [] Claims no spiritual orientation:      [] Seeking spiritual identity:           [x] Adheres to an individual form of spirituality: He considers himself to be a spiritually oriented person     [] Not able to assess:                Identified resources for coping and support system:   Support System: Spouse, Children, Palliative Care       [] Prayer                  [] Devotional reading               [] Music                  [] Guided Imagery     [] Pet visits                                        [] Other: (COMMENT)     Specific area/focus of visit   Encounter:    Crisis:    Spiritual/Emotional needs:    Ritual, Rites and Sacraments:    Grief, Loss, and Adjustments: Type: Adjustment to illness, Life Adjustments  Ethics/Mediation:    Behavioral Health:    Palliative Care: Type: Palliative Care, Initial/Spiritual Assessment  Advance Care Planning:      Visited patient for palliative initial spiritual assessment. His chart was consulted prior to the visit. His wife Celia was at bedside during the visit. The couple have been together some 25 years and  for 25 of those years. They have two children, both of whom are in college. He spoke of his medical issues and believes they are livable and he will be able to overcome his issues.    Chaplain Rene, MDiv, MS, [facility]

## 2023-12-18 NOTE — PROGRESS NOTES
Clinical Pharmacy Note: IV to PO Automatic Conversion  Please note: Marcos Barry medication- thiamine has been changed from IV to PO based on the following critiera:    Patient is tolerating oral medications  Patient is tolerating a diet more advanced than clear liquids  Patient is not requiring vasopressors    This IV to PO conversion is based on the [employer] approved automatic conversion policy for eligible patients. Please call with questions.

## 2023-12-18 NOTE — PLAN OF CARE
Problem: Physical Therapy - Adult  Goal: By Discharge: Performs mobility at highest level of function for planned discharge setting. See evaluation for individualized goals. Description: FUNCTIONAL STATUS PRIOR TO ADMISSION: Patient reports being independent with the use of a rolling walker vs cane. He states he had a fall recently which is why he came to the ED.    HOME SUPPORT PRIOR TO ADMISSION: The patient lived with his wife, whom he states \"can help me with whatever I ask\" but reports not requiring assistance from her prior to admission. Physical Therapy Goals  Initiated 12/15/2023  1. Patient will move from supine to sit and sit to supine, scoot up and down, and roll side to side in bed with minimal assistance within 7 day(s). 2.  Patient will perform sit to stand with minimal assistance within 7 day(s). 3.  Patient will transfer from bed to chair and chair to bed with minimal assistance using the least restrictive device within 7 day(s). 4.  Patient will ambulate with minimal assistance for 50 feet with the least restrictive device within 7 day(s). 5.  Patient will ascend/descend 3 stairs with handrail(s) with minimal assistance within 7 day(s). Outcome: Progressing    PHYSICAL THERAPY TREATMENT    Patient: Ramy Menard (89 y.o. male)  Date: 12/18/2023  Diagnosis: Hyponatremia [E87.1] Hyponatremia      Precautions: Fall Risk                    ASSESSMENT:  Patient continues to benefit from skilled PT services and is slowly progressing towards goals. Significant functional improvement noted from last session. Patient is able to sit EOB without physical assist. He is still anxious and shaking, and his BP elevated to 190/85 sitting EOB, limiting treatment. Educated on deep breathing and relaxing the shoulders. No headaches. Biggest complaint is low back pain. Patient was able to stand with min assist x 1 minute with RW.  Patient felt \"insecure\" standing and asked to sit, deferring bed to

## 2023-12-18 NOTE — PROGRESS NOTES
[facility]  Jeet Ye MD  [phone]            Renal Progress Note    NAME:  Hal Casas   :   1945   MRN:   [id number]     Date/Time:  2023 11:24 AM            DISCUSSION / PLAN :      Assessment:  Hyponatremia initially due to binge drinking and volume depletion in setting of diarrhea and poor po intake-On admission,  Urine NA <10, with high U creat. At this point he is probably volume replete  -Na at 130, K low  -CO2 low,      Hypokalemia- improved  ETOH abuse  Possible Alcohol withdrawal  Encephalopathy, metabolic  HTN-controlled  Thrombocytopenia  New Afib /rvr on Cardizem  Swallowing dysfxn, high risk for aspiration        Plan:  Continue IVF to NS+Kckl 20 meq/lit at 75 ml/hr-monitor electrolytes  Recheck Mg and K in AM - replete as needed  Regular diet, protein supplements-once safe to have po, may require PEG  F/u BMP daily  Replete phos, Mg and K prn  Recheck Urune Na         ___________________________________________________  Subjective:         Confused, states he can't void, Na 121 this am    12-16 seems lucid, not allowed to eat or drink due to swallowing issues. At some point he will need protein intake to facilitate water excretion. Can ry UreNa and/ or salt tablets when allowed to swallow    12-17 remains NPO. Alert, comfortable. No distress    23 No new issues. Patient is seen for Hyponatremia, na is correcting nicely with saline.    Medications reviewed:  Current Facility-Administered Medications   Medication Dose Route Frequency    metoprolol (LOPRESSOR) injection 2.5 mg  2.5 mg IntraVENous Q6H    hydrALAZINE (APRESOLINE) injection 5 mg  5 mg IntraVENous Q8H PRN    enoxaparin (LOVENOX) injection 90 mg  1 mg/kg SubCUTAneous BID    hydrocortisone 1 % cream   Topical BID    [Held by provider] metoprolol tartrate (LOPRESSOR) tablet 50 mg  50 mg Oral BID    [Held by provider] apixaban (ELIQUIS) tablet 5 mg  5 mg Oral BID    dextrose 5 % and 0.9 % NaCl with KCl  MD     [phone]  [address] [place] [address]

## 2023-12-18 NOTE — PROGRESS NOTES
SLP Contact Note    Noted events from the weekend. Several things to consider with this complex patient.    -Patient has had a history of dysphagia with silent aspiration as early as 2021. Pt's MBS on Friday consistent with this chronic dysphagia. -The aspiration is silent meaning that a bedside swallow evaluation is unreliable.     -Halle Herrmann, SLP, spent time completing education with pt and pt's wife on Friday to which pt's wife expressed understanding.     -At this juncture, feel pt would be best served by a goals of care conversation. Would discuss pt's chronic risk for dysphagia, if another PEG tube is consistent with pt's overall goals of care to determine if pt/pt's wife would like to accept risks of aspiration vs have a PEG tube placed again. This is the next step towards determining PO advancement, as this dysphagia is chronic. SLP available to help as needed.     PAPA Alexander.Ed, [facility]  Speech-Language Pathologist

## 2023-12-18 NOTE — PROGRESS NOTES
[facility]  Hospitalist Group                                                                                          Hospitalist Progress Note  Kodi Schaeffer MD  Answering service: [phone] OR [phone] from in house phone        Date of Service:  2023  NAME:  Bonnie Allen  :  1945  MRN:  [id number]      Admission Summary:   Per H&P, Bonnie Allen is a 66 y.o. male with pmhx of htn and etoh abuse who presents with bilateral lower extremity weakness. Patient states he fell two nights ago at 3am and has had trouble bearing weight on his legs. He states he has no pain except in his lower back and does attests to some numbness in BLE but still has sensation. Workup in ED showed wbc 9.7k, Hg 12.9, plt 85, , K 3.5, BUN 8, Cr. 0.69, Gene 8.2, Albumin 3.2, AST 44, Tbili 1.7, glucose 90.  UA, ct abd/pelvis, pelvis xray pending. Will admit for hyponatremia. Attests to drinking 6-8 PBR daily, lower back pain (nonreproducible) with radiation into the front lower abdomen, and some diarrhea.  Denies fever chest pain shortness of breath, n/v     Interval history / Subjective:     Patient seen and examined follow-up for a fib follow up and ETOH abuse  Is still being evaluated and cellulitis patient has listed discussed with family at bedside  Review Oncology video utilized this time  modified dysphagia diet speech medication to oral ready for d/c awaiting SNF      Assessment & Plan:     # Atrial fibrillation with RVR  - RRT called (-) for A-fib w/RVR   - No longer in a fib, NSR in 70s, continue IV metoprolol scheduled to replace home meds  - Dilt gtt dc  - Consult placed to cardiology  - [id number] score = 3, patient is moderate-high risk for cardioembolic event.  -On beta-blocker and Eliquis    # HTN  - Continuing metoprolol, increase to 25mg BID changed to IV  - d/c fluids add second agent ARB     # Hyponatremia na 130  # Hypokalemia 3.7  - Likely 2/2 EtOH intake

## 2023-12-18 NOTE — PROGRESS NOTES
[facility]   [id number] [place] [address]       GI PROGRESS NOTE  Bonita Naylor PA-C  [phone] office  NP/PA in-hospital M-F until 4:30PM  After 5PM or on weekends, please call  for physician on call      NAME: Hal Casas   :  1945   MRN:  [id number]       Subjective:     Patient is resting comfortably in bed. Reports eating popsicles without difficulty. Endorses intermittent nausea. No vomiting. Discussed NGT with patient who declines at this time.     Objective:     VITALS:   Last 24hrs VS reviewed since prior progress note. Most recent are:  Vitals:    23 1200   BP:    Pulse: 89   Resp:    Temp:    SpO2:        PHYSICAL EXAM:  General: Cooperative, no acute distress    Neurologic:  Alert and oriented X 3.  HEENT: EOMI, no scleral icterus   Lungs:  CTA bilaterally. No wheezing  Heart:  S1 S2, regular rhythm  Abdomen: Soft, non-distended, no tenderness. +Bowel sounds  Extremities: No edema  Psych:   Good insight. Not anxious or agitated.    Lab Data Reviewed:     Recent Results (from the past 24 hour(s))   Basic Metabolic Panel    Collection Time: 23 12:45 AM   Result Value Ref Range    Sodium 130 (L) 136 - 145 mmol/L    Potassium 3.7 3.5 - 5.1 mmol/L    Chloride 101 97 - 108 mmol/L    CO2 21 21 - 32 mmol/L    Anion Gap 8 5 - 15 mmol/L    Glucose 102 (H) 65 - 100 mg/dL    BUN 3 (L) 6 - 20 MG/DL    Creatinine 0.56 (L) 0.70 - 1.30 MG/DL    Bun/Cre Ratio 5 (L) 12 - 20      Est, Glom Filt Rate >60 >60 ml/min/1.73m2    Calcium 7.5 (L) 8.5 - 10.1 MG/DL   Extra Tubes Hold    Collection Time: 23 12:45 AM   Result Value Ref Range    Specimen HOld 1LAV     Comment:        Add-on orders for these samples will be processed based on acceptable specimen integrity and analyte stability, which may vary by analyte.            Assessment:     Dysphagia: modified barium swallow (12/15/23): episodes of silent aspiration; appreciate SLP recs. Hgb 13.1, INR 0.9.  History PEG in 2021  New atrial fibrillation with RVR: cardiology following. On diltiazem gtt, plan to start Eliquis. Thrombocytopenia  Hyponatremia: Na 130. Nephrology following. Hypokalemia, improved: K+ 3.7  Hypertension  Alcohol abuse     Patient Active Problem List   Diagnosis    Hyponatremia    Oropharyngeal dysphagia    AMS (altered mental status)    Excessive drinking of alcohol    MOHAN (acute kidney injury) ([address])    Alcohol withdrawal ([address])    Acute respiratory failure with hypoxia (HCC)    Goals of care, counseling/discussion    Dry mouth    Anemia    Essential hypertension    Hypokalemia    UTI (urinary tract infection)    Alcoholism ([address])    Debility    Other nonthrombocytopenic purpura (HCC)    History of alcoholism ([address])     Plan:     Diet per SLP recommendations  Appreciate SLP input  Supportive therapy  Nephrology and cardiology following  Discussed risks versus benefits of PEG tube with the patient. Patient is unsure if he would be interested in PEG placement. Will re-evaluate tomorrow. Discussed patient with Dr. Prashant Feliz By: PRACHI Saunders     12/18/2023  12:27 PM       GI attending note:    Vivek Jacques, labs, and imaging reviewed. Agree with the history, physical, and plan of the MICHELLE. Please call with any questions or concerns.     Dr. Flor Gaming

## 2023-12-18 NOTE — PROGRESS NOTES
Transition of Care Plan:    SNF choices pending. The patient and wife plan for the patient to go to SNF for rehab services. No auth required with Medicare. Transport TBD. Patient's wife to review list left bedside with patient tonight when she visits. CM to follow up on list in the morning. RUR:12% Low  Prior Level of Functioning: Independent    Disposition: SNF  If SNF or IPR: Date FOC offered: 12/18   Date FOC received: Pending choices   Accepting facility: Pending  Date authorization started with reference number: no auth with Medicare   Follow up appointments: Per attending's recommendations. DME needed: Per SNF' recommendations   Transportation at discharge: TBD   IM/IMM Medicare/ letter given: 12/13   Caregiver Contact: Daryl Palafox [phone]    Discharge Caregiver contacted prior to discharge? Y   Care Conference needed? no   Barriers to discharge: Placement.     Orquidea Tyson RN/CRM  [phone]

## 2023-12-19 PROBLEM — Z71.89 DNR (DO NOT RESUSCITATE) DISCUSSION: Status: ACTIVE | Noted: 2023-12-19

## 2023-12-19 PROBLEM — R13.10 DYSPHAGIA: Status: ACTIVE | Noted: 2023-12-19

## 2023-12-19 LAB
ANION GAP SERPL CALC-SCNC: 10 MMOL/L (ref 5–15)
BUN SERPL-MCNC: 5 MG/DL (ref 6–20)
BUN/CREAT SERPL: 7 (ref 12–20)
CALCIUM SERPL-MCNC: 8.3 MG/DL (ref 8.5–10.1)
CHLORIDE SERPL-SCNC: 96 MMOL/L (ref 97–108)
CO2 SERPL-SCNC: 20 MMOL/L (ref 21–32)
COMMENT:: NORMAL
CREAT SERPL-MCNC: 0.67 MG/DL (ref 0.7–1.3)
GLUCOSE SERPL-MCNC: 101 MG/DL (ref 65–100)
POTASSIUM SERPL-SCNC: 4.1 MMOL/L (ref 3.5–5.1)
SODIUM SERPL-SCNC: 126 MMOL/L (ref 136–145)
SPECIMEN HOLD: NORMAL

## 2023-12-19 PROCEDURE — 6370000000 HC RX 637 (ALT 250 FOR IP): Performed by: INTERNAL MEDICINE

## 2023-12-19 PROCEDURE — 97530 THERAPEUTIC ACTIVITIES: CPT

## 2023-12-19 PROCEDURE — 80048 BASIC METABOLIC PNL TOTAL CA: CPT

## 2023-12-19 PROCEDURE — 2580000003 HC RX 258: Performed by: NURSE PRACTITIONER

## 2023-12-19 PROCEDURE — 99223 1ST HOSP IP/OBS HIGH 75: CPT | Performed by: INTERNAL MEDICINE

## 2023-12-19 PROCEDURE — 97535 SELF CARE MNGMENT TRAINING: CPT

## 2023-12-19 PROCEDURE — 6370000000 HC RX 637 (ALT 250 FOR IP): Performed by: HOSPITALIST

## 2023-12-19 PROCEDURE — 6370000000 HC RX 637 (ALT 250 FOR IP): Performed by: NURSE PRACTITIONER

## 2023-12-19 PROCEDURE — 2060000000 HC ICU INTERMEDIATE R&B

## 2023-12-19 PROCEDURE — 36415 COLL VENOUS BLD VENIPUNCTURE: CPT

## 2023-12-19 RX ADMIN — METOPROLOL TARTRATE 50 MG: 50 TABLET, FILM COATED ORAL at 21:11

## 2023-12-19 RX ADMIN — LOSARTAN POTASSIUM 50 MG: 50 TABLET, FILM COATED ORAL at 09:37

## 2023-12-19 RX ADMIN — APIXABAN 5 MG: 5 TABLET, FILM COATED ORAL at 21:11

## 2023-12-19 RX ADMIN — Medication 10 ML: at 21:15

## 2023-12-19 RX ADMIN — Medication 15 G: at 15:38

## 2023-12-19 RX ADMIN — METOPROLOL TARTRATE 50 MG: 50 TABLET, FILM COATED ORAL at 09:37

## 2023-12-19 RX ADMIN — Medication 10 ML: at 21:16

## 2023-12-19 RX ADMIN — Medication 10 ML: at 09:38

## 2023-12-19 RX ADMIN — ANTI-PRURITIC: 1 CREAM TOPICAL at 21:15

## 2023-12-19 RX ADMIN — THERA TABS 1 TABLET: TAB at 09:37

## 2023-12-19 RX ADMIN — Medication 15 G: at 21:22

## 2023-12-19 RX ADMIN — APIXABAN 5 MG: 5 TABLET, FILM COATED ORAL at 09:37

## 2023-12-19 RX ADMIN — Medication 100 MG: at 09:35

## 2023-12-19 ASSESSMENT — PAIN SCALES - GENERAL
PAINLEVEL_OUTOF10: 0
PAINLEVEL_OUTOF10: 0

## 2023-12-19 NOTE — PLAN OF CARE
Problem: Occupational Therapy - Adult  Goal: By Discharge: Performs self-care activities at highest level of function for planned discharge setting. See evaluation for individualized goals. Description: FUNCTIONAL STATUS PRIOR TO ADMISSION:  Pt reports he lives in one-level home with wife and was IND with ADLs and IADLs prior to admission. HOME SUPPORT: Patient lived with wife but didn't require assistance. Occupational Therapy Goals:  Initiated 12/15/2023  1. Patient will perform grooming seated with Supervision within 7 day(s). 2.  Patient will perform upper body dressing seated with Minimal Assist within 7 day(s). 3.  Patient will perform LB bathing seated with Maximal Assist within 7 day(s). 4.  Patient will perform toilet transfers with Moderate Assist  within 7 day(s). 5.  Patient will perform all aspects of toileting with Maximal Assist within 7 day(s). 6.  Patient will participate in upper extremity therapeutic exercise/activities with Stand by Assist for 5 minutes within 7 day(s). 7.  Patient will utilize energy conservation techniques during functional activities with verbal and visual cues within 7 day(s). Outcome: Progressing     OCCUPATIONAL THERAPY TREATMENT  Patient: Benson Sorensen (11 y.o. male)  Date: 12/19/2023  Primary Diagnosis: Hyponatremia [E87.1]       Precautions: Fall Risk                Chart, occupational therapy assessment, plan of care, and goals were reviewed. ASSESSMENT  Patient continues to benefit from skilled OT services and is slowly progressing towards goals. Pt received to OT services semi-reclined in bed, amendable to session. Pt required overall MIN A x 2 to MOD A x 2 for all bed mobility for trunk/LE management and VCs for overall technique. Pt transferred to seated EOB, and attempted to transfer to chair, however, unsuccessful 2/2 to reported increased anxiety and overall fear of falling forward (noted pt with posterior lean).  Pt completed x 3

## 2023-12-19 NOTE — PROGRESS NOTES
SLP Contact Note    Noted that diet has been ordered despite SLP recommendations. Unclear but suspect that pt and wife choose to accept risks of aspiration and comfortable with diet advancement despite the risks. Important to keep in mind that the patient's aspiration was silent and with all consistencies trialed (thin, mildly-thick, puree). This has been chronic, first reported in 2021. Given diet ordered, no further SLP needs. SLP available to help as needed.       MAYTE TolliverEd, [facility]  Speech-Language Pathologist

## 2023-12-19 NOTE — CONSULTS
Palliative Medicine  Patient Name: Marcelle Keith  YOB: 1945  MRN: [id number]  Age: 66 y.o. Gender: male    Date of Initial Consult: 12/18/23  Date of Service: 12/19/2023  Time: 5:20 PM  Provider: Clarissa Vásquez MD  Hospital Day: 8  Admit Date: 12/12/2023  Referring Provider: Ron Rodríguez MD       Reasons for Consultation:  Goals of Care    HISTORY OF PRESENT ILLNESS (HPI):   Marcelle Keith is a 66 y.o. male with a past medical history of HTN, etoh abuse, who was admitted on 12/12/2023 from home with a diagnosis of weakness. While admitted he was found to have Afib with RVR. He was seen by speech therapy and was found to have silent aspiration with all consistencies (thin, midly-thick, puree). Psychosocial: pt lives with his wife Celia. He has two children Aruna 21 and Ravin 18. 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 Met with pt at bedside, he currently has capacity to participate in MDM  Called pt wife celia to discuss plan of care  Reviewed the events of this hospital stay. He shows good understanding of his swallowing difficulties  We discussed the options moving forward including PEG tube and no po intake vs comfort feeding  Pt would like to continue to eat by mouth. He expresses understanding for the risk for aspiration if he continues to eat/drink. We reviewed that this could be a potentially life limiting complication  He understands these risks and has decided against PeG tube and would like to proceed with PO intake  DNR discussion: given his above decision we discussed CPR and mechanical ventilation  He does feel he would not want to be on a ventilator  He is unsure about decisions regarding CPR.    He will discuss with his separately reportable services.    Electronically signed by   Jade Acosta MD  Palliative Care Team  on 12/19/2023 at 5:20 PM

## 2023-12-19 NOTE — ACP (ADVANCE CARE PLANNING)
Advance Care Planning   Healthcare Decision Maker:    Primary Decision Maker: Michaela Essex - Spouse - [phone]    Secondary Decision Maker: Mirian Jaquez Child - [phone]      Palliative Medicine assisted in completing AMD, he initially completed w/ family earlier today but agents were witnesses- we returned to bedside to help complete. The patient is clear he wants his wife Michaela Essex to be his primary decision maker and his voice for medical decisions if he is unable, secondary he trusts his daughter Mirian Jaquez. The patient reflects that if he were dying and medical treatments would not help him recover- he would not want treatments to prolong his life, like being on machines, etc.- if doctors did not think that these things would help him get better. He initially shares that if he were neurologically devestated and unable to interact with others or his environment- he would want treatments for one month, however, once we discussed that this may entail, he states with us numerous times \"When it's time to go, it's time to go. \"     Decision made to write in on this section- \"\"I trust Celia to be my voice and work with my doctors for decisions. When it's time to go, it's time to go. I trust Celia to help make this decision with my doctors. \"     Patient feels this statement reflects his wishes as well as his trust in [facility] to be his voice whenever needed in the future. The patient is willing to be organ donor if candidate in the future. Original and copy given to the patient, copy also placed on hard chart to be scanned into system.      Lou Moy LCSW

## 2023-12-19 NOTE — PLAN OF CARE
Problem: Physical Therapy - Adult  Goal: By Discharge: Performs mobility at highest level of function for planned discharge setting. See evaluation for individualized goals. Description: FUNCTIONAL STATUS PRIOR TO ADMISSION: Patient reports being independent with the use of a rolling walker vs cane. He states he had a fall recently which is why he came to the ED.    HOME SUPPORT PRIOR TO ADMISSION: The patient lived with his wife, whom he states \"can help me with whatever I ask\" but reports not requiring assistance from her prior to admission. Physical Therapy Goals  Initiated 12/15/2023  1. Patient will move from supine to sit and sit to supine, scoot up and down, and roll side to side in bed with minimal assistance within 7 day(s). 2.  Patient will perform sit to stand with minimal assistance within 7 day(s). 3.  Patient will transfer from bed to chair and chair to bed with minimal assistance using the least restrictive device within 7 day(s). 4.  Patient will ambulate with minimal assistance for 50 feet with the least restrictive device within 7 day(s). 5.  Patient will ascend/descend 3 stairs with handrail(s) with minimal assistance within 7 day(s). Outcome: Not Progressing     PHYSICAL THERAPY TREATMENT    Patient: Lopez Franz (89 y.o. male)  Date: 12/19/2023  Diagnosis: Hyponatremia [E87.1] Hyponatremia      Precautions: Fall Risk              alarm      ASSESSMENT:  Patient continues to benefit from skilled PT services and is not progressing towards goals today. Despite multiple efforts, unable to get pt from the bed to the chair. Trialed bilateral hand held assist and use of a rolling walker. Note significant posterior lean with pt reporting feeling like he was going to fall forward. Also note tendency for pt to sit with little warning.  Utilized a chair in front of him forward facing and with his hands on the armrests, he utilized a pull to stand with some improvement with a weight

## 2023-12-19 NOTE — PROGRESS NOTES
[facility]  Katina Chapa MD  [phone]            Renal Progress Note    NAME:  Scar Amador   :   1945   MRN:   [id number]     Date/Time:  2023 12:51 PM            DISCUSSION / PLAN :      Assessment:  Hyponatremia initially due to binge drinking and volume depletion in setting of diarrhea and poor po intake-On admission,  Urine NA <10, with high U creat. At this point he is probably volume replete  -Na is down to 126 today. Will hold off IVF. Hypokalemia- improved  ETOH abuse  Possible Alcohol withdrawal  Encephalopathy, metabolic  HTN-controlled  Thrombocytopenia  New Afib /rvr on Cardizem  Swallowing dysfxn, high risk for aspiration        Plan:    Start Ure NA --> 15 grams orally twice daily in an effort to increase free water clearance. Regular diet, protein supplements-once safe to have po, may require PEG  F/u BMP daily  Replete phos, Mg and K prn  Recheck Urune Na         ___________________________________________________  Subjective:         Confused, states he can't void, Na 121 this am    12-16 seems lucid, not allowed to eat or drink due to swallowing issues. At some point he will need protein intake to facilitate water excretion. Can ry UreNa and/ or salt tablets when allowed to swallow    -17 remains NPO. Alert, comfortable. No distress    23 No new issues. Patient is seen for Hyponatremia, na is correcting nicely with saline. 23 --> F /U Hyponatremia --> Radha    Feeling better. There was no suggestion of headache.       Medications reviewed:  Current Facility-Administered Medications   Medication Dose Route Frequency    urea (URE-NA) packet 15 g  15 g Oral BID    losartan (COZAAR) tablet 50 mg  50 mg Oral Daily    thiamine tablet 100 mg  100 mg Oral Daily    hydrALAZINE (APRESOLINE) injection 5 mg  5 mg IntraVENous Q8H PRN    hydrocortisone 1 % cream   Topical BID    metoprolol tartrate (LOPRESSOR) tablet 50 mg  50 mg Oral BID    apixaban

## 2023-12-19 NOTE — CARE COORDINATION
Transition of Care Plan:    [facility], [facility]. and [facility]. accepted. Wife will call with preference in the am    Referrals sent to [facility], [address] and [place]     Referral sent via [employer] to [facility] via [facility]-pending     RUR:12% Low  Prior Level of Functioning: Independent    Disposition: SNF  If SNF or IPR: Date FOC offered: 12/18   Date FOC received: 12/19/23  Accepting facility: 12/19/23  Date authorization started with reference number: n/a  Follow up appointments: Per attending's recommendations. DME needed: Per SNF' recommendations   Transportation at discharge: Family vs BLS  IM/IMM Medicare/ letter given: 12/13   Caregiver Contact: Adriana Do [phone]    Discharge Caregiver contacted prior to discharge? Y   Care Conference needed? no   Barriers to discharge: SNF Placement. CM called and spoke with patient's wife Adriana Do to discuss discharge planning. Wife said she would prefer [facility] [place], [facility]. at [facility]. Referrals sent. Malorie Barrientos MSA, RN, CM    2:00 PM. CM notified wife that [facility] declined patient. Offered more choices, she said to send Referrals sent to [facility], [address] and [place].   Malorie Barrientos MSA, RN, CM

## 2023-12-19 NOTE — PROGRESS NOTES
[facility]   [address] [address]       GI PROGRESS NOTE  Bonita Naylor PA-C  [phone] office  NP/PA in-hospital M-F until 4:30PM  After 5PM or on weekends, please call  for physician on call      NAME: Hal Casas   :  1945   MRN:  [id number]       Subjective:     Patient is resting comfortably in bed, reports eating yesterday. Has a decrease in appetite but still wants to eat. Denies nausea, vomiting, or obvious difficulty swallowing. Denies odynophagia.     Objective:     VITALS:   Last 24hrs VS reviewed since prior progress note. Most recent are:  Vitals:    23 1000   BP:    Pulse: 94   Resp:    Temp:    SpO2:        PHYSICAL EXAM:  General: Cooperative, no acute distress    Neurologic:  Alert and oriented X 3.  HEENT: EOMI, no scleral icterus   Lungs:  CTA bilaterally. No wheezing  Heart:  S1 S2, regular rhythm  Abdomen: Soft, non-distended, no tenderness. +Bowel sounds  Extremities: No edema  Psych:   Good insight. Not anxious or agitated.    Lab Data Reviewed:     Recent Results (from the past 24 hour(s))   Basic Metabolic Panel    Collection Time: 23 12:04 AM   Result Value Ref Range    Sodium 126 (L) 136 - 145 mmol/L    Potassium 4.1 3.5 - 5.1 mmol/L    Chloride 96 (L) 97 - 108 mmol/L    CO2 20 (L) 21 - 32 mmol/L    Anion Gap 10 5 - 15 mmol/L    Glucose 101 (H) 65 - 100 mg/dL    BUN 5 (L) 6 - 20 MG/DL    Creatinine 0.67 (L) 0.70 - 1.30 MG/DL    Bun/Cre Ratio 7 (L) 12 - 20      Est, Glom Filt Rate >60 >60 ml/min/1.73m2    Calcium 8.3 (L) 8.5 - 10.1 MG/DL   Extra Tubes Hold    Collection Time: 23 12:04 AM   Result Value Ref Range    Specimen HOld 1LAV     Comment:        Add-on orders for these samples will be processed based on acceptable specimen integrity and analyte stability, which may vary by analyte.            Assessment:     Dysphagia: modified barium swallow (12/15/23): episodes of silent aspiration; appreciate SLP recs.  History PEG in 2021  New atrial fibrillation with RVR: cardiology following. Started on Eliquis. Thrombocytopenia  Hyponatremia: Na 126. Nephrology following. Hypokalemia, improved: K+ 4.1  Hypertension  Alcohol abuse     Patient Active Problem List   Diagnosis    Hyponatremia    Oropharyngeal dysphagia    AMS (altered mental status)    Excessive drinking of alcohol    MOHAN (acute kidney injury) ([address])    Alcohol withdrawal ([facility])    Acute respiratory failure with hypoxia ([facility])    Goals of care, counseling/discussion    Dry mouth    Anemia    Essential hypertension    Hypokalemia    UTI (urinary tract infection)    Alcoholism ([address])    Debility    Other nonthrombocytopenic purpura (HCC)    History of alcoholism ([address])     Plan:       Appreciate SLP input  Supportive therapy  Nephrology and cardiology following  Discussed patient with Dr. Davis [place]  Will sign off, please contact for further management      Signed By: PRACHI Pollock     12/19/2023  10:40 AM       GI attending note:    Vitals, labs, and imaging reviewed. Agree with the history, physical, and plan of the MICHELLE. Per Palliative note, pt doesn't wish to have PEG and wishes to continue PO intake. Will sign off. Please call with any questions or concerns.     Dr. Ryan Washington

## 2023-12-19 NOTE — PROGRESS NOTES
[facility] [address] [facility]  Hospitalist Group                                                                                          Hospitalist Progress Note  Irving Gutiérrez MD  Answering service: [phone] OR [phone] from in house phone        Date of Service:  2023  NAME:  Tavo Nguyen  :  1945  MRN:  [id number]      Admission Summary:   Per H&P, Tavo Nguyen is a 66 y.o. male with pmhx of htn and etoh abuse who presents with bilateral lower extremity weakness. Patient states he fell two nights ago at 3am and has had trouble bearing weight on his legs. He states he has no pain except in his lower back and does attests to some numbness in BLE but still has sensation. Workup in ED showed wbc 9.7k, Hg 12.9, plt 85, , K 3.5, BUN 8, Cr. 0.69, Gene 8.2, Albumin 3.2, AST 44, Tbili 1.7, glucose 90.  UA, ct abd/pelvis, pelvis xray pending. Will admit for hyponatremia. Attests to drinking 6-8 PBR daily, lower back pain (nonreproducible) with radiation into the front lower abdomen, and some diarrhea.  Denies fever chest pain shortness of breath, n/v     Interval history / Subjective:     Patient seen and examined follow-up for a fib follow up and ETOH abuse  Patient awaiting report to rehab does not want a feeding tube discussed risk of aspiration with patient and family currently waiting for [facility] versus [facility] placement ready for     Assessment & Plan:     # Atrial fibrillation with RVR  - RRT called (-) for A-fib w/RVR   -Patient patient now restarted oral intake so medication changed to oral  - Consult placed to cardiology  - [id number] score = 3, patient is moderate-high risk for cardioembolic event.  -On beta-blocker and Eliquis    # HTN  - Continuing metoprolol, increase to 25mg BID   -  add second agent ARB     # Hyponatremia na 126  # Hypokalemia 4.1  - Nephrology following, added urea tab    # Altered mental status-- resolved  # EtOH abuse  -

## 2023-12-20 LAB
ALBUMIN SERPL-MCNC: 2.6 G/DL (ref 3.5–5)
ANION GAP SERPL CALC-SCNC: 9 MMOL/L (ref 5–15)
ANION GAP SERPL CALC-SCNC: 9 MMOL/L (ref 5–15)
BUN SERPL-MCNC: 26 MG/DL (ref 6–20)
BUN SERPL-MCNC: 39 MG/DL (ref 6–20)
BUN/CREAT SERPL: 37 (ref 12–20)
BUN/CREAT SERPL: 49 (ref 12–20)
CALCIUM SERPL-MCNC: 8 MG/DL (ref 8.5–10.1)
CALCIUM SERPL-MCNC: 8.1 MG/DL (ref 8.5–10.1)
CHLORIDE SERPL-SCNC: 94 MMOL/L (ref 97–108)
CHLORIDE SERPL-SCNC: 94 MMOL/L (ref 97–108)
CO2 SERPL-SCNC: 21 MMOL/L (ref 21–32)
CO2 SERPL-SCNC: 21 MMOL/L (ref 21–32)
CREAT SERPL-MCNC: 0.7 MG/DL (ref 0.7–1.3)
CREAT SERPL-MCNC: 0.8 MG/DL (ref 0.7–1.3)
CREAT UR-MCNC: 134 MG/DL
ERYTHROCYTE [DISTWIDTH] IN BLOOD BY AUTOMATED COUNT: 12.6 % (ref 11.5–14.5)
GLUCOSE SERPL-MCNC: 94 MG/DL (ref 65–100)
GLUCOSE SERPL-MCNC: 95 MG/DL (ref 65–100)
HCT VFR BLD AUTO: 34.3 % (ref 36.6–50.3)
HGB BLD-MCNC: 12.2 G/DL (ref 12.1–17)
MCH RBC QN AUTO: 31.9 PG (ref 26–34)
MCHC RBC AUTO-ENTMCNC: 35.6 G/DL (ref 30–36.5)
MCV RBC AUTO: 89.8 FL (ref 80–99)
NRBC # BLD: 0 K/UL (ref 0–0.01)
NRBC BLD-RTO: 0 PER 100 WBC
PHOSPHATE SERPL-MCNC: 4.8 MG/DL (ref 2.6–4.7)
PLATELET # BLD AUTO: 150 K/UL (ref 150–400)
PMV BLD AUTO: 8.7 FL (ref 8.9–12.9)
POTASSIUM SERPL-SCNC: 3.8 MMOL/L (ref 3.5–5.1)
POTASSIUM SERPL-SCNC: 4.1 MMOL/L (ref 3.5–5.1)
POTASSIUM UR-SCNC: 43 MMOL/L
RBC # BLD AUTO: 3.82 M/UL (ref 4.1–5.7)
SODIUM SERPL-SCNC: 124 MMOL/L (ref 136–145)
SODIUM SERPL-SCNC: 124 MMOL/L (ref 136–145)
SODIUM UR-SCNC: 93 MMOL/L
WBC # BLD AUTO: 6.9 K/UL (ref 4.1–11.1)

## 2023-12-20 PROCEDURE — 80069 RENAL FUNCTION PANEL: CPT

## 2023-12-20 PROCEDURE — 82570 ASSAY OF URINE CREATININE: CPT

## 2023-12-20 PROCEDURE — 80048 BASIC METABOLIC PNL TOTAL CA: CPT

## 2023-12-20 PROCEDURE — 2060000000 HC ICU INTERMEDIATE R&B

## 2023-12-20 PROCEDURE — 84133 ASSAY OF URINE POTASSIUM: CPT

## 2023-12-20 PROCEDURE — 2580000003 HC RX 258: Performed by: NURSE PRACTITIONER

## 2023-12-20 PROCEDURE — 85027 COMPLETE CBC AUTOMATED: CPT

## 2023-12-20 PROCEDURE — 36415 COLL VENOUS BLD VENIPUNCTURE: CPT

## 2023-12-20 PROCEDURE — 94760 N-INVAS EAR/PLS OXIMETRY 1: CPT

## 2023-12-20 PROCEDURE — 84300 ASSAY OF URINE SODIUM: CPT

## 2023-12-20 PROCEDURE — 6370000000 HC RX 637 (ALT 250 FOR IP): Performed by: NURSE PRACTITIONER

## 2023-12-20 PROCEDURE — 6370000000 HC RX 637 (ALT 250 FOR IP): Performed by: INTERNAL MEDICINE

## 2023-12-20 PROCEDURE — 6370000000 HC RX 637 (ALT 250 FOR IP): Performed by: HOSPITALIST

## 2023-12-20 RX ADMIN — Medication 15 G: at 22:00

## 2023-12-20 RX ADMIN — ANTI-PRURITIC: 1 CREAM TOPICAL at 20:34

## 2023-12-20 RX ADMIN — Medication 15 G: at 11:53

## 2023-12-20 RX ADMIN — METOPROLOL TARTRATE 50 MG: 50 TABLET, FILM COATED ORAL at 11:53

## 2023-12-20 RX ADMIN — Medication 100 MG: at 11:53

## 2023-12-20 RX ADMIN — LOSARTAN POTASSIUM 50 MG: 50 TABLET, FILM COATED ORAL at 11:53

## 2023-12-20 RX ADMIN — METOPROLOL TARTRATE 50 MG: 50 TABLET, FILM COATED ORAL at 20:31

## 2023-12-20 RX ADMIN — Medication 10 ML: at 11:53

## 2023-12-20 RX ADMIN — THERA TABS 1 TABLET: TAB at 11:52

## 2023-12-20 RX ADMIN — Medication 10 ML: at 20:34

## 2023-12-20 RX ADMIN — Medication 10 ML: at 20:31

## 2023-12-20 RX ADMIN — APIXABAN 5 MG: 5 TABLET, FILM COATED ORAL at 11:53

## 2023-12-20 RX ADMIN — ANTI-PRURITIC: 1 CREAM TOPICAL at 11:53

## 2023-12-20 RX ADMIN — APIXABAN 5 MG: 5 TABLET, FILM COATED ORAL at 20:31

## 2023-12-20 ASSESSMENT — PAIN SCALES - WONG BAKER
WONGBAKER_NUMERICALRESPONSE: 0
WONGBAKER_NUMERICALRESPONSE: 0

## 2023-12-20 ASSESSMENT — PAIN SCALES - GENERAL
PAINLEVEL_OUTOF10: 0
PAINLEVEL_OUTOF10: 0

## 2023-12-20 NOTE — PROGRESS NOTES
SNF vs home with home health. Patient's wife debating hiring private duty care and patient discharging home with home health when medically stable. SNF referrals pending. [address], [address] have accepted. [facility], [facility] and [address] [facility] have declined. No auth required with Medicare. Second IMM letter needed prior to discharge. BLS to transport. RUR:12% Low  Prior Level of Functioning: Independent    Disposition: SNF  If SNF or IPR: Date FOC offered: 12/18   Date [address] received: 12/19/23  Accepting facility: 12/19/23  Date authorization started with reference number: n/a  Follow up appointments: Per attending's recommendations. DME needed: Per SNF' recommendations   Transportation at discharge: Family vs BLS  IM/IMM Medicare/ letter given: 12/13   [phone]    Discharge Caregiver contacted prior to discharge? Y   Care Conference needed? no   Barriers to discharge: SNF Placement.      Denilson Beckman RN/CRM  [phone]

## 2023-12-20 NOTE — PROGRESS NOTES
day    # Altered mental status-- resolved  # EtOH abuse  - ABG did not demonstrate hypercapnia, suspect this is 2/2 AUD  - CIWA monitoring    Hyperbilirubinemia: resolved     Thrombocytopenia: improving  - Follow CBC    PT/OT    Spoke with wife Celia and updated    Code status: Full  Prophylaxis:Lovenox  Care Plan discussed with: pt/rn/ cm  Anticipated Disposition: SNF soon TBD           Review of Systems:   Pertinent items are noted in HPI.       Vital Signs:    Last 24hrs VS reviewed since prior progress note. Most recent are:  Vitals:    12/20/23 1000   BP:    Pulse: 95   Resp:    Temp:    SpO2:        No intake or output data in the 24 hours ending 12/20/23 1046       Physical Examination:     I had a face to face encounter with this patient and independently examined them on 12/20/2023 as outlined below:          Constitutional:  Alert, NAD   ENT:  Oral mucosa moist, oropharynx benign.    Resp:  CTA bilaterally. No wheezing/rhonchi/rales. No accessory muscle use.    CV:  Regular rhythm, normal rate, no murmurs, gallops, rubs    GI:  Soft, non distended, non tender. normoactive bowel sounds, no hepatosplenomegaly     Musculoskeletal:  No edema, warm, 2+ pulses throughout    Neurologic:  Moves all extremities.  AAOx3 but is somewhat somnolent, CN II-XII grossly intact            Data Review:    Review and/or order of clinical lab test  Review and/or order of tests in the radiology section of CPT  Review and/or order of tests in the medicine section of CPT      Labs:     Recent Labs     12/20/23  0223   WBC 6.9   HGB 12.2   HCT 34.3*          Recent Labs     12/18/23  0045 12/19/23  0004 12/20/23  0223   * 126* 124*   K 3.7 4.1 3.8    96* 94*   CO2 21 20* 21   BUN 3* 5* 26*       No results for input(s): \"ALT\", \"TP\", \"ALB\", \"GLOB\", \"GGT\", \"AML\" in the last 72 hours.    Invalid input(s): \"SGOT\", \"GPT\", \"AP\", \"TBIL\", \"TBILI\", \"AMYP\", \"LPSE\", \"HLPSE\"      Lab Results   Component Value Date/Time     injection 2 mg  2 mg IntraVENous Q1H PRN    Or    LORazepam (ATIVAN) tablet 3 mg  3 mg Oral Q1H PRN    Or    LORazepam (ATIVAN) injection 3 mg  3 mg IntraVENous Q1H PRN    Or    LORazepam (ATIVAN) tablet 4 mg  4 mg Oral Q1H PRN    Or    LORazepam (ATIVAN) injection 4 mg  4 mg IntraVENous Q1H PRN     ______________________________________________________________________  EXPECTED LENGTH OF STAY: 3  ACTUAL LENGTH OF STAY:          7                 Angel Snyder MD

## 2023-12-20 NOTE — PROGRESS NOTES
[facility]  Ysabel Sullivan MD  [phone]            Renal Progress Note    NAME:  Stephan Garcia   :   1945   MRN:   [id number]     Date/Time:  2023 4:11 PM            DISCUSSION / PLAN :      Assessment:  Hyponatremia initially due to binge drinking and volume depletion in setting of diarrhea and poor po intake-On admission,  Urine NA <10, with high U creat. At this point he is probably volume replete  -Na is down to 124 today. Will hold off IVF. Hypokalemia- improved  ETOH abuse  Possible Alcohol withdrawal  Encephalopathy, metabolic  HTN-controlled  Thrombocytopenia  New Afib /rvr on Cardizem  Swallowing dysfxn, high risk for aspiration        Plan:    Continue Ure NA --> 15 grams orally twice daily in an effort to increase free water clearance. Fluid Restriction --> 1 to 1.25 litres/day. Regular diet, protein supplements-once safe to have po, may require PEG  F/u BMP this afternoon. Replete phos, Mg and K prn  Re-sent urine lytes today. The urine sodium was > 40 (not consistent with vol depletion). ___________________________________________________  Subjective:         Confused, states he can't void, Na 121 this am    12-16 seems lucid, not allowed to eat or drink due to swallowing issues. At some point he will need protein intake to facilitate water excretion. Can ry UreNa and/ or salt tablets when allowed to swallow    12-17 remains NPO. Alert, comfortable. No distress    23 No new issues. Patient is seen for Hyponatremia, na is correcting nicely with saline. 23 --> F /U Hyponatremia --> Radha    Feeling better.   There was no suggestion of headache.      23 --> F/U Hyponatremia --> Radha    Medications reviewed:  Current Facility-Administered Medications   Medication Dose Route Frequency    urea (URE-NA) packet 15 g  15 g Oral BID    losartan (COZAAR) tablet 50 mg  50 mg Oral Daily    thiamine tablet 100 mg  100 mg Oral Daily    hydrALAZINE

## 2023-12-21 LAB
ALBUMIN SERPL-MCNC: 3 G/DL (ref 3.5–5)
ANION GAP SERPL CALC-SCNC: 10 MMOL/L (ref 5–15)
ANION GAP SERPL CALC-SCNC: 12 MMOL/L (ref 5–15)
BASOPHILS # BLD: 0.1 K/UL (ref 0–0.1)
BASOPHILS NFR BLD: 2 % (ref 0–1)
BUN SERPL-MCNC: 53 MG/DL (ref 6–20)
BUN SERPL-MCNC: 54 MG/DL (ref 6–20)
BUN/CREAT SERPL: 55 (ref 12–20)
BUN/CREAT SERPL: 56 (ref 12–20)
CALCIUM SERPL-MCNC: 8.4 MG/DL (ref 8.5–10.1)
CALCIUM SERPL-MCNC: 8.7 MG/DL (ref 8.5–10.1)
CHLORIDE SERPL-SCNC: 95 MMOL/L (ref 97–108)
CHLORIDE SERPL-SCNC: 97 MMOL/L (ref 97–108)
CO2 SERPL-SCNC: 19 MMOL/L (ref 21–32)
CO2 SERPL-SCNC: 20 MMOL/L (ref 21–32)
COMMENT:: NORMAL
CREAT SERPL-MCNC: 0.95 MG/DL (ref 0.7–1.3)
CREAT SERPL-MCNC: 0.98 MG/DL (ref 0.7–1.3)
DIFFERENTIAL METHOD BLD: ABNORMAL
EOSINOPHIL # BLD: 0.1 K/UL (ref 0–0.4)
EOSINOPHIL NFR BLD: 1 % (ref 0–7)
ERYTHROCYTE [DISTWIDTH] IN BLOOD BY AUTOMATED COUNT: 12.8 % (ref 11.5–14.5)
ERYTHROCYTE [DISTWIDTH] IN BLOOD BY AUTOMATED COUNT: 13 % (ref 11.5–14.5)
GLUCOSE SERPL-MCNC: 85 MG/DL (ref 65–100)
GLUCOSE SERPL-MCNC: 86 MG/DL (ref 65–100)
HCT VFR BLD AUTO: 40.1 % (ref 36.6–50.3)
HCT VFR BLD AUTO: 45.4 % (ref 36.6–50.3)
HGB BLD-MCNC: 13.7 G/DL (ref 12.1–17)
HGB BLD-MCNC: 14.9 G/DL (ref 12.1–17)
IMM GRANULOCYTES # BLD AUTO: 0.1 K/UL (ref 0–0.04)
IMM GRANULOCYTES NFR BLD AUTO: 1 % (ref 0–0.5)
LYMPHOCYTES # BLD: 2 K/UL (ref 0.8–3.5)
LYMPHOCYTES NFR BLD: 30 % (ref 12–49)
MCH RBC QN AUTO: 32 PG (ref 26–34)
MCH RBC QN AUTO: 32.4 PG (ref 26–34)
MCHC RBC AUTO-ENTMCNC: 32.8 G/DL (ref 30–36.5)
MCHC RBC AUTO-ENTMCNC: 34.2 G/DL (ref 30–36.5)
MCV RBC AUTO: 94.8 FL (ref 80–99)
MCV RBC AUTO: 97.4 FL (ref 80–99)
MONOCYTES # BLD: 1 K/UL (ref 0–1)
MONOCYTES NFR BLD: 15 % (ref 5–13)
NEUTS SEG # BLD: 3.4 K/UL (ref 1.8–8)
NEUTS SEG NFR BLD: 51 % (ref 32–75)
NRBC # BLD: 0 K/UL (ref 0–0.01)
NRBC # BLD: 0 K/UL (ref 0–0.01)
NRBC BLD-RTO: 0 PER 100 WBC
NRBC BLD-RTO: 0 PER 100 WBC
OSMOLALITY UR: 596 MOSM/KG H2O
PHOSPHATE SERPL-MCNC: 5.7 MG/DL (ref 2.6–4.7)
PLATELET # BLD AUTO: 148 K/UL (ref 150–400)
PLATELET # BLD AUTO: 160 K/UL (ref 150–400)
PMV BLD AUTO: 9.1 FL (ref 8.9–12.9)
POTASSIUM SERPL-SCNC: 3.8 MMOL/L (ref 3.5–5.1)
POTASSIUM SERPL-SCNC: 3.9 MMOL/L (ref 3.5–5.1)
RBC # BLD AUTO: 4.23 M/UL (ref 4.1–5.7)
RBC # BLD AUTO: 4.66 M/UL (ref 4.1–5.7)
RBC MORPH BLD: ABNORMAL
SODIUM SERPL-SCNC: 126 MMOL/L (ref 136–145)
SODIUM SERPL-SCNC: 127 MMOL/L (ref 136–145)
SODIUM UR-SCNC: 13 MMOL/L
SPECIMEN HOLD: NORMAL
WBC # BLD AUTO: 6.7 K/UL (ref 4.1–11.1)
WBC # BLD AUTO: 7.2 K/UL (ref 4.1–11.1)

## 2023-12-21 PROCEDURE — 87186 SC STD MICRODIL/AGAR DIL: CPT

## 2023-12-21 PROCEDURE — 2580000003 HC RX 258: Performed by: NURSE PRACTITIONER

## 2023-12-21 PROCEDURE — 6370000000 HC RX 637 (ALT 250 FOR IP): Performed by: NURSE PRACTITIONER

## 2023-12-21 PROCEDURE — 87086 URINE CULTURE/COLONY COUNT: CPT

## 2023-12-21 PROCEDURE — 6370000000 HC RX 637 (ALT 250 FOR IP): Performed by: INTERNAL MEDICINE

## 2023-12-21 PROCEDURE — 97530 THERAPEUTIC ACTIVITIES: CPT

## 2023-12-21 PROCEDURE — 85027 COMPLETE CBC AUTOMATED: CPT

## 2023-12-21 PROCEDURE — 80048 BASIC METABOLIC PNL TOTAL CA: CPT

## 2023-12-21 PROCEDURE — 80069 RENAL FUNCTION PANEL: CPT

## 2023-12-21 PROCEDURE — 87077 CULTURE AEROBIC IDENTIFY: CPT

## 2023-12-21 PROCEDURE — 6370000000 HC RX 637 (ALT 250 FOR IP): Performed by: HOSPITALIST

## 2023-12-21 PROCEDURE — 85025 COMPLETE CBC W/AUTO DIFF WBC: CPT

## 2023-12-21 PROCEDURE — 84300 ASSAY OF URINE SODIUM: CPT

## 2023-12-21 PROCEDURE — 2060000000 HC ICU INTERMEDIATE R&B

## 2023-12-21 PROCEDURE — 83935 ASSAY OF URINE OSMOLALITY: CPT

## 2023-12-21 PROCEDURE — 36415 COLL VENOUS BLD VENIPUNCTURE: CPT

## 2023-12-21 RX ADMIN — Medication 100 MG: at 09:52

## 2023-12-21 RX ADMIN — Medication 10 ML: at 20:33

## 2023-12-21 RX ADMIN — Medication 15 G: at 20:32

## 2023-12-21 RX ADMIN — METOPROLOL TARTRATE 25 MG: 25 TABLET, FILM COATED ORAL at 20:32

## 2023-12-21 RX ADMIN — ANTI-PRURITIC: 1 CREAM TOPICAL at 20:33

## 2023-12-21 RX ADMIN — APIXABAN 5 MG: 5 TABLET, FILM COATED ORAL at 09:52

## 2023-12-21 RX ADMIN — Medication 10 ML: at 09:54

## 2023-12-21 RX ADMIN — ANTI-PRURITIC: 1 CREAM TOPICAL at 09:52

## 2023-12-21 RX ADMIN — Medication 15 G: at 12:57

## 2023-12-21 RX ADMIN — THERA TABS 1 TABLET: TAB at 09:52

## 2023-12-21 RX ADMIN — APIXABAN 5 MG: 5 TABLET, FILM COATED ORAL at 20:32

## 2023-12-21 ASSESSMENT — PAIN SCALES - WONG BAKER
WONGBAKER_NUMERICALRESPONSE: 0

## 2023-12-21 ASSESSMENT — PAIN SCALES - GENERAL
PAINLEVEL_OUTOF10: 0

## 2023-12-21 NOTE — PROGRESS NOTES
Bedside shift change report given to [address] [place], [place] [address]amber Curtis (oncoming nurse) by Kaela Blue (offgoing nurse). Report included the following information Nurse Handoff Report, Adult Overview, Intake/Output, MAR, Recent Results, Cardiac Rhythm SA, Quality Measures, and Neuro Assessment.

## 2023-12-21 NOTE — PROGRESS NOTES
12/21/23 1557 12/21/23 1600 12/21/23 1604   Vitals   Pulse 89 98 85   /82 103/60 119/72   MAP (Calculated) 97 74 88   BP Location Right upper arm Right upper arm Right upper arm   BP Method Automatic Automatic Automatic   Patient Position Semi fowlers Sitting Supine

## 2023-12-21 NOTE — PROGRESS NOTES
[address] [facility]  Hospitalist Group                                                                                          Hospitalist Progress Note  Bibiana Patel MD  Answering service: [phone] OR [phone] from in house phone        Date of Service:  2023  NAME:  Scar Amador  :  1945  MRN:  [id number]      Admission Summary:   Per H&P, Scar Amador is a 66 y.o. male with pmhx of htn and etoh abuse who presents with bilateral lower extremity weakness. Patient states he fell two nights ago at 3am and has had trouble bearing weight on his legs. He states he has no pain except in his lower back and does attests to some numbness in BLE but still has sensation. Workup in ED showed wbc 9.7k, Hg 12.9, plt 85, , K 3.5, BUN 8, Cr. 0.69, Gene 8.2, Albumin 3.2, AST 44, Tbili 1.7, glucose 90.  UA, ct abd/pelvis, pelvis xray pending. Will admit for hyponatremia. Attests to drinking 6-8 PBR daily, lower back pain (nonreproducible) with radiation into the front lower abdomen, and some diarrhea.  Denies fever chest pain shortness of breath, n/v     Interval history / Subjective:     Patient seen and examined follow-up for a fib follow up and ETOH abuse  Patient awaiting authorization to go to rehab/SNF  Discussed with nephrology na trended down on urea tab and fluid restriction    No labs today will request also repeat urine lytes     Assessment & Plan:     # Atrial fibrillation with RVR-- rate controlled   - RRT called (-) for A-fib w/RVR   - Patient patient now restarted oral intake so medication changed to oral  - Consult placed to cardiology  - [id number] score = 3, patient is moderate-high risk for cardioembolic event.  -On beta-blocker and Eliquis    # HTN-- BP controlled  - Continuing metoprolol, increase to 25mg BID   - add second agent ARB     # Hyponatremia na 124  # Hypokalemia 4.1  - Nephrology following, added urea tab and fluid restriction 1.2 L/ day   -

## 2023-12-21 NOTE — PLAN OF CARE
Problem: Safety - Adult  Goal: Free from fall injury  Outcome: Progressing     Problem: Discharge Planning  Goal: Discharge to home or other facility with appropriate resources  Outcome: Progressing     Problem: Confusion  Goal: Confusion, delirium, dementia, or psychosis is improved or at baseline  Description: INTERVENTIONS:  1. Assess for possible contributors to thought disturbance, including medications, impaired vision or hearing, underlying metabolic abnormalities, dehydration, psychiatric diagnoses, and notify attending LIP  2. [place] high risk fall precautions, as indicated  3. Provide frequent short contacts to provide reality reorientation, refocusing and direction  4. Decrease environmental stimuli, including noise as appropriate  5. Monitor and intervene to maintain adequate nutrition, hydration, elimination, sleep and activity  6. If unable to ensure safety without constant attention obtain sitter and review sitter guidelines with assigned personnel  7. Initiate Psychosocial CNS and Spiritual Care consult, as indicated  Outcome: Progressing  Flowsheets (Taken 12/20/2023 2030)  Effect of thought disturbance (confusion, delirium, dementia, or psychosis) are managed with adequate functional status:   [place] high risk fall precautions, as indicated   Provide frequent short contacts to provide reality reorientation, refocusing and direction   Monitor and intervene to maintain adequate nutrition, hydration, elimination, sleep and activity     Problem: Skin/Tissue Integrity  Goal: Absence of new skin breakdown  Description: 1. Monitor for areas of redness and/or skin breakdown  2. Assess vascular access sites hourly  3. Every 4-6 hours minimum:  Change oxygen saturation probe site  4. Every 4-6 hours:  If on nasal continuous positive airway pressure, respiratory therapy assess nares and determine need for appliance change or resting period.   Outcome: Progressing     Problem: Pain  Goal:

## 2023-12-21 NOTE — PROGRESS NOTES
[facility]  Tolu Terrell MD  [phone]            Renal Progress Note    NAME:  Becky Holcomb   :   1945   MRN:   [id number]     Date/Time:  2023 1:53 PM            DISCUSSION / PLAN :      Assessment:  Hyponatremia initially due to binge drinking and volume depletion in setting of diarrhea and poor po intake-On admission,  Urine NA <10, with high U creat. However, the urine sodium is now up to 93. The urine creatinine is much lower as well. This seems more consistent with ADH excess. Hypokalemia- improved  ETOH abuse  Possible Alcohol withdrawal  Encephalopathy, metabolic  HTN-controlled  Thrombocytopenia  New Afib /rvr on Cardizem  Swallowing dysfxn, high risk for aspiration        Plan:    Continue Ure NA --> 15 grams orally twice daily in an effort to increase free water clearance. The seerum sodium is up to 126 / 127 (from 124). A rate of correction of 4 to 6 meq/l/day seems reasonable  Fluid Restriction --> 1 to 1.25 litres/day. Regular diet, protein supplements-once safe to have po, may require PEG  F/u BMP this afternoon. Replete phos, Mg and K prn         ___________________________________________________  Subjective:         Confused, states he can't void, Na 121 this am    12-16 seems lucid, not allowed to eat or drink due to swallowing issues. At some point he will need protein intake to facilitate water excretion. Can ry UreNa and/ or salt tablets when allowed to swallow    12-17 remains NPO. Alert, comfortable. No distress    23 No new issues. Patient is seen for Hyponatremia, na is correcting nicely with saline. 23 --> F /U Hyponatremia --> Radha    Feeling better. There was no suggestion of headache.      23 --> F/U Hyponatremia --> Radha      2023 --> F/U Hyponatremia --> Radha      Feeling better.     Medications reviewed:  Current Facility-Administered Medications   Medication Dose Route Frequency    metoprolol tartrate (LOPRESSOR)

## 2023-12-21 NOTE — PROGRESS NOTES
Comprehensive Nutrition Assessment    Type and Reason for Visit: Initial, RD Nutrition Re-Screen/LOS    Nutrition Recommendations/Plan:   Continue soft and bite sized diet (wife reports they have accepted aspiration risks). Preferences updated. Added high kcal/high protein ONS BID, Magic Cup BID  Fluid restriction per Nephrology  Monitor Phos. Previously low, never repleted and now trending high. ?need Phos binder. PO intake isn't great and favoring dairy products. Malnutrition Assessment:  Malnutrition Status:  Insufficient data (12/21/23 1322)         Nutrition Assessment:    PMHx includes HTN and EtOH abuse. Presented with bilat LE weakness. Pt admitted with Hyponatremia, Afib. Pt seen for LOS. He was sleeping, wife at bedside. Reports pt with poor PO, but hasn't been great for a few months now and has always been kind of a picky eater/ not a big eater. She notes that he had been NPO d/t issues with his swallowing, but they decided to just accept aspiration risks and proceed with diet. Palliative care involved. SLP notes reviewed \"Noted that diet has been ordered despite SLP recommendations. Unclear but suspect that pt and wife choose to accept risks of aspiration and comfortable with diet advancement despite the risks. Important to keep in mind that the patient's aspiration was silent and with all consistencies trialed (thin, mildly-thick, puree) and therefore bedside presentation is unreliable as pt will not have a cough response to aspiration. This has been chronic, first reported in 2021. Given diet ordered, no further SLP needs. SLP available to help as needed. \"    Wife states that pt does well with things like yogurt, pudding, soft fruit, and loves milk. She thinks he will also like chocolate Ensure (has in the past). He is more of a sweets libia. Wife has been trying to bring in some outside food as well - notes she is a trained .     She believes pt's weight as of recently (past

## 2023-12-21 NOTE — PLAN OF CARE
Problem: Physical Therapy - Adult  Goal: By Discharge: Performs mobility at highest level of function for planned discharge setting. See evaluation for individualized goals. Description: FUNCTIONAL STATUS PRIOR TO ADMISSION: Patient reports being independent with the use of a rolling walker vs cane. He states he had a fall recently which is why he came to the ED.    HOME SUPPORT PRIOR TO ADMISSION: The patient lived with his wife, whom he states \"can help me with whatever I ask\" but reports not requiring assistance from her prior to admission. Physical Therapy Goals  Initiated 12/15/2023  1. Patient will move from supine to sit and sit to supine, scoot up and down, and roll side to side in bed with minimal assistance within 7 day(s). 2.  Patient will perform sit to stand with minimal assistance within 7 day(s). 3.  Patient will transfer from bed to chair and chair to bed with minimal assistance using the least restrictive device within 7 day(s). 4.  Patient will ambulate with minimal assistance for 50 feet with the least restrictive device within 7 day(s). 5.  Patient will ascend/descend 3 stairs with handrail(s) with minimal assistance within 7 day(s). Outcome: Progressing   PHYSICAL THERAPY TREATMENT    Patient: Mateo Burk (70 y.o. male)  Date: 12/21/2023  Diagnosis: Hyponatremia [E87.1] Hyponatremia      Precautions: Fall Risk              alarm      ASSESSMENT:  Patient continues to benefit from skilled PT services and is slowly progressing towards goals. Pt remains limited by impaired cognition, generalized weakness but primarily by impaired standing balance. Able to get him to standing to a walker with assist X 1 but then he promptly sat and declined any additional attempts at standing and returned to supine. He remains a high fall risk at this time and continue to recommend rehab.  His sodium remains low, resulted today as 126.           12/21/23 1557 12/21/23 1600 12/21/23 1604   Vitals

## 2023-12-22 LAB
ALBUMIN SERPL-MCNC: 2.5 G/DL (ref 3.5–5)
ANION GAP SERPL CALC-SCNC: 11 MMOL/L (ref 5–15)
BUN SERPL-MCNC: 90 MG/DL (ref 6–20)
BUN/CREAT SERPL: 51 (ref 12–20)
CALCIUM SERPL-MCNC: 8.1 MG/DL (ref 8.5–10.1)
CHLORIDE SERPL-SCNC: 97 MMOL/L (ref 97–108)
CO2 SERPL-SCNC: 19 MMOL/L (ref 21–32)
COMMENT:: NORMAL
CREAT SERPL-MCNC: 1.76 MG/DL (ref 0.7–1.3)
ERYTHROCYTE [DISTWIDTH] IN BLOOD BY AUTOMATED COUNT: 12.7 % (ref 11.5–14.5)
GLUCOSE SERPL-MCNC: 151 MG/DL (ref 65–100)
HCT VFR BLD AUTO: 35.9 % (ref 36.6–50.3)
HGB BLD-MCNC: 12.7 G/DL (ref 12.1–17)
MCH RBC QN AUTO: 32.2 PG (ref 26–34)
MCHC RBC AUTO-ENTMCNC: 35.4 G/DL (ref 30–36.5)
MCV RBC AUTO: 90.9 FL (ref 80–99)
NRBC # BLD: 0 K/UL (ref 0–0.01)
NRBC BLD-RTO: 0 PER 100 WBC
PHOSPHATE SERPL-MCNC: 4.3 MG/DL (ref 2.6–4.7)
PLATELET # BLD AUTO: 156 K/UL (ref 150–400)
PMV BLD AUTO: 9 FL (ref 8.9–12.9)
POTASSIUM SERPL-SCNC: 3.6 MMOL/L (ref 3.5–5.1)
RBC # BLD AUTO: 3.95 M/UL (ref 4.1–5.7)
SODIUM SERPL-SCNC: 127 MMOL/L (ref 136–145)
SPECIMEN HOLD: NORMAL
WBC # BLD AUTO: 13.3 K/UL (ref 4.1–11.1)

## 2023-12-22 PROCEDURE — 2580000003 HC RX 258: Performed by: HOSPITALIST

## 2023-12-22 PROCEDURE — 6370000000 HC RX 637 (ALT 250 FOR IP): Performed by: INTERNAL MEDICINE

## 2023-12-22 PROCEDURE — 36415 COLL VENOUS BLD VENIPUNCTURE: CPT

## 2023-12-22 PROCEDURE — 97535 SELF CARE MNGMENT TRAINING: CPT

## 2023-12-22 PROCEDURE — 97530 THERAPEUTIC ACTIVITIES: CPT

## 2023-12-22 PROCEDURE — 6370000000 HC RX 637 (ALT 250 FOR IP): Performed by: HOSPITALIST

## 2023-12-22 PROCEDURE — 6370000000 HC RX 637 (ALT 250 FOR IP): Performed by: NURSE PRACTITIONER

## 2023-12-22 PROCEDURE — 2060000000 HC ICU INTERMEDIATE R&B

## 2023-12-22 PROCEDURE — 80069 RENAL FUNCTION PANEL: CPT

## 2023-12-22 PROCEDURE — 85027 COMPLETE CBC AUTOMATED: CPT

## 2023-12-22 PROCEDURE — 2580000003 HC RX 258: Performed by: NURSE PRACTITIONER

## 2023-12-22 RX ORDER — SODIUM CHLORIDE 9 MG/ML
INJECTION, SOLUTION INTRAVENOUS CONTINUOUS
Status: DISPENSED | OUTPATIENT
Start: 2023-12-22 | End: 2023-12-22

## 2023-12-22 RX ORDER — LANOLIN ALCOHOL/MO/W.PET/CERES
100 CREAM (GRAM) TOPICAL DAILY
Qty: 30 TABLET | Refills: 0 | Status: CANCELLED
Start: 2023-12-23 | End: 2024-01-22

## 2023-12-22 RX ORDER — MULTIVITAMIN WITH IRON
1 TABLET ORAL DAILY
Qty: 30 TABLET | Refills: 0 | Status: CANCELLED
Start: 2023-12-23 | End: 2024-01-22

## 2023-12-22 RX ADMIN — Medication 15 G: at 20:32

## 2023-12-22 RX ADMIN — APIXABAN 5 MG: 5 TABLET, FILM COATED ORAL at 10:23

## 2023-12-22 RX ADMIN — Medication 10 ML: at 10:23

## 2023-12-22 RX ADMIN — Medication 10 ML: at 20:32

## 2023-12-22 RX ADMIN — Medication 100 MG: at 10:23

## 2023-12-22 RX ADMIN — APIXABAN 5 MG: 5 TABLET, FILM COATED ORAL at 20:32

## 2023-12-22 RX ADMIN — SODIUM CHLORIDE: 9 INJECTION, SOLUTION INTRAVENOUS at 11:53

## 2023-12-22 RX ADMIN — Medication 15 G: at 10:23

## 2023-12-22 RX ADMIN — ANTI-PRURITIC: 1 CREAM TOPICAL at 20:33

## 2023-12-22 RX ADMIN — ANTI-PRURITIC: 1 CREAM TOPICAL at 10:24

## 2023-12-22 RX ADMIN — THERA TABS 1 TABLET: TAB at 10:23

## 2023-12-22 ASSESSMENT — PAIN SCALES - WONG BAKER: WONGBAKER_NUMERICALRESPONSE: 0

## 2023-12-22 ASSESSMENT — PAIN SCALES - GENERAL: PAINLEVEL_OUTOF10: 0

## 2023-12-22 NOTE — PROGRESS NOTES
[address] [facility]  Hospitalist Group                                                                                          Hospitalist Progress Note  Devon Zavaleta MD  Answering service: [phone] OR 36 from in house phone        Date of Service:  2023  NAME:  Isa Barksdale  :  1945  MRN:  [id number]      Admission Summary:   Per H&P, Isa Barksdale is a 66 y.o. male with pmhx of htn and etoh abuse who presents with bilateral lower extremity weakness. Patient states he fell two nights ago at 3am and has had trouble bearing weight on his legs. He states he has no pain except in his lower back and does attests to some numbness in BLE but still has sensation. Workup in ED showed wbc 9.7k, Hg 12.9, plt 85, , K 3.5, BUN 8, Cr. 0.69, Gene 8.2, Albumin 3.2, AST 44, Tbili 1.7, glucose 90.  UA, ct abd/pelvis, pelvis xray pending. Will admit for hyponatremia. Attests to drinking 6-8 PBR daily, lower back pain (nonreproducible) with radiation into the front lower abdomen, and some diarrhea.  Denies fever chest pain shortness of breath, n/v     Interval history / Subjective:     He offers no complaints - denies pain, dyspnea, n/v.     Assessment & Plan:     # Atrial fibrillation with RVR-- rate controlled   - RRT called (-) for A-fib w/RVR   - Patient patient now restarted oral intake so medication changed to oral  - Consult placed to cardiology  - [id number] score = 3, patient is moderate-high risk for cardioembolic event.  -On beta-blocker and Eliquis    # HTN-- BP controlled  - Continuing metoprolol  - losartan held     # Hyponatremia na 124  # Hypokalemia 4.1  - Nephrology following, added urea tab and fluid restriction 1.2 L/ day     # Altered mental status-- resolved  # EtOH abuse  - ABG did not demonstrate hypercapnia, suspect this is 2/2 AUD  - CIWA monitoring    Hyperbilirubinemia: resolved     Thrombocytopenia: improving  - Follow CBC    MOHAN: due to PRN    Or    LORazepam (ATIVAN) tablet 3 mg  3 mg Oral Q1H PRN    Or    LORazepam (ATIVAN) tablet 4 mg  4 mg Oral Q1H PRN     ______________________________________________________________________  EXPECTED LENGTH OF STAY: 3  ACTUAL LENGTH OF STAY:          9                 Roscoe Arriola MD

## 2023-12-22 NOTE — PLAN OF CARE
Problem: Physical Therapy - Adult  Goal: By Discharge: Performs mobility at highest level of function for planned discharge setting. See evaluation for individualized goals. Description: FUNCTIONAL STATUS PRIOR TO ADMISSION: Patient reports being independent with the use of a rolling walker vs cane. He states he had a fall recently which is why he came to the ED.    HOME SUPPORT PRIOR TO ADMISSION: The patient lived with his wife, whom he states \"can help me with whatever I ask\" but reports not requiring assistance from her prior to admission. Physical Therapy Goals  Revised 12/22/2023  1. Patient will move from supine to sit and sit to supine , scoot up and down, and roll side to side in bed with supervision/set-up within 7 day(s). 2.  Patient will transfer from bed to chair and chair to bed with moderate assistance  using the least restrictive device within 7 day(s). 3.  Patient will perform sit to stand with moderate assistance  within 7 day(s). 4.  Patient will ambulate with moderate assistance  for 10 feet with the least restrictive device within 7 day(s). Stairs goal to be set prn         Physical Therapy Goals  Initiated 12/15/2023  1. Patient will move from supine to sit and sit to supine, scoot up and down, and roll side to side in bed with minimal assistance within 7 day(s). 2.  Patient will perform sit to stand with minimal assistance within 7 day(s). 3.  Patient will transfer from bed to chair and chair to bed with minimal assistance using the least restrictive device within 7 day(s). 4.  Patient will ambulate with minimal assistance for 50 feet with the least restrictive device within 7 day(s). 5.  Patient will ascend/descend 3 stairs with handrail(s) with minimal assistance within 7 day(s).     Outcome: Not Progressing      PHYSICAL THERAPY TREATMENT: WEEKLY REASSESSMENT    Patient: Maureen Rm (46 y.o. male)  Date: 12/22/2023  Primary Diagnosis: Hyponatremia [E87.1]

## 2023-12-22 NOTE — PROGRESS NOTES
Bedside shift change report given to NORMA Salcido/NORMA Segura (oncoming nurse) by Luciana Bender (offgoing nurse). Report included the following information Nurse Handoff Report, Adult Overview, Intake/Output, MAR, Recent Results, Cardiac Rhythm SR, Quality Measures, and Neuro Assessment.

## 2023-12-22 NOTE — PLAN OF CARE
Problem: Safety - Adult  Goal: Free from fall injury  Outcome: Progressing     Problem: Discharge Planning  Goal: Discharge to home or other facility with appropriate resources  Outcome: Progressing  Flowsheets (Taken 12/21/2023 2030)  Discharge to home or other facility with appropriate resources: Identify barriers to discharge with patient and caregiver     Problem: Confusion  Goal: Confusion, delirium, dementia, or psychosis is improved or at baseline  Description: INTERVENTIONS:  1. Assess for possible contributors to thought disturbance, including medications, impaired vision or hearing, underlying metabolic abnormalities, dehydration, psychiatric diagnoses, and notify attending LIP  2. [place] high risk fall precautions, as indicated  3. Provide frequent short contacts to provide reality reorientation, refocusing and direction  4. Decrease environmental stimuli, including noise as appropriate  5. Monitor and intervene to maintain adequate nutrition, hydration, elimination, sleep and activity  6. If unable to ensure safety without constant attention obtain sitter and review sitter guidelines with assigned personnel  7. Initiate Psychosocial CNS and Spiritual Care consult, as indicated  Outcome: Progressing     Problem: Skin/Tissue Integrity  Goal: Absence of new skin breakdown  Description: 1. Monitor for areas of redness and/or skin breakdown  2. Assess vascular access sites hourly  3. Every 4-6 hours minimum:  Change oxygen saturation probe site  4. Every 4-6 hours:  If on nasal continuous positive airway pressure, respiratory therapy assess nares and determine need for appliance change or resting period.   Outcome: Progressing     Problem: Pain  Goal: Verbalizes/displays adequate comfort level or baseline comfort level  Outcome: Progressing  Flowsheets  Taken 12/22/2023 0400  Verbalizes/displays adequate comfort level or baseline comfort level: Assess pain using appropriate pain scale  Taken

## 2023-12-22 NOTE — PROGRESS NOTES
[facility]  Jeet Ye MD  [phone]            Renal Progress Note    NAME:  Hal Casas   :   1945   MRN:   [id number]     Date/Time:  2023 12:59 PM            DISCUSSION / PLAN :      Assessment:  Hyponatremia initially due to binge drinking and volume depletion in setting of diarrhea and poor po intake-On admission,  Urine NA <10, with high U creat. However, the urine sodium is now up to 93.  The urine creatinine is much lower as well. This seems more consistent with ADH excess.     Hypokalemia- improved  ETOH abuse  Possible Alcohol withdrawal  Encephalopathy, metabolic  HTN-controlled  Thrombocytopenia  New Afib /rvr on Cardizem  Swallowing dysfxn, high risk for aspiration  MOHAN - pre-renal --> ? Sec to vol contraction        Plan:  Start N/Saline @ 75 mls per hour in an effort to reverse the azotemia.  Adjust Ure NA to 15 grams once daily.  A rate of correction of 4 to 6 meq/l/day seems reasonable  Regular diet, protein supplements-once safe to have po, may require PEG             ___________________________________________________  Subjective:         Confused, states he can't void, Na 121 this am    12-16 seems lucid, not allowed to eat or drink due to swallowing issues. At some point he will need protein intake to facilitate water excretion. Can ry UreNa and/ or salt tablets when allowed to swallow    12-17 remains NPO. Alert, comfortable. No distress    23 No new issues. Patient is seen for Hyponatremia, na is correcting nicely with saline.    23 --> F /U Hyponatremia --> Radha    Feeling better.  There was no suggestion of headache.      23 --> F/U Hyponatremia --> Radha      2023 --> F/U Hyponatremia --> Radha      Feeling better.    23 --> Radha    Seemed to have rested fairly well.  There were no major complaints.  Mr Casas was concerned about his Wife.    Medications reviewed:  Current Facility-Administered Medications   Medication Dose  Sodium 127 (L) 136 - 145 mmol/L    Potassium 3.6 3.5 - 5.1 mmol/L    Chloride 97 97 - 108 mmol/L    CO2 19 (L) 21 - 32 mmol/L    Anion Gap 11 5 - 15 mmol/L    Glucose 151 (H) 65 - 100 mg/dL    BUN 90 (H) 6 - 20 MG/DL    Creatinine 1.76 (H) 0.70 - 1.30 MG/DL    Bun/Cre Ratio 51 (H) 12 - 20      Est, Glom Filt Rate 39 (L) >60 ml/min/1.73m2    Calcium 8.1 (L) 8.5 - 10.1 MG/DL    Phosphorus 4.3 2.6 - 4.7 MG/DL    Albumin 2.5 (L) 3.5 - 5.0 g/dL               Total time spent with patient:         [] Critical Care Provided    Care Plan discussed with:      Dr. Mcintosh Speaker        [x] Patient   [] Family    [] Care Manager   [] Consultant/Specialist :      []   >50% of visit spent in counseling and coordination of care   (Discussed [] CODE status,  [] Care Plan, [] D/C Planning)    ___________________________________________________    Attending Physician: Carol Acosta MD     [phone]  [address] [place] [address]

## 2023-12-22 NOTE — PLAN OF CARE
Problem: Safety - Adult  Goal: Free from fall injury  12/22/2023 1127 by Latricia Nguyen RN  Outcome: Progressing  12/22/2023 0414 by Essence Vogt RN  Outcome: Progressing     Problem: Discharge Planning  Goal: Discharge to home or other facility with appropriate resources  12/22/2023 1127 by Latricia Nguyen RN  Outcome: Progressing  Flowsheets (Taken 12/22/2023 0800)  Discharge to home or other facility with appropriate resources: Identify barriers to discharge with patient and caregiver  12/22/2023 0414 by Essence Vogt RN  Outcome: Progressing  Flowsheets (Taken 12/21/2023 2030)  Discharge to home or other facility with appropriate resources: Identify barriers to discharge with patient and caregiver     Problem: Confusion  Goal: Confusion, delirium, dementia, or psychosis is improved or at baseline  Description: INTERVENTIONS:  1. Assess for possible contributors to thought disturbance, including medications, impaired vision or hearing, underlying metabolic abnormalities, dehydration, psychiatric diagnoses, and notify attending LIP  2. [place] high risk fall precautions, as indicated  3. Provide frequent short contacts to provide reality reorientation, refocusing and direction  4. Decrease environmental stimuli, including noise as appropriate  5. Monitor and intervene to maintain adequate nutrition, hydration, elimination, sleep and activity  6. If unable to ensure safety without constant attention obtain sitter and review sitter guidelines with assigned personnel  7.  Initiate Psychosocial CNS and Spiritual Care consult, as indicated  12/22/2023 1127 by Latricia Nguyen RN  Outcome: Progressing  Flowsheets (Taken 12/22/2023 0800)  Effect of thought disturbance (confusion, delirium, dementia, or psychosis) are managed with adequate functional status:   [place] high risk fall precautions, as indicated   Provide frequent short contacts to provide reality reorientation, refocusing and direction  12/22/2023 0414 by [place], [place], RN  Outcome: Progressing     Problem: Skin/Tissue Integrity  Goal: Absence of new skin breakdown  Description: 1.  Monitor for areas of redness and/or skin breakdown  2.  Assess vascular access sites hourly  3.  Every 4-6 hours minimum:  Change oxygen saturation probe site  4.  Every 4-6 hours:  If on nasal continuous positive airway pressure, respiratory therapy assess nares and determine need for appliance change or resting period.  12/22/2023 1127 by Sharmaine Stafford RN  Outcome: Progressing  12/22/2023 0414 by Vick Moreno RN  Outcome: Progressing     Problem: Pain  Goal: Verbalizes/displays adequate comfort level or baseline comfort level  12/22/2023 1127 by Shramaine Stafford RN  Outcome: Progressing  12/22/2023 0414 by iVck Moreno RN  Outcome: Progressing  Flowsheets  Taken 12/22/2023 0400  Verbalizes/displays adequate comfort level or baseline comfort level: Assess pain using appropriate pain scale  Taken 12/21/2023 2030  Verbalizes/displays adequate comfort level or baseline comfort level: Assess pain using appropriate pain scale     Problem: Nutrition Deficit:  Goal: Optimize nutritional status  12/22/2023 1127 by Sharmaine Stafford RN  Outcome: Progressing  12/22/2023 0414 by Vick Moreno RN  Outcome: Progressing

## 2023-12-22 NOTE — PROGRESS NOTES
[address] [facility]  Hospitalist Group                                                                                          Hospitalist Progress Note  Minda Lozano MD  Answering service: [phone] OR 36 from in house phone        Date of Service:  2023  NAME:  Scar Amador  :  1945  MRN:  [id number]      Admission Summary:   Per H&P, Scar Amador is a 66 y.o. male with pmhx of htn and etoh abuse who presents with bilateral lower extremity weakness. Patient states he fell two nights ago at 3am and has had trouble bearing weight on his legs. He states he has no pain except in his lower back and does attests to some numbness in BLE but still has sensation. Workup in ED showed wbc 9.7k, Hg 12.9, plt 85, , K 3.5, BUN 8, Cr. 0.69, Gene 8.2, Albumin 3.2, AST 44, Tbili 1.7, glucose 90.  UA, ct abd/pelvis, pelvis xray pending. Will admit for hyponatremia. Attests to drinking 6-8 PBR daily, lower back pain (nonreproducible) with radiation into the front lower abdomen, and some diarrhea.  Denies fever chest pain shortness of breath, n/v     Interval history / Subjective:     He offers no complaints - denies pain, dyspnea, n/v.     Assessment & Plan:     # Atrial fibrillation with RVR-- rate controlled   - RRT called (-) for A-fib w/RVR   - Patient patient now restarted oral intake so medication changed to oral  - Consult placed to cardiology  - [id number] score = 3, patient is moderate-high risk for cardioembolic event.  -On beta-blocker and Eliquis    # HTN-- BP controlled  - Continuing metoprolol  - losartan held     # Hyponatremia na 124  # Hypokalemia 4.1  - Nephrology following, added urea tab and fluid restriction 1.2 L/ day     # Altered mental status-- resolved  # EtOH abuse  - ABG did not demonstrate hypercapnia, suspect this is 2/2 AUD  - CIWA monitoring    Hyperbilirubinemia: resolved     Thrombocytopenia: improving  - Follow CBC    MOHAN: due to

## 2023-12-23 LAB
ALBUMIN SERPL-MCNC: 2.4 G/DL (ref 3.5–5)
ALBUMIN SERPL-MCNC: 2.5 G/DL (ref 3.5–5)
ANION GAP SERPL CALC-SCNC: 6 MMOL/L (ref 5–15)
ANION GAP SERPL CALC-SCNC: 9 MMOL/L (ref 5–15)
BACTERIA SPEC CULT: ABNORMAL
BASOPHILS # BLD: 0 K/UL (ref 0–0.1)
BASOPHILS NFR BLD: 1 % (ref 0–1)
BUN SERPL-MCNC: 69 MG/DL (ref 6–20)
BUN SERPL-MCNC: 86 MG/DL (ref 6–20)
BUN/CREAT SERPL: 62 (ref 12–20)
BUN/CREAT SERPL: 75 (ref 12–20)
CALCIUM SERPL-MCNC: 8.2 MG/DL (ref 8.5–10.1)
CALCIUM SERPL-MCNC: 8.7 MG/DL (ref 8.5–10.1)
CC UR VC: ABNORMAL
CHLORIDE SERPL-SCNC: 102 MMOL/L (ref 97–108)
CHLORIDE SERPL-SCNC: 102 MMOL/L (ref 97–108)
CO2 SERPL-SCNC: 23 MMOL/L (ref 21–32)
CO2 SERPL-SCNC: 26 MMOL/L (ref 21–32)
CREAT SERPL-MCNC: 1.11 MG/DL (ref 0.7–1.3)
CREAT SERPL-MCNC: 1.15 MG/DL (ref 0.7–1.3)
DIFFERENTIAL METHOD BLD: ABNORMAL
EOSINOPHIL # BLD: 0 K/UL (ref 0–0.4)
EOSINOPHIL NFR BLD: 0 % (ref 0–7)
ERYTHROCYTE [DISTWIDTH] IN BLOOD BY AUTOMATED COUNT: 12.7 % (ref 11.5–14.5)
GLUCOSE SERPL-MCNC: 117 MG/DL (ref 65–100)
GLUCOSE SERPL-MCNC: 127 MG/DL (ref 65–100)
HCT VFR BLD AUTO: 37.2 % (ref 36.6–50.3)
HGB BLD-MCNC: 12.6 G/DL (ref 12.1–17)
IMM GRANULOCYTES # BLD AUTO: 0 K/UL (ref 0–0.04)
IMM GRANULOCYTES NFR BLD AUTO: 1 % (ref 0–0.5)
LYMPHOCYTES # BLD: 1.2 K/UL (ref 0.8–3.5)
LYMPHOCYTES NFR BLD: 20 % (ref 12–49)
MAGNESIUM SERPL-MCNC: 2 MG/DL (ref 1.6–2.4)
MCH RBC QN AUTO: 31.7 PG (ref 26–34)
MCHC RBC AUTO-ENTMCNC: 33.9 G/DL (ref 30–36.5)
MCV RBC AUTO: 93.7 FL (ref 80–99)
MONOCYTES # BLD: 0.6 K/UL (ref 0–1)
MONOCYTES NFR BLD: 9 % (ref 5–13)
NEUTS SEG # BLD: 4.3 K/UL (ref 1.8–8)
NEUTS SEG NFR BLD: 69 % (ref 32–75)
NRBC # BLD: 0 K/UL (ref 0–0.01)
NRBC BLD-RTO: 0 PER 100 WBC
PHOSPHATE SERPL-MCNC: 2.9 MG/DL (ref 2.6–4.7)
PHOSPHATE SERPL-MCNC: 3 MG/DL (ref 2.6–4.7)
PLATELET # BLD AUTO: 141 K/UL (ref 150–400)
PMV BLD AUTO: 9.2 FL (ref 8.9–12.9)
POTASSIUM SERPL-SCNC: 3.4 MMOL/L (ref 3.5–5.1)
POTASSIUM SERPL-SCNC: 4.2 MMOL/L (ref 3.5–5.1)
RBC # BLD AUTO: 3.97 M/UL (ref 4.1–5.7)
SERVICE CMNT-IMP: ABNORMAL
SODIUM SERPL-SCNC: 134 MMOL/L (ref 136–145)
SODIUM SERPL-SCNC: 134 MMOL/L (ref 136–145)
WBC # BLD AUTO: 6.1 K/UL (ref 4.1–11.1)

## 2023-12-23 PROCEDURE — 2580000003 HC RX 258: Performed by: NURSE PRACTITIONER

## 2023-12-23 PROCEDURE — 80069 RENAL FUNCTION PANEL: CPT

## 2023-12-23 PROCEDURE — 85025 COMPLETE CBC W/AUTO DIFF WBC: CPT

## 2023-12-23 PROCEDURE — 6370000000 HC RX 637 (ALT 250 FOR IP): Performed by: INTERNAL MEDICINE

## 2023-12-23 PROCEDURE — 36415 COLL VENOUS BLD VENIPUNCTURE: CPT

## 2023-12-23 PROCEDURE — 2580000003 HC RX 258: Performed by: INTERNAL MEDICINE

## 2023-12-23 PROCEDURE — 6370000000 HC RX 637 (ALT 250 FOR IP): Performed by: HOSPITALIST

## 2023-12-23 PROCEDURE — 83735 ASSAY OF MAGNESIUM: CPT

## 2023-12-23 PROCEDURE — 2060000000 HC ICU INTERMEDIATE R&B

## 2023-12-23 PROCEDURE — 6370000000 HC RX 637 (ALT 250 FOR IP): Performed by: NURSE PRACTITIONER

## 2023-12-23 RX ORDER — SODIUM CHLORIDE 450 MG/100ML
INJECTION, SOLUTION INTRAVENOUS CONTINUOUS
Status: DISCONTINUED | OUTPATIENT
Start: 2023-12-23 | End: 2023-12-24

## 2023-12-23 RX ADMIN — APIXABAN 5 MG: 5 TABLET, FILM COATED ORAL at 20:35

## 2023-12-23 RX ADMIN — SODIUM CHLORIDE: 4.5 INJECTION, SOLUTION INTRAVENOUS at 18:46

## 2023-12-23 RX ADMIN — ANTI-PRURITIC: 1 CREAM TOPICAL at 20:36

## 2023-12-23 RX ADMIN — APIXABAN 5 MG: 5 TABLET, FILM COATED ORAL at 09:09

## 2023-12-23 RX ADMIN — ANTI-PRURITIC: 1 CREAM TOPICAL at 09:17

## 2023-12-23 RX ADMIN — Medication 10 ML: at 20:36

## 2023-12-23 RX ADMIN — METOPROLOL TARTRATE 25 MG: 25 TABLET, FILM COATED ORAL at 20:35

## 2023-12-23 RX ADMIN — POTASSIUM BICARBONATE 40 MEQ: 782 TABLET, EFFERVESCENT ORAL at 09:08

## 2023-12-23 RX ADMIN — Medication 100 MG: at 09:08

## 2023-12-23 RX ADMIN — THERA TABS 1 TABLET: TAB at 09:08

## 2023-12-23 RX ADMIN — Medication 10 ML: at 09:18

## 2023-12-23 RX ADMIN — Medication 10 ML: at 09:11

## 2023-12-23 RX ADMIN — METOPROLOL TARTRATE 25 MG: 25 TABLET, FILM COATED ORAL at 09:09

## 2023-12-23 ASSESSMENT — PAIN SCALES - GENERAL
PAINLEVEL_OUTOF10: 0
PAINLEVEL_OUTOF10: 0

## 2023-12-23 ASSESSMENT — PAIN SCALES - WONG BAKER
WONGBAKER_NUMERICALRESPONSE: 0
WONGBAKER_NUMERICALRESPONSE: 0

## 2023-12-23 NOTE — PROGRESS NOTES
2/2 AUD  - CIWA monitoring     Hyperbilirubinemia: resolved     Thrombocytopenia: improving  - Follow CBC     MOHAN: due to volume depletion?   Improved on NS  Now on 1/2 NS  Monitor      PT/OT     Code status:   Prophylaxis:   Care Plan discussed with:   Anticipated Disposition:      Principal Problem:    Hyponatremia  Active Problems:    Dysphagia    DNR (do not resuscitate) discussion  Resolved Problems:    * No resolved hospital problems. *            Review of Systems:   Pertinent items are noted in HPI.         Vital Signs:    Last 24hrs VS reviewed since prior progress note. Most recent are:  /72   Pulse 74   Temp 97.7 °F (36.5 °C) (Oral)   Resp 13   Ht 1.803 m (5' 11\")   Wt 80.1 kg (176 lb 9.6 oz)   SpO2 100%   BMI 24.63 kg/m²        Intake/Output Summary (Last 24 hours) at 12/23/2023 1031  Last data filed at 12/23/2023 0826  Gross per 24 hour   Intake --   Output 300 ml   Net -300 ml        Physical Examination:     I had a face to face encounter with this patient and independently examined them on 12/23/2023 as outlined below:          General : alert x 3, awake, no acute distress,   HEENT: PEERL, EOMI, moist mucus membrane  Neck: supple, no JVD, no meningeal signs  Chest: Clear to auscultation bilaterally   CVS: S1 S2 heard, Capillary refill less than 2 seconds  Abd: soft/ non tender, non distended, BS physiological,   Ext: no clubbing, no cyanosis, no edema, brisk 2+ DP pulses  Neuro/Psych: pleasant mood and affect, CN 2-12 grossly intact, sensory grossly within normal limit, Strength 5/5 in all extremities  Skin: warm            Data Review:    Review and/or order of clinical lab test  Review and/or order of tests in the radiology section of CPT  Review and/or order of tests in the medicine section of CPT    I have independently reviewed and interpreted patient's lab and all other diagnostic data    Notes reviewed from all clinical/nonclinical/nursing services involved in patient's clinical  care. Care coordination discussions were held with appropriate clinical/nonclinical/ nursing providers based on care coordination needs. Labs:     Recent Labs     12/22/23  0527 12/23/23  0329   WBC 13.3* 6.1   HGB 12.7 12.6   HCT 35.9* 37.2    141*     Recent Labs     12/21/23  0833 12/21/23  0834 12/22/23  0527 12/23/23  0329   * 126* 127* 134*   K 3.9 3.8 3.6 3.4*   CL 95* 97 97 102   CO2 20* 19* 19* 23   BUN 53* 54* 90* 86*   MG  --   --   --  2.0   PHOS 5.7*  --  4.3 3.0     No results for input(s): \"ALT\", \"TP\", \"ALB\", \"GLOB\", \"GGT\", \"AML\" in the last 72 hours. Invalid input(s): \"SGOT\", \"GPT\", \"AP\", \"TBIL\", \"TBILI\", \"AMYP\", \"LPSE\", \"HLPSE\"  No results for input(s): \"INR\", \"APTT\" in the last 72 hours. Invalid input(s): \"PTP\"   No results for input(s): \"TIBC\", \"FERR\" in the last 72 hours. Invalid input(s): \"FE\", \"PSAT\"   No results found for: \"FOL\", \"RBCF\"   No results for input(s): \"PH\", \"PCO2\", \"PO2\" in the last 72 hours. No results for input(s): \"CPK\" in the last 72 hours.     Invalid input(s): \"CPKMB\", \"CKNDX\", \"TROIQ\"  Lab Results   Component Value Date/Time    CHOL 216 11/08/2022 11:42 AM    HDL 58 11/08/2022 11:42 AM     No results found for: \"GLUCPOC\"        Medications Reviewed:     Current Facility-Administered Medications   Medication Dose Route Frequency    metoprolol tartrate (LOPRESSOR) tablet 25 mg  25 mg Oral BID    [Held by provider] losartan (COZAAR) tablet 50 mg  50 mg Oral Daily    thiamine tablet 100 mg  100 mg Oral Daily    hydrALAZINE (APRESOLINE) injection 5 mg  5 mg IntraVENous Q8H PRN    hydrocortisone 1 % cream   Topical BID    apixaban (ELIQUIS) tablet 5 mg  5 mg Oral BID    sodium chloride flush 0.9 % injection 5-40 mL  5-40 mL IntraVENous 2 times per day    sodium chloride flush 0.9 % injection 5-40 mL  5-40 mL IntraVENous PRN    0.9 % sodium chloride infusion   IntraVENous PRN    potassium chloride (KLOR-CON) extended release tablet 40 mEq  40 mEq Oral PRN

## 2023-12-23 NOTE — PROGRESS NOTES
[facility]  Tolu Terrell MD  [phone]            Renal Progress Note    NAME:  Becky Holcomb   :   1945   MRN:   [id number]     Date/Time:  2023 12:50 PM            DISCUSSION / PLAN :      Assessment:  Hyponatremia initially due to binge drinking and volume depletion in setting of diarrhea and poor po intake-On admission,  Urine NA <10, with high U creat. However, the urine sodium is now up to 93. The urine creatinine is much lower as well. This seems more consistent with ADH excess. His serum sodium improved to 134 with IV N/saline. This was a bit ahead of schedule. Creatinine is now down to 1.15     Hypokalemia-  ETOH abuse  Possible Alcohol withdrawal  Encephalopathy, metabolic  HTN-controlled  Thrombocytopenia  New Afib /rvr on Cardizem  Swallowing dysfxn, high risk for aspiration  MOHAN - pre-renal --> ? Sec to vol contraction        Plan:    D/C'ed N/Saline. Start 1/2 N/Saline in an effort to slow the rate of correction of the serum sodium. Hold Ure NA. Replete K. Repeat the renal panel this afternoon. A rate of correction of 4 to 6 meq/l/day seems reasonable  Regular diet, protein supplements-once safe to have po, may require PEG             ___________________________________________________  Subjective:         Confused, states he can't void, Na 121 this am    12-16 seems lucid, not allowed to eat or drink due to swallowing issues. At some point he will need protein intake to facilitate water excretion. Can ry UreNa and/ or salt tablets when allowed to swallow    12-17 remains NPO. Alert, comfortable. No distress    23 No new issues. Patient is seen for Hyponatremia, na is correcting nicely with saline. 23 --> F /U Hyponatremia --> Radha    Feeling better. There was no suggestion of headache.      23 --> F/U Hyponatremia --> Radha      2023 --> F/U Hyponatremia --> Radha      Feeling better.     23 --> Anh Sol    Seemed to have rested LORazepam (ATIVAN) tablet 3 mg  3 mg Oral Q1H PRN    Or    LORazepam (ATIVAN) tablet 4 mg  4 mg Oral Q1H PRN        Objective:   Vitals:  /72   Pulse 70   Temp 97.7 °F (36.5 °C) (Oral)   Resp 13   Ht 1.803 m (5' 11\")   Wt 80.1 kg (176 lb 9.6 oz)   SpO2 100%   BMI 24.63 kg/m²   Temp (24hrs), Av.7 °F (36.5 °C), Min:97.4 °F (36.3 °C), Max:97.9 °F (36.6 °C)           Last 24hr Input/Output:    Intake/Output Summary (Last 24 hours) at 2023 1250  Last data filed at 2023 0826  Gross per 24 hour   Intake --   Output 300 ml   Net -300 ml          Physical Exam:    Seen in Room 414.      General: Alert , lying in bed quite comfortably.    HEENT: Sclerae without icterus.     Lungs : Clear  to auscultation , no wheezes, no rales    CVS: S 1 , S 2 , No S3 gallop , No pericardial rub.    Abdomen:  Not distended    Extremities:  No Edema    Neuro - Hearing deficit (not new), otherwise responding appropriately.         [] Telemetry Reviewed     [] NSR [] PAC/PVCs   [] Afib  [] Paced   [] NSVT   [] Benson [] NGT  [] Intubated on vent    Lab Data Reviewed:    Recent Results (from the past 24 hour(s))   Magnesium    Collection Time: 23  3:29 AM   Result Value Ref Range    Magnesium 2.0 1.6 - 2.4 mg/dL   Renal Function Panel    Collection Time: 23  3:29 AM   Result Value Ref Range    Sodium 134 (L) 136 - 145 mmol/L    Potassium 3.4 (L) 3.5 - 5.1 mmol/L    Chloride 102 97 - 108 mmol/L    CO2 23 21 - 32 mmol/L    Anion Gap 9 5 - 15 mmol/L    Glucose 117 (H) 65 - 100 mg/dL    BUN 86 (H) 6 - 20 MG/DL    Creatinine 1.15 0.70 - 1.30 MG/DL    Bun/Cre Ratio 75 (H) 12 - 20      Est, Glom Filt Rate >60 >60 ml/min/1.73m2    Calcium 8.2 (L) 8.5 - 10.1 MG/DL    Phosphorus 3.0 2.6 - 4.7 MG/DL    Albumin 2.5 (L) 3.5 - 5.0 g/dL   CBC with Auto Differential    Collection Time: 23  3:29 AM   Result Value Ref Range    WBC 6.1 4.1 - 11.1 K/uL    RBC 3.97 (L) 4.10 - 5.70 M/uL    Hemoglobin 12.6 12.1 - 17.0 g/dL

## 2023-12-23 NOTE — CARE COORDINATION
Transition of Care Plan:[facility] when medically stable, possibly 12/24. RUR: 13%  Prior Level of Functioning: Independent  Disposition: [facility] SNF  If SNF or IPR: Date FOC offered: 12/19  Date [address] received: 12/23  Accepting facility: [facility]  Date authorization started with reference number: NA  Date authorization received and expires: NA  Follow up appointments: PCP/Specialist  DME needed: per SNF  Transportation at discharge: [facility]  IM/IMM Medicare/ letter given:   Is patient a [place] and connected with VA? No     Caregiver Contact: Daphnie Jalloh, wife [phone]  Discharge Caregiver contacted prior to discharge? Care Conference needed? No  Barriers to discharge:  medical    CM spoke with pt's wife, Daphnie Jalloh to confirm discharge plan. Celia has verbalized that [facility] is her preference for discharge, and has accepted. CM contacted [facility] to confirm bed placement, although admissions is not available for bed placement this weekend per  at [address] is made aware. CM following.     Nelsy Sprague, RN BSN  Care Manager

## 2023-12-24 LAB
ALBUMIN SERPL-MCNC: 2.6 G/DL (ref 3.5–5)
ANION GAP SERPL CALC-SCNC: 7 MMOL/L (ref 5–15)
BASOPHILS # BLD: 0 K/UL (ref 0–0.1)
BASOPHILS NFR BLD: 0 % (ref 0–1)
BUN SERPL-MCNC: 54 MG/DL (ref 6–20)
BUN/CREAT SERPL: 55 (ref 12–20)
CALCIUM SERPL-MCNC: 8.4 MG/DL (ref 8.5–10.1)
CHLORIDE SERPL-SCNC: 102 MMOL/L (ref 97–108)
CO2 SERPL-SCNC: 25 MMOL/L (ref 21–32)
CREAT SERPL-MCNC: 0.98 MG/DL (ref 0.7–1.3)
DIFFERENTIAL METHOD BLD: ABNORMAL
EOSINOPHIL # BLD: 0.1 K/UL (ref 0–0.4)
EOSINOPHIL NFR BLD: 2 % (ref 0–7)
ERYTHROCYTE [DISTWIDTH] IN BLOOD BY AUTOMATED COUNT: 12.8 % (ref 11.5–14.5)
GLUCOSE SERPL-MCNC: 126 MG/DL (ref 65–100)
HCT VFR BLD AUTO: 35 % (ref 36.6–50.3)
HGB BLD-MCNC: 12 G/DL (ref 12.1–17)
IMM GRANULOCYTES # BLD AUTO: 0 K/UL (ref 0–0.04)
IMM GRANULOCYTES NFR BLD AUTO: 0 % (ref 0–0.5)
LYMPHOCYTES # BLD: 1.3 K/UL (ref 0.8–3.5)
LYMPHOCYTES NFR BLD: 19 % (ref 12–49)
MCH RBC QN AUTO: 31.7 PG (ref 26–34)
MCHC RBC AUTO-ENTMCNC: 34.3 G/DL (ref 30–36.5)
MCV RBC AUTO: 92.3 FL (ref 80–99)
MONOCYTES # BLD: 0.7 K/UL (ref 0–1)
MONOCYTES NFR BLD: 10 % (ref 5–13)
NEUTS SEG # BLD: 4.7 K/UL (ref 1.8–8)
NEUTS SEG NFR BLD: 69 % (ref 32–75)
NRBC # BLD: 0 K/UL (ref 0–0.01)
NRBC BLD-RTO: 0 PER 100 WBC
PHOSPHATE SERPL-MCNC: 2.8 MG/DL (ref 2.6–4.7)
PLATELET # BLD AUTO: 156 K/UL (ref 150–400)
PMV BLD AUTO: 9.5 FL (ref 8.9–12.9)
POTASSIUM SERPL-SCNC: 4 MMOL/L (ref 3.5–5.1)
RBC # BLD AUTO: 3.79 M/UL (ref 4.1–5.7)
SODIUM SERPL-SCNC: 134 MMOL/L (ref 136–145)
WBC # BLD AUTO: 6.9 K/UL (ref 4.1–11.1)

## 2023-12-24 PROCEDURE — 6370000000 HC RX 637 (ALT 250 FOR IP): Performed by: HOSPITALIST

## 2023-12-24 PROCEDURE — 94760 N-INVAS EAR/PLS OXIMETRY 1: CPT

## 2023-12-24 PROCEDURE — 6370000000 HC RX 637 (ALT 250 FOR IP): Performed by: INTERNAL MEDICINE

## 2023-12-24 PROCEDURE — 2580000003 HC RX 258: Performed by: INTERNAL MEDICINE

## 2023-12-24 PROCEDURE — 2580000003 HC RX 258: Performed by: NURSE PRACTITIONER

## 2023-12-24 PROCEDURE — 1100000000 HC RM PRIVATE

## 2023-12-24 PROCEDURE — 6370000000 HC RX 637 (ALT 250 FOR IP): Performed by: NURSE PRACTITIONER

## 2023-12-24 PROCEDURE — 85025 COMPLETE CBC W/AUTO DIFF WBC: CPT

## 2023-12-24 PROCEDURE — 36415 COLL VENOUS BLD VENIPUNCTURE: CPT

## 2023-12-24 PROCEDURE — 80069 RENAL FUNCTION PANEL: CPT

## 2023-12-24 RX ORDER — SODIUM CHLORIDE 9 MG/ML
INJECTION, SOLUTION INTRAVENOUS CONTINUOUS
Status: DISPENSED | OUTPATIENT
Start: 2023-12-24 | End: 2023-12-24

## 2023-12-24 RX ADMIN — METOPROLOL TARTRATE 25 MG: 25 TABLET, FILM COATED ORAL at 10:17

## 2023-12-24 RX ADMIN — Medication 10 ML: at 10:17

## 2023-12-24 RX ADMIN — ANTI-PRURITIC: 1 CREAM TOPICAL at 20:53

## 2023-12-24 RX ADMIN — APIXABAN 5 MG: 5 TABLET, FILM COATED ORAL at 20:52

## 2023-12-24 RX ADMIN — METOPROLOL TARTRATE 25 MG: 25 TABLET, FILM COATED ORAL at 20:53

## 2023-12-24 RX ADMIN — Medication 100 MG: at 10:16

## 2023-12-24 RX ADMIN — Medication 10 ML: at 20:53

## 2023-12-24 RX ADMIN — THERA TABS 1 TABLET: TAB at 10:16

## 2023-12-24 RX ADMIN — APIXABAN 5 MG: 5 TABLET, FILM COATED ORAL at 10:17

## 2023-12-24 RX ADMIN — SODIUM CHLORIDE: 9 INJECTION, SOLUTION INTRAVENOUS at 02:32

## 2023-12-24 RX ADMIN — ANTI-PRURITIC: 1 CREAM TOPICAL at 10:17

## 2023-12-24 ASSESSMENT — PAIN SCALES - GENERAL
PAINLEVEL_OUTOF10: 0

## 2023-12-24 NOTE — PROGRESS NOTES
Bedside shift change report given to [address] [place] [address] Laura Curtis (oncoming nurse) by Derek Melendez (offgoing nurse). Report included the following information Nurse Handoff Report, Adult Overview, Intake/Output, MAR, Recent Results, Cardiac Rhythm SR, Quality Measures, and Neuro Assessment.

## 2023-12-24 NOTE — PROGRESS NOTES
Hospitalist Progress Note  Cal Boothe MD  Answering service: [phone] from in house phone        Date of Service:  2023  NAME:  Merle Guidry  :  1945  MRN:  [id number]      Admission Summary:   Per H&P, Merle Guidry is a 66 y.o. male with pmhx of htn and etoh abuse who presents with bilateral lower extremity weakness. Patient states he fell two nights ago at 3am and has had trouble bearing weight on his legs. He states he has no pain except in his lower back and does attests to some numbness in BLE but still has sensation. Workup in ED showed wbc 9.7k, Hg 12.9, plt 85, , K 3.5, BUN 8, Cr. 0.69, Gene 8.2, Albumin 3.2, AST 44, Tbili 1.7, glucose 90.  UA, ct abd/pelvis, pelvis xray pending. Will admit for hyponatremia. Attests to drinking 6-8 PBR daily, lower back pain (nonreproducible) with radiation into the front lower abdomen, and some diarrhea. Denies fever chest pain shortness of breath, n/v     Interval history / Subjective:   Seen and examined for follow up of hyponatremia. Reports feeling well. He says that his wife mentioned that the SNF he is meant to go to had a covid outbreak, so he is concerned.       Assessment & Plan:     Atrial fibrillation with RVR-- rate controlled   - RRT called (-) for A-fib w/RVR   - Patient patient now restarted oral intake so medication changed to oral  - Consult placed to cardiology  - [id number] score = 3, patient is moderate-high risk for cardioembolic event.  -On beta-blocker and Eliquis     HTN-- BP controlled  - Continuing metoprolol  - losartan held     Hyponatremia na 124 - 134  Hypokalemia   - Nephrology following, added urea tab and fluid restriction 1.2 L/ day   - on  NS to slow rate of correction   - Urea held today   - stable   - monitor with daily labs     Altered mental status-- resolved  EtOH abuse  -

## 2023-12-24 NOTE — PLAN OF CARE
Problem: Safety - Adult  Goal: Free from fall injury  Outcome: Progressing     Problem: Discharge Planning  Goal: Discharge to home or other facility with appropriate resources  Outcome: Progressing     Problem: Confusion  Goal: Confusion, delirium, dementia, or psychosis is improved or at baseline  Description: INTERVENTIONS:  1. Assess for possible contributors to thought disturbance, including medications, impaired vision or hearing, underlying metabolic abnormalities, dehydration, psychiatric diagnoses, and notify attending LIP  2. [place] high risk fall precautions, as indicated  3. Provide frequent short contacts to provide reality reorientation, refocusing and direction  4. Decrease environmental stimuli, including noise as appropriate  5. Monitor and intervene to maintain adequate nutrition, hydration, elimination, sleep and activity  6. If unable to ensure safety without constant attention obtain sitter and review sitter guidelines with assigned personnel  7. Initiate Psychosocial CNS and Spiritual Care consult, as indicated  Outcome: Progressing     Problem: Skin/Tissue Integrity  Goal: Absence of new skin breakdown  Description: 1. Monitor for areas of redness and/or skin breakdown  2. Assess vascular access sites hourly  3. Every 4-6 hours minimum:  Change oxygen saturation probe site  4. Every 4-6 hours:  If on nasal continuous positive airway pressure, respiratory therapy assess nares and determine need for appliance change or resting period.   Outcome: Progressing     Problem: Pain  Goal: Verbalizes/displays adequate comfort level or baseline comfort level  Outcome: Progressing  Flowsheets  Taken 12/23/2023 1830 by Sherrie Pickard RN  Verbalizes/displays adequate comfort level or baseline comfort level: Encourage patient to monitor pain and request assistance  Taken 12/23/2023 1545 by Sherrie Pickard RN  Verbalizes/displays adequate comfort level or baseline comfort level: Encourage patient to monitor pain and request assistance     Problem: Nutrition Deficit:  Goal: Optimize nutritional status  Outcome: Progressing

## 2023-12-25 LAB
ANION GAP SERPL CALC-SCNC: 4 MMOL/L (ref 5–15)
BUN SERPL-MCNC: 27 MG/DL (ref 6–20)
BUN/CREAT SERPL: 40 (ref 12–20)
CALCIUM SERPL-MCNC: 8.2 MG/DL (ref 8.5–10.1)
CHLORIDE SERPL-SCNC: 104 MMOL/L (ref 97–108)
CO2 SERPL-SCNC: 24 MMOL/L (ref 21–32)
COMMENT:: NORMAL
CREAT SERPL-MCNC: 0.67 MG/DL (ref 0.7–1.3)
GLUCOSE SERPL-MCNC: 106 MG/DL (ref 65–100)
POTASSIUM SERPL-SCNC: 4.1 MMOL/L (ref 3.5–5.1)
SODIUM SERPL-SCNC: 132 MMOL/L (ref 136–145)
SPECIMEN HOLD: NORMAL

## 2023-12-25 PROCEDURE — 80048 BASIC METABOLIC PNL TOTAL CA: CPT

## 2023-12-25 PROCEDURE — 1100000000 HC RM PRIVATE

## 2023-12-25 PROCEDURE — 6370000000 HC RX 637 (ALT 250 FOR IP): Performed by: INTERNAL MEDICINE

## 2023-12-25 PROCEDURE — 6370000000 HC RX 637 (ALT 250 FOR IP): Performed by: NURSE PRACTITIONER

## 2023-12-25 PROCEDURE — 36415 COLL VENOUS BLD VENIPUNCTURE: CPT

## 2023-12-25 PROCEDURE — 6370000000 HC RX 637 (ALT 250 FOR IP): Performed by: HOSPITALIST

## 2023-12-25 PROCEDURE — 94760 N-INVAS EAR/PLS OXIMETRY 1: CPT

## 2023-12-25 RX ADMIN — APIXABAN 5 MG: 5 TABLET, FILM COATED ORAL at 09:26

## 2023-12-25 RX ADMIN — METOPROLOL TARTRATE 25 MG: 25 TABLET, FILM COATED ORAL at 09:26

## 2023-12-25 RX ADMIN — THERA TABS 1 TABLET: TAB at 09:26

## 2023-12-25 RX ADMIN — APIXABAN 5 MG: 5 TABLET, FILM COATED ORAL at 20:31

## 2023-12-25 RX ADMIN — ANTI-PRURITIC: 1 CREAM TOPICAL at 09:26

## 2023-12-25 RX ADMIN — METOPROLOL TARTRATE 25 MG: 25 TABLET, FILM COATED ORAL at 20:31

## 2023-12-25 RX ADMIN — Medication 15 G: at 10:35

## 2023-12-25 RX ADMIN — Medication 100 MG: at 09:26

## 2023-12-25 RX ADMIN — ANTI-PRURITIC: 1 CREAM TOPICAL at 23:46

## 2023-12-25 ASSESSMENT — PAIN SCALES - GENERAL
PAINLEVEL_OUTOF10: 0
PAINLEVEL_OUTOF10: 0

## 2023-12-25 NOTE — PLAN OF CARE
Problem: Safety - Adult  Goal: Free from fall injury  Outcome: Progressing     Problem: Discharge Planning  Goal: Discharge to home or other facility with appropriate resources  Outcome: Progressing     Problem: Confusion  Goal: Confusion, delirium, dementia, or psychosis is improved or at baseline  Description: INTERVENTIONS:  1. Assess for possible contributors to thought disturbance, including medications, impaired vision or hearing, underlying metabolic abnormalities, dehydration, psychiatric diagnoses, and notify attending LIP  2. [place] high risk fall precautions, as indicated  3. Provide frequent short contacts to provide reality reorientation, refocusing and direction  4. Decrease environmental stimuli, including noise as appropriate  5. Monitor and intervene to maintain adequate nutrition, hydration, elimination, sleep and activity  6. If unable to ensure safety without constant attention obtain sitter and review sitter guidelines with assigned personnel  7. Initiate Psychosocial CNS and Spiritual Care consult, as indicated  Outcome: Progressing     Problem: Skin/Tissue Integrity  Goal: Absence of new skin breakdown  Description: 1. Monitor for areas of redness and/or skin breakdown  2. Assess vascular access sites hourly  3. Every 4-6 hours minimum:  Change oxygen saturation probe site  4. Every 4-6 hours:  If on nasal continuous positive airway pressure, respiratory therapy assess nares and determine need for appliance change or resting period.   Outcome: Progressing     Problem: Pain  Goal: Verbalizes/displays adequate comfort level or baseline comfort level  Outcome: Progressing  Flowsheets  Taken 12/25/2023 0400  Verbalizes/displays adequate comfort level or baseline comfort level: Assess pain using appropriate pain scale  Taken 12/24/2023 2330  Verbalizes/displays adequate comfort level or baseline comfort level: Assess pain using appropriate pain scale  Taken 12/24/2023

## 2023-12-25 NOTE — CARE COORDINATION
Transition of Care Plan:[facility] when medically stable, possibly 12/24. RUR: 13%  Prior Level of Functioning: Independent  Disposition: [facility] SNF  If SNF or IPR: Date FOC offered: 12/19  Date [address] received: 12/23  Accepting facility: [facility]  Date authorization started with reference number: NA  Date authorization received and expires: NA  Follow up appointments: PCP/Specialist  DME needed: per SNF  Transportation at discharge: [facility] van vs BLS  IM/IMM Medicare/ letter given:   Is patient a [place] and connected with VA? NO    CM spoke with pt's wife, Tarun Falk to confirm discharge plan. Celia has verbalized that [facility] is her preference for discharge, and has accepted. CM contacted [facility] to confirm bed placement, although admissions is not available for bed placement this weekend per  at [facility]. Dr. [place] is made aware. CM following. Update 12/25/23-attending would like to discharge today back to [facility] for SNF. CM spoke with Caron Sol in admissions [phone] and she confirmed they do have ovid in the facility. Patient's wife does not want him to return to [facility]. Will need choices from wife to send referrals to other facilities. Attending made aware. Left detailed message for wife regarding choice for SNFs. Received call from [employer] and she woud like referral sent to [facility], [facility] via [facility].

## 2023-12-25 NOTE — PROGRESS NOTES
vary by analyte.                Total time spent with patient:         [] Critical Care Provided    Care Plan discussed with:              [x] Patient   [] Family    [] Care Manager   [] Consultant/Specialist :      []   >50% of visit spent in counseling and coordination of care   (Discussed [] CODE status,  [] Care Plan, [] D/C Planning)    ___________________________________________________    Attending Physician: Chad Dela Cruz MD     [phone]  [address] [place] [address]

## 2023-12-25 NOTE — PROGRESS NOTES
Hospitalist Progress Note  José Antonio Jarrett MD  Answering service: [phone] OR [phone] from in house phone        Date of Service:  2023  NAME:  Hal Casas  :  1945  MRN:  [id number]      Admission Summary:   Per H&P, Hal Casas is a 78 y.o. male with pmhx of htn and etoh abuse who presents with bilateral lower extremity weakness.  Patient states he fell two nights ago at 3am and has had trouble bearing weight on his legs.  He states he has no pain except in his lower back and does attests to some numbness in BLE but still has sensation.  Workup in ED showed wbc 9.7k, Hg 12.9, plt 85, , K 3.5, BUN 8, Cr. 0.69, Gene 8.2, Albumin 3.2, AST 44, Tbili 1.7, glucose 90.  UA, ct abd/pelvis, pelvis xray pending.  Will admit for hyponatremia.     Attests to drinking 6-8 PBR daily, lower back pain (nonreproducible) with radiation into the front lower abdomen, and some diarrhea. Denies fever chest pain shortness of breath, n/v     Interval history / Subjective:   Seen and examined for follow up of hyponatremia. No acute complaints. Doing well.      Assessment & Plan:     Atrial fibrillation with RVR-- rate controlled   - RRT called (-) for A-fib w/RVR   - Patient patient now restarted oral intake so medication changed to oral  - Consult placed to cardiology  - [id number] score = 3, patient is moderate-high risk for cardioembolic event.  -On beta-blocker and Eliquis     HTN-- BP controlled  - Continuing metoprolol  - losartan held     Hyponatremia na 124 - 134  Hypokalemia   - Nephrology following, added urea tab and fluid restriction 1.2 L/ day   - on 1/2 NS to slow rate of correction   - Urea held today   - stable   - monitor with daily labs     Altered mental status-- resolved  EtOH abuse  - ABG did not demonstrate hypercapnia, suspect this is 2/2 AUD  - CIWA monitoring    tests in the radiology section of CPT  Review and/or order of tests in the medicine section of CPT    I have independently reviewed and interpreted patient's lab and all other diagnostic data    Notes reviewed from all clinical/nonclinical/nursing services involved in patient's clinical care. Care coordination discussions were held with appropriate clinical/nonclinical/ nursing providers based on care coordination needs. Labs:     Recent Labs     12/23/23 0329 12/24/23 0222   WBC 6.1 6.9   HGB 12.6 12.0*   HCT 37.2 35.0*   * 156       Recent Labs     12/23/23  0329 12/23/23  1259 12/24/23 0222 12/25/23  0051   * 134* 134* 132*   K 3.4* 4.2 4.0 4.1    102 102 104   CO2 23 26 25 24   BUN 86* 69* 54* 27*   MG 2.0  --   --   --    PHOS 3.0 2.9 2.8  --        No results for input(s): \"ALT\", \"TP\", \"ALB\", \"GLOB\", \"GGT\", \"AML\" in the last 72 hours. Invalid input(s): \"SGOT\", \"GPT\", \"AP\", \"TBIL\", \"TBILI\", \"AMYP\", \"LPSE\", \"HLPSE\"  No results for input(s): \"INR\", \"APTT\" in the last 72 hours. Invalid input(s): \"PTP\"   No results for input(s): \"TIBC\", \"FERR\" in the last 72 hours. Invalid input(s): \"FE\", \"PSAT\"   No results found for: \"FOL\", \"RBCF\"   No results for input(s): \"PH\", \"PCO2\", \"PO2\" in the last 72 hours. No results for input(s): \"CPK\" in the last 72 hours.     Invalid input(s): \"CPKMB\", \"CKNDX\", \"TROIQ\"  Lab Results   Component Value Date/Time    CHOL 216 11/08/2022 11:42 AM    HDL 58 11/08/2022 11:42 AM     No results found for: \"GLUCPOC\"        Medications Reviewed:     Current Facility-Administered Medications   Medication Dose Route Frequency    urea (URE-NA) packet 15 g  15 g Oral Daily    metoprolol tartrate (LOPRESSOR) tablet 25 mg  25 mg Oral BID    [Held by provider] losartan (COZAAR) tablet 50 mg  50 mg Oral Daily    thiamine tablet 100 mg  100 mg Oral Daily    hydrALAZINE (APRESOLINE) injection 5 mg  5 mg IntraVENous Q8H PRN    hydrocortisone 1 % cream   Topical BID

## 2023-12-25 NOTE — PROGRESS NOTES
Bedside shift change report given to [address] [place], [place] [address]abmer Curtis (oncoming nurse) by Rashid Tarango (offgoing nurse). Report included the following information Nurse Handoff Report, Adult Overview, Intake/Output, MAR, Recent Results, Cardiac Rhythm SR, Quality Measures, and Neuro Assessment.

## 2023-12-26 VITALS
RESPIRATION RATE: 14 BRPM | TEMPERATURE: 98.4 F | SYSTOLIC BLOOD PRESSURE: 150 MMHG | HEIGHT: 71 IN | WEIGHT: 177 LBS | BODY MASS INDEX: 24.78 KG/M2 | HEART RATE: 74 BPM | OXYGEN SATURATION: 94 % | DIASTOLIC BLOOD PRESSURE: 84 MMHG

## 2023-12-26 PROBLEM — E87.1 HYPONATREMIA: Status: RESOLVED | Noted: 2019-08-01 | Resolved: 2023-12-26

## 2023-12-26 PROBLEM — R13.10 DYSPHAGIA: Status: RESOLVED | Noted: 2023-12-19 | Resolved: 2023-12-26

## 2023-12-26 LAB
ANION GAP SERPL CALC-SCNC: 8 MMOL/L (ref 5–15)
BASOPHILS # BLD: 0 K/UL (ref 0–0.1)
BASOPHILS NFR BLD: 1 % (ref 0–1)
BUN SERPL-MCNC: 22 MG/DL (ref 6–20)
BUN/CREAT SERPL: 33 (ref 12–20)
CALCIUM SERPL-MCNC: 8.3 MG/DL (ref 8.5–10.1)
CHLORIDE SERPL-SCNC: 104 MMOL/L (ref 97–108)
CO2 SERPL-SCNC: 22 MMOL/L (ref 21–32)
CREAT SERPL-MCNC: 0.67 MG/DL (ref 0.7–1.3)
DIFFERENTIAL METHOD BLD: ABNORMAL
EOSINOPHIL # BLD: 0.3 K/UL (ref 0–0.4)
EOSINOPHIL NFR BLD: 6 % (ref 0–7)
ERYTHROCYTE [DISTWIDTH] IN BLOOD BY AUTOMATED COUNT: 12.7 % (ref 11.5–14.5)
GLUCOSE SERPL-MCNC: 97 MG/DL (ref 65–100)
HCT VFR BLD AUTO: 33.8 % (ref 36.6–50.3)
HGB BLD-MCNC: 11.6 G/DL (ref 12.1–17)
IMM GRANULOCYTES # BLD AUTO: 0.1 K/UL (ref 0–0.04)
IMM GRANULOCYTES NFR BLD AUTO: 1 % (ref 0–0.5)
LYMPHOCYTES # BLD: 1.5 K/UL (ref 0.8–3.5)
LYMPHOCYTES NFR BLD: 29 % (ref 12–49)
MCH RBC QN AUTO: 32 PG (ref 26–34)
MCHC RBC AUTO-ENTMCNC: 34.3 G/DL (ref 30–36.5)
MCV RBC AUTO: 93.1 FL (ref 80–99)
MONOCYTES # BLD: 0.6 K/UL (ref 0–1)
MONOCYTES NFR BLD: 12 % (ref 5–13)
NEUTS SEG # BLD: 2.7 K/UL (ref 1.8–8)
NEUTS SEG NFR BLD: 52 % (ref 32–75)
NRBC # BLD: 0.02 K/UL (ref 0–0.01)
NRBC BLD-RTO: 0.4 PER 100 WBC
PLATELET # BLD AUTO: 156 K/UL (ref 150–400)
POTASSIUM SERPL-SCNC: 4.4 MMOL/L (ref 3.5–5.1)
RBC # BLD AUTO: 3.63 M/UL (ref 4.1–5.7)
SODIUM SERPL-SCNC: 134 MMOL/L (ref 136–145)
WBC # BLD AUTO: 5.2 K/UL (ref 4.1–11.1)

## 2023-12-26 PROCEDURE — 6370000000 HC RX 637 (ALT 250 FOR IP): Performed by: HOSPITALIST

## 2023-12-26 PROCEDURE — 6370000000 HC RX 637 (ALT 250 FOR IP): Performed by: INTERNAL MEDICINE

## 2023-12-26 PROCEDURE — 36415 COLL VENOUS BLD VENIPUNCTURE: CPT

## 2023-12-26 PROCEDURE — 85025 COMPLETE CBC W/AUTO DIFF WBC: CPT

## 2023-12-26 PROCEDURE — 80048 BASIC METABOLIC PNL TOTAL CA: CPT

## 2023-12-26 PROCEDURE — 97530 THERAPEUTIC ACTIVITIES: CPT

## 2023-12-26 PROCEDURE — 6370000000 HC RX 637 (ALT 250 FOR IP): Performed by: NURSE PRACTITIONER

## 2023-12-26 RX ORDER — LOSARTAN POTASSIUM 50 MG/1
50 TABLET ORAL DAILY
Qty: 30 TABLET | Refills: 0 | DISCHARGE
Start: 2023-12-26 | End: 2023-12-26 | Stop reason: HOSPADM

## 2023-12-26 RX ORDER — LANOLIN ALCOHOL/MO/W.PET/CERES
100 CREAM (GRAM) TOPICAL DAILY
Qty: 30 TABLET | Refills: 3 | DISCHARGE
Start: 2023-12-27

## 2023-12-26 RX ORDER — MULTIVITAMIN WITH IRON
1 TABLET ORAL DAILY
Refills: 0 | DISCHARGE
Start: 2023-12-27

## 2023-12-26 RX ADMIN — Medication 15 G: at 08:47

## 2023-12-26 RX ADMIN — APIXABAN 5 MG: 5 TABLET, FILM COATED ORAL at 08:47

## 2023-12-26 RX ADMIN — METOPROLOL TARTRATE 25 MG: 25 TABLET, FILM COATED ORAL at 08:47

## 2023-12-26 RX ADMIN — Medication 100 MG: at 08:47

## 2023-12-26 RX ADMIN — THERA TABS 1 TABLET: TAB at 08:47

## 2023-12-26 ASSESSMENT — PAIN SCALES - GENERAL: PAINLEVEL_OUTOF10: 0

## 2023-12-26 NOTE — CARE COORDINATION
Transition of Care Plan:    RUR: 11%   Prior Level of Functioning: Independent   Disposition: SNF   If SNF or IPR: Date FOC offered: 12/19  Date [address] received: 12/19   Accepting facility: [facility] call report [phone]  Date authorization started with reference number: N/A   Date authorization received and expires: N/A   Follow up appointments: PCP   DME needed: None   Transportation at discharge: BLS  IM/IMM Medicare/ letter given: Received   Caregiver Contact: Celia Casas (Spouse)   [phone]   Discharge Caregiver contacted prior to discharge? Yes   Care Conference needed? No           Transition of Care Plan to SNF/Rehab    Communication to Patient/Family:  Met with patient and family and they are agreeable to the transition plan. The Plan for Transition of Care is related to the following treatment goals: SNF    The Patient and/or patient representative was provided with a choice of provider and agrees  with the discharge plan. Yes [x] No []    A Freedom of choice list was provided with basic dialogue that supports the patient's individualized plan of care/goals and shares the quality data associated with the providers. Yes [x] No []    SNF/Rehab Transition:  Patient has been accepted to [place] and meets criteria for admission. Patient will transported by [facility] and expected to leave at 2:00pm.    Communication to SNF/Rehab:  Bedside RN, Aureliano Falk, has been notified to update the transition plan to the facility and call report (phone number). Discharge information has been updated on the AVS. And communicated to facility via [employer], or CC link. Discharge instructions to be fax'd to facility at [facility] #). Nursing Please include all hard scripts for controlled substances, med rec and dc summary, and AVS in packet.      Reviewed and confirmed with facility, can manage the patient care needs for the following:     Sean with (X) only those applicable:  Medication:  [x]Medications are available at the facility  []IV Antibiotics    [x]Controlled Substance - hard copies available sent.  []Weekly Labs    Equipment:  []CPAP/BiPAP  []Wound Vacuum  []Benson or Urinary Device  []PICC/Central Line  []Nebulizer  []Ventilator    Treatment:  []Isolation (for MRSA, VRE, etc.)  []Surgical Drain Management  []Tracheostomy Care  []Dressing Changes  []Dialysis with transportation  []PEG Care  []Oxygen  []Daily Weights for Heart Failure    Dietary:  []Any diet limitations  []Tube Feedings   []Total Parenteral Management (TPN)    Financial Resources:  []Medicaid Application Completed    []UAI Completed and copy given to pt/family  and copy given to pt/family  []A screening has previously been completed.    []Level II Completed    [] Private pay individual who will not become   financially eligible for Medicaid within 6 months from admission to a [facility].     [] Individual refused to have screening conducted.     []Medicaid Application Completed    []The screening denied because it was determined individual did not need/did not qualify for nursing facility level of care.  [] Out of state residents seeking direct admission to a VA nursing facility.  [] Individuals who are inpatients of an out of state hospital, or in state or out of state veterans/ hospital and seek direct admission to a VA nursing facility  [] Individuals who are pateints or residents of a state owned/operated facility that is licensed by Department of Behavioral Services (DBHDS) and seek direct admission to VA nursing facility  [] A screening not required for enrollment in Medicaid Hospice services as set out in 12 VAC 30-  [] [facility] staff shall perform screenings of the [facility] clients.    Advanced Care Plan:  []Surrogate Decision Maker of Care  []POA  []Communicated Code Status and copy sent.    Other:

## 2023-12-26 NOTE — PROGRESS NOTES
Report was called Jennifer to at [facility]. Time for questions and clarification was given. Review of SBAR and MAR was done. All lines were removed from the patient. Patient awaiting transport with [facility]!

## 2023-12-26 NOTE — DISCHARGE SUMMARY
Discharge Summary       PATIENT ID: Hal Casas  MRN: [id number]   YOB: 1945    DATE OF ADMISSION: 12/12/2023  3:22 PM    DATE OF DISCHARGE: 12/26/23   PRIMARY CARE PROVIDER: Yossi Jain MD     ATTENDING PHYSICIAN: José Antonio Jarrett MD    DISCHARGING PROVIDER: José Antonio Jarrett MD    To contact this individual call [phone] and ask the  to page.  If unavailable ask to be transferred the Adult Hospitalist Department.    CONSULTATIONS: IP CONSULT TO SOCIAL WORK  IP CONSULT TO NEPHROLOGY  IP CONSULT TO CARDIOLOGY  IP CONSULT TO GI  IP CONSULT TO PALLIATIVE CARE    PROCEDURES/SURGERIES: * No surgery found *    ADMITTING DIAGNOSES & HOSPITAL COURSE:   Hal Casas is a 78 y.o. male with pmhx of htn and etoh abuse who presents with bilateral lower extremity weakness.  Patient states he fell two nights ago at 3am and has had trouble bearing weight on his legs.  He states he has no pain except in his lower back and does attests to some numbness in BLE but still has sensation.  Workup in ED showed wbc 9.7k, Hg 12.9, plt 85, , K 3.5, BUN 8, Cr. 0.69, Gene 8.2, Albumin 3.2, AST 44, Tbili 1.7, glucose 90.  UA, ct abd/pelvis, pelvis xray pending.  Will admit for hyponatremia.     Attests to drinking 6-8 PBR daily, lower back pain (nonreproducible) with radiation into the front lower abdomen, and some diarrhea. Denies fever chest pain shortness of breath, n/v    HTN  Continue home metoprolol and hold asa due to thrombocytopenia     Hyponatremia  -1L NS bolus  -trend bmp, ca, mg, phos q12  -UA, urine studies pending     Etoh abuse  -CIWA protocol with ativan  -daily folate,thiamine and MVI     Hyperbilirubinemia  -trend cmp  -ct abd/pelvis pending     Thrombocytopenia  -plt 85 on admission, historically has had low plt  -hold asa and lovenox    DISCHARGE DIAGNOSES / PLAN:      Atrial fibrillation with RVR-- rate controlled   - RRT called (12/13-14) for A-fib w/RVR   -  tablet  thiamine 100 MG tablet  urea 15 g Pack packet           NOTIFY YOUR PHYSICIAN FOR ANY OF THE FOLLOWING:   Fever over 101 degrees for 24 hours. Chest pain, shortness of breath, fever, chills, nausea, vomiting, diarrhea, change in mentation, falling, weakness, bleeding. Severe pain or pain not relieved by medications. Or, any other signs or symptoms that you may have questions about.     DISPOSITION:    Home With:   OT  PT  HH  RN      X Long term SNF/Inpatient Rehab    Independent/assisted living    Hospice    Other:       PATIENT CONDITION AT DISCHARGE:     Functional status    Poor     Deconditioned    X Independent      Cognition    X Lucid     Forgetful     Dementia      Catheters/lines (plus indication)    Benson     PICC     PEG    X None      Code status    X Full code     DNR      PHYSICAL EXAMINATION AT DISCHARGE:    General : alert x 3, awake, no acute distress,   HEENT: PEERL, EOMI, moist mucus membrane  Neck: supple, no JVD, no meningeal signs  Chest: Clear to auscultation bilaterally   CVS: S1 S2 heard, Capillary refill less than 2 seconds  Abd: soft/ Non tender, non distended, BS physiological,   Ext: no clubbing, no cyanosis, no edema, brisk 2+ DP pulses  Neuro/Psych: pleasant mood and affect, CN 2-12 grossly intact, sensory grossly within normal limit, Strength 5/5 in all extremities  Skin: warm     CHRONIC MEDICAL DIAGNOSES:      Greater than 31 minutes were spent with the patient on counseling and coordination of care    Signed:   Armani Walton MD  12/26/2023  11:52 AM

## 2023-12-26 NOTE — PROGRESS NOTES
[facility]  Jeet Ye MD  [phone]            Renal Progress Note    NAME:  Hal Casas   :   1945   MRN:   [id number]     Date/Time:  2023 1:11 PM            DISCUSSION / PLAN :      Assessment:  Hyponatremia initially due to binge drinking and volume depletion in setting of diarrhea and poor po intake-     His serum sodium remains above 130.   Creatinine is now down to 0.67. Azotemia is improving nicely     Hypokalemia-  ETOH abuse  Possible Alcohol withdrawal  Encephalopathy, metabolic  HTN-controlled  Thrombocytopenia  New Afib /rvr on Cardizem  Swallowing dysfxn, high risk for aspiration  MOHAN - pre-renal  Sec to vol contraction, resolved        Plan:    Stable for discharge from renal standpoint  No need for outpatient follow up      He should have a f/u renal panel in 2 to 3 days.         ___________________________________________________  Subjective:         Confused, states he can't void, Na 121 this am    12-16 seems lucid, not allowed to eat or drink due to swallowing issues. At some point he will need protein intake to facilitate water excretion. Can ry UreNa and/ or salt tablets when allowed to swallow     remains NPO. Alert, comfortable. No distress    23 No new issues. Patient is seen for Hyponatremia, na is correcting nicely with saline.    23 --> F /U Hyponatremia --> Radha    Feeling better.  There was no suggestion of headache.      23 --> F/U Hyponatremia --> Radha      2023 --> F/U Hyponatremia --> Radha      Feeling better.    23 --> Radha    Seemed to have rested fairly well.  There were no major complaints.  Mr Casas was concerned about his Wife.    23 --> F/U Hyponatremia    No major complaints.  Had N/Saline yesterday.  The serum sodium over-corrected a bit.  Azotemia improved.      23 --> F/U Hyponatremia --> Radha    Feeling better.  Mr Casas inquired about discharge.    23 --> F/U Hyponatremia -->  Radha       No major complaints.     Medications reviewed:  Current Facility-Administered Medications   Medication Dose Route Frequency    urea (URE-NA) packet 15 g  15 g Oral Daily    metoprolol tartrate (LOPRESSOR) tablet 25 mg  25 mg Oral BID    [Held by provider] losartan (COZAAR) tablet 50 mg  50 mg Oral Daily    thiamine tablet 100 mg  100 mg Oral Daily    hydrALAZINE (APRESOLINE) injection 5 mg  5 mg IntraVENous Q8H PRN    hydrocortisone 1 % cream   Topical BID    apixaban (ELIQUIS) tablet 5 mg  5 mg Oral BID    sodium chloride flush 0.9 % injection 5-40 mL  5-40 mL IntraVENous 2 times per day    sodium chloride flush 0.9 % injection 5-40 mL  5-40 mL IntraVENous PRN    0.9 % sodium chloride infusion   IntraVENous PRN    potassium chloride (KLOR-CON) extended release tablet 40 mEq  40 mEq Oral PRN    Or    potassium bicarb-citric acid (EFFER-K) effervescent tablet 40 mEq  40 mEq Oral PRN    Or    potassium chloride 10 mEq/100 mL IVPB (Peripheral Line)  10 mEq IntraVENous PRN    magnesium sulfate 2000 mg in 50 mL IVPB premix  2,000 mg IntraVENous PRN    ondansetron (ZOFRAN-ODT) disintegrating tablet 4 mg  4 mg Oral Q8H PRN    Or    ondansetron (ZOFRAN) injection 4 mg  4 mg IntraVENous Q6H PRN    polyethylene glycol (GLYCOLAX) packet 17 g  17 g Oral Daily PRN    acetaminophen (TYLENOL) tablet 650 mg  650 mg Oral Q6H PRN    Or    acetaminophen (TYLENOL) suppository 650 mg  650 mg Rectal Q6H PRN    sodium chloride flush 0.9 % injection 5-40 mL  5-40 mL IntraVENous 2 times per day    sodium chloride flush 0.9 % injection 5-40 mL  5-40 mL IntraVENous PRN    0.9 % sodium chloride infusion   IntraVENous PRN    multivitamin 1 tablet  1 tablet Oral Daily    LORazepam (ATIVAN) tablet 1 mg  1 mg Oral Q1H PRN    Or    LORazepam (ATIVAN) tablet 2 mg  2 mg Oral Q1H PRN    Or    LORazepam (ATIVAN) tablet 3 mg  3 mg Oral Q1H PRN    Or    LORazepam (ATIVAN) tablet 4 mg  4 mg Oral Q1H PRN        Objective:   Vitals:  BP (!)

## 2023-12-26 NOTE — PLAN OF CARE
Problem: Physical Therapy - Adult  Goal: By Discharge: Performs mobility at highest level of function for planned discharge setting. See evaluation for individualized goals. Description: FUNCTIONAL STATUS PRIOR TO ADMISSION: Patient reports being independent with the use of a rolling walker vs cane. He states he had a fall recently which is why he came to the ED.    HOME SUPPORT PRIOR TO ADMISSION: The patient lived with his wife, whom he states \"can help me with whatever I ask\" but reports not requiring assistance from her prior to admission. Physical Therapy Goals  Revised 12/22/2023  1. Patient will move from supine to sit and sit to supine , scoot up and down, and roll side to side in bed with supervision/set-up within 7 day(s). 2.  Patient will transfer from bed to chair and chair to bed with moderate assistance  using the least restrictive device within 7 day(s). 3.  Patient will perform sit to stand with moderate assistance  within 7 day(s). 4.  Patient will ambulate with moderate assistance  for 10 feet with the least restrictive device within 7 day(s). Stairs goal to be set prn         Physical Therapy Goals  Initiated 12/15/2023  1. Patient will move from supine to sit and sit to supine, scoot up and down, and roll side to side in bed with minimal assistance within 7 day(s). 2.  Patient will perform sit to stand with minimal assistance within 7 day(s). 3.  Patient will transfer from bed to chair and chair to bed with minimal assistance using the least restrictive device within 7 day(s). 4.  Patient will ambulate with minimal assistance for 50 feet with the least restrictive device within 7 day(s). 5.  Patient will ascend/descend 3 stairs with handrail(s) with minimal assistance within 7 day(s).     Outcome: Progressing     PHYSICAL THERAPY TREATMENT    Patient: Jay Jay Piedra (3 y.o. male)  Date: 12/26/2023  Diagnosis: Hyponatremia [E87.1] Hyponatremia      Precautions: Fall Risk

## 2023-12-27 ENCOUNTER — CLINICAL DOCUMENTATION (OUTPATIENT)
Age: 78
End: 2023-12-27

## 2023-12-27 NOTE — PROGRESS NOTES
Palliative Medicine ~      Returned call from wife Radha mcintosh, [phone]. Pt recently at [facility] and my colleague Dr Kumar Even evaluated from our team. Pt w/ silent aspiration on MBS this admission and wife concerned why repeat testing was not done. As not knowing pt directly, cannot answer for certain but often given the significant aspiration it would not have improved w/in his stay here. Also bedside would not be able to eval silent aspiration. Today when working w/ SLP at [facility] , the therapist mentioned that [facility] recommended hospice care and wife concerned that our team recommended this care. I was part of interdisciplinary rounds when Dr Kumar Even discussed pt, and she did talk to family and they were to think about DNR status- but she did not mentioned hospice (uncertain pt would qualify anyhow). Pt w/out hospital stay in several years, no recurrent aspiration PNAs. Did have a PEG tube in past, but this was not used and was removed. Wife told SNF that hospice is not their goal, and is going to request outpatient objective imaging. Wife can pass on my contact info to SNF SLP. She also has the phone number for pt advocacy about concerns about pt's care and lack of communication amongst staff and herself. She did say that she appreciated Dr Toni Parrish involvement.

## 2024-01-14 PROBLEM — I48.0 PAROXYSMAL ATRIAL FIBRILLATION (HCC): Status: ACTIVE | Noted: 2024-01-14

## 2024-01-14 PROBLEM — S32.020S CLOSED COMPRESSION FRACTURE OF L2 LUMBAR VERTEBRA, SEQUELA: Status: ACTIVE | Noted: 2024-01-14

## 2024-02-02 ENCOUNTER — HOSPITAL ENCOUNTER (OUTPATIENT)
Age: 79
End: 2024-02-02
Payer: MEDICARE

## 2024-02-02 ENCOUNTER — HOSPITAL ENCOUNTER (OUTPATIENT)
Facility: HOSPITAL | Age: 79
End: 2024-02-02
Attending: FAMILY MEDICINE
Payer: MEDICARE

## 2024-02-02 DIAGNOSIS — Z01.89 ENCOUNTER FOR IMAGING TO SCREEN FOR METAL PRIOR TO MRI: ICD-10-CM

## 2024-02-02 DIAGNOSIS — S32.020S CLOSED COMPRESSION FRACTURE OF L2 LUMBAR VERTEBRA, SEQUELA: ICD-10-CM

## 2024-02-02 PROCEDURE — 72148 MRI LUMBAR SPINE W/O DYE: CPT

## 2024-02-02 PROCEDURE — 72100 X-RAY EXAM L-S SPINE 2/3 VWS: CPT

## (undated) DEVICE — BINDER ABD H12IN FOR 30-45IN WAIST UNIV 4 PNL PREM DSGN E

## (undated) DEVICE — KIT GASTMY PERC PEG PULL 20FR -- ENDOVIVE BX/2

## (undated) DEVICE — TUBING HYDR IRR --